# Patient Record
Sex: MALE | Race: WHITE | NOT HISPANIC OR LATINO | Employment: OTHER | URBAN - NONMETROPOLITAN AREA
[De-identification: names, ages, dates, MRNs, and addresses within clinical notes are randomized per-mention and may not be internally consistent; named-entity substitution may affect disease eponyms.]

---

## 2017-01-27 ENCOUNTER — OFFICE VISIT (OUTPATIENT)
Dept: FAMILY MEDICINE | Facility: CLINIC | Age: 77
End: 2017-01-27
Payer: COMMERCIAL

## 2017-01-27 VITALS
TEMPERATURE: 96.6 F | HEIGHT: 67 IN | DIASTOLIC BLOOD PRESSURE: 82 MMHG | WEIGHT: 207 LBS | HEART RATE: 68 BPM | RESPIRATION RATE: 18 BRPM | SYSTOLIC BLOOD PRESSURE: 130 MMHG | BODY MASS INDEX: 32.49 KG/M2 | OXYGEN SATURATION: 95 %

## 2017-01-27 DIAGNOSIS — G62.9 PERIPHERAL POLYNEUROPATHY: ICD-10-CM

## 2017-01-27 DIAGNOSIS — E11.9 TYPE 2 DIABETES MELLITUS WITHOUT COMPLICATION, WITHOUT LONG-TERM CURRENT USE OF INSULIN (H): ICD-10-CM

## 2017-01-27 DIAGNOSIS — M54.41 CHRONIC BILATERAL LOW BACK PAIN WITH BILATERAL SCIATICA: Primary | ICD-10-CM

## 2017-01-27 DIAGNOSIS — G89.29 CHRONIC BILATERAL LOW BACK PAIN WITH BILATERAL SCIATICA: Primary | ICD-10-CM

## 2017-01-27 DIAGNOSIS — Z71.89 ADVANCE CARE PLANNING: ICD-10-CM

## 2017-01-27 DIAGNOSIS — M54.42 CHRONIC BILATERAL LOW BACK PAIN WITH BILATERAL SCIATICA: Primary | ICD-10-CM

## 2017-01-27 PROCEDURE — 99214 OFFICE O/P EST MOD 30 MIN: CPT | Performed by: FAMILY MEDICINE

## 2017-01-27 NOTE — PATIENT INSTRUCTIONS
"  * Sciatica    Sciatica (\"Lumbar Radiculopathy\") causes a pain that spreads from the lower back down into the buttock, hip and leg. Sometimes leg pain can occur without any back pain. Sciatica is due to irritation or pressure on a spinal nerve as it comes out of the spinal canal. This is most often due to a bulge or rupture of a nearby spinal disk (the cartilage cushion between each spinal bone), which presses on a nearby nerve. Other causes include spinal stenosis (narrowing of the spinal canal) and spasm of the piriformis muscle (a muscle in the buttocks that the sciatic nerve passes through).  Sciatica may begin after a sudden twisting/bending force (such as in a car accident), or sometimes after a simple awkward movement. In either case, muscle spasm is commonly present and contributes to the pain.  The diagnosis of sciatica is made from the symptoms and physical exam. Unless you had a physical injury (such as a car accident or fall), X-rays are usually not ordered for the initial evaluation of sciatica because the nerves and disks cannot be seen on an X-ray. Most sciatica (80-90%) gets better with time.  What can I do about my low back pain?  There are three main things you can do to ease low back pain and help it go away.    Use heat or cold packs.    Take medicine as directed.    Use positions, movements and exercises. Stay active! Too much rest can make your symptoms worse.  Using heat or cold packs  Try cold packs or gentle heat to ease your pain. Use whichever gives the most relief. Apply the cold pack or heat for 15 minutes at a time, as often as needed.  Taking medicine  If taking over-the-counter medicine:    Take ibuprofen (Advil, Motrin) 600 mg. three times a day as needed for pain.  OR    Take Aleve (naproxen sodium) 220 to 440 mg. two times a day as needed for pain  If your doctor prescribed a muscle relaxant (cyclobenzapine 10 mg.):    Take one half ( ) to 1 tablet at bedtime    Do not drive when " taking this medicine. This drug may make you sleepy.  Using positions, movements and exercises  Research tells us that moving your joints and muscles can help you recover from back pain. Such activity should be simple and gentle.  Use the positions below as well as walking to help relieve your discomfort. Try taking a short walk every 3 to 4 hours during the day. Walk for a few minutes inside your home or take longer walks outside, on a treadmill or at a mall. Slowly increase the amount of time you walk. Expect discomfort when you begin, but it should lessen as your back starts to recover.  Finding a position that is comfortable  When your back pain is new, you may find that certain positions will ease your pain. Gently try each of the following positions until you find one that eases your pain. Once you find a position of comfort, use it as often as you like while you recover. Return to your daily routine as soon as possible.     Lie on your back with your legs bent. You can do this by placing a pillow under your knees or lie on the floor and rest your lower legs on the seat of a chair.    Lie on your side with your knees bent and place a pillow between your knees.    Lie on your stomach over pillows.  When should I call my doctor?  Your back pain should improve over the first couple of weeks. As it improves, you should be able to return to your normal activities. But call your doctor if:    You have a sudden change in your ability to control? your bladder or bowels.    You begin to feel tingling in your groin or legs.    The pain spreads down your leg and into your foot.    Your toes, feet or leg muscles begin to feel weak.    You feel generally unwell or sick.    Your pain gets worse.    7455-8819 [employer]. [address] [place] [address], [place] [address]. All rights reserved. This information is not intended as a substitute for professional medical care. Always follow your healthcare professional's  instructions.        Treating Peripheral Neuropathy  Peripheral neuropathy is a disease of the nerves. It most often starts in your feet and may also eventually affect the arms. It may cause pain or may make you unable to sense pain. Sometimes, weakness occurs as well. Lack of pain and weakness makes you more likely to injure yourself without knowing it.  Learn ways to protect your feet. Check your feet daily for wounds you may not have felt. Avoid burns by testing bath water with your elbow before stepping in. Also, always wear shoes to prevent injury.    Regular foot care  If you have foot numbness, you may not notice cutting yourself while trimming your nails. To prevent problems, your doctor may ask you to visit for nail and callus trimming. See your doctor for foot care as often as suggested.  Check your feet daily  Catch problems early by checking your feet every day for changes. Look at the top and bottom of your feet, your heels, and between your toes. It may help to use a mirror. If this is hard, ask someone to check for you. Call your doctor if you notice a wound, ulceration, ingrown nail, or any changes in your feet. This includes increased heat, swelling, and redness.  Wear proper footwear  Always wear shoes and socks, even indoors. Ask your doctor how to choose the right shoe. After buying shoes, bring them to your doctor to be checked for fit. Take new shoes off every hour or so to check for red pressure areas on your feet. Each time you put on your shoes, use your fingers first to feel inside for foreign objects.  Common causes of peripheral neuropathy  Some common causes of peripheral neuropathy include:    Diabetes or other endocrine disorders    Toxins (such as alcohol)    Nutritional deficiencies (such as Vitamin B-12)    Kidney disease    Injury    Repetitive stress (such as carpal tunnel syndrome)    Autoimmune disease    Cancer and tumors    Infection  Diagnosis and treatment  Diagnosis of  peripheral neuropathy includes a complete history and physical exam.  Lab tests including blood work and imaging often help determine the cause.  Special nerve tests are often helpful including nerve conduction velocity studies (NCV), and electromyelography (EMG).  Treatment focuses on treating the underlying disorder and treating symptoms through the use of medicines, injections, TENS (transcutaneous electrical nerve stimulation), acupuncture, massage, and other methods.    2180-7010 [employer]. [address] [place] [address], [place] [address]. All rights reserved. This information is not intended as a substitute for professional medical care. Always follow your healthcare professional's instructions.
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
## 2017-01-27 NOTE — MR AVS SNAPSHOT
"              After Visit Summary   1/27/2017    Ana Maria Duvall    MRN: [id number]           Patient Information     Date Of Birth          1940        Visit Information        Provider Department      1/27/2017 8:40 AM See Meng MD [facility]        Today's Diagnoses     Chronic bilateral low back pain with bilateral sciatica    -  1     Peripheral polyneuropathy (H)         Type 2 diabetes mellitus without complication, without long-term current use of insulin (H)         Advance care planning           Care Instructions      * Sciatica    Sciatica (\"Lumbar Radiculopathy\") causes a pain that spreads from the lower back down into the buttock, hip and leg. Sometimes leg pain can occur without any back pain. Sciatica is due to irritation or pressure on a spinal nerve as it comes out of the spinal canal. This is most often due to a bulge or rupture of a nearby spinal disk (the cartilage cushion between each spinal bone), which presses on a nearby nerve. Other causes include spinal stenosis (narrowing of the spinal canal) and spasm of the piriformis muscle (a muscle in the buttocks that the sciatic nerve passes through).  Sciatica may begin after a sudden twisting/bending force (such as in a car accident), or sometimes after a simple awkward movement. In either case, muscle spasm is commonly present and contributes to the pain.  The diagnosis of sciatica is made from the symptoms and physical exam. Unless you had a physical injury (such as a car accident or fall), X-rays are usually not ordered for the initial evaluation of sciatica because the nerves and disks cannot be seen on an X-ray. Most sciatica (80-90%) gets better with time.  What can I do about my low back pain?  There are three main things you can do to ease low back pain and help it go away.    Use heat or cold packs.    Take medicine as directed.    Use positions, movements and exercises. Stay active! Too much rest can make " your symptoms worse.  Using heat or cold packs  Try cold packs or gentle heat to ease your pain. Use whichever gives the most relief. Apply the cold pack or heat for 15 minutes at a time, as often as needed.  Taking medicine  If taking over-the-counter medicine:    Take ibuprofen (Advil, Motrin) 600 mg. three times a day as needed for pain.  OR    Take Aleve (naproxen sodium) 220 to 440 mg. two times a day as needed for pain  If your doctor prescribed a muscle relaxant (cyclobenzapine 10 mg.):    Take one half ( ) to 1 tablet at bedtime    Do not drive when taking this medicine. This drug may make you sleepy.  Using positions, movements and exercises  Research tells us that moving your joints and muscles can help you recover from back pain. Such activity should be simple and gentle.  Use the positions below as well as walking to help relieve your discomfort. Try taking a short walk every 3 to 4 hours during the day. Walk for a few minutes inside your home or take longer walks outside, on a treadmill or at a mall. Slowly increase the amount of time you walk. Expect discomfort when you begin, but it should lessen as your back starts to recover.  Finding a position that is comfortable  When your back pain is new, you may find that certain positions will ease your pain. Gently try each of the following positions until you find one that eases your pain. Once you find a position of comfort, use it as often as you like while you recover. Return to your daily routine as soon as possible.     Lie on your back with your legs bent. You can do this by placing a pillow under your knees or lie on the floor and rest your lower legs on the seat of a chair.    Lie on your side with your knees bent and place a pillow between your knees.    Lie on your stomach over pillows.  When should I call my doctor?  Your back pain should improve over the first couple of weeks. As it improves, you should be able to return to your normal  activities. But call your doctor if:    You have a sudden change in your ability to control? your bladder or bowels.    You begin to feel tingling in your groin or legs.    The pain spreads down your leg and into your foot.    Your toes, feet or leg muscles begin to feel weak.    You feel generally unwell or sick.    Your pain gets worse.    3424-4623 [employer]. [address] [place] [address], [place] [address]. All rights reserved. This information is not intended as a substitute for professional medical care. Always follow your healthcare professional's instructions.        Treating Peripheral Neuropathy  Peripheral neuropathy is a disease of the nerves. It most often starts in your feet and may also eventually affect the arms. It may cause pain or may make you unable to sense pain. Sometimes, weakness occurs as well. Lack of pain and weakness makes you more likely to injure yourself without knowing it.  Learn ways to protect your feet. Check your feet daily for wounds you may not have felt. Avoid burns by testing bath water with your elbow before stepping in. Also, always wear shoes to prevent injury.    Regular foot care  If you have foot numbness, you may not notice cutting yourself while trimming your nails. To prevent problems, your doctor may ask you to visit for nail and callus trimming. See your doctor for foot care as often as suggested.  Check your feet daily  Catch problems early by checking your feet every day for changes. Look at the top and bottom of your feet, your heels, and between your toes. It may help to use a mirror. If this is hard, ask someone to check for you. Call your doctor if you notice a wound, ulceration, ingrown nail, or any changes in your feet. This includes increased heat, swelling, and redness.  Wear proper footwear  Always wear shoes and socks, even indoors. Ask your doctor how to choose the right shoe. After buying shoes, bring them to your doctor to be checked for fit.  Take new shoes off every hour or so to check for red pressure areas on your feet. Each time you put on your shoes, use your fingers first to feel inside for foreign objects.  Common causes of peripheral neuropathy  Some common causes of peripheral neuropathy include:    Diabetes or other endocrine disorders    Toxins (such as alcohol)    Nutritional deficiencies (such as Vitamin B-12)    Kidney disease    Injury    Repetitive stress (such as carpal tunnel syndrome)    Autoimmune disease    Cancer and tumors    Infection  Diagnosis and treatment  Diagnosis of peripheral neuropathy includes a complete history and physical exam.  Lab tests including blood work and imaging often help determine the cause.  Special nerve tests are often helpful including nerve conduction velocity studies (NCV), and electromyelography (EMG).  Treatment focuses on treating the underlying disorder and treating symptoms through the use of medicines, injections, TENS (transcutaneous electrical nerve stimulation), acupuncture, massage, and other methods.    0886-2818 [employer]. [address] [place] [address]. All rights reserved. This information is not intended as a substitute for professional medical care. Always follow your healthcare professional's instructions.              Follow-ups after your visit        Future tests that were ordered for you today     Open Future Orders        Priority Expected Expires Ordered    MR Lumbar Spine w/o Contrast Routine  1/27/2018 1/27/2017            Who to contact     If you have questions or need follow up information about today's clinic visit or your schedule please contact [facility] directly at [phone].  Normal or non-critical lab and imaging results will be communicated to you by MyChart, letter or phone within 4 business days after the clinic has received the results. If you do not hear from us within 7 days, please contact the clinic through  "[employer] or phone. If you have a critical or abnormal lab result, we will notify you by phone as soon as possible.  Submit refill requests through [employer] or call your pharmacy and they will forward the refill request to us. Please allow 3 business days for your refill to be completed.          Additional Information About Your Visit        [employer] Information     [employer] lets you send messages to your doctor, view your test results, renew your prescriptions, schedule appointments and more. To sign up, go to [email or web address] . Click on \"Log in\" on the left side of the screen, which will take you to the Welcome page. Then click on \"Sign up Now\" on the right side of the page.     You will be asked to enter the access code listed below, as well as some personal information. Please follow the directions to create your username and password.     Your access code is: [email or web address]  Expires: 3/30/2017  8:03 PM     Your access code will  in 90 days. If you need help or a new code, please call your [facility] clinic or [phone].        Care EveryWhere ID     This is your Care EveryWhere ID. This could be used by other organizations to access your [facility] medical records  [id number]        Your Vitals Were     Pulse Temperature Respirations Height BMI (Body Mass Index) Pulse Oximetry    68 96.6  F (35.9  C) (Tympanic) 18 5' 7\" (1.702 m) 32.41 kg/m2 95%       Blood Pressure from Last 3 Encounters:   17 130/82   16 156/92   10/11/16 130/78    Weight from Last 3 Encounters:   17 207 lb (93.895 kg)   16 200 lb (90.719 kg)   10/11/16 206 lb 6.4 oz (93.622 kg)               Primary Care Provider Office Phone # Fax #    Ravinder Plunkett [phone]       [address] [address]        Thank you!     Thank you for choosing [facility]  for your care. Our goal is always to provide you with excellent care. " Hearing back from our patients is one way we can continue to improve our services. Please take a few minutes to complete the written survey that you may receive in the mail after your visit with us. Thank you!             Your Updated Medication List - Protect others around you: Learn how to safely use, store and throw away your medicines at www.disposemymeds.org.          This list is accurate as of: 1/27/17  9:06 AM.  Always use your most recent med list.                   Brand Name Dispense Instructions for use    ACE NOT PRESCRIBED (INTENTIONAL)     0 each    ACE Inhibitor not prescribed due to Intolerance       aspirin 81 MG tablet      1 TABLET DAILY       blood glucose monitoring lancets     1 Box    Use to test blood sugars 1 times daily or as directed.       blood glucose monitoring test strip    ONE TOUCH ULTRA    100 each    Use to test blood sugars 1 times daily or as directed.       brimonidine 0.2 % ophthalmic solution    ALPHAGAN    1 Bottle    Place 1 drop into both eyes 2 times daily       CALCIUM + D PO      1 TABLET DAILY       flax seed oil 1000 MG capsule      1 CAPSULE DAILY       ibuprofen 200 MG tablet    ADVIL/MOTRIN     Take 200 mg by mouth every 4 hours as needed       metFORMIN 500 MG tablet    GLUCOPHAGE    180 tablet    Take one tab at bkfst and one at supper       Multi-vitamin Tabs tablet   Generic drug:  multivitamin, therapeutic with minerals      DAILY       order for DME     1 Device    Equipment being ordered: cam walker       Saw Palmetto 1000 MG Caps      2 CAPSULES DAILY       STATIN NOT PRESCRIBED (INTENTIONAL)      Statin not prescribed intentionally due to Intolerance       TYLENOL 500 MG tablet   Generic drug:  acetaminophen      Take 1,000 mg by mouth every 6 hours as needed       VITAMIN D3 PO      Take by mouth daily

## 2017-02-03 ENCOUNTER — HOSPITAL ENCOUNTER (OUTPATIENT)
Dept: MRI IMAGING | Facility: CLINIC | Age: 77
Discharge: HOME | End: 2017-02-03
Attending: FAMILY MEDICINE | Admitting: FAMILY MEDICINE
Payer: COMMERCIAL

## 2017-02-03 ENCOUNTER — DOCUMENTATION ONLY (OUTPATIENT)
Dept: URGENT CARE | Facility: URGENT CARE | Age: 77
End: 2017-02-03

## 2017-02-03 DIAGNOSIS — M54.41 CHRONIC BILATERAL LOW BACK PAIN WITH BILATERAL SCIATICA: ICD-10-CM

## 2017-02-03 DIAGNOSIS — G89.29 CHRONIC BILATERAL LOW BACK PAIN WITH SCIATICA, SCIATICA LATERALITY UNSPECIFIED: Primary | ICD-10-CM

## 2017-02-03 DIAGNOSIS — M54.40 CHRONIC BILATERAL LOW BACK PAIN WITH SCIATICA, SCIATICA LATERALITY UNSPECIFIED: Primary | ICD-10-CM

## 2017-02-03 DIAGNOSIS — M54.42 CHRONIC BILATERAL LOW BACK PAIN WITH BILATERAL SCIATICA: ICD-10-CM

## 2017-02-03 DIAGNOSIS — G89.29 CHRONIC BILATERAL LOW BACK PAIN WITH BILATERAL SCIATICA: ICD-10-CM

## 2017-02-03 LAB
CREAT BLD-MCNC: 0.8 MG/DL (ref 0.66–1.25)
GFR SERPL CREATININE-BSD FRML MDRD: >90 ML/MIN/1.7M2

## 2017-02-03 PROCEDURE — 25500064 ZZH RX 255 OP 636: Performed by: FAMILY MEDICINE

## 2017-02-03 PROCEDURE — 82565 ASSAY OF CREATININE: CPT

## 2017-02-03 PROCEDURE — A9585 GADOBUTROL INJECTION: HCPCS | Performed by: FAMILY MEDICINE

## 2017-02-03 PROCEDURE — 72158 MRI LUMBAR SPINE W/O & W/DYE: CPT

## 2017-02-03 RX ORDER — GADOBUTROL 604.72 MG/ML
7 INJECTION INTRAVENOUS ONCE
Status: COMPLETED | OUTPATIENT
Start: 2017-02-03 | End: 2017-02-03

## 2017-02-03 RX ADMIN — GADOBUTROL 7 ML: 604.72 INJECTION INTRAVENOUS at 10:38

## 2017-02-04 NOTE — PROGRESS NOTES
Patient has been informed of MRI lumbar spine results, consisted with multilevel foraminal stenosis and mild progressive degenerative changes at L2-L3 level. Orthopedic referral placed for further recommendation. All questions answered.      Results for orders placed or performed during the hospital encounter of 02/03/17   MR Lumbar Spine w/o & w Contrast    Narrative    MRI LUMBAR SPINE WITHOUT AND WITH CONTRAST   2/3/2017 11:05 AM     HISTORY: Low back pain. Multiple previous surgeries.    TECHNIQUE: Multiplanar multisequence MRI of the lumbar spine without  and with 7 mL Gadavist.    COMPARISON: MRI from 9/6/2013.    FINDINGS: The report is dictated assuming five lumbar-type vertebral  bodies, and radiographic correlation may be necessary.  Sagittal  images demonstrate normal vertebral body height.  Bone marrow signal  is unremarkable. Tip of the conus medullaris and cauda equina are  normal.    T12-L1:  No disc herniation or stenosis.  Facet joints are  unremarkable.    L1-L2:  Mild disc bulge and facet hypertrophy. Mild central stenosis.  Mild-to-moderate right and mild left foraminal stenosis. No  significant change.    L2-L3:  Broad-based disc bulge. Mild facet hypertrophy. Moderate  central stenosis. Moderate left and mild-to-moderate right foraminal  stenosis. Very mild progression of degenerative changes at this level.    L3-L4:  Broad-based disc bulge and left greater than right facet  hypertrophy. Mild central stenosis. Severe left and moderate-to-severe  right foraminal stenosis. No significant change.    L4-L5:  Previous posterior decompression at this level and probably  posterior fusion. No disc herniation or stenosis.    L5-S1:  Evidence of left laminotomy at this level. Broad-based disc  bulge. No central stenosis. Moderate to severe, if not severe, left  foraminal stenosis. Moderate right foraminal stenosis.    Paraspinous soft tissues:  Unremarkable.      Impression    IMPRESSION:    1. At L1-L2  there is mild central stenosis. Mild-to-moderate right and  mild left foraminal stenosis.  2. At L2-L3 there is moderate central and left foraminal stenosis.  Mild-to-moderate right foraminal stenosis.  3. At L3-L4 there is mild central stenosis. Severe left and  moderate-to-severe right foraminal stenosis.  4. At L5-S1 there is moderate-to-severe, if not severe, left foraminal  stenosis. Moderate right foraminal stenosis.  5. Findings are similar to the prior study with perhaps mild  progression of degenerative changes at the L2-L3 level.    AMILCAR HERNÁNDEZ MD   Creatinine POCT   Result Value Ref Range    Creatinine 0.8 0.66 - 1.25 mg/dL    GFR Estimate >90 >60 mL/min/1.7m2    GFR Estimate If Black >90 >60 mL/min/1.7m2         See Meng MD  [facility]

## 2017-02-10 ENCOUNTER — OFFICE VISIT (OUTPATIENT)
Dept: FAMILY MEDICINE | Facility: CLINIC | Age: 77
End: 2017-02-10
Payer: COMMERCIAL

## 2017-02-10 VITALS
TEMPERATURE: 97.1 F | WEIGHT: 209 LBS | SYSTOLIC BLOOD PRESSURE: 142 MMHG | HEIGHT: 67 IN | BODY MASS INDEX: 32.8 KG/M2 | HEART RATE: 68 BPM | DIASTOLIC BLOOD PRESSURE: 80 MMHG | RESPIRATION RATE: 18 BRPM

## 2017-02-10 DIAGNOSIS — K57.32 DIVERTICULITIS OF LARGE INTESTINE WITHOUT PERFORATION OR ABSCESS WITHOUT BLEEDING: Primary | ICD-10-CM

## 2017-02-10 DIAGNOSIS — R10.32 ABDOMINAL PAIN, LEFT LOWER QUADRANT: ICD-10-CM

## 2017-02-10 PROCEDURE — 99213 OFFICE O/P EST LOW 20 MIN: CPT | Performed by: FAMILY MEDICINE

## 2017-02-10 RX ORDER — METRONIDAZOLE 500 MG/1
500 TABLET ORAL 3 TIMES DAILY
Qty: 21 TABLET | Refills: 0 | Status: SHIPPED | OUTPATIENT
Start: 2017-02-10 | End: 2017-02-17

## 2017-02-10 RX ORDER — CIPROFLOXACIN 500 MG/1
500 TABLET, FILM COATED ORAL 2 TIMES DAILY
Qty: 14 TABLET | Refills: 0 | Status: SHIPPED | OUTPATIENT
Start: 2017-02-10 | End: 2017-04-21

## 2017-02-10 NOTE — PATIENT INSTRUCTIONS
Diverticulitis    Some people get pouches along the wall of the colon as they get older. The pouches, called diverticuli, usually cause no symptoms. If the pouches become blocked, you can get an infection. This infection is called diverticulitis. It causes pain in your lower abdomen and fever. If not treated, it can become a serious condition, causing an abscess to form inside the pouch. The abscess may block the intestinal tract even or rupture, spreading infection throughout the abdomen.  When treatment is started early, oral antibiotics alone may be enough to cure diverticulitis. This method is tried first. But, if you don't improve or if your condition gets worse while you are trying oral antibiotics, you may need to be admitted to the hospital for IV antibiotics. Severe cases may require surgery.  Home care  The following guidelines will help you care for yourself at home:    During the acute illness, rest and follow a low-fiber diet. Include foods like:    Flake cereal, mashed potatoes, pancakes, waffles, pasta, white bread, rice, applesauce, bananas, eggs, meat, fish, poultry, tofu, and cooked vegetables    Take antibiotics exactly as the doctor says. Don't miss any doses or stop taking the medication, even if you feel better.    Monitor your temperature and report any rising temperatures to your doctor.  Preventing future attacks  Once you have had an episode of diverticulitis, you are at risk for having it again. After you have recovered from this episode, you may be able to reduce your risk by eating a high-fiber diet (20-35 gm/day of fiber). This cleans out the colon pouches that already exist and prevents new ones from forming. Foods high in fiber include fresh fruits and edible peelings, raw or lightly cooked vegetables, whole grain cereals and breads, dried beans and peas, and bran.  Other steps that can help prevent future attacks include:    Take your medications, such as antibiotics, as the doctor  says.    Drink 6 to 8 glasses of water every day, unless directed otherwise.    Use a heating pad or hot water bottle to reduce abdominal cramping or pain.    Begin an exercise program. Ask your doctor how to get started. You can benefit from simple activities such as walking or gardening.    Treat diarrhea with a bland diet. Start with liquids only; then slowly add fiber over time.    Watch for changes in your bowel movements (constipation to diarrhea).    Get plenty of rest and sleep.  Follow-up care  Follow up with your doctor as advised or sooner if you are not getting better in the next 2 days.  When to seek medical care  Get prompt medical attention if any of the following occur:    Fever of 100.4 F (38 C) or higher, or as directed by your health care provider    Repeated vomiting or swelling of the abdomen    Weakness, dizziness, light-headedness    Pain in your abdomen that gets worse, severe, or spreads to your back    Pain that moves to the right lower abdomen    Rectal bleeding (stools that are red, black or maroon color)    Unexpected vaginal bleeding    2896-3986 [employer]. [address] [place] [address]. All rights reserved. This information is not intended as a substitute for professional medical care. Always follow your healthcare professional's instructions.        Patient Education    Ciprofloxacin Hydrochloride Ophthalmic drops, solution    Ciprofloxacin Hydrochloride Ophthalmic ointment    Ciprofloxacin Hydrochloride Oral tablet    Ciprofloxacin Hydrochloride Oral tablet, extended-release    Ciprofloxacin Hydrochloride Otic drops, solution    Ciprofloxacin Oral suspension    Ciprofloxacin Solution for injection    Ciprofloxacin, Dextrose Solution for injection  Ciprofloxacin Hydrochloride Oral tablet  What is this medicine?  CIPROFLOXACIN (sip ana laura FLOX a sin) is a quinolone antibiotic. It is used to treat certain kinds of bacterial infections. It will not work for  colds, flu, or other viral infections.  This medicine may be used for other purposes; ask your health care provider or pharmacist if you have questions.  What should I tell my health care provider before I take this medicine?  They need to know if you have any of these conditions:    bone problems    cerebral disease    joint problems    irregular heartbeat    kidney disease    liver disease    myasthenia gravis    seizure disorder    tendon problems    an unusual or allergic reaction to ciprofloxacin, other antibiotics or medicines, foods, dyes, or preservatives    pregnant or trying to get pregnant    breast-feeding  How should I use this medicine?  Take this medicine by mouth with a glass of water. Follow the directions on the prescription label. Take your medicine at regular intervals. Do not take your medicine more often than directed. Take all of your medicine as directed even if you think your are better. Do not skip doses or stop your medicine early.  You can take this medicine with food or on an empty stomach. It can be taken with a meal that contains dairy or calcium, but do not take it alone with a dairy product, like milk or yogurt or calcium-fortified juice.  A special MedGuide will be given to you by the pharmacist with each prescription and refill. Be sure to read this information carefully each time.  Talk to your pediatrician regarding the use of this medicine in children. Special care may be needed.  Overdosage: If you think you have taken too much of this medicine contact a poison control center or emergency room at once.  NOTE: This medicine is only for you. Do not share this medicine with others.  What if I miss a dose?  If you miss a dose, take it as soon as you can. If it is almost time for your next dose, take only that dose. Do not take double or extra doses.  What may interact with this medicine?  Do not take this medicine with any of the following  medications:    cisapride    droperidol    terfenadine    tizanidine  This medicine may also interact with the following medications:    antacids    birth control pills    caffeine    cyclosporin    didanosine (ddI) buffered tablets or powder    medicines for diabetes    medicines for inflammation like ibuprofen, naproxen    methotrexate    multivitamins    omeprazole    phenytoin    probenecid    sucralfate    theophylline    warfarin  This list may not describe all possible interactions. Give your health care provider a list of all the medicines, herbs, non-prescription drugs, or dietary supplements you use. Also tell them if you smoke, drink alcohol, or use illegal drugs. Some items may interact with your medicine.  What should I watch for while using this medicine?  Tell your doctor or health care professional if your symptoms do not improve.  Do not treat diarrhea with over the counter products. Contact your doctor if you have diarrhea that lasts more than 2 days or if it is severe and watery.  You may get drowsy or dizzy. Do not drive, use machinery, or do anything that needs mental alertness until you know how this medicine affects you. Do not stand or sit up quickly, especially if you are an older patient. This reduces the risk of dizzy or fainting spells.  This medicine can make you more sensitive to the sun. Keep out of the sun. If you cannot avoid being in the sun, wear protective clothing and use sunscreen. Do not use sun lamps or tanning beds/booths.  Avoid antacids, aluminum, calcium, iron, magnesium, and zinc products for 6 hours before and 2 hours after taking a dose of this medicine.  What side effects may I notice from receiving this medicine?  Side effects that you should report to your doctor or health care professional as soon as possible:    allergic reactions like skin rash, itching or hives, swelling of the face, lips, or tongue    breathing problems    confusion, nightmares or  hallucinations    feeling faint or lightheaded, falls    irregular heartbeat    joint, muscle or tendon pain or swelling    pain or trouble passing urine    persistent headache with or without blurred vision    redness, blistering, peeling or loosening of the skin, including inside the mouth    seizure    unusual pain, numbness, tingling, or weakness  Side effects that usually do not require medical attention (report to your doctor or health care professional if they continue or are bothersome):    diarrhea    nausea or stomach upset    white patches or sores in the mouth  This list may not describe all possible side effects. Call your doctor for medical advice about side effects. You may report side effects to FDA at 5-605-FDA-8214.  Where should I keep my medicine?  Keep out of the reach of children.  Store at room temperature below 30 degrees C (86 degrees F). Keep container tightly closed. Throw away any unused medicine after the expiration date.  NOTE:This sheet is a summary. It may not cover all possible information. If you have questions about this medicine, talk to your doctor, pharmacist, or health care provider. Copyright  2016 [employer]        Patient Education    Butylparaben, Cetyl Alcohol, Methylparaben, Propylene Glycol, Propylparaben, Sodium Lauryl Sulfate, Stearyl Alcohol, Water Topical emulsion, Metronidazole Topical gel    Metronidazole Oral capsule    Metronidazole Oral tablet    Metronidazole Oral tablet, extended-release    Metronidazole Solution for injection    Metronidazole Topical cream    Metronidazole Topical gel    Metronidazole Topical lotion    Metronidazole Vaginal gel  Metronidazole Oral tablet  What is this medicine?  METRONIDAZOLE (me troe NI da zole) is an antiinfective. It is used to treat certain kinds of bacterial and protozoal infections. It will not work for colds, flu, or other viral infections.  This medicine may be used for other purposes; ask your health care provider  or pharmacist if you have questions.  What should I tell my health care provider before I take this medicine?  They need to know if you have any of these conditions:    anemia or other blood disorders    disease of the nervous system    fungal or yeast infection    if you drink alcohol containing drinks    liver disease    seizures    an unusual or allergic reaction to metronidazole, or other medicines, foods, dyes, or preservatives    pregnant or trying to get pregnant    breast-feeding  How should I use this medicine?  Take this medicine by mouth with a full glass of water. Follow the directions on the prescription label. Take your medicine at regular intervals. Do not take your medicine more often than directed. Take all of your medicine as directed even if you think you are better. Do not skip doses or stop your medicine early.  Talk to your pediatrician regarding the use of this medicine in children. Special care may be needed.  Overdosage: If you think you have taken too much of this medicine contact a poison control center or emergency room at once.  NOTE: This medicine is only for you. Do not share this medicine with others.  What if I miss a dose?  If you miss a dose, take it as soon as you can. If it is almost time for your next dose, take only that dose. Do not take double or extra doses.  What may interact with this medicine?  Do not take this medicine with any of the following medications:    alcohol or any product that contains alcohol    amprenavir oral solution    cisapride    disulfiram    dofetilide    dronedarone    paclitaxel injection    pimozide    ritonavir oral solution    sertraline oral solution    sulfamethoxazole-trimethoprim injection    thioridazine    ziprasidone  This medicine may also interact with the following medications:    birth control pills    cimetidine    lithium    other medicines that prolong the QT interval (cause an abnormal heart  rhythm)    phenobarbital    phenytoin    warfarin  This list may not describe all possible interactions. Give your health care provider a list of all the medicines, herbs, non-prescription drugs, or dietary supplements you use. Also tell them if you smoke, drink alcohol, or use illegal drugs. Some items may interact with your medicine.  What should I watch for while using this medicine?  Tell your doctor or health care professional if your symptoms do not improve or if they get worse.  You may get drowsy or dizzy. Do not drive, use machinery, or do anything that needs mental alertness until you know how this medicine affects you. Do not stand or sit up quickly, especially if you are an older patient. This reduces the risk of dizzy or fainting spells.  Avoid alcoholic drinks while you are taking this medicine and for three days afterward. Alcohol may make you feel dizzy, sick, or flushed.  If you are being treated for a sexually transmitted disease, avoid sexual contact until you have finished your treatment. Your sexual partner may also need treatment.  What side effects may I notice from receiving this medicine?  Side effects that you should report to your doctor or health care professional as soon as possible:    allergic reactions like skin rash or hives, swelling of the face, lips, or tongue    confusion, clumsiness    difficulty speaking    discolored or sore mouth    dizziness    fever, infection    numbness, tingling, pain or weakness in the hands or feet    trouble passing urine or change in the amount of urine    redness, blistering, peeling or loosening of the skin, including inside the mouth    seizures    unusually weak or tired    vaginal irritation, dryness, or discharge  Side effects that usually do not require medical attention (report to your doctor or health care professional if they continue or are bothersome):    diarrhea    headache    irritability    metallic taste    nausea    stomach pain or  cramps    trouble sleeping  This list may not describe all possible side effects. Call your doctor for medical advice about side effects. You may report side effects to FDA at 5-264-HUC-4737.  Where should I keep my medicine?  Keep out of the reach of children.  Store at room temperature below 25 degrees C (77 degrees F). Protect from light. Keep container tightly closed. Throw away any unused medicine after the expiration date.  NOTE:This sheet is a summary. It may not cover all possible information. If you have questions about this medicine, talk to your doctor, pharmacist, or health care provider. Copyright  2016 [employer]
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
## 2017-02-10 NOTE — NURSING NOTE
"Chief Complaint   Patient presents with     Abdominal Pain       Initial /80 mmHg  Pulse 68  Temp(Src) 97.1  F (36.2  C) (Tympanic)  Resp 18  Ht 5' 7\" (1.702 m)  Wt 209 lb (94.802 kg)  BMI 32.73 kg/m2 Estimated body mass index is 32.73 kg/(m^2) as calculated from the following:    Height as of this encounter: 5' 7\" (1.702 m).    Weight as of this encounter: 209 lb (94.802 kg).  Medication Reconciliation: complete  "

## 2017-02-10 NOTE — MR AVS SNAPSHOT
After Visit Summary   2/10/2017    Ana Maria Duvall    MRN: [id number]           Patient Information     Date Of Birth          1940        Visit Information        Provider Department      2/10/2017 10:40 AM See Meng MD [facility]        Today's Diagnoses     Diverticulitis of large intestine without perforation or abscess without bleeding    -  1       Care Instructions      Diverticulitis    Some people get pouches along the wall of the colon as they get older. The pouches, called diverticuli, usually cause no symptoms. If the pouches become blocked, you can get an infection. This infection is called diverticulitis. It causes pain in your lower abdomen and fever. If not treated, it can become a serious condition, causing an abscess to form inside the pouch. The abscess may block the intestinal tract even or rupture, spreading infection throughout the abdomen.  When treatment is started early, oral antibiotics alone may be enough to cure diverticulitis. This method is tried first. But, if you don't improve or if your condition gets worse while you are trying oral antibiotics, you may need to be admitted to the hospital for IV antibiotics. Severe cases may require surgery.  Home care  The following guidelines will help you care for yourself at home:    During the acute illness, rest and follow a low-fiber diet. Include foods like:    Flake cereal, mashed potatoes, pancakes, waffles, pasta, white bread, rice, applesauce, bananas, eggs, meat, fish, poultry, tofu, and cooked vegetables    Take antibiotics exactly as the doctor says. Don't miss any doses or stop taking the medication, even if you feel better.    Monitor your temperature and report any rising temperatures to your doctor.  Preventing future attacks  Once you have had an episode of diverticulitis, you are at risk for having it again. After you have recovered from this episode, you may be able to reduce your  risk by eating a high-fiber diet (20-35 gm/day of fiber). This cleans out the colon pouches that already exist and prevents new ones from forming. Foods high in fiber include fresh fruits and edible peelings, raw or lightly cooked vegetables, whole grain cereals and breads, dried beans and peas, and bran.  Other steps that can help prevent future attacks include:    Take your medications, such as antibiotics, as the doctor says.    Drink 6 to 8 glasses of water every day, unless directed otherwise.    Use a heating pad or hot water bottle to reduce abdominal cramping or pain.    Begin an exercise program. Ask your doctor how to get started. You can benefit from simple activities such as walking or gardening.    Treat diarrhea with a bland diet. Start with liquids only; then slowly add fiber over time.    Watch for changes in your bowel movements (constipation to diarrhea).    Get plenty of rest and sleep.  Follow-up care  Follow up with your doctor as advised or sooner if you are not getting better in the next 2 days.  When to seek medical care  Get prompt medical attention if any of the following occur:    Fever of 100.4 F (38 C) or higher, or as directed by your health care provider    Repeated vomiting or swelling of the abdomen    Weakness, dizziness, light-headedness    Pain in your abdomen that gets worse, severe, or spreads to your back    Pain that moves to the right lower abdomen    Rectal bleeding (stools that are red, black or maroon color)    Unexpected vaginal bleeding    5967-0953 [employer]. [address] [place] [address]. All rights reserved. This information is not intended as a substitute for professional medical care. Always follow your healthcare professional's instructions.        Patient Education    Ciprofloxacin Hydrochloride Ophthalmic drops, solution    Ciprofloxacin Hydrochloride Ophthalmic ointment    Ciprofloxacin Hydrochloride Oral tablet    Ciprofloxacin  Hydrochloride Oral tablet, extended-release    Ciprofloxacin Hydrochloride Otic drops, solution    Ciprofloxacin Oral suspension    Ciprofloxacin Solution for injection    Ciprofloxacin, Dextrose Solution for injection  Ciprofloxacin Hydrochloride Oral tablet  What is this medicine?  CIPROFLOXACIN (sip ana laura FLOX a sin) is a quinolone antibiotic. It is used to treat certain kinds of bacterial infections. It will not work for colds, flu, or other viral infections.  This medicine may be used for other purposes; ask your health care provider or pharmacist if you have questions.  What should I tell my health care provider before I take this medicine?  They need to know if you have any of these conditions:    bone problems    cerebral disease    joint problems    irregular heartbeat    kidney disease    liver disease    myasthenia gravis    seizure disorder    tendon problems    an unusual or allergic reaction to ciprofloxacin, other antibiotics or medicines, foods, dyes, or preservatives    pregnant or trying to get pregnant    breast-feeding  How should I use this medicine?  Take this medicine by mouth with a glass of water. Follow the directions on the prescription label. Take your medicine at regular intervals. Do not take your medicine more often than directed. Take all of your medicine as directed even if you think your are better. Do not skip doses or stop your medicine early.  You can take this medicine with food or on an empty stomach. It can be taken with a meal that contains dairy or calcium, but do not take it alone with a dairy product, like milk or yogurt or calcium-fortified juice.  A special MedGuide will be given to you by the pharmacist with each prescription and refill. Be sure to read this information carefully each time.  Talk to your pediatrician regarding the use of this medicine in children. Special care may be needed.  Overdosage: If you think you have taken too much of this medicine contact a  poison control center or emergency room at once.  NOTE: This medicine is only for you. Do not share this medicine with others.  What if I miss a dose?  If you miss a dose, take it as soon as you can. If it is almost time for your next dose, take only that dose. Do not take double or extra doses.  What may interact with this medicine?  Do not take this medicine with any of the following medications:    cisapride    droperidol    terfenadine    tizanidine  This medicine may also interact with the following medications:    antacids    birth control pills    caffeine    cyclosporin    didanosine (ddI) buffered tablets or powder    medicines for diabetes    medicines for inflammation like ibuprofen, naproxen    methotrexate    multivitamins    omeprazole    phenytoin    probenecid    sucralfate    theophylline    warfarin  This list may not describe all possible interactions. Give your health care provider a list of all the medicines, herbs, non-prescription drugs, or dietary supplements you use. Also tell them if you smoke, drink alcohol, or use illegal drugs. Some items may interact with your medicine.  What should I watch for while using this medicine?  Tell your doctor or health care professional if your symptoms do not improve.  Do not treat diarrhea with over the counter products. Contact your doctor if you have diarrhea that lasts more than 2 days or if it is severe and watery.  You may get drowsy or dizzy. Do not drive, use machinery, or do anything that needs mental alertness until you know how this medicine affects you. Do not stand or sit up quickly, especially if you are an older patient. This reduces the risk of dizzy or fainting spells.  This medicine can make you more sensitive to the sun. Keep out of the sun. If you cannot avoid being in the sun, wear protective clothing and use sunscreen. Do not use sun lamps or tanning beds/booths.  Avoid antacids, aluminum, calcium, iron, magnesium, and zinc products  for 6 hours before and 2 hours after taking a dose of this medicine.  What side effects may I notice from receiving this medicine?  Side effects that you should report to your doctor or health care professional as soon as possible:    allergic reactions like skin rash, itching or hives, swelling of the face, lips, or tongue    breathing problems    confusion, nightmares or hallucinations    feeling faint or lightheaded, falls    irregular heartbeat    joint, muscle or tendon pain or swelling    pain or trouble passing urine    persistent headache with or without blurred vision    redness, blistering, peeling or loosening of the skin, including inside the mouth    seizure    unusual pain, numbness, tingling, or weakness  Side effects that usually do not require medical attention (report to your doctor or health care professional if they continue or are bothersome):    diarrhea    nausea or stomach upset    white patches or sores in the mouth  This list may not describe all possible side effects. Call your doctor for medical advice about side effects. You may report side effects to FDA at 2-363-FDA-9623.  Where should I keep my medicine?  Keep out of the reach of children.  Store at room temperature below 30 degrees C (86 degrees F). Keep container tightly closed. Throw away any unused medicine after the expiration date.  NOTE:This sheet is a summary. It may not cover all possible information. If you have questions about this medicine, talk to your doctor, pharmacist, or health care provider. Copyright  2016 [employer]        Patient Education    Butylparaben, Cetyl Alcohol, Methylparaben, Propylene Glycol, Propylparaben, Sodium Lauryl Sulfate, Stearyl Alcohol, Water Topical emulsion, Metronidazole Topical gel    Metronidazole Oral capsule    Metronidazole Oral tablet    Metronidazole Oral tablet, extended-release    Metronidazole Solution for injection    Metronidazole Topical cream    Metronidazole Topical  gel    Metronidazole Topical lotion    Metronidazole Vaginal gel  Metronidazole Oral tablet  What is this medicine?  METRONIDAZOLE (me troe NI da zole) is an antiinfective. It is used to treat certain kinds of bacterial and protozoal infections. It will not work for colds, flu, or other viral infections.  This medicine may be used for other purposes; ask your health care provider or pharmacist if you have questions.  What should I tell my health care provider before I take this medicine?  They need to know if you have any of these conditions:    anemia or other blood disorders    disease of the nervous system    fungal or yeast infection    if you drink alcohol containing drinks    liver disease    seizures    an unusual or allergic reaction to metronidazole, or other medicines, foods, dyes, or preservatives    pregnant or trying to get pregnant    breast-feeding  How should I use this medicine?  Take this medicine by mouth with a full glass of water. Follow the directions on the prescription label. Take your medicine at regular intervals. Do not take your medicine more often than directed. Take all of your medicine as directed even if you think you are better. Do not skip doses or stop your medicine early.  Talk to your pediatrician regarding the use of this medicine in children. Special care may be needed.  Overdosage: If you think you have taken too much of this medicine contact a poison control center or emergency room at once.  NOTE: This medicine is only for you. Do not share this medicine with others.  What if I miss a dose?  If you miss a dose, take it as soon as you can. If it is almost time for your next dose, take only that dose. Do not take double or extra doses.  What may interact with this medicine?  Do not take this medicine with any of the following medications:    alcohol or any product that contains alcohol    amprenavir oral  solution    cisapride    disulfiram    dofetilide    dronedarone    paclitaxel injection    pimozide    ritonavir oral solution    sertraline oral solution    sulfamethoxazole-trimethoprim injection    thioridazine    ziprasidone  This medicine may also interact with the following medications:    birth control pills    cimetidine    lithium    other medicines that prolong the QT interval (cause an abnormal heart rhythm)    phenobarbital    phenytoin    warfarin  This list may not describe all possible interactions. Give your health care provider a list of all the medicines, herbs, non-prescription drugs, or dietary supplements you use. Also tell them if you smoke, drink alcohol, or use illegal drugs. Some items may interact with your medicine.  What should I watch for while using this medicine?  Tell your doctor or health care professional if your symptoms do not improve or if they get worse.  You may get drowsy or dizzy. Do not drive, use machinery, or do anything that needs mental alertness until you know how this medicine affects you. Do not stand or sit up quickly, especially if you are an older patient. This reduces the risk of dizzy or fainting spells.  Avoid alcoholic drinks while you are taking this medicine and for three days afterward. Alcohol may make you feel dizzy, sick, or flushed.  If you are being treated for a sexually transmitted disease, avoid sexual contact until you have finished your treatment. Your sexual partner may also need treatment.  What side effects may I notice from receiving this medicine?  Side effects that you should report to your doctor or health care professional as soon as possible:    allergic reactions like skin rash or hives, swelling of the face, lips, or tongue    confusion, clumsiness    difficulty speaking    discolored or sore mouth    dizziness    fever, infection    numbness, tingling, pain or weakness in the hands or feet    trouble passing urine or change in the  amount of urine    redness, blistering, peeling or loosening of the skin, including inside the mouth    seizures    unusually weak or tired    vaginal irritation, dryness, or discharge  Side effects that usually do not require medical attention (report to your doctor or health care professional if they continue or are bothersome):    diarrhea    headache    irritability    metallic taste    nausea    stomach pain or cramps    trouble sleeping  This list may not describe all possible side effects. Call your doctor for medical advice about side effects. You may report side effects to FDA at 3-194-YYJ-4013.  Where should I keep my medicine?  Keep out of the reach of children.  Store at room temperature below 25 degrees C (77 degrees F). Protect from light. Keep container tightly closed. Throw away any unused medicine after the expiration date.  NOTE:This sheet is a summary. It may not cover all possible information. If you have questions about this medicine, talk to your doctor, pharmacist, or health care provider. Copyright  2016 [employer]              Follow-ups after your visit        Your next 10 appointments already scheduled     Feb 14, 2017  2:20 PM   New Visit with Jerardo Thompson MD   [facility] ([facility])    [address] [place] [address] [id number] [phone]              Who to contact     If you have questions or need follow up information about today's clinic visit or your schedule please contact [facility] directly at [phone].  Normal or non-critical lab and imaging results will be communicated to you by MyChart, letter or phone within 4 business days after the clinic has received the results. If you do not hear from us within 7 days, please contact the clinic through MyChart or phone. If you have a critical or abnormal lab result, we will notify you by phone as soon as possible.  Submit refill requests through [employer] or call your  "pharmacy and they will forward the refill request to us. Please allow 3 business days for your refill to be completed.          Additional Information About Your Visit        [employer] Information     [employer] gives you secure access to your electronic health record. If you see a primary care provider, you can also send messages to your care team and make appointments. If you have questions, please call your primary care clinic.  If you do not have a primary care provider, please call [phone] and they will assist you.        Care EveryWhere ID     This is your Care EveryWhere ID. This could be used by other organizations to access your [place] medical records  [id number]        Your Vitals Were     Pulse Temperature Respirations Height BMI (Body Mass Index)       68 97.1  F (36.2  C) (Tympanic) 18 5' 7\" (1.702 m) 32.73 kg/m2        Blood Pressure from Last 3 Encounters:   02/10/17 142/80   01/27/17 130/82   12/30/16 156/92    Weight from Last 3 Encounters:   02/10/17 209 lb (94.802 kg)   01/27/17 207 lb (93.895 kg)   12/30/16 200 lb (90.719 kg)              Today, you had the following     No orders found for display         Today's Medication Changes          These changes are accurate as of: 2/10/17 11:03 AM.  If you have any questions, ask your nurse or doctor.               Start taking these medicines.        Dose/Directions    ciprofloxacin 500 MG tablet   Commonly known as:  CIPRO   Used for:  Diverticulitis of large intestine without perforation or abscess without bleeding   Started by:  See Meng MD        Dose:  500 mg   Take 1 tablet (500 mg) by mouth 2 times daily   Quantity:  14 tablet   Refills:  0       metroNIDAZOLE 500 MG tablet   Commonly known as:  FLAGYL   Used for:  Diverticulitis of large intestine without perforation or abscess without bleeding   Started by:  See Meng MD        Dose:  500 mg   Take 1 tablet (500 mg) by mouth 3 times daily for 7 days   Quantity:  21 tablet "   Refills:  0            Where to get your medicines      These medications were sent to [facility]- - [place], [place] - [address]  [address] [facility] [address]     Phone:  [phone]    - ciprofloxacin 500 MG tablet  - metroNIDAZOLE 500 MG tablet             Primary Care Provider Office Phone # Fax #    Ravinder Plunkett [phone]       [facility] [address] [facility] [address]        Thank you!     Thank you for choosing [facility]  for your care. Our goal is always to provide you with excellent care. Hearing back from our patients is one way we can continue to improve our services. Please take a few minutes to complete the written survey that you may receive in the mail after your visit with us. Thank you!             Your Updated Medication List - Protect others around you: Learn how to safely use, store and throw away your medicines at www.disposemymeds.org.          This list is accurate as of: 2/10/17 11:03 AM.  Always use your most recent med list.                   Brand Name Dispense Instructions for use    ACE NOT PRESCRIBED (INTENTIONAL)     0 each    ACE Inhibitor not prescribed due to Intolerance       aspirin 81 MG tablet      1 TABLET DAILY       blood glucose monitoring lancets     1 Box    Use to test blood sugars 1 times daily or as directed.       blood glucose monitoring test strip    ONE TOUCH ULTRA    100 each    Use to test blood sugars 1 times daily or as directed.       brimonidine 0.2 % ophthalmic solution    ALPHAGAN    1 Bottle    Place 1 drop into both eyes 2 times daily       CALCIUM + D PO      1 TABLET DAILY       ciprofloxacin 500 MG tablet    CIPRO    14 tablet    Take 1 tablet (500 mg) by mouth 2 times daily       flax seed oil 1000 MG capsule      1 CAPSULE DAILY       ibuprofen 200 MG tablet    ADVIL/MOTRIN     Take 200 mg by mouth every 4 hours as needed        metFORMIN 500 MG tablet    GLUCOPHAGE    180 tablet    Take one tab at bkfst and one at supper       metroNIDAZOLE 500 MG tablet    FLAGYL    21 tablet    Take 1 tablet (500 mg) by mouth 3 times daily for 7 days       Multi-vitamin Tabs tablet   Generic drug:  multivitamin, therapeutic with minerals      DAILY       order for DME     1 Device    Equipment being ordered: cam walker       Saw Palmetto 1000 MG Caps      2 CAPSULES DAILY       STATIN NOT PRESCRIBED (INTENTIONAL)      Statin not prescribed intentionally due to Intolerance       TYLENOL 500 MG tablet   Generic drug:  acetaminophen      Take 1,000 mg by mouth every 6 hours as needed       VITAMIN D3 PO      Take by mouth daily

## 2017-02-14 ENCOUNTER — RADIANT APPOINTMENT (OUTPATIENT)
Dept: GENERAL RADIOLOGY | Facility: CLINIC | Age: 77
End: 2017-02-14
Attending: NEUROLOGICAL SURGERY
Payer: COMMERCIAL

## 2017-02-14 ENCOUNTER — TELEPHONE (OUTPATIENT)
Dept: PALLIATIVE MEDICINE | Facility: CLINIC | Age: 77
End: 2017-02-14

## 2017-02-14 ENCOUNTER — OFFICE VISIT (OUTPATIENT)
Dept: NEUROSURGERY | Facility: CLINIC | Age: 77
End: 2017-02-14
Payer: COMMERCIAL

## 2017-02-14 VITALS
OXYGEN SATURATION: 95 % | WEIGHT: 210 LBS | HEART RATE: 85 BPM | SYSTOLIC BLOOD PRESSURE: 151 MMHG | BODY MASS INDEX: 32.96 KG/M2 | TEMPERATURE: 98.1 F | DIASTOLIC BLOOD PRESSURE: 88 MMHG | HEIGHT: 67 IN

## 2017-02-14 DIAGNOSIS — M54.42 CHRONIC BILATERAL LOW BACK PAIN WITH LEFT-SIDED SCIATICA: Primary | ICD-10-CM

## 2017-02-14 DIAGNOSIS — G89.29 CHRONIC BILATERAL LOW BACK PAIN WITH LEFT-SIDED SCIATICA: Primary | ICD-10-CM

## 2017-02-14 DIAGNOSIS — M54.16 LUMBAR RADICULOPATHY: ICD-10-CM

## 2017-02-14 DIAGNOSIS — M54.40 LOW BACK PAIN WITH SCIATICA: ICD-10-CM

## 2017-02-14 PROCEDURE — 72110 X-RAY EXAM L-2 SPINE 4/>VWS: CPT

## 2017-02-14 PROCEDURE — 99203 OFFICE O/P NEW LOW 30 MIN: CPT | Performed by: NEUROLOGICAL SURGERY

## 2017-02-14 NOTE — TELEPHONE ENCOUNTER
Called patient to schedule CHAYO CLIFTON 2/14/17    [facility] [place]

## 2017-02-14 NOTE — PATIENT INSTRUCTIONS
I recommend low back steroid injections with pain management in [facility] physical therapy. You will receive a call to schedule these. Please also stop at Xray before you leave. We will call with results.   Follow up if pain persists. Please call the clinic with any questions. [phone]

## 2017-02-14 NOTE — NURSING NOTE
"Ana Maria Duvall is a 76 year old male who presents for:  Chief Complaint   Patient presents with     Neurologic Problem     bilateral low back pain radiating into left buttocks, leg and foot        Initial Vitals:  There were no vitals taken for this visit. Estimated body mass index is 32.73 kg/(m^2) as calculated from the following:    Height as of 2/10/17: 5' 7\" (1.702 m).    Weight as of 2/10/17: 209 lb (94.8 kg).. There is no height or weight on file to calculate BSA. BP completed using cuff size: regular  Data Unavailable    Do you feel safe in your environment?  Yes  Do you need any refills today? No    Nursing Comments: bilateral low back pain radiating into left buttocks, leg and foot.  Patient rates his pain today as 3 bilateral buttucks and legs with numbness and weakness.  Worse when bening      5 min. nursing intake time  Lauren Montanez CMA, AAS      Discharge plan: LORE, PT and Xrays  2 min. nursing discharge time  Lauren Montanez CMA, AAS       "
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
## 2017-02-14 NOTE — MR AVS SNAPSHOT
After Visit Summary   2/14/2017    Ana Maria Duvall    MRN: [id number]           Patient Information     Date Of Birth          1940        Visit Information        Provider Department      2/14/2017 2:20 PM Jerardo Thompson MD [facility] Portal        Today's Diagnoses     Low back pain with sciatica    -  1      Care Instructions    I recommend low back steroid [facility]. You will receive a call to schedule these. Please also stop at Xray before you leave. We will call with results.   Follow up if pain persists. Please call the clinic with any questions. [phone]          Follow-ups after your visit        Additional Services     [facility] PT, HAND, AND CHIROPRACTIC REFERRAL       **This order will print in the [facility] Scheduling Office**    Physical Therapy, Hand Therapy and Chiropractic Care are available through:    *[facility]  *[facility]  *[facility]    Call one number to schedule at any of the above locations: [phone].    Your provider has referred you to: Physical Therapy at [facility] or [facility]    Indication/Reason for Referral: Low Back Pain  Onset of Illness: unknown  Therapy Orders: Evaluate and Treat  Special Programs: None  Special Request: None    Alexsandra Upton      Additional Comments for the Therapist or Chiropractor: none    Please be aware that coverage of these services is subject to the terms and limitations of your health insurance plan.  Call member services at your health plan with any benefit or coverage questions.      Please bring the following to your appointment:    *Your personal calendar for scheduling future appointments  *Comfortable clothing            PAIN MANAGEMENT CENTER ([place]) REFERRAL       Your provider has referred you to the [facility].    Reason for Referral: Procedure or injection only - patient will  be contacted within 24 hrs to schedule: Epidural Steroid: Lumbar    Please complete the following questions:    What is your diagnosis for the patient's pain? Lumbar radiculopathy    Do you have any specific questions for the pain specialist? No    Are there any red flags that may impact the assessment or management of the patient? None    **ANY DIAGNOSTIC TESTS THAT ARE NOT IN EPIC SHOULD BE SENT TO THE PAIN CENTER**    Please note the Pre-Op Pain Consult must be scheduled 2-3 weeks prior to the patient's surgery.  Patient's trying to schedule within 2 weeks of surgery may not be accommodated.     Pre-Op Pain Consults are only good for 30 days.    REGARDING OPIOID MEDICATIONS:  We will always address appropriateness of opioid pain medications, but we generally will not automatically take on a prescribing role. When we do take on prescribing of opioids for chronic pain, it is in collaboration with the referring physician for an intermediate period of time (months), with an expectation that the primary physician or provider will assume the prescribing role if medications are effective at stable doses with demonstrated compliance.  Therefore, please do not assume that your prescribing responsibilities end on the day of pain clinic consultation.  Is this agreeable to you? YES    For any questions, contact the [facility] at [phone].    Please be aware that coverage of these services is subject to the terms and limitations of your health insurance plan.  Call member services at your health plan with any benefit or coverage questions.      Please bring the following with you to your appointment:    (1) Any X-Rays, CTs or MRIs which have been performed.  Contact the facility where they were done to arrange for  prior to your scheduled appointment.    (2) List of current medications   (3) This referral request   (4) Any documents/labs given to you for this referral                  Future  "tests that were ordered for you today     Open Future Orders        Priority Expected Expires Ordered    XR Lumbar Spine G/E 4 Views Routine 2/14/2017 2/14/2018 2/14/2017            Who to contact     If you have questions or need follow up information about today's clinic visit or your schedule please contact [facility] directly at [phone].  Normal or non-critical lab and imaging results will be communicated to you by MyChart, letter or phone within 4 business days after the clinic has received the results. If you do not hear from us within 7 days, please contact the clinic through [phone] or phone. If you have a critical or abnormal lab result, we will notify you by phone as soon as possible.  Submit refill requests through [employer] or call your pharmacy and they will forward the refill request to us. Please allow 3 business days for your refill to be completed.          Additional Information About Your Visit        [employer] [employer] Information     [employer] gives you secure access to your electronic health record. If you see a primary care provider, you can also send messages to your care team and make appointments. If you have questions, please call your primary care clinic.  If you do not have a primary care provider, please call [phone] and they will assist you.        Care EveryWhere ID     This is your Care EveryWhere ID. This could be used by other organizations to access your [facility] medical records  [id number]        Your Vitals Were     Pulse Temperature Height Pulse Oximetry BMI (Body Mass Index)       85 98.1  F (36.7  C) (Oral) 5' 7\" (1.702 m) 95% 32.89 kg/m2        Blood Pressure from Last 3 Encounters:   02/14/17 151/88   02/10/17 142/80   01/27/17 130/82    Weight from Last 3 Encounters:   02/14/17 210 lb (95.3 kg)   02/10/17 209 lb (94.8 kg)   01/27/17 207 lb (93.9 kg)              We Performed the Following     [facility] PT, HAND, AND CHIROPRACTIC REFERRAL     PAIN MANAGEMENT CENTER " ([facility]) REFERRAL        Primary Care Provider Office Phone # Fax #    Ravinder Plunkett [phone]       [address] [address]        Thank you!     Thank you for choosing [facility]  for your care. Our goal is always to provide you with excellent care. Hearing back from our patients is one way we can continue to improve our services. Please take a few minutes to complete the written survey that you may receive in the mail after your visit with us. Thank you!             Your Updated Medication List - Protect others around you: Learn how to safely use, store and throw away your medicines at www.disposemymeds.org.          This list is accurate as of: 2/14/17  2:58 PM.  Always use your most recent med list.                   Brand Name Dispense Instructions for use    ACE NOT PRESCRIBED (INTENTIONAL)     0 each    ACE Inhibitor not prescribed due to Intolerance       aspirin 81 MG tablet      1 TABLET DAILY       blood glucose monitoring lancets     1 Box    Use to test blood sugars 1 times daily or as directed.       blood glucose monitoring test strip    ONE TOUCH ULTRA    100 each    Use to test blood sugars 1 times daily or as directed.       brimonidine 0.2 % ophthalmic solution    ALPHAGAN    1 Bottle    Place 1 drop into both eyes 2 times daily       CALCIUM + D PO      1 TABLET DAILY       ciprofloxacin 500 MG tablet    CIPRO    14 tablet    Take 1 tablet (500 mg) by mouth 2 times daily       flax seed oil 1000 MG capsule      1 CAPSULE DAILY       ibuprofen 200 MG tablet    ADVIL/MOTRIN     Take 200 mg by mouth every 4 hours as needed       metFORMIN 500 MG tablet    GLUCOPHAGE    180 tablet    Take one tab at bkfst and one at supper       metroNIDAZOLE 500 MG tablet    FLAGYL    21 tablet    Take 1 tablet (500 mg) by mouth 3 times daily for 7 days       Multi-vitamin Tabs tablet   Generic drug:  multivitamin,  therapeutic with minerals      DAILY       order for DME     1 Device    Equipment being ordered: cam walker       Saw Palmetto 1000 MG Caps      2 CAPSULES DAILY       STATIN NOT PRESCRIBED (INTENTIONAL)      Statin not prescribed intentionally due to Intolerance       TYLENOL 500 MG tablet   Generic drug:  acetaminophen      Take 1,000 mg by mouth every 6 hours as needed       VITAMIN D3 PO      Take by mouth daily

## 2017-02-27 ENCOUNTER — HOSPITAL ENCOUNTER (OUTPATIENT)
Dept: PHYSICAL THERAPY | Facility: CLINIC | Age: 77
Setting detail: THERAPIES SERIES
End: 2017-02-27
Attending: NEUROLOGICAL SURGERY
Payer: COMMERCIAL

## 2017-02-27 PROCEDURE — 97110 THERAPEUTIC EXERCISES: CPT | Mod: GP | Performed by: PHYSICAL THERAPIST

## 2017-02-27 PROCEDURE — 40000718 ZZHC STATISTIC PT DEPARTMENT ORTHO VISIT: Performed by: PHYSICAL THERAPIST

## 2017-02-27 PROCEDURE — 97140 MANUAL THERAPY 1/> REGIONS: CPT | Mod: GP | Performed by: PHYSICAL THERAPIST

## 2017-02-27 PROCEDURE — 97161 PT EVAL LOW COMPLEX 20 MIN: CPT | Mod: GP | Performed by: PHYSICAL THERAPIST

## 2017-02-27 NOTE — PROGRESS NOTES
02/27/17 0900   General Information   Type of Visit Initial OP Ortho PT Evaluation   Start of Care Date 02/27/17   Referring Physician Dr. 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 Pmhx; back surgery 2014, low back in the 80s/90s, neck surgery 80s, shoulder ssurgery early 80's. 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 Pt states he wants less pain, wants to do the exercises well. Pt states he is active except in the winter time. Pt states he has to go up and down stairs at work and at home with some increase in pain. no pain that goes down the legs, only when he lifts something too heavy. His main goal is to decrease pain. He is frustrated that he did not go to PT after the back surgery in 2014 as the back pain has progressively gotten worse since then and he knows he needs to do something about it.   Impairments A. Pain;F. Decreased strength and endurance;D. Decreased ROM;E. Decreased flexibility   Functional Limitations perform activities of daily living;perform required work activities   Symptom Location Pt states his pain location changes, middle and down the left.   How/Where did it occur Other  (Post op lumbar fusion. )   Onset date of current episode/exacerbation 01/01/14   Chronicity Chronic   Pain rating (0-10 point scale) Best (/10);Worst (/10)   Best (/10) 3   Worst (/10) 10   Pain quality A. Sharp;E. Shooting;F. Stabbing   Frequency of pain/symptoms A. Constant    Pain/symptoms exacerbated by C. Lifting;D. Carrying;G. Certain positions;I. Bending;L. Work tasks   Pain/symptoms eased by C. Rest;E. Changing positions;G. Heat;H. Cold   Progression of symptoms since onset: Worsened   Prior Level of Function   Prior Level of Function-Mobility Pt was able to walk, carry and participate with all work activities without being limited by pain.   Current Level of Function   Patient role/employment history A. 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   Living environment [place]   Fall Risk Screen   Fall screen completed by PT   Per patient - Fall 2 or more times in past year? Yes   Per patient - Fall with injury in past year? No   Is patient a fall risk? No   Fall screen comments Pt fell on ice, tripped on stairs. no concers from PT regarding balance.   Lumbar Spine/SI Objective Findings   Gait/Locomotion Decreased Hip mobility into extension and terminal knee extension during ambulation.    Balance/Proprioception (Single Leg Stance) x5s B   Pelvic Screen - Hip ROM, - SI provocation test of ilium compression   Hip Screen - Hip ROM   Transversus Abdominus Strength (Laith Leg Lowering-deg) + weakness as indicated by 90/90 position heel taps   Hip Flexion (L2) Strength 5/5 B   Hip Abduction Strength 4-/5 B   Knee Flexion Strength 5/5 B   Knee Extension (L3) Strength 5/5 R, 4-/5 L   Ankle Dorsiflexion (L4) Strength 5/5 B   Hamstring Flexibility + tightness in 90/90 position B    Hip Flexor Flexibility + tightness as indicated by prone position, hips flat with stretch   Quadricep Flexibility + tightness as indicated by Ravinder Stretch   Ravinder Test + B   Ely Test + B   Segmental Mobility decreased segmental mobility in thoracic spine T6-T8. Lumbar spine not taken due to post operative status.   Palpation Tightness along lumbar surgical incision. Tightness in B QL with palpable trigger points.   Planned Therapy Interventions   Planned Therapy Interventions IADL  retraining;balance training;gait training;joint mobilization;manual therapy;ROM;strengthening;stretching;neuromuscular re-education;motor coordination training   Clinical Impression   Criteria for Skilled Therapeutic Interventions Met yes, treatment indicated   PT Diagnosis Decreased strength and mobility secondary to chronic LBP   Influenced by the following impairments decreased strength, decreased muscular length   Functional limitations due to impairments decreased sitting tolerance, lifting tolerance and difficulty participating with work activities   Clinical Presentation Stable/Uncomplicated   Clinical Presentation Rationale Pt presents with long past medical history however patient's condition is currently stable. Pt is motivated to participate with PT and demonstrates understanding of interventions   Clinical Decision Making (Complexity) Low complexity   Therapy Frequency 2 times/Week   Predicted Duration of Therapy Intervention (days/wks) 10 weeks   Risk & Benefits of therapy have been explained Yes   Patient, Family & other staff in agreement with plan of care Yes   Education Assessment   Preferred Learning Style Demonstration;Pictures/video   Barriers to Learning No barriers   ORTHO GOALS   PT Ortho Eval Goals 1;2;3   Ortho Goal 1   Goal Identifier Strength   Goal Description Pt will demonstrate atleast 4+/5 hip abduction strength bilaterally in order to climb stairs with <3/10 pain.   Target Date 05/08/17   Ortho Goal 2   Goal Identifier HEP   Goal Description Pt will demonstrate proper form and duration with HEP of hip and core strengthening in order to progress independently.   Target Date 03/27/17   Ortho Goal 3   Goal Identifier Work   Goal Description Pt will be able to lift atleast 50# w/ proper squat form and lifting mechanics in order to prevent recurring low back pain.   Target Date 05/08/17   Total Evaluation Time   Total Evaluation Time 25

## 2017-03-03 ENCOUNTER — HOSPITAL ENCOUNTER (OUTPATIENT)
Dept: PHYSICAL THERAPY | Facility: CLINIC | Age: 77
Setting detail: THERAPIES SERIES
End: 2017-03-03
Attending: NEUROLOGICAL SURGERY
Payer: COMMERCIAL

## 2017-03-03 PROCEDURE — 97140 MANUAL THERAPY 1/> REGIONS: CPT | Mod: GP | Performed by: PHYSICAL THERAPIST

## 2017-03-03 PROCEDURE — 40000718 ZZHC STATISTIC PT DEPARTMENT ORTHO VISIT: Performed by: PHYSICAL THERAPIST

## 2017-03-03 PROCEDURE — 97110 THERAPEUTIC EXERCISES: CPT | Mod: GP | Performed by: PHYSICAL THERAPIST

## 2017-03-06 ENCOUNTER — HOSPITAL ENCOUNTER (OUTPATIENT)
Dept: PHYSICAL THERAPY | Facility: CLINIC | Age: 77
Setting detail: THERAPIES SERIES
End: 2017-03-06
Attending: NEUROLOGICAL SURGERY
Payer: COMMERCIAL

## 2017-03-06 PROCEDURE — 97140 MANUAL THERAPY 1/> REGIONS: CPT | Mod: GP | Performed by: PHYSICAL THERAPIST

## 2017-03-06 PROCEDURE — 97110 THERAPEUTIC EXERCISES: CPT | Mod: GP | Performed by: PHYSICAL THERAPIST

## 2017-03-06 PROCEDURE — 40000718 ZZHC STATISTIC PT DEPARTMENT ORTHO VISIT: Performed by: PHYSICAL THERAPIST

## 2017-03-09 ENCOUNTER — HOSPITAL ENCOUNTER (OUTPATIENT)
Dept: PHYSICAL THERAPY | Facility: CLINIC | Age: 77
Setting detail: THERAPIES SERIES
End: 2017-03-09
Attending: NEUROLOGICAL SURGERY
Payer: COMMERCIAL

## 2017-03-09 PROCEDURE — 97110 THERAPEUTIC EXERCISES: CPT | Mod: GP | Performed by: PHYSICAL THERAPIST

## 2017-03-09 PROCEDURE — 97140 MANUAL THERAPY 1/> REGIONS: CPT | Mod: GP | Performed by: PHYSICAL THERAPIST

## 2017-03-09 PROCEDURE — 40000718 ZZHC STATISTIC PT DEPARTMENT ORTHO VISIT: Performed by: PHYSICAL THERAPIST

## 2017-03-13 ENCOUNTER — HOSPITAL ENCOUNTER (OUTPATIENT)
Dept: PHYSICAL THERAPY | Facility: CLINIC | Age: 77
Setting detail: THERAPIES SERIES
End: 2017-03-13
Attending: NEUROLOGICAL SURGERY
Payer: COMMERCIAL

## 2017-03-13 PROCEDURE — 97140 MANUAL THERAPY 1/> REGIONS: CPT | Mod: GP | Performed by: PHYSICAL THERAPIST

## 2017-03-13 PROCEDURE — 97110 THERAPEUTIC EXERCISES: CPT | Mod: GP | Performed by: PHYSICAL THERAPIST

## 2017-03-13 PROCEDURE — 40000718 ZZHC STATISTIC PT DEPARTMENT ORTHO VISIT: Performed by: PHYSICAL THERAPIST

## 2017-03-16 ENCOUNTER — HOSPITAL ENCOUNTER (OUTPATIENT)
Dept: PHYSICAL THERAPY | Facility: CLINIC | Age: 77
Setting detail: THERAPIES SERIES
End: 2017-03-16
Attending: NEUROLOGICAL SURGERY
Payer: COMMERCIAL

## 2017-03-16 PROCEDURE — 40000718 ZZHC STATISTIC PT DEPARTMENT ORTHO VISIT: Performed by: PHYSICAL THERAPIST

## 2017-03-16 PROCEDURE — 97110 THERAPEUTIC EXERCISES: CPT | Mod: GP | Performed by: PHYSICAL THERAPIST

## 2017-03-16 PROCEDURE — 97140 MANUAL THERAPY 1/> REGIONS: CPT | Mod: GP | Performed by: PHYSICAL THERAPIST

## 2017-03-20 ENCOUNTER — TELEPHONE (OUTPATIENT)
Dept: FAMILY MEDICINE | Facility: CLINIC | Age: 77
End: 2017-03-20

## 2017-03-20 ENCOUNTER — HOSPITAL ENCOUNTER (OUTPATIENT)
Dept: PHYSICAL THERAPY | Facility: CLINIC | Age: 77
Setting detail: THERAPIES SERIES
End: 2017-03-20
Attending: NEUROLOGICAL SURGERY
Payer: COMMERCIAL

## 2017-03-20 PROCEDURE — 40000718 ZZHC STATISTIC PT DEPARTMENT ORTHO VISIT: Performed by: PHYSICAL THERAPIST

## 2017-03-20 PROCEDURE — 97140 MANUAL THERAPY 1/> REGIONS: CPT | Mod: GP | Performed by: PHYSICAL THERAPIST

## 2017-03-20 PROCEDURE — 97110 THERAPEUTIC EXERCISES: CPT | Mod: GP | Performed by: PHYSICAL THERAPIST

## 2017-03-20 NOTE — TELEPHONE ENCOUNTER
Per 02-14-17 neurosurg appt, Dr. Thompson-  Imaging:   MRI lumbar - post op changes from L4-S1 and left L5-S1 foraminal stenosis        Assessment/Plan:   1. PT  2. LORE  3. Will check spine Xrays  4. No surgery recommended at this time, he may follow up with Nurse practitioner as needed        Jerardo Thompson MD, MS, FAANS  Neurosurgeon  [facility]  [address] [place] [address]. [place] [address]  Tel [phone]      Spoke with pt who is currently doing PT, feels would benefit from LORE.  Gave number to above clinic to call for scheduling LORE.  SILVIO Montiel RN

## 2017-03-20 NOTE — TELEPHONE ENCOUNTER
Patient left a message with the  requesting that Dr. Meng call him to discuss back issues.    Paula Cantrell-Station

## 2017-03-21 ENCOUNTER — TELEPHONE (OUTPATIENT)
Dept: NEUROSURGERY | Facility: CLINIC | Age: 77
End: 2017-03-21

## 2017-03-21 NOTE — TELEPHONE ENCOUNTER
Patient wanted to try out PT for a while first which is going well but having continued pain.  Saw Dr Thompson 2/14/17 recommended PT and LORE. He would like to try injection. Order in EPIC for ETIENNE at [facility]. Provided patient with number to call to schedule injection at  Pain. Advised to call back if any further questions or concerns. Patient verbalized understanding.

## 2017-03-21 NOTE — TELEPHONE ENCOUNTER
Pre-screening Questions for Radiology Injections:    Injection to be done at which interventional clinic site? [facility]    Procedure ordered by DR. LIGHT    Procedure ordered? Lumbar Epidural Steroid Injection    What insurance would patient like us to bill for this procedure? [employer]      Worker's comp-Any injection DO NOT SCHEDULE and route to Shannon Reeves.      [employer] insurance - For SI joint injections, DO NOT SCHEDULE and route Shannon Reeves.      [employer]- MBB's must be scheduled at LEAST two weeks apart      [employer] - Any injection besides hip/shoulder/knee joint DO NOT SCHEDULE and route to Shannon Reeves. She will obtain PA and call pt back to schedule procedure or notify pt of denial.     Any chance of pregnancy? Not Applicable   If YES, do NOT schedule and route to RN pool    Is an  needed? No     Patient has a drive home? (mandatory) YES:     Is patient taking any blood thinners (plavix, coumadin, jantoven, warfarin, heparin, pradaxa or dabigatran )? No   If hold needed, do NOT schedule, route to RN pool     Is patient taking any aspirin products? No     If more than 325mg/day do NOT schedule; route to RN pool     For CERVICAL procedures, hold all aspirin products for 6 days.      Does the patient have a bleeding or clotting disorder? No     If yes, okay to schedule AND route to RN nurse pool    **For any patients with platelet count <100, must be forwarded to provider**    Is patient diabetic?  Yes  If YES, have them bring their glucometer.    Does patient have an active infection or treated for one within the past week? No     Is patient currently taking any antibiotics?  No     For patients on chronic, preventative, or prophylactic antibiotics, procedures may be scheduled.     For patients on antibiotics for active or recent infection:    Viridiana Kern [place], [place]-antibiotic course must have been completed for 4 days    Jos Cordero-antibiotic course must have  been completed for 7 days    Is patient currently taking any steroid medications? (i.e. Prednisone, Medrol)  No     For patients on steroid medications:    Viridiana Kern [place], [place]-steroid course must have been completed for 4 days    Jos Cordero-steroid course must have been completed for 7 days    Reviewed with patient:  If you are started on any steroids or antibiotics between now and your appointment, you must contact us because it may affect our ability to perform your procedure.  Yes    Is patient actively being treated for cancer or immunocompromised, including the spleen having been removed? No    If YES, do NOT schedule and route to RN pool     **For Dr. Pike patients without spleens should have the chart sent to her**    Are you able to get on and off an exam table with minimal or no assistance? Yes  If NO, do NOT schedule and route to RN pool    Are you able to roll over and lay on your stomach with minimal or no assistance? Yes  If NO, do NOT schedule and route to RN pool     Any allergies to contrast dye, iodine, shellfish, or numbing and steroid medications? No  If YES, route to RN pool AND add allergy information to appointment notes    Allergies: Review of patient's allergies indicates no known allergies.        Has the patient had a flu shot or any other vaccinations within 7 days before or after the procedure.  No       Does patient have an MRI/CT?  YES:   (SI joint, hip injections, lumbar sympathetic blocks, and stellate ganglion blocks do not require an MRI)    Was the MRI done w/in the last 3 years?  Yes    Was MRI done at [facility]? Yes      If not, where was it done? N/A       If MRI was not done at [facility], [facility] or [facility] do NOT schedule and route to nursing.  If pt has an imaging disc, the injection may be scheduled but pt has to bring disc to appt. If they show up w/out disc the injection cannot be done    Reminders (please tell patient if applicable):        Instructed pt to arrive 30 minutes early for IV start if this is for a cervical procedure, ALL sympathetic (stellate ganglion, hypogastric, or lumbar sympathetic block) and all sedation procedures (RFA, spinal cord stimulation trials).  Not Applicable    -IVs are not routinely placed for [place] and [place] cervical case       If NPO for sedation, informed patient that it is okay to take medications with sips of water (except if they are to hold blood thinners).  Not Applicable   *DO take blood pressure medication if it is prescribed*      If this is for a cervical LORE, informed patient that aspirin needs to be held for 6 days.   Not Applicable      For all patients not having spinal cord stimulator (SCS) trials or radiofrequency ablations (RFAs), informed patient:  IV sedation is not provided for this procedure.  If you feel that an oral anti-anxiety medication is needed, you can discuss this further with your referring provider or primary care provider.  The Pain Clinic provider will discuss specifics of what the procedure includes at your appointment.  Most procedures last 10-20 minutes.  We use numbing medications to help with any discomfort during the procedure.  Not Applicable      Do not schedule procedures requiring IV placement in the first appointment of the day or first appointment after lunch.       For patients 85 or older we recommend having an adult stay w/ them for the remainder of the day.       Does the patient have any questions?  NO  Shannon Reeves  [facility]

## 2017-03-21 NOTE — TELEPHONE ENCOUNTER
Routing to  to submit PA, patient would like a Friday appointment.      Shannon ALMANZAR    [facility]

## 2017-03-22 NOTE — TELEPHONE ENCOUNTER
PA pending additional information - please specify exact level and laterality to be injected. Routing to Dr. Thompson to determine.        Shannon ALMANZAR    [facility]

## 2017-03-23 ENCOUNTER — HOSPITAL ENCOUNTER (OUTPATIENT)
Dept: PHYSICAL THERAPY | Facility: CLINIC | Age: 77
Setting detail: THERAPIES SERIES
End: 2017-03-23
Attending: NEUROLOGICAL SURGERY
Payer: COMMERCIAL

## 2017-03-23 PROCEDURE — 97035 APP MDLTY 1+ULTRASOUND EA 15: CPT | Mod: GP | Performed by: PHYSICAL THERAPIST

## 2017-03-23 PROCEDURE — 97140 MANUAL THERAPY 1/> REGIONS: CPT | Mod: GP | Performed by: PHYSICAL THERAPIST

## 2017-03-23 PROCEDURE — 40000718 ZZHC STATISTIC PT DEPARTMENT ORTHO VISIT: Performed by: PHYSICAL THERAPIST

## 2017-03-27 ENCOUNTER — HOSPITAL ENCOUNTER (OUTPATIENT)
Dept: PHYSICAL THERAPY | Facility: CLINIC | Age: 77
Setting detail: THERAPIES SERIES
End: 2017-03-27
Attending: NEUROLOGICAL SURGERY
Payer: COMMERCIAL

## 2017-03-27 PROCEDURE — 97140 MANUAL THERAPY 1/> REGIONS: CPT | Mod: GP | Performed by: PHYSICAL THERAPIST

## 2017-03-27 PROCEDURE — 40000718 ZZHC STATISTIC PT DEPARTMENT ORTHO VISIT: Performed by: PHYSICAL THERAPIST

## 2017-03-29 DIAGNOSIS — E11.9 TYPE 2 DIABETES MELLITUS WITHOUT COMPLICATION, WITHOUT LONG-TERM CURRENT USE OF INSULIN (H): Primary | ICD-10-CM

## 2017-03-29 NOTE — TELEPHONE ENCOUNTER
Patient left a vm stating he talked to [employer] and they were going to send something to request this medication. We haven't received anything yet but patient wants to get this medication through [employer].    Paula Cantrell-[facility] [place]

## 2017-03-30 ENCOUNTER — HOSPITAL ENCOUNTER (OUTPATIENT)
Dept: PHYSICAL THERAPY | Facility: CLINIC | Age: 77
Setting detail: THERAPIES SERIES
End: 2017-03-30
Attending: NEUROLOGICAL SURGERY
Payer: COMMERCIAL

## 2017-03-30 PROCEDURE — 97140 MANUAL THERAPY 1/> REGIONS: CPT | Mod: GP | Performed by: PHYSICAL THERAPIST

## 2017-03-30 PROCEDURE — 97110 THERAPEUTIC EXERCISES: CPT | Mod: GP | Performed by: PHYSICAL THERAPIST

## 2017-03-30 PROCEDURE — 40000718 ZZHC STATISTIC PT DEPARTMENT ORTHO VISIT: Performed by: PHYSICAL THERAPIST

## 2017-03-30 NOTE — TELEPHONE ENCOUNTER
PA pending additional information - please specify exact level and laterality to be injected. Routing to nursing to determine.        Shannon ALMANZAR    [place]

## 2017-03-30 NOTE — TELEPHONE ENCOUNTER
Lets plan on left L5-S1 transforaminal epidural steroid injection  Given surgery, back-up plan would be L4 transforaminal epidural steroid injection, and then caudal.  Cannot do interlaminar at this level.    Analia Bal MD  [facility]

## 2017-03-30 NOTE — TELEPHONE ENCOUNTER
Faxed completed PA form and supporting documentation for ETIENNE Carrero. Right fax confirmation 3/30 at 3:00 pm.      Shannon ALMANZAR    [facility]

## 2017-04-03 ENCOUNTER — HOSPITAL ENCOUNTER (OUTPATIENT)
Dept: PHYSICAL THERAPY | Facility: CLINIC | Age: 77
Setting detail: THERAPIES SERIES
End: 2017-04-03
Attending: NEUROLOGICAL SURGERY
Payer: COMMERCIAL

## 2017-04-03 DIAGNOSIS — E11.9 TYPE 2 DIABETES MELLITUS WITHOUT COMPLICATION, WITHOUT LONG-TERM CURRENT USE OF INSULIN (H): Primary | ICD-10-CM

## 2017-04-03 DIAGNOSIS — E11.9 TYPE 2 DIABETES MELLITUS WITHOUT COMPLICATION, WITHOUT LONG-TERM CURRENT USE OF INSULIN (H): ICD-10-CM

## 2017-04-03 DIAGNOSIS — E11.9 TYPE 2 DIABETES MELLITUS WITHOUT COMPLICATION, UNSPECIFIED LONG TERM INSULIN USE STATUS: ICD-10-CM

## 2017-04-03 PROCEDURE — 40000718 ZZHC STATISTIC PT DEPARTMENT ORTHO VISIT: Performed by: PHYSICAL THERAPIST

## 2017-04-03 PROCEDURE — 97140 MANUAL THERAPY 1/> REGIONS: CPT | Mod: GP | Performed by: PHYSICAL THERAPIST

## 2017-04-03 PROCEDURE — 97110 THERAPEUTIC EXERCISES: CPT | Mod: GP | Performed by: PHYSICAL THERAPIST

## 2017-04-03 RX ORDER — BILBERRY FRUIT 1000 MG
2 CAPSULE ORAL DAILY
Qty: 60 CAPSULE | Refills: 0 | Status: SHIPPED | OUTPATIENT
Start: 2017-04-03

## 2017-04-03 NOTE — TELEPHONE ENCOUNTER
order for DME tashia plus meter         Last Written Prescription Date: 9/22/16  Last Fill Quantity: 1, # refills: 0  Last Office Visit with [facility], [facility] or [facility] prescribing provider:  2/10/17        BP Readings from Last 3 Encounters:   02/14/17 151/88   02/10/17 142/80   01/27/17 130/82     Lab Results   Component Value Date    MICROL 10 08/01/2016     Lab Results   Component Value Date    UMALCR 12.45 08/01/2016     Creatinine   Date Value Ref Range Status   08/01/2016 0.76 0.66 - 1.25 mg/dL Final   ]  GFR Estimate   Date Value Ref Range Status   02/03/2017 >90 >60 mL/min/1.7m2 Final   08/01/2016 >90  Non  GFR Calc   >60 mL/min/1.7m2 Final   01/15/2015 88 >60 mL/min/1.7m2 Final     Comment:     Non  GFR Calc     GFR Estimate If Black   Date Value Ref Range Status   02/03/2017 >90 >60 mL/min/1.7m2 Final   08/01/2016 >90   GFR Calc   >60 mL/min/1.7m2 Final   01/15/2015 >90   GFR Calc   >60 mL/min/1.7m2 Final     Lab Results   Component Value Date    CHOL 188 11/09/2015     Lab Results   Component Value Date    HDL 47 11/09/2015     Lab Results   Component Value Date     11/09/2015     Lab Results   Component Value Date    TRIG 88 11/09/2015     Lab Results   Component Value Date    CHOLHDLRATIO 4.0 11/09/2015     Lab Results   Component Value Date    AST 22 08/01/2016     Lab Results   Component Value Date    ALT 32 08/01/2016     Lab Results   Component Value Date    A1C 5.8 08/01/2016    A1C 6.0 11/09/2015    A1C 5.9 04/09/2015    A1C 6.5 11/21/2014    A1C 6.3 06/21/2013     Potassium   Date Value Ref Range Status   08/01/2016 5.0 3.4 - 5.3 mmol/L Final     blood glucose monitoring test strip  Change to Tashia test strips       Last Written Prescription Date: 9/16/16  Last Fill Quantity: 100, # refills: 1  Last Office Visit with [facility], [facility] or  [facility] prescribing provider:  2/10/16        BP Readings from Last 3 Encounters:   02/14/17 151/88    02/10/17 142/80   01/27/17 130/82     Lab Results   Component Value Date    MICROL 10 08/01/2016     Lab Results   Component Value Date    UMALCR 12.45 08/01/2016     Creatinine   Date Value Ref Range Status   08/01/2016 0.76 0.66 - 1.25 mg/dL Final   ]  GFR Estimate   Date Value Ref Range Status   02/03/2017 >90 >60 mL/min/1.7m2 Final   08/01/2016 >90  Non  GFR Calc   >60 mL/min/1.7m2 Final   01/15/2015 88 >60 mL/min/1.7m2 Final     Comment:     Non  GFR Calc     GFR Estimate If Black   Date Value Ref Range Status   02/03/2017 >90 >60 mL/min/1.7m2 Final   08/01/2016 >90   GFR Calc   >60 mL/min/1.7m2 Final   01/15/2015 >90   GFR Calc   >60 mL/min/1.7m2 Final     Lab Results   Component Value Date    CHOL 188 11/09/2015     Lab Results   Component Value Date    HDL 47 11/09/2015     Lab Results   Component Value Date     11/09/2015     Lab Results   Component Value Date    TRIG 88 11/09/2015     Lab Results   Component Value Date    CHOLHDLRATIO 4.0 11/09/2015     Lab Results   Component Value Date    AST 22 08/01/2016     Lab Results   Component Value Date    ALT 32 08/01/2016     Lab Results   Component Value Date    A1C 5.8 08/01/2016    A1C 6.0 11/09/2015    A1C 5.9 04/09/2015    A1C 6.5 11/21/2014    A1C 6.3 06/21/2013     Potassium   Date Value Ref Range Status   08/01/2016 5.0 3.4 - 5.3 mmol/L Final         Look like this is a dupilcation from 3/29/17 for metFORMIN (GLUCOPHAGE) 500 MG tablet

## 2017-04-03 NOTE — TELEPHONE ENCOUNTER
blood glucose monitoring (ONE TOUCH DELICA) lancets         Last Written Prescription Date: 12/4/14  Last Fill Quantity: 1, # refills: 6  Last Office Visit with [facility] or [facility] prescribing provider:  2/10/16        BP Readings from Last 3 Encounters:   02/14/17 151/88   02/10/17 142/80   01/27/17 130/82     Lab Results   Component Value Date    MICROL 10 08/01/2016     Lab Results   Component Value Date    UMALCR 12.45 08/01/2016     Creatinine   Date Value Ref Range Status   08/01/2016 0.76 0.66 - 1.25 mg/dL Final   ]  GFR Estimate   Date Value Ref Range Status   02/03/2017 >90 >60 mL/min/1.7m2 Final   08/01/2016 >90  Non  GFR Calc   >60 mL/min/1.7m2 Final   01/15/2015 88 >60 mL/min/1.7m2 Final     Comment:     Non  GFR Calc     GFR Estimate If Black   Date Value Ref Range Status   02/03/2017 >90 >60 mL/min/1.7m2 Final   08/01/2016 >90   GFR Calc   >60 mL/min/1.7m2 Final   01/15/2015 >90   GFR Calc   >60 mL/min/1.7m2 Final     Lab Results   Component Value Date    CHOL 188 11/09/2015     Lab Results   Component Value Date    HDL 47 11/09/2015     Lab Results   Component Value Date     11/09/2015     Lab Results   Component Value Date    TRIG 88 11/09/2015     Lab Results   Component Value Date    CHOLHDLRATIO 4.0 11/09/2015     Lab Results   Component Value Date    AST 22 08/01/2016     Lab Results   Component Value Date    ALT 32 08/01/2016     Lab Results   Component Value Date    A1C 5.8 08/01/2016    A1C 6.0 11/09/2015    A1C 5.9 04/09/2015    A1C 6.5 11/21/2014    A1C 6.3 06/21/2013     Potassium   Date Value Ref Range Status   08/01/2016 5.0 3.4 - 5.3 mmol/L Final

## 2017-04-06 ENCOUNTER — HOSPITAL ENCOUNTER (OUTPATIENT)
Dept: PHYSICAL THERAPY | Facility: CLINIC | Age: 77
Setting detail: THERAPIES SERIES
End: 2017-04-06
Attending: NEUROLOGICAL SURGERY
Payer: COMMERCIAL

## 2017-04-06 PROCEDURE — 40000718 ZZHC STATISTIC PT DEPARTMENT ORTHO VISIT: Performed by: PHYSICAL THERAPIST

## 2017-04-06 PROCEDURE — 97140 MANUAL THERAPY 1/> REGIONS: CPT | Mod: GP | Performed by: PHYSICAL THERAPIST

## 2017-04-07 NOTE — TELEPHONE ENCOUNTER
The injection has been scheduled.    Nitza Velasco RN-BSN  [facility]

## 2017-04-07 NOTE — TELEPHONE ENCOUNTER
PA approved.  Effective date: 4/5/17  PA reference #: [id number]  Pt. notified:   Yes   Pt. informed directly.      Shannon ALMANZAR    [facility]

## 2017-04-10 ENCOUNTER — HOSPITAL ENCOUNTER (OUTPATIENT)
Dept: PHYSICAL THERAPY | Facility: CLINIC | Age: 77
Setting detail: THERAPIES SERIES
End: 2017-04-10
Attending: NEUROLOGICAL SURGERY
Payer: COMMERCIAL

## 2017-04-10 PROCEDURE — 40000718 ZZHC STATISTIC PT DEPARTMENT ORTHO VISIT: Performed by: PHYSICAL THERAPIST

## 2017-04-10 PROCEDURE — 97140 MANUAL THERAPY 1/> REGIONS: CPT | Mod: GP | Performed by: PHYSICAL THERAPIST

## 2017-04-13 ENCOUNTER — HOSPITAL ENCOUNTER (OUTPATIENT)
Dept: PHYSICAL THERAPY | Facility: CLINIC | Age: 77
Setting detail: THERAPIES SERIES
End: 2017-04-13
Attending: NEUROLOGICAL SURGERY
Payer: COMMERCIAL

## 2017-04-13 PROCEDURE — 40000718 ZZHC STATISTIC PT DEPARTMENT ORTHO VISIT: Performed by: PHYSICAL THERAPIST

## 2017-04-13 PROCEDURE — 97110 THERAPEUTIC EXERCISES: CPT | Mod: GP | Performed by: PHYSICAL THERAPIST

## 2017-04-13 PROCEDURE — 97140 MANUAL THERAPY 1/> REGIONS: CPT | Mod: GP | Performed by: PHYSICAL THERAPIST

## 2017-04-20 ENCOUNTER — HOSPITAL ENCOUNTER (OUTPATIENT)
Dept: PHYSICAL THERAPY | Facility: CLINIC | Age: 77
Setting detail: THERAPIES SERIES
End: 2017-04-20
Attending: NEUROLOGICAL SURGERY
Payer: COMMERCIAL

## 2017-04-20 PROCEDURE — 40000718 ZZHC STATISTIC PT DEPARTMENT ORTHO VISIT: Performed by: PHYSICAL THERAPIST

## 2017-04-20 PROCEDURE — 97140 MANUAL THERAPY 1/> REGIONS: CPT | Mod: GP | Performed by: PHYSICAL THERAPIST

## 2017-04-20 PROCEDURE — 97110 THERAPEUTIC EXERCISES: CPT | Mod: GP | Performed by: PHYSICAL THERAPIST

## 2017-04-21 ENCOUNTER — TELEPHONE (OUTPATIENT)
Dept: PALLIATIVE MEDICINE | Facility: CLINIC | Age: 77
End: 2017-04-21

## 2017-04-21 ENCOUNTER — RADIOLOGY INJECTION OFFICE VISIT (OUTPATIENT)
Dept: PALLIATIVE MEDICINE | Facility: CLINIC | Age: 77
End: 2017-04-21
Payer: COMMERCIAL

## 2017-04-21 ENCOUNTER — RADIANT APPOINTMENT (OUTPATIENT)
Dept: RADIOLOGY | Facility: CLINIC | Age: 77
End: 2017-04-21
Attending: ANESTHESIOLOGY
Payer: COMMERCIAL

## 2017-04-21 VITALS — DIASTOLIC BLOOD PRESSURE: 91 MMHG | OXYGEN SATURATION: 99 % | SYSTOLIC BLOOD PRESSURE: 163 MMHG | HEART RATE: 65 BPM

## 2017-04-21 DIAGNOSIS — M54.32 BILATERAL SCIATICA: Primary | ICD-10-CM

## 2017-04-21 DIAGNOSIS — M47.816 LUMBAR FACET ARTHROPATHY: ICD-10-CM

## 2017-04-21 DIAGNOSIS — M54.16 LUMBAR RADICULOPATHY: ICD-10-CM

## 2017-04-21 DIAGNOSIS — M47.817 LUMBOSACRAL FACET JOINT SYNDROME: ICD-10-CM

## 2017-04-21 DIAGNOSIS — M96.1 POST LAMINECTOMY SYNDROME: ICD-10-CM

## 2017-04-21 DIAGNOSIS — M54.31 BILATERAL SCIATICA: Primary | ICD-10-CM

## 2017-04-21 PROCEDURE — 64494 INJ PARAVERT F JNT L/S 2 LEV: CPT | Mod: 50 | Performed by: ANESTHESIOLOGY

## 2017-04-21 PROCEDURE — 64493 INJ PARAVERT F JNT L/S 1 LEV: CPT | Mod: 50 | Performed by: ANESTHESIOLOGY

## 2017-04-21 ASSESSMENT — PAIN SCALES - GENERAL: PAINLEVEL: MODERATE PAIN (4)

## 2017-04-21 NOTE — PATIENT INSTRUCTIONS
[facility]   Procedure Discharge Instructions    Today you saw:    Dr. Ronald El    You had an:  Lumbar facet joint injections    Medications used:  Lidocaine   Bupivacaine   Dexamethasone  Omnipaque            Be cautious when walking. Numbness and/or weakness in the lower extremities may occur up to 6-8 hours after the procedure due to effect of the local anesthetic    Do not drive for 6 hours. The effect of the local anesthetic could slow your reflexes.     You may resume your regular activities after 24 hours    Avoid strenuous activity for the first 24 hours    You may shower, however avoid swimming, tub baths or hot tubs for 24 hours following your procedure    You may have a mild to moderate increase in pain for several days following the injection.    It may take up to 14 days for the steroid medication to start working although you may feel the effect as early as a few days after the procedure.       You may use ice packs for 10-15 minutes, 3 to 4 times a day at the injection site for comfort    Do not use heat to painful areas for 6 to 8 hours. This will give the local anesthetic time to wear off and prevent you from accidentally burning your skin.     You may use anti-inflammatory medications (such as Ibuprofen or Aleve or Advil) or Tylenol for pain control if necessary    If you were fasting, you may resume your normal diet and medications after the procedure    If you have diabetes, check your blood sugar more frequently than usual as your blood sugar may be higher than normal for 10-14 days following a steroid injection. Contact your doctor who manages your diabetes if your blood sugar is higher than usual    If you experience any of the following, call the pain center nursing line during work hours at [phone] or the after hours provider line at [phone]:  -Fever over 100 degree F  -Swelling, bleeding, redness, drainage, warmth at the injection site  -Progressive  weakness or numbness in your legs or arms  -Loss of bowel or bladder function  -Unusual headache that is not relieved by Tylenol  -Unusual new onset of pain that is not improving      Phone #s:  Appointment line: [phone];  Nurse line: [phone]

## 2017-04-21 NOTE — NURSING NOTE
"Chief Complaint   Patient presents with     Pain       Initial BP (!) 176/96  Pulse 74 Estimated body mass index is 32.89 kg/(m^2) as calculated from the following:    Height as of 2/14/17: 1.702 m (5' 7\").    Weight as of 2/14/17: 95.3 kg (210 lb).  Medication Reconciliation: complete     Injection intake:    If this procedure is requiring IV sedation has patient been NPO for 6  Hours? NA    Is patient on coumadin, plavix or other prescribed blood thinner?   No    If patient is on coumadin was it held for 5 days?   NA    If patient is on plavix was it held for 7 days?    NA     Does patient take aspirin?  Yes -   ASA - 81mg    If this is for a cervical procedure and patient is on aspirin has it been held for 6 days?   NA    Any allergies to contrast dye, iodine, steroid and/or numbing medications?  NO    Is patient currently taking antibiotics or have an active infection?  NO    Does patient have a ? Yes       Is patient pregnant or breastfeeding?  Not Applicable    Are the vital signs normal?  No: Elevated BP      "

## 2017-04-21 NOTE — NURSING NOTE
Discharge Information    IV Discontiued Time:  NA    Amount of Fluid Infused:  NA    Discharge Criteria = When patient returns to baseline or as per MD order    Consciousness:  Pt is fully awake    Circulation:  BP +/- 20% of pre-procedure level    Respiration:  Patient is able to breathe deeply    O2 Sat:  Patient is able to maintain O2 Sat >92% on room air    Activity:  Moves 4 extremities on command    Ambulation:  Patient is able to stand and walk or stand and pivot into wheelchair    Dressing:  Clean/dry or No Dressing    Notes:   Discharge instructions and AVS given to patient    Patient meets criteria for discharge?  YES    Admitted to PCU?  No    Responsible adult present to accompany patient home?  Yes    Signature/Title:    luis langston RN Care Coordinator  [facility] [place]

## 2017-04-21 NOTE — MR AVS SNAPSHOT
After Visit Summary   4/21/2017    Ana Maria Duvall    MRN: [id number]           Patient Information     Date Of Birth          1940        Visit Information        Provider Department      4/21/2017 7:45 AM Ronald Yates MD [facility]        Care Instructions    [facility]   Procedure Discharge Instructions    Today you saw:    Dr. Ronald El    You had an:  Lumbar facet joint injections    Medications used:  Lidocaine   Bupivacaine   Dexamethasone  Omnipaque            Be cautious when walking. Numbness and/or weakness in the lower extremities may occur up to 6-8 hours after the procedure due to effect of the local anesthetic    Do not drive for 6 hours. The effect of the local anesthetic could slow your reflexes.     You may resume your regular activities after 24 hours    Avoid strenuous activity for the first 24 hours    You may shower, however avoid swimming, tub baths or hot tubs for 24 hours following your procedure    You may have a mild to moderate increase in pain for several days following the injection.    It may take up to 14 days for the steroid medication to start working although you may feel the effect as early as a few days after the procedure.       You may use ice packs for 10-15 minutes, 3 to 4 times a day at the injection site for comfort    Do not use heat to painful areas for 6 to 8 hours. This will give the local anesthetic time to wear off and prevent you from accidentally burning your skin.     You may use anti-inflammatory medications (such as Ibuprofen or Aleve or Advil) or Tylenol for pain control if necessary    If you were fasting, you may resume your normal diet and medications after the procedure    If you have diabetes, check your blood sugar more frequently than usual as your blood sugar may be higher than normal for 10-14 days following a steroid injection. Contact your doctor who manages your diabetes if  your blood sugar is higher than usual    If you experience any of the following, call the pain center nursing line during work hours at [phone] or the after hours provider line at [phone]:  -Fever over 100 degree F  -Swelling, bleeding, redness, drainage, warmth at the injection site  -Progressive weakness or numbness in your legs or arms  -Loss of bowel or bladder function  -Unusual headache that is not relieved by Tylenol  -Unusual new onset of pain that is not improving      Phone #s:  Appointment line: [phone];  Nurse line: [phone]            Follow-ups after your visit        Your next 10 appointments already scheduled     Apr 24, 2017  8:45 AM CDT   [facility]    [address] [place] [address] [id number] [phone]            Apr 27, 2017  7:15 AM CDT   [facility] L [facility]ulaw[facility] PT   [facility]    [address] [place] [id number] [phone]            May 01, 2017  8:15 AM CDT   [facility] L Alt[facility]    [address] [place] [id number] [phone]            May 04, 2017  7:15 AM CDT   Treatment with [facility]ela L Ethelnow, PT   [facility]    [address] [place] [address] [id number] [phone]              Who to contact     If you have questions or need follow up information about today's clinic visit or your schedule please contact [facility] directly at [phone].  Normal or non-critical lab and imaging results will be communicated to you by MyChart, letter or phone within 4 business days after the clinic has received the results. If you do not hear from us within 7 days, please contact the clinic through MyChart or phone. If you have a critical or abnormal lab result, we will  notify you by phone as soon as possible.  Submit refill requests through [employer] or call your pharmacy and they will forward the refill request to us. Please allow 3 business days for your refill to be completed.          Additional Information About Your Visit        [employer] Information     [employer] gives you secure access to your electronic health record. If you see a primary care provider, you can also send messages to your care team and make appointments. If you have questions, please call your primary care clinic.  If you do not have a primary care provider, please call [phone] and they will assist you.        Care EveryWhere ID     This is your Care EveryWhere ID. This could be used by other organizations to access your [facility] medical records  [id number]        Your Vitals Were     Pulse Pulse Oximetry                65 99%           Blood Pressure from Last 3 Encounters:   04/21/17 171/89   02/14/17 151/88   02/10/17 142/80    Weight from Last 3 Encounters:   02/14/17 95.3 kg (210 lb)   02/10/17 94.8 kg (209 lb)   01/27/17 93.9 kg (207 lb)              Today, you had the following     No orders found for display       Primary Care Provider Office Phone # Fax #    Ravinder Plunektt [phone]       [address] [address]        Thank you!     Thank you for choosing [facility]  for your care. Our goal is always to provide you with excellent care. Hearing back from our patients is one way we can continue to improve our services. Please take a few minutes to complete the written survey that you may receive in the mail after your visit with us. Thank you!             Your Updated Medication List - Protect others around you: Learn how to safely use, store and throw away your medicines at www.disposemymeds.org.          This list is accurate as of: 4/21/17  8:37 AM.  Always use your most recent med list.                    Brand Name Dispense Instructions for use    ACE NOT PRESCRIBED (INTENTIONAL)     0 each    ACE Inhibitor not prescribed due to Intolerance       aspirin 81 MG tablet      1 TABLET DAILY       blood glucose monitoring lancets     1 Box    Use to test blood sugars 1 times daily or as directed.       blood glucose monitoring test strip    ONE TOUCH ULTRA    100 each    Use to test blood sugars 1 times daily or as directed.       brimonidine 0.2 % ophthalmic solution    ALPHAGAN    1 Bottle    Place 1 drop into both eyes 2 times daily       CALCIUM + D PO      1 TABLET DAILY       flax seed oil 1000 MG capsule      1 CAPSULE DAILY       ibuprofen 200 MG tablet    ADVIL/MOTRIN     Take 200 mg by mouth every 4 hours as needed Reported on 4/21/2017       metFORMIN 500 MG tablet    GLUCOPHAGE    180 tablet    Take one tab at bkfst and one at supper       Multi-vitamin Tabs tablet   Generic drug:  multivitamin, therapeutic with minerals      DAILY       order for DME     1 Device    Equipment being ordered: cam walker       Saw Palmetto 1000 MG Caps     60 capsule    Take 2 capsules by mouth daily       STATIN NOT PRESCRIBED (INTENTIONAL)      Statin not prescribed intentionally due to Intolerance       TYLENOL 500 MG tablet   Generic drug:  acetaminophen      Take 1,000 mg by mouth every 6 hours as needed       VITAMIN D3 PO      Take by mouth daily

## 2017-04-21 NOTE — PROGRESS NOTES
Pre procedure Diagnosis: lumbosacral facet arthropathy, lumbosacral spondylosis   Post procedure Diagnosis: Same  Procedure performed: lumbosacral facet joint injections at L4-5 and L5-S1 bilaterally, fluoroscopically guided, contrast controlled  Indication:  Therapeutic for pain  Anesthesia: none  Complication:  none  Operators: Ronald El MD    Indications:   Ana Maria Duvall is a 76 year old male was sent by Dr. Jerardo Thompson for lumbar epidural steroid injection.  The patient has a history of chronic lower back pain without significant lower extremity pain.  Exam shows tenderness across the lower back and reproduction of pain with extension and lateral rotation of the lumbar spine bilaterally consistent with lumbar facet arthropathy.  They have tried conservative treatment including physical therapy, activity modifications, medications.    Options/alternatives, benefits and risks were discussed with the patient including bleeding, infection, flared pain, tissue trauma, exposure to radiation, reaction to medications including seizure, spinal cord injury, paralysis, weakness, numbness and headache.     Questions were answered to his satisfaction and he wishes to proceed. Voluntary informed consent was obtained and signed.     Vitals were reviewed: Yes  BP (!) 163/91  Pulse 65  SpO2 99%  Allergies were reviewed:  Yes   Medications were reviewed:  Yes   Pre-procedure pain score: 4/10    Procedure:  After obtaining signed informed consent, the patient was brought into the procedure suite and was placed in a prone position on the procedure table.   A Pause for the Cause was performed.  The patient was prepped and draped in the usual sterile fashion.     Under AP fluoroscopic guidance the L4-5 and L5-S1 facet joints on the right side were identified, and the C-arm was rotated obliquely to the affected side to open the joint space. A total of 1 ml of 1% lidocaine was injected at the needle entry points  and needle tracts. Then 27 gauge 3.5 inch spinal needles were inserted and advanced under fluoroscopic guidance targeting the superior articular pillar of each joint. Once the needles made contact with the SAP, they were rotated and advanced into the joints with positive pain reproduction.  Then, the left L4-5 and L5-S1 facet joints were identified with fluoroscopy.  The skin and subcutaneous tissues were anesthetized with Lidocaine 1%.  27 gauge 3.5 inch spinal needles were advanced into the facet joints utilizing intermittent fluoroscopy with positive pain reproduction.  Aspiration was negative at all the levels.    AP fluoroscopic views were obtained to confirm the needle placement. Then,  Omnipaque 300 contrast dye was injected after negative aspiration for heme and CSF in each joint, confirming appropriate placement.  A total of 1 ml of Omnipaque was used.  Omnipaque wasted:  9 ml.    The, each joint was injected with 1.5 ml of a combination of Depomedrol 80 mg and Bupivicaine 0.5% 4 ml and the needles were flushed with Lidocaine 1 % as they were withdrawn.  (total injectate volume 6 ml).     Hemostasis was achieved, the area was cleaned, and bandaids were placed when appropriate.  The patient tolerated the procedure well, and was taken to the recovery room.    Images were saved to PACS.    Post-procedure pain score: 3/10 with improved range of motion  Follow-up includes:   -f/u phone call in one week  -f/u with referring provider    Ronald El MD  [facility]

## 2017-04-24 ENCOUNTER — HOSPITAL ENCOUNTER (OUTPATIENT)
Dept: PHYSICAL THERAPY | Facility: CLINIC | Age: 77
Setting detail: THERAPIES SERIES
End: 2017-04-24
Attending: NEUROLOGICAL SURGERY
Payer: COMMERCIAL

## 2017-04-24 PROCEDURE — 97140 MANUAL THERAPY 1/> REGIONS: CPT | Mod: GP | Performed by: PHYSICAL THERAPIST

## 2017-04-24 PROCEDURE — 40000718 ZZHC STATISTIC PT DEPARTMENT ORTHO VISIT: Performed by: PHYSICAL THERAPIST

## 2017-04-24 NOTE — TELEPHONE ENCOUNTER
[facility], she states that since services have been rendered a new PA needs to be submitted with correct codes.       Faxed completed PA form and supporting documentation for Lumbar facet joint injection to [facility]. Right fax confirmation 4/24 at  10:27 am.        Shannon ALMANZAR    [facility]

## 2017-04-24 NOTE — TELEPHONE ENCOUNTER
Vida dela cruz from [employer], she is requesting the procedure note to be faxed. Procedure note faxed to [phone], right fax confirmation 4/24 at 3:24 pm.    Shannon ALMANZAR    [facility]

## 2017-04-25 NOTE — TELEPHONE ENCOUNTER
PA approved. LUMBAR FACET JOINT INJECTION- ALREADY COMPLETED 4/21/17  Effective date: 4/24/17  PA reference #: [id number]  Pt. notified:   Yes   Pt. informed directly. Called patient and informed him that his injection was approved.      Shannon ALMANZAR    [facility]

## 2017-04-27 ENCOUNTER — TELEPHONE (OUTPATIENT)
Dept: PALLIATIVE MEDICINE | Facility: CLINIC | Age: 77
End: 2017-04-27

## 2017-04-27 NOTE — TELEPHONE ENCOUNTER
Pt LM at 1049 on 4/27. Pt had injection last Friday and states that he is having lots of pain. Pain is in a new spot and is not improving. Would like call back. Phone # [phone].    Christy Presley    [facility]

## 2017-04-27 NOTE — TELEPHONE ENCOUNTER
Agree with nursing assessment - sounds like a pain flare that will ease with time.  Would allow time for steroid to work and continue with instructions by nurse.    Ronald El MD  [facility]

## 2017-04-27 NOTE — TELEPHONE ENCOUNTER
Pt had bilateral lumbar facet joint injections on 4/21/17.    Called pt. He felt good for 2 days after the injection.  He did overdo it after that.  He did miss work due to pain.  He had a PT appointment that he had to cancel.    Any new numbness/tingling?: No  Any weakness?  No  Any bowel or bladder issues?: No  Any New pain areas?: No-intense pain in one area of the low back.    Signs of infection? No  Fever/chills:  No  Pain relief from the injection? No    Informed pt that there do not appear to be any complications from the injection. Pt is aware that it can take up to 14 days for the steroid medication to take full effect. He/She will f/u with Dr. Thompson after 14 days and give an update. Will have Dr. Chloe El  review and only call pt back if he/she has other recommendations.     Informed him that the steroid medication can make his pain worse for a few days before taking full effect.    He is using ice and heat off and on. He is taking tylenol as needed but often enough.  He isn't sure if it is helpful. Talked to pt about doses of tylenol and ibuprofen.  He was advised to stagger use of these 2 meds and take as scheduled for the next 2 days.  He also has some topical meds that he will add to the regimen.      He has the after hours line if needed.    Pt is okay with this plan.  Nitza Velasco RN-BSN  [facility]

## 2017-05-01 ENCOUNTER — HOSPITAL ENCOUNTER (OUTPATIENT)
Dept: PHYSICAL THERAPY | Facility: CLINIC | Age: 77
Setting detail: THERAPIES SERIES
End: 2017-05-01
Attending: NEUROLOGICAL SURGERY
Payer: COMMERCIAL

## 2017-05-01 PROCEDURE — 97140 MANUAL THERAPY 1/> REGIONS: CPT | Mod: GP | Performed by: PHYSICAL THERAPIST

## 2017-05-01 PROCEDURE — 40000718 ZZHC STATISTIC PT DEPARTMENT ORTHO VISIT: Performed by: PHYSICAL THERAPIST

## 2017-05-01 PROCEDURE — 97535 SELF CARE MNGMENT TRAINING: CPT | Mod: GP | Performed by: PHYSICAL THERAPIST

## 2017-05-04 ENCOUNTER — HOSPITAL ENCOUNTER (OUTPATIENT)
Dept: PHYSICAL THERAPY | Facility: CLINIC | Age: 77
Setting detail: THERAPIES SERIES
End: 2017-05-04
Attending: NEUROLOGICAL SURGERY
Payer: COMMERCIAL

## 2017-05-04 PROCEDURE — 64550 ZZHC PT APPLICATION OF SURFACE NUEROSTIMULATOR: CPT | Mod: GP | Performed by: PHYSICAL THERAPIST

## 2017-05-04 PROCEDURE — 97140 MANUAL THERAPY 1/> REGIONS: CPT | Mod: GP | Performed by: PHYSICAL THERAPIST

## 2017-05-04 PROCEDURE — 40000718 ZZHC STATISTIC PT DEPARTMENT ORTHO VISIT: Performed by: PHYSICAL THERAPIST

## 2017-05-08 ENCOUNTER — HOSPITAL ENCOUNTER (OUTPATIENT)
Dept: PHYSICAL THERAPY | Facility: CLINIC | Age: 77
Setting detail: THERAPIES SERIES
End: 2017-05-08
Attending: NEUROLOGICAL SURGERY
Payer: COMMERCIAL

## 2017-05-08 PROCEDURE — 64550 ZZHC PT APPLICATION OF SURFACE NUEROSTIMULATOR: CPT | Mod: GP | Performed by: PHYSICAL THERAPIST

## 2017-05-08 PROCEDURE — 40000718 ZZHC STATISTIC PT DEPARTMENT ORTHO VISIT: Performed by: PHYSICAL THERAPIST

## 2017-05-08 PROCEDURE — 97140 MANUAL THERAPY 1/> REGIONS: CPT | Mod: GP | Performed by: PHYSICAL THERAPIST

## 2017-05-12 ENCOUNTER — HOSPITAL ENCOUNTER (OUTPATIENT)
Dept: PHYSICAL THERAPY | Facility: CLINIC | Age: 77
Setting detail: THERAPIES SERIES
End: 2017-05-12
Attending: NEUROLOGICAL SURGERY
Payer: COMMERCIAL

## 2017-05-12 PROCEDURE — 64550 ZZHC PT APPLICATION OF SURFACE NUEROSTIMULATOR: CPT | Mod: GP | Performed by: PHYSICAL THERAPIST

## 2017-05-12 PROCEDURE — 40000718 ZZHC STATISTIC PT DEPARTMENT ORTHO VISIT: Performed by: PHYSICAL THERAPIST

## 2017-05-22 ENCOUNTER — TELEPHONE (OUTPATIENT)
Dept: FAMILY MEDICINE | Facility: CLINIC | Age: 77
End: 2017-05-22

## 2017-05-22 ENCOUNTER — MEDICAL CORRESPONDENCE (OUTPATIENT)
Dept: HEALTH INFORMATION MANAGEMENT | Facility: CLINIC | Age: 77
End: 2017-05-22

## 2017-05-22 NOTE — TELEPHONE ENCOUNTER
Reason for Call:  Form, our goal is to have forms completed with 72 hours, however, some forms may require a visit or additional information.    Type of letter, form or note:  medical    Who is the form from?: [employer]    Where did the form come from: form was faxed in    What clinic location was the form placed at?: [place]    Where the form was placed: [facility]           Call taken on 5/22/2017 at 11:49 AM by Elizabeth Yeung

## 2017-06-01 DIAGNOSIS — I10 ESSENTIAL HYPERTENSION: Primary | ICD-10-CM

## 2017-06-01 DIAGNOSIS — Z13.6 CARDIOVASCULAR SCREENING; LDL GOAL LESS THAN 130: ICD-10-CM

## 2017-06-01 DIAGNOSIS — E11.9 TYPE 2 DIABETES MELLITUS WITHOUT COMPLICATION, WITHOUT LONG-TERM CURRENT USE OF INSULIN (H): ICD-10-CM

## 2017-06-01 NOTE — LETTER
[facility]  [address] [facility] [address]  Phone: [phone]  Fax: [phone]        June 2, 2017      Ana Maria Duvall                                                                                                             [id number] [facility] [address]            Dear Mr. Duvall,    We are concerned about your health care.  We recently provided you with a medication refill.  Many medications require routine follow-up with your Doctor.      At this time we ask that: You schedule a routine office visit with your physician to follow your Diabetes and High Blood pressure. Also needing fasting labs completed.     Your prescription: Has been refilled for 1 month so you may have time for the above noted follow-up.      Thank you,      Ravinder Plunkett MD/ ss

## 2017-06-01 NOTE — TELEPHONE ENCOUNTER
metFORMIN (GLUCOPHAGE) 500 MG tablet         Last Written Prescription Date: 3/29/2017  Last Fill Quantity: 180, # refills: 0  Last Office Visit with G, [facility] or [facility] prescribing provider:  2/10/2017        BP Readings from Last 3 Encounters:   04/21/17 (!) 163/91   02/14/17 151/88   02/10/17 142/80     Lab Results   Component Value Date    MICROL 10 08/01/2016     Lab Results   Component Value Date    UMALCR 12.45 08/01/2016     Creatinine   Date Value Ref Range Status   08/01/2016 0.76 0.66 - 1.25 mg/dL Final   ]  GFR Estimate   Date Value Ref Range Status   02/03/2017 >90 >60 mL/min/1.7m2 Final   08/01/2016 >90  Non  GFR Calc   >60 mL/min/1.7m2 Final   01/15/2015 88 >60 mL/min/1.7m2 Final     Comment:     Non  GFR Calc     GFR Estimate If Black   Date Value Ref Range Status   02/03/2017 >90 >60 mL/min/1.7m2 Final   08/01/2016 >90   GFR Calc   >60 mL/min/1.7m2 Final   01/15/2015 >90   GFR Calc   >60 mL/min/1.7m2 Final     Lab Results   Component Value Date    CHOL 188 11/09/2015     Lab Results   Component Value Date    HDL 47 11/09/2015     Lab Results   Component Value Date     11/09/2015     Lab Results   Component Value Date    TRIG 88 11/09/2015     Lab Results   Component Value Date    CHOLHDLRATIO 4.0 11/09/2015     Lab Results   Component Value Date    AST 22 08/01/2016     Lab Results   Component Value Date    ALT 32 08/01/2016     Lab Results   Component Value Date    A1C 5.8 08/01/2016    A1C 6.0 11/09/2015    A1C 5.9 04/09/2015    A1C 6.5 11/21/2014    A1C 6.3 06/21/2013     Potassium   Date Value Ref Range Status   08/01/2016 5.0 3.4 - 5.3 mmol/L Final

## 2017-06-02 ENCOUNTER — HOSPITAL ENCOUNTER (OUTPATIENT)
Dept: PHYSICAL THERAPY | Facility: CLINIC | Age: 77
Setting detail: THERAPIES SERIES
End: 2017-06-02
Attending: NEUROLOGICAL SURGERY
Payer: COMMERCIAL

## 2017-06-02 PROCEDURE — 40000718 ZZHC STATISTIC PT DEPARTMENT ORTHO VISIT: Performed by: PHYSICAL THERAPIST

## 2017-06-02 PROCEDURE — 97110 THERAPEUTIC EXERCISES: CPT | Mod: GP | Performed by: PHYSICAL THERAPIST

## 2017-06-02 NOTE — PROGRESS NOTES
Outpatient Physical Therapy Discharge Note     Patient: Ana Maria Duvall  : 1940    Beginning/End Dates of Reporting Period:  2017 to 2017    Referring Provider: Jay Swanson Diagnosis: Decreased mobility and strength secondary to chronic LBP     Client Self Report: Pt states he has been using the TENS unit for pain. pt states he has his days where he is sore, feels that the shot is really working. Pt states he has days where it really hurts at work, but uses the ice pack.     Objective Measurements:                                        Goals:  Goal Identifier Strength   Goal Description Pt will demonstrate atleast 4+/5 hip abduction strength bilaterally in order to climb stairs with <3/10 pain.   Target Date 17   Date Met  17   Progress:     Goal Identifier HEP   Goal Description Pt will demonstrate proper form and duration with HEP of hip and core strengthening in order to progress independently.   Target Date 17   Date Met  17   Progress:     Goal Identifier Work   Goal Description Pt will be able to lift atleast 50# w/ proper squat form and lifting mechanics in order to prevent recurring low back pain.   Target Date 17   Date Met  17   Progress:     Goal Identifier     Goal Description     Target Date     Date Met      Progress:     Goal Identifier     Goal Description     Target Date     Date Met      Progress:     Goal Identifier     Goal Description     Target Date     Date Met      Progress:     Goal Identifier     Goal Description     Target Date     Date Met      Progress:     Goal Identifier     Goal Description     Target Date     Date Met      Progress:     Progress Toward Goals:   Progress this reporting period: Pt has achieved all goals of lifting and participation at work without back pain limiting mobility. Pt is made aware of his pain and when he should stop to pay attention to what his body is telling him, then continue with his day.            Plan:  Discharge from therapy.    Discharge:    Reason for Discharge: Patient has met all goals.    Equipment Issued: grey therapy band    Discharge Plan: Patient to continue home program.

## 2017-06-08 ENCOUNTER — OFFICE VISIT (OUTPATIENT)
Dept: FAMILY MEDICINE | Facility: CLINIC | Age: 77
End: 2017-06-08
Payer: COMMERCIAL

## 2017-06-08 VITALS
WEIGHT: 205 LBS | DIASTOLIC BLOOD PRESSURE: 74 MMHG | SYSTOLIC BLOOD PRESSURE: 120 MMHG | BODY MASS INDEX: 32.18 KG/M2 | HEIGHT: 67 IN | RESPIRATION RATE: 18 BRPM | HEART RATE: 68 BPM | TEMPERATURE: 98.4 F

## 2017-06-08 DIAGNOSIS — I10 ESSENTIAL HYPERTENSION: ICD-10-CM

## 2017-06-08 DIAGNOSIS — Z78.9 STATIN INTOLERANCE: ICD-10-CM

## 2017-06-08 DIAGNOSIS — E11.9 TYPE 2 DIABETES MELLITUS WITHOUT COMPLICATION, WITHOUT LONG-TERM CURRENT USE OF INSULIN (H): ICD-10-CM

## 2017-06-08 DIAGNOSIS — K57.32 DIVERTICULITIS OF COLON: Primary | ICD-10-CM

## 2017-06-08 PROCEDURE — 99214 OFFICE O/P EST MOD 30 MIN: CPT | Performed by: FAMILY MEDICINE

## 2017-06-08 RX ORDER — CIPROFLOXACIN 500 MG/1
500 TABLET, FILM COATED ORAL 2 TIMES DAILY
Qty: 20 TABLET | Refills: 0 | Status: SHIPPED | OUTPATIENT
Start: 2017-06-08 | End: 2017-06-18

## 2017-06-08 RX ORDER — METRONIDAZOLE 500 MG/1
500 TABLET ORAL 3 TIMES DAILY
Qty: 20 TABLET | Refills: 0 | Status: SHIPPED | OUTPATIENT
Start: 2017-06-08 | End: 2017-10-20

## 2017-06-08 NOTE — PATIENT INSTRUCTIONS
Diverticulitis    Some people get pouches along the wall of the colon as they get older. The pouches, called diverticuli, usually cause no symptoms. If the pouches become blocked, you can get an infection. This infection is called diverticulitis. It causes pain in your lower abdomen and fever. If not treated, it can become a serious condition, causing an abscess to form inside the pouch. The abscess may block the intestinal tract even or rupture, spreading infection throughout the abdomen.  When treatment is started early, oral antibiotics alone may be enough to cure diverticulitis. This method is tried first. But, if you don't improve or if your condition gets worse while you are trying oral antibiotics, you may need to be admitted to the hospital for IV antibiotics. Severe cases may require surgery.  Home care  The following guidelines will help you care for yourself at home:    During the acute illness, rest and follow a low-fiber diet. Include foods like:    Flake cereal, mashed potatoes, pancakes, waffles, pasta, white bread, rice, applesauce, bananas, eggs, meat, fish, poultry, tofu, and cooked vegetables    Take antibiotics exactly as the doctor says. Don't miss any doses or stop taking the medication, even if you feel better.    Monitor your temperature and report any rising temperatures to your doctor.  Preventing future attacks  Once you have had an episode of diverticulitis, you are at risk for having it again. After you have recovered from this episode, you may be able to reduce your risk by eating a high-fiber diet (20-35 gm/day of fiber). This cleans out the colon pouches that already exist and prevents new ones from forming. Foods high in fiber include fresh fruits and edible peelings, raw or lightly cooked vegetables, whole grain cereals and breads, dried beans and peas, and bran.  Other steps that can help prevent future attacks include:    Take your medications, such as antibiotics, as the doctor  says.    Drink 6 to 8 glasses of water every day, unless directed otherwise.    Use a heating pad or hot water bottle to reduce abdominal cramping or pain.    Begin an exercise program. Ask your doctor how to get started. You can benefit from simple activities such as walking or gardening.    Treat diarrhea with a bland diet. Start with liquids only; then slowly add fiber over time.    Watch for changes in your bowel movements (constipation to diarrhea).    Get plenty of rest and sleep.  Follow-up care  Follow up with your doctor as advised or sooner if you are not getting better in the next 2 days.  When to seek medical care  Get prompt medical attention if any of the following occur:    Fever of 100.4 F (38 C) or higher, or as directed by your health care provider    Repeated vomiting or swelling of the abdomen    Weakness, dizziness, light-headedness    Pain in your abdomen that gets worse, severe, or spreads to your back    Pain that moves to the right lower abdomen    Rectal bleeding (stools that are red, black or maroon color)    Unexpected vaginal bleeding    5173-9561 [employer]. [address] [place] [address]. All rights reserved. This information is not intended as a substitute for professional medical care. Always follow your healthcare professional's instructions.        Patient Education    Ciprofloxacin Hydrochloride Ophthalmic drops, solution    Ciprofloxacin Hydrochloride Ophthalmic ointment    Ciprofloxacin Hydrochloride Oral tablet    Ciprofloxacin Hydrochloride Oral tablet, extended-release    Ciprofloxacin Hydrochloride Otic drops, solution    Ciprofloxacin Oral suspension    Ciprofloxacin Solution for injection    Ciprofloxacin, Dextrose Solution for injection  Ciprofloxacin Hydrochloride Oral tablet  What is this medicine?  CIPROFLOXACIN (sip ana laura FLOX a sin) is a quinolone antibiotic. It is used to treat certain kinds of bacterial infections. It will not work for  colds, flu, or other viral infections.  This medicine may be used for other purposes; ask your health care provider or pharmacist if you have questions.  What should I tell my health care provider before I take this medicine?  They need to know if you have any of these conditions:    bone problems    cerebral disease    joint problems    irregular heartbeat    kidney disease    liver disease    myasthenia gravis    seizure disorder    tendon problems    an unusual or allergic reaction to ciprofloxacin, other antibiotics or medicines, foods, dyes, or preservatives    pregnant or trying to get pregnant    breast-feeding  How should I use this medicine?  Take this medicine by mouth with a glass of water. Follow the directions on the prescription label. Take your medicine at regular intervals. Do not take your medicine more often than directed. Take all of your medicine as directed even if you think your are better. Do not skip doses or stop your medicine early.  You can take this medicine with food or on an empty stomach. It can be taken with a meal that contains dairy or calcium, but do not take it alone with a dairy product, like milk or yogurt or calcium-fortified juice.  A special MedGuide will be given to you by the pharmacist with each prescription and refill. Be sure to read this information carefully each time.  Talk to your pediatrician regarding the use of this medicine in children. Special care may be needed.  Overdosage: If you think you have taken too much of this medicine contact a poison control center or emergency room at once.  NOTE: This medicine is only for you. Do not share this medicine with others.  What if I miss a dose?  If you miss a dose, take it as soon as you can. If it is almost time for your next dose, take only that dose. Do not take double or extra doses.  What may interact with this medicine?  Do not take this medicine with any of the following  medications:    cisapride    droperidol    terfenadine    tizanidine  This medicine may also interact with the following medications:    antacids    birth control pills    caffeine    cyclosporin    didanosine (ddI) buffered tablets or powder    medicines for diabetes    medicines for inflammation like ibuprofen, naproxen    methotrexate    multivitamins    omeprazole    phenytoin    probenecid    sucralfate    theophylline    warfarin  This list may not describe all possible interactions. Give your health care provider a list of all the medicines, herbs, non-prescription drugs, or dietary supplements you use. Also tell them if you smoke, drink alcohol, or use illegal drugs. Some items may interact with your medicine.  What should I watch for while using this medicine?  Tell your doctor or health care professional if your symptoms do not improve.  Do not treat diarrhea with over the counter products. Contact your doctor if you have diarrhea that lasts more than 2 days or if it is severe and watery.  You may get drowsy or dizzy. Do not drive, use machinery, or do anything that needs mental alertness until you know how this medicine affects you. Do not stand or sit up quickly, especially if you are an older patient. This reduces the risk of dizzy or fainting spells.  This medicine can make you more sensitive to the sun. Keep out of the sun. If you cannot avoid being in the sun, wear protective clothing and use sunscreen. Do not use sun lamps or tanning beds/booths.  Avoid antacids, aluminum, calcium, iron, magnesium, and zinc products for 6 hours before and 2 hours after taking a dose of this medicine.  What side effects may I notice from receiving this medicine?  Side effects that you should report to your doctor or health care professional as soon as possible:    allergic reactions like skin rash, itching or hives, swelling of the face, lips, or tongue    breathing problems    confusion, nightmares or  hallucinations    feeling faint or lightheaded, falls    irregular heartbeat    joint, muscle or tendon pain or swelling    pain or trouble passing urine    persistent headache with or without blurred vision    redness, blistering, peeling or loosening of the skin, including inside the mouth    seizure    unusual pain, numbness, tingling, or weakness  Side effects that usually do not require medical attention (report to your doctor or health care professional if they continue or are bothersome):    diarrhea    nausea or stomach upset    white patches or sores in the mouth  This list may not describe all possible side effects. Call your doctor for medical advice about side effects. You may report side effects to FDA at 5-455-FDA-8930.  Where should I keep my medicine?  Keep out of the reach of children.  Store at room temperature below 30 degrees C (86 degrees F). Keep container tightly closed. Throw away any unused medicine after the expiration date.  NOTE:This sheet is a summary. It may not cover all possible information. If you have questions about this medicine, talk to your doctor, pharmacist, or health care provider. Copyright  2016 [employer]        Patient Education    Butylparaben, Cetyl Alcohol, Methylparaben, Propylene Glycol, Propylparaben, Sodium Lauryl Sulfate, Stearyl Alcohol, Water Topical emulsion, Metronidazole Topical gel    Metronidazole Oral capsule    Metronidazole Oral tablet    Metronidazole Oral tablet, extended-release    Metronidazole Solution for injection    Metronidazole Topical cream    Metronidazole Topical gel    Metronidazole Topical lotion    Metronidazole Vaginal gel  Metronidazole Oral tablet  What is this medicine?  METRONIDAZOLE (me troe NI da zole) is an antiinfective. It is used to treat certain kinds of bacterial and protozoal infections. It will not work for colds, flu, or other viral infections.  This medicine may be used for other purposes; ask your health care provider  or pharmacist if you have questions.  What should I tell my health care provider before I take this medicine?  They need to know if you have any of these conditions:    anemia or other blood disorders    disease of the nervous system    fungal or yeast infection    if you drink alcohol containing drinks    liver disease    seizures    an unusual or allergic reaction to metronidazole, or other medicines, foods, dyes, or preservatives    pregnant or trying to get pregnant    breast-feeding  How should I use this medicine?  Take this medicine by mouth with a full glass of water. Follow the directions on the prescription label. Take your medicine at regular intervals. Do not take your medicine more often than directed. Take all of your medicine as directed even if you think you are better. Do not skip doses or stop your medicine early.  Talk to your pediatrician regarding the use of this medicine in children. Special care may be needed.  Overdosage: If you think you have taken too much of this medicine contact a poison control center or emergency room at once.  NOTE: This medicine is only for you. Do not share this medicine with others.  What if I miss a dose?  If you miss a dose, take it as soon as you can. If it is almost time for your next dose, take only that dose. Do not take double or extra doses.  What may interact with this medicine?  Do not take this medicine with any of the following medications:    alcohol or any product that contains alcohol    amprenavir oral solution    cisapride    disulfiram    dofetilide    dronedarone    paclitaxel injection    pimozide    ritonavir oral solution    sertraline oral solution    sulfamethoxazole-trimethoprim injection    thioridazine    ziprasidone  This medicine may also interact with the following medications:    birth control pills    cimetidine    lithium    other medicines that prolong the QT interval (cause an abnormal heart  rhythm)    phenobarbital    phenytoin    warfarin  This list may not describe all possible interactions. Give your health care provider a list of all the medicines, herbs, non-prescription drugs, or dietary supplements you use. Also tell them if you smoke, drink alcohol, or use illegal drugs. Some items may interact with your medicine.  What should I watch for while using this medicine?  Tell your doctor or health care professional if your symptoms do not improve or if they get worse.  You may get drowsy or dizzy. Do not drive, use machinery, or do anything that needs mental alertness until you know how this medicine affects you. Do not stand or sit up quickly, especially if you are an older patient. This reduces the risk of dizzy or fainting spells.  Avoid alcoholic drinks while you are taking this medicine and for three days afterward. Alcohol may make you feel dizzy, sick, or flushed.  If you are being treated for a sexually transmitted disease, avoid sexual contact until you have finished your treatment. Your sexual partner may also need treatment.  What side effects may I notice from receiving this medicine?  Side effects that you should report to your doctor or health care professional as soon as possible:    allergic reactions like skin rash or hives, swelling of the face, lips, or tongue    confusion, clumsiness    difficulty speaking    discolored or sore mouth    dizziness    fever, infection    numbness, tingling, pain or weakness in the hands or feet    trouble passing urine or change in the amount of urine    redness, blistering, peeling or loosening of the skin, including inside the mouth    seizures    unusually weak or tired    vaginal irritation, dryness, or discharge  Side effects that usually do not require medical attention (report to your doctor or health care professional if they continue or are bothersome):    diarrhea    headache    irritability    metallic taste    nausea    stomach pain or  cramps    trouble sleeping  This list may not describe all possible side effects. Call your doctor for medical advice about side effects. You may report side effects to FDA at 7-962-OSQ-3329.  Where should I keep my medicine?  Keep out of the reach of children.  Store at room temperature below 25 degrees C (77 degrees F). Protect from light. Keep container tightly closed. Throw away any unused medicine after the expiration date.  NOTE:This sheet is a summary. It may not cover all possible information. If you have questions about this medicine, talk to your doctor, pharmacist, or health care provider. Copyright  2016 [employer]        4 Steps for Eating Healthier  Changing the way you eat can improve your health. It can lower your cholesterol and blood pressure, and help you stay at a healthy weight. Your diet doesn t have to be bland and boring to be healthy. Just watch your calories and follow these steps:    1. Eat fewer unhealthy fats    Choose more fish and lean meats instead of fatty cuts of meat.    Skip butter and lard, and use less margarine.    Pass on foods that have palm, coconut, or hydrogenated oils.    Eat fewer high-fat dairy foods like cheese, ice cream, and whole milk.    Get a heart-healthy cookbook and try some low-fat recipes.  2. Go light on salt    Keep the saltshaker off the table.    Limit high-salt ingredients, such as soy sauce, bouillon, and garlic salt.    Instead of adding salt when cooking, season your food with herbs and flavorings. Try lemon, garlic, and onion.    Limit convenience foods, such as boxed or canned foods and restaurant food.    Read food labels and choose lower-sodium options.  3. Limit sugar    Pause before you add sugars to pancakes, cereal, coffee, or tea. This includes white and brown table sugar, syrup, honey, and molasses. Cut your usual amount by half.    Use non-sugar sweeteners. Stevia, aspartame, and sucralose can satisfy a sweet tooth without adding  calories.    Swap out sugar-filled soda and other drinks. Buy sugar-free or low-calorie beverages. Remember water is always the best choice.    Read labels and choose foods with less added sugar. Keep in mind that dairy foods and foods with fruit will have some natural sugar.    Cut the sugar in recipes by 1/3 to 1/2. Boost the flavor with extracts like almond, vanilla, or orange. Or add spices such as cinnamon or nutmeg.  4. Eat more fiber    Eat fresh fruits and vegetables every day.    Boost your diet with whole grains. Go for oats, whole-grain rice, and bran.    Add beans and lentils to your meals.    Drink more water to match your fiber increase. This is to help prevent constipation.    1779-3624 [employer]. [address] [place] [address]. All rights reserved. This information is not intended as a substitute for professional medical care. Always follow your healthcare professional's instructions.        Low-Salt Choices  Eating salt (sodium) can make your body retain too much water. Excess water makes your heart work harder. Canned, packaged, and frozen foods are easy to prepare, but they are often high in sodium. Here are some ideas for low-salt foods you can easily prepare yourself.    For breakfast    Fruit or 100% fruit juice    Whole-wheat bread or an English muffin. Compare sodium content on labels.    Low-fat milk or yogurt    Unsalted eggs    Shredded wheat    Corn tortillas    Unsalted steamed rice    Regular (not instant) hot cereal, made without salt  Stay away from:    Sausage, majano, and ham    Flour tortillas    Packaged muffins, pancakes, and biscuits    Instant hot cereals    Cottage cheese  For lunch and dinner    Fresh fish, chicken, turkey, or meat--baked, broiled, or roasted without salt    Dry beans, cooked without salt    Tofu, stir-fried without salt    Unsalted fresh fruit and vegetables, or frozen or canned fruit and vegetables with no added salt  Stay away  from:    Lunch or deli meat that is cured or smoked    Cheese    Tomato juice and catsup    Canned vegetables, soups, and fish not labeled as no-salt-added or reduced sodium    Packaged gravies and sauces    Olives, pickles, and relish    Bottled salad dressings  For snacks and desserts    Yogurt    Unsalted, air popped popcorn    Unsalted nuts or seeds  Stay away from:    Pies and cakes    Packaged dessert mixes    Pizza    Canned and packaged puddings    Pretzels, chips, crackers, and nuts--unless the label says unsalted    5667-0068 [employer]. [address] [place] [address], [place] [address]. All rights reserved. This information is not intended as a substitute for professional medical care. Always follow your healthcare professional's instructions.

## 2017-06-08 NOTE — NURSING NOTE
"Chief Complaint   Patient presents with     Diabetes     Hypertension     Abdominal Cramping       Initial /74 (Cuff Size: Adult Regular)  Pulse 68  Temp 98.4  F (36.9  C) (Tympanic)  Resp 18  Ht 5' 7\" (1.702 m)  Wt 205 lb (93 kg)  BMI 32.11 kg/m2 Estimated body mass index is 32.11 kg/(m^2) as calculated from the following:    Height as of this encounter: 5' 7\" (1.702 m).    Weight as of this encounter: 205 lb (93 kg).  Medication Reconciliation: complete    Health Maintenance that is potentially due pending provider review:  NONE    n/a    "

## 2017-06-08 NOTE — PROGRESS NOTES
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    Use non-sugar sweeteners. Stevia, aspartame, and sucralose can satisfy a sweet tooth without adding calories.    Swap out sugar-filled soda and other drinks. Buy sugar-free or low-calorie beverages. Remember water is always the best choice.    Read labels and choose foods with less added sugar. Keep in mind that dairy foods and foods with fruit will have some natural sugar.    Cut the sugar in recipes by 1/3 to 1/2. Boost the flavor with extracts like almond, vanilla, or orange. Or add spices such as cinnamon or nutmeg.  4. Eat more fiber    Eat fresh fruits and vegetables every day.    Boost your diet with whole grains. Go for oats, whole-grain rice, and bran.    Add beans and lentils to your meals.    Drink more water to match your fiber increase. This is to help prevent constipation.    0403-4190 [employer]. [address] [place] [address], [place] [address]. All rights reserved. This information is not intended as a substitute for professional medical care. Always follow your healthcare professional's instructions.        Low-Salt Choices  Eating salt (sodium) can make your body retain too much water. Excess water makes your heart work harder. Canned, packaged, and frozen foods are easy to prepare, but they are often high in sodium. Here are some ideas for low-salt foods  you can easily prepare yourself.    For breakfast    Fruit or 100% fruit juice    Whole-wheat bread or an English muffin. Compare sodium content on labels.    Low-fat milk or yogurt    Unsalted eggs    Shredded wheat    Corn tortillas    Unsalted steamed rice    Regular (not instant) hot cereal, made without salt  Stay away from:    Sausage, majano, and ham    Flour tortillas    Packaged muffins, pancakes, and biscuits    Instant hot cereals    Cottage cheese  For lunch and dinner    Fresh fish, chicken, turkey, or meat--baked, broiled, or roasted without salt    Dry beans, cooked without salt    Tofu, stir-fried without salt    Unsalted fresh fruit and vegetables, or frozen or canned fruit and vegetables with no added salt  Stay away from:    Lunch or deli meat that is cured or smoked    Cheese    Tomato juice and catsup    Canned vegetables, soups, and fish not labeled as no-salt-added or reduced sodium    Packaged gravies and sauces    Olives, pickles, and relish    Bottled salad dressings  For snacks and desserts    Yogurt    Unsalted, air popped popcorn    Unsalted nuts or seeds  Stay away from:    Pies and cakes    Packaged dessert mixes    Pizza    Canned and packaged puddings    Pretzels, chips, crackers, and nuts--unless the label says unsalted    5224-6119 [employer]. [address] [place] [address], [place] [address]. All rights reserved. This information is not intended as a substitute for professional medical care. Always follow your healthcare professional's instructions.            See Meng MD  [facility]

## 2017-06-08 NOTE — MR AVS SNAPSHOT
After Visit Summary   6/8/2017    Ana Maria Duvall    MRN: [id number]           Patient Information     Date Of Birth          1940        Visit Information        Provider Department      6/8/2017 8:00 AM See Meng MD [facility]        Today's Diagnoses     Diverticulitis of colon    -  1    Type 2 diabetes mellitus without complication, without long-term current use of insulin (H)        Essential hypertension        Statin intolerance          Care Instructions      Diverticulitis    Some people get pouches along the wall of the colon as they get older. The pouches, called diverticuli, usually cause no symptoms. If the pouches become blocked, you can get an infection. This infection is called diverticulitis. It causes pain in your lower abdomen and fever. If not treated, it can become a serious condition, causing an abscess to form inside the pouch. The abscess may block the intestinal tract even or rupture, spreading infection throughout the abdomen.  When treatment is started early, oral antibiotics alone may be enough to cure diverticulitis. This method is tried first. But, if you don't improve or if your condition gets worse while you are trying oral antibiotics, you may need to be admitted to the hospital for IV antibiotics. Severe cases may require surgery.  Home care  The following guidelines will help you care for yourself at home:    During the acute illness, rest and follow a low-fiber diet. Include foods like:    Flake cereal, mashed potatoes, pancakes, waffles, pasta, white bread, rice, applesauce, bananas, eggs, meat, fish, poultry, tofu, and cooked vegetables    Take antibiotics exactly as the doctor says. Don't miss any doses or stop taking the medication, even if you feel better.    Monitor your temperature and report any rising temperatures to your doctor.  Preventing future attacks  Once you have had an episode of diverticulitis, you are at risk for  having it again. After you have recovered from this episode, you may be able to reduce your risk by eating a high-fiber diet (20-35 gm/day of fiber). This cleans out the colon pouches that already exist and prevents new ones from forming. Foods high in fiber include fresh fruits and edible peelings, raw or lightly cooked vegetables, whole grain cereals and breads, dried beans and peas, and bran.  Other steps that can help prevent future attacks include:    Take your medications, such as antibiotics, as the doctor says.    Drink 6 to 8 glasses of water every day, unless directed otherwise.    Use a heating pad or hot water bottle to reduce abdominal cramping or pain.    Begin an exercise program. Ask your doctor how to get started. You can benefit from simple activities such as walking or gardening.    Treat diarrhea with a bland diet. Start with liquids only; then slowly add fiber over time.    Watch for changes in your bowel movements (constipation to diarrhea).    Get plenty of rest and sleep.  Follow-up care  Follow up with your doctor as advised or sooner if you are not getting better in the next 2 days.  When to seek medical care  Get prompt medical attention if any of the following occur:    Fever of 100.4 F (38 C) or higher, or as directed by your health care provider    Repeated vomiting or swelling of the abdomen    Weakness, dizziness, light-headedness    Pain in your abdomen that gets worse, severe, or spreads to your back    Pain that moves to the right lower abdomen    Rectal bleeding (stools that are red, black or maroon color)    Unexpected vaginal bleeding    3535-1339 [employer]. [address] [place] [address], [place] [address]. All rights reserved. This information is not intended as a substitute for professional medical care. Always follow your healthcare professional's instructions.        Patient Education    Ciprofloxacin Hydrochloride Ophthalmic drops, solution    Ciprofloxacin  Hydrochloride Ophthalmic ointment    Ciprofloxacin Hydrochloride Oral tablet    Ciprofloxacin Hydrochloride Oral tablet, extended-release    Ciprofloxacin Hydrochloride Otic drops, solution    Ciprofloxacin Oral suspension    Ciprofloxacin Solution for injection    Ciprofloxacin, Dextrose Solution for injection  Ciprofloxacin Hydrochloride Oral tablet  What is this medicine?  CIPROFLOXACIN (sip ana laura FLOX a sin) is a quinolone antibiotic. It is used to treat certain kinds of bacterial infections. It will not work for colds, flu, or other viral infections.  This medicine may be used for other purposes; ask your health care provider or pharmacist if you have questions.  What should I tell my health care provider before I take this medicine?  They need to know if you have any of these conditions:    bone problems    cerebral disease    joint problems    irregular heartbeat    kidney disease    liver disease    myasthenia gravis    seizure disorder    tendon problems    an unusual or allergic reaction to ciprofloxacin, other antibiotics or medicines, foods, dyes, or preservatives    pregnant or trying to get pregnant    breast-feeding  How should I use this medicine?  Take this medicine by mouth with a glass of water. Follow the directions on the prescription label. Take your medicine at regular intervals. Do not take your medicine more often than directed. Take all of your medicine as directed even if you think your are better. Do not skip doses or stop your medicine early.  You can take this medicine with food or on an empty stomach. It can be taken with a meal that contains dairy or calcium, but do not take it alone with a dairy product, like milk or yogurt or calcium-fortified juice.  A special MedGuide will be given to you by the pharmacist with each prescription and refill. Be sure to read this information carefully each time.  Talk to your pediatrician regarding the use of this medicine in children. Special care  may be needed.  Overdosage: If you think you have taken too much of this medicine contact a poison control center or emergency room at once.  NOTE: This medicine is only for you. Do not share this medicine with others.  What if I miss a dose?  If you miss a dose, take it as soon as you can. If it is almost time for your next dose, take only that dose. Do not take double or extra doses.  What may interact with this medicine?  Do not take this medicine with any of the following medications:    cisapride    droperidol    terfenadine    tizanidine  This medicine may also interact with the following medications:    antacids    birth control pills    caffeine    cyclosporin    didanosine (ddI) buffered tablets or powder    medicines for diabetes    medicines for inflammation like ibuprofen, naproxen    methotrexate    multivitamins    omeprazole    phenytoin    probenecid    sucralfate    theophylline    warfarin  This list may not describe all possible interactions. Give your health care provider a list of all the medicines, herbs, non-prescription drugs, or dietary supplements you use. Also tell them if you smoke, drink alcohol, or use illegal drugs. Some items may interact with your medicine.  What should I watch for while using this medicine?  Tell your doctor or health care professional if your symptoms do not improve.  Do not treat diarrhea with over the counter products. Contact your doctor if you have diarrhea that lasts more than 2 days or if it is severe and watery.  You may get drowsy or dizzy. Do not drive, use machinery, or do anything that needs mental alertness until you know how this medicine affects you. Do not stand or sit up quickly, especially if you are an older patient. This reduces the risk of dizzy or fainting spells.  This medicine can make you more sensitive to the sun. Keep out of the sun. If you cannot avoid being in the sun, wear protective clothing and use sunscreen. Do not use sun lamps or  tanning beds/booths.  Avoid antacids, aluminum, calcium, iron, magnesium, and zinc products for 6 hours before and 2 hours after taking a dose of this medicine.  What side effects may I notice from receiving this medicine?  Side effects that you should report to your doctor or health care professional as soon as possible:    allergic reactions like skin rash, itching or hives, swelling of the face, lips, or tongue    breathing problems    confusion, nightmares or hallucinations    feeling faint or lightheaded, falls    irregular heartbeat    joint, muscle or tendon pain or swelling    pain or trouble passing urine    persistent headache with or without blurred vision    redness, blistering, peeling or loosening of the skin, including inside the mouth    seizure    unusual pain, numbness, tingling, or weakness  Side effects that usually do not require medical attention (report to your doctor or health care professional if they continue or are bothersome):    diarrhea    nausea or stomach upset    white patches or sores in the mouth  This list may not describe all possible side effects. Call your doctor for medical advice about side effects. You may report side effects to FDA at 8-272-FDA-1648.  Where should I keep my medicine?  Keep out of the reach of children.  Store at room temperature below 30 degrees C (86 degrees F). Keep container tightly closed. Throw away any unused medicine after the expiration date.  NOTE:This sheet is a summary. It may not cover all possible information. If you have questions about this medicine, talk to your doctor, pharmacist, or health care provider. Copyright  2016 [employer]        Patient Education    Butylparaben, Cetyl Alcohol, Methylparaben, Propylene Glycol, Propylparaben, Sodium Lauryl Sulfate, Stearyl Alcohol, Water Topical emulsion, Metronidazole Topical gel    Metronidazole Oral capsule    Metronidazole Oral tablet    Metronidazole Oral tablet,  extended-release    Metronidazole Solution for injection    Metronidazole Topical cream    Metronidazole Topical gel    Metronidazole Topical lotion    Metronidazole Vaginal gel  Metronidazole Oral tablet  What is this medicine?  METRONIDAZOLE (me marcos NI shree zoltaylor) is an antiinfective. It is used to treat certain kinds of bacterial and protozoal infections. It will not work for colds, flu, or other viral infections.  This medicine may be used for other purposes; ask your health care provider or pharmacist if you have questions.  What should I tell my health care provider before I take this medicine?  They need to know if you have any of these conditions:    anemia or other blood disorders    disease of the nervous system    fungal or yeast infection    if you drink alcohol containing drinks    liver disease    seizures    an unusual or allergic reaction to metronidazole, or other medicines, foods, dyes, or preservatives    pregnant or trying to get pregnant    breast-feeding  How should I use this medicine?  Take this medicine by mouth with a full glass of water. Follow the directions on the prescription label. Take your medicine at regular intervals. Do not take your medicine more often than directed. Take all of your medicine as directed even if you think you are better. Do not skip doses or stop your medicine early.  Talk to your pediatrician regarding the use of this medicine in children. Special care may be needed.  Overdosage: If you think you have taken too much of this medicine contact a poison control center or emergency room at once.  NOTE: This medicine is only for you. Do not share this medicine with others.  What if I miss a dose?  If you miss a dose, take it as soon as you can. If it is almost time for your next dose, take only that dose. Do not take double or extra doses.  What may interact with this medicine?  Do not take this medicine with any of the following medications:    alcohol or any product  that contains alcohol    amprenavir oral solution    cisapride    disulfiram    dofetilide    dronedarone    paclitaxel injection    pimozide    ritonavir oral solution    sertraline oral solution    sulfamethoxazole-trimethoprim injection    thioridazine    ziprasidone  This medicine may also interact with the following medications:    birth control pills    cimetidine    lithium    other medicines that prolong the QT interval (cause an abnormal heart rhythm)    phenobarbital    phenytoin    warfarin  This list may not describe all possible interactions. Give your health care provider a list of all the medicines, herbs, non-prescription drugs, or dietary supplements you use. Also tell them if you smoke, drink alcohol, or use illegal drugs. Some items may interact with your medicine.  What should I watch for while using this medicine?  Tell your doctor or health care professional if your symptoms do not improve or if they get worse.  You may get drowsy or dizzy. Do not drive, use machinery, or do anything that needs mental alertness until you know how this medicine affects you. Do not stand or sit up quickly, especially if you are an older patient. This reduces the risk of dizzy or fainting spells.  Avoid alcoholic drinks while you are taking this medicine and for three days afterward. Alcohol may make you feel dizzy, sick, or flushed.  If you are being treated for a sexually transmitted disease, avoid sexual contact until you have finished your treatment. Your sexual partner may also need treatment.  What side effects may I notice from receiving this medicine?  Side effects that you should report to your doctor or health care professional as soon as possible:    allergic reactions like skin rash or hives, swelling of the face, lips, or tongue    confusion, clumsiness    difficulty speaking    discolored or sore mouth    dizziness    fever, infection    numbness, tingling, pain or weakness in the hands or  feet    trouble passing urine or change in the amount of urine    redness, blistering, peeling or loosening of the skin, including inside the mouth    seizures    unusually weak or tired    vaginal irritation, dryness, or discharge  Side effects that usually do not require medical attention (report to your doctor or health care professional if they continue or are bothersome):    diarrhea    headache    irritability    metallic taste    nausea    stomach pain or cramps    trouble sleeping  This list may not describe all possible side effects. Call your doctor for medical advice about side effects. You may report side effects to FDA at 9-187-NWV-3717.  Where should I keep my medicine?  Keep out of the reach of children.  Store at room temperature below 25 degrees C (77 degrees F). Protect from light. Keep container tightly closed. Throw away any unused medicine after the expiration date.  NOTE:This sheet is a summary. It may not cover all possible information. If you have questions about this medicine, talk to your doctor, pharmacist, or health care provider. Copyright  2016 [employer]        4 Steps for Eating Healthier  Changing the way you eat can improve your health. It can lower your cholesterol and blood pressure, and help you stay at a healthy weight. Your diet doesn t have to be bland and boring to be healthy. Just watch your calories and follow these steps:    1. Eat fewer unhealthy fats    Choose more fish and lean meats instead of fatty cuts of meat.    Skip butter and lard, and use less margarine.    Pass on foods that have palm, coconut, or hydrogenated oils.    Eat fewer high-fat dairy foods like cheese, ice cream, and whole milk.    Get a heart-healthy cookbook and try some low-fat recipes.  2. Go light on salt    Keep the saltshaker off the table.    Limit high-salt ingredients, such as soy sauce, bouillon, and garlic salt.    Instead of adding salt when cooking, season your food with herbs and  flavorings. Try lemon, garlic, and onion.    Limit convenience foods, such as boxed or canned foods and restaurant food.    Read food labels and choose lower-sodium options.  3. Limit sugar    Pause before you add sugars to pancakes, cereal, coffee, or tea. This includes white and brown table sugar, syrup, honey, and molasses. Cut your usual amount by half.    Use non-sugar sweeteners. Stevia, aspartame, and sucralose can satisfy a sweet tooth without adding calories.    Swap out sugar-filled soda and other drinks. Buy sugar-free or low-calorie beverages. Remember water is always the best choice.    Read labels and choose foods with less added sugar. Keep in mind that dairy foods and foods with fruit will have some natural sugar.    Cut the sugar in recipes by 1/3 to 1/2. Boost the flavor with extracts like almond, vanilla, or orange. Or add spices such as cinnamon or nutmeg.  4. Eat more fiber    Eat fresh fruits and vegetables every day.    Boost your diet with whole grains. Go for oats, whole-grain rice, and bran.    Add beans and lentils to your meals.    Drink more water to match your fiber increase. This is to help prevent constipation.    2677-7894 [employer]. [address] [place] [address], [place] [address]. All rights reserved. This information is not intended as a substitute for professional medical care. Always follow your healthcare professional's instructions.        Low-Salt Choices  Eating salt (sodium) can make your body retain too much water. Excess water makes your heart work harder. Canned, packaged, and frozen foods are easy to prepare, but they are often high in sodium. Here are some ideas for low-salt foods you can easily prepare yourself.    For breakfast    Fruit or 100% fruit juice    Whole-wheat bread or an English muffin. Compare sodium content on labels.    Low-fat milk or yogurt    Unsalted eggs    Shredded wheat    Corn tortillas    Unsalted steamed rice    Regular (not instant)  hot cereal, made without salt  Stay away from:    Sausage, majano, and ham    Flour tortillas    Packaged muffins, pancakes, and biscuits    Instant hot cereals    Cottage cheese  For lunch and dinner    Fresh fish, chicken, turkey, or meat--baked, broiled, or roasted without salt    Dry beans, cooked without salt    Tofu, stir-fried without salt    Unsalted fresh fruit and vegetables, or frozen or canned fruit and vegetables with no added salt  Stay away from:    Lunch or deli meat that is cured or smoked    Cheese    Tomato juice and catsup    Canned vegetables, soups, and fish not labeled as no-salt-added or reduced sodium    Packaged gravies and sauces    Olives, pickles, and relish    Bottled salad dressings  For snacks and desserts    Yogurt    Unsalted, air popped popcorn    Unsalted nuts or seeds  Stay away from:    Pies and cakes    Packaged dessert mixes    Pizza    Canned and packaged puddings    Pretzels, chips, crackers, and nuts--unless the label says unsalted    1188-4647 [employer]. [address] [place] [address]. All rights reserved. This information is not intended as a substitute for professional medical care. Always follow your healthcare professional's instructions.                Follow-ups after your visit        Your next 10 appointments already scheduled     Jun 09, 2017  8:15 AM CDT   LAB with PI LAB   [facility] ([facility])    [address] [place] [id number]   [phone]           Patient must bring picture ID.  Patient should be prepared to give a urine specimen  Please do not eat 10-12 hours before your appointment if you are coming in fasting for labs on lipids, cholesterol, or glucose (sugar).  Pregnant women should follow their Care Team instructions. Water with medications is okay. Do not drink coffee or other fluids.   If you have concerns about taking  your medications, please ask at office or if scheduling  via [employer], send a message by clicking on Secure Messaging, Message Your Care Team.              Future tests that were ordered for you today     Open Future Orders        Priority Expected Expires Ordered    Lipid Profile with reflex to direct LDL Routine 6/9/2017 6/8/2018 6/8/2017    Hemoglobin A1c Routine 6/9/2017 6/8/2018 6/8/2017    TSH with free T4 reflex Routine 6/9/2017 6/8/2018 6/8/2017    Basic metabolic panel  (Ca, Cl, CO2, Creat, Gluc, K, Na, BUN) Routine 6/9/2017 6/8/2018 6/8/2017    CBC with platelets Routine 6/9/2017 6/8/2018 6/8/2017            Who to contact     If you have questions or need follow up information about today's clinic visit or your schedule please contact [facility] directly at [phone].  Normal or non-critical lab and imaging results will be communicated to you by [employer], letter or phone within 4 business days after the clinic has received the results. If you do not hear from us within 7 days, please contact the clinic through "[employer]t or phone. If you have a critical or abnormal lab result, we will notify you by phone as soon as possible.  Submit refill requests through [employer] or call your pharmacy and they will forward the refill request to us. Please allow 3 business days for your refill to be completed.          Additional Information About Your Visit        [employer] Information     [employer] gives you secure access to your electronic health record. If you see a primary care provider, you can also send messages to your care team and make appointments. If you have questions, please call your primary care clinic.  If you do not have a primary care provider, please call [phone] and they will assist you.        Care EveryWhere ID     This is your Care EveryWhere ID. This could be used by other organizations to access your [facility] medical records  [id number]        Your Vitals Were     Pulse Temperature Respirations Height BMI (Body Mass Index)       68  "98.4  F (36.9  C) (Tympanic) 18 5' 7\" (1.702 m) 32.11 kg/m2        Blood Pressure from Last 3 Encounters:   06/08/17 120/74   04/21/17 (!) 163/91   02/14/17 151/88    Weight from Last 3 Encounters:   06/08/17 205 lb (93 kg)   02/14/17 210 lb (95.3 kg)   02/10/17 209 lb (94.8 kg)                 Today's Medication Changes          These changes are accurate as of: 6/8/17  8:33 AM.  If you have any questions, ask your nurse or doctor.               Start taking these medicines.        Dose/Directions    ciprofloxacin 500 MG tablet   Commonly known as:  CIPRO   Used for:  Diverticulitis of colon   Started by:  See Meng MD        Dose:  500 mg   Take 1 tablet (500 mg) by mouth 2 times daily for 10 days   Quantity:  20 tablet   Refills:  0       metroNIDAZOLE 500 MG tablet   Commonly known as:  FLAGYL   Used for:  Diverticulitis of colon   Started by:  See Meng MD        Dose:  500 mg   Take 1 tablet (500 mg) by mouth 3 times daily   Quantity:  20 tablet   Refills:  0            Where to get your medicines      These medications were sent to [facility] [address]     Phone:  [phone]     ciprofloxacin 500 MG tablet    metroNIDAZOLE 500 MG tablet                Primary Care Provider Office Phone # Fax #    Ravinder Plunkett [phone]       [address] [address]        Thank you!     Thank you for choosing [facility]  for your care. Our goal is always to provide you with excellent care. Hearing back from our patients is one way we can continue to improve our services. Please take a few minutes to complete the written survey that you may receive in the mail after your visit with us. Thank you!             Your Updated Medication List - Protect others around you: Learn how to safely use, store and throw away your medicines " at www.disposemymeds.org.          This list is accurate as of: 6/8/17  8:33 AM.  Always use your most recent med list.                   Brand Name Dispense Instructions for use    ACE NOT PRESCRIBED (INTENTIONAL)     0 each    ACE Inhibitor not prescribed due to Intolerance       aspirin 81 MG tablet      1 TABLET DAILY       blood glucose monitoring lancets     1 Box    Use to test blood sugars 1 times daily or as directed.       blood glucose monitoring test strip    ONE TOUCH ULTRA    100 each    Use to test blood sugars 1 times daily or as directed.       brimonidine 0.2 % ophthalmic solution    ALPHAGAN    1 Bottle    Place 1 drop into both eyes 2 times daily       CALCIUM + D PO      1 TABLET DAILY       ciprofloxacin 500 MG tablet    CIPRO    20 tablet    Take 1 tablet (500 mg) by mouth 2 times daily for 10 days       flax seed oil 1000 MG capsule      1 CAPSULE DAILY       ibuprofen 200 MG tablet    ADVIL/MOTRIN     Take 200 mg by mouth every 4 hours as needed Reported on 4/21/2017       metFORMIN 500 MG tablet    GLUCOPHAGE    180 tablet    Take 1 tablet (500 mg) by mouth 2 times daily (with meals) DUE FOR APPOINTMENT AND FASTING LAB June 2017. NO FURTHER REFILLS       metroNIDAZOLE 500 MG tablet    FLAGYL    20 tablet    Take 1 tablet (500 mg) by mouth 3 times daily       Multi-vitamin Tabs tablet   Generic drug:  multivitamin, therapeutic with minerals      DAILY       order for DME     1 Device    Equipment being ordered: cam walker       Saw Palmetto 1000 MG Caps     60 capsule    Take 2 capsules by mouth daily       STATIN NOT PRESCRIBED (INTENTIONAL)      Statin not prescribed intentionally due to Intolerance       TYLENOL 500 MG tablet   Generic drug:  acetaminophen      Take 1,000 mg by mouth every 6 hours as needed       VITAMIN D3 PO      Take by mouth daily

## 2017-06-09 DIAGNOSIS — E11.9 TYPE 2 DIABETES MELLITUS WITHOUT COMPLICATION, WITHOUT LONG-TERM CURRENT USE OF INSULIN (H): ICD-10-CM

## 2017-06-09 LAB
ANION GAP SERPL CALCULATED.3IONS-SCNC: 7 MMOL/L (ref 3–14)
BUN SERPL-MCNC: 17 MG/DL (ref 7–30)
CALCIUM SERPL-MCNC: 9.2 MG/DL (ref 8.5–10.1)
CHLORIDE SERPL-SCNC: 101 MMOL/L (ref 94–109)
CHOLEST SERPL-MCNC: 179 MG/DL
CO2 SERPL-SCNC: 24 MMOL/L (ref 20–32)
CREAT SERPL-MCNC: 0.83 MG/DL (ref 0.66–1.25)
ERYTHROCYTE [DISTWIDTH] IN BLOOD BY AUTOMATED COUNT: 13.8 % (ref 10–15)
GFR SERPL CREATININE-BSD FRML MDRD: ABNORMAL ML/MIN/1.7M2
GLUCOSE SERPL-MCNC: 123 MG/DL (ref 70–99)
HBA1C MFR BLD: 6.1 % (ref 4.3–6)
HCT VFR BLD AUTO: 43.2 % (ref 40–53)
HDLC SERPL-MCNC: 49 MG/DL
HGB BLD-MCNC: 15 G/DL (ref 13.3–17.7)
LDLC SERPL CALC-MCNC: 115 MG/DL
MCH RBC QN AUTO: 30.2 PG (ref 26.5–33)
MCHC RBC AUTO-ENTMCNC: 34.7 G/DL (ref 31.5–36.5)
MCV RBC AUTO: 87 FL (ref 78–100)
NONHDLC SERPL-MCNC: 130 MG/DL
PLATELET # BLD AUTO: 269 10E9/L (ref 150–450)
POTASSIUM SERPL-SCNC: 4.3 MMOL/L (ref 3.4–5.3)
RBC # BLD AUTO: 4.96 10E12/L (ref 4.4–5.9)
SODIUM SERPL-SCNC: 132 MMOL/L (ref 133–144)
TRIGL SERPL-MCNC: 75 MG/DL
TSH SERPL DL<=0.005 MIU/L-ACNC: 1.52 MU/L (ref 0.4–4)
WBC # BLD AUTO: 6.1 10E9/L (ref 4–11)

## 2017-06-09 PROCEDURE — 85027 COMPLETE CBC AUTOMATED: CPT | Performed by: FAMILY MEDICINE

## 2017-06-09 PROCEDURE — 84443 ASSAY THYROID STIM HORMONE: CPT | Performed by: FAMILY MEDICINE

## 2017-06-09 PROCEDURE — 80061 LIPID PANEL: CPT | Performed by: FAMILY MEDICINE

## 2017-06-09 PROCEDURE — 36415 COLL VENOUS BLD VENIPUNCTURE: CPT | Performed by: FAMILY MEDICINE

## 2017-06-09 PROCEDURE — 80048 BASIC METABOLIC PNL TOTAL CA: CPT | Performed by: FAMILY MEDICINE

## 2017-06-09 PROCEDURE — 83036 HEMOGLOBIN GLYCOSYLATED A1C: CPT | Performed by: FAMILY MEDICINE

## 2017-06-12 ENCOUNTER — TELEPHONE (OUTPATIENT)
Dept: FAMILY MEDICINE | Facility: CLINIC | Age: 77
End: 2017-06-12

## 2017-06-12 NOTE — TELEPHONE ENCOUNTER
Per 06-08-17 OV, Dr. Meng-    1. Diverticulitis of colon  - Symptoms likely secondary to acute diverticulitis  - Ciprofloxacin and metronidazole prescribed, common side effects discussed  - Suggested to continue soft diet and well hydration  - We'll consider imaging if symptoms persist or worsen  - ciprofloxacin (CIPRO) 500 MG tablet; Take 1 tablet (500 mg) by mouth 2 times daily for 10 days  Dispense: 20 tablet; Refill: 0  - metroNIDAZOLE (FLAGYL) 500 MG tablet; Take 1 tablet (500 mg) by mouth 3 times daily  Dispense: 20 tablet; Refill: 0       Jennifer, [facility] calling today as thinks MD did not prescribe adequate qty of flagyl for directions indicated.   RN discussed with Dr. Plunkett in Dr. Meng's absence Flagyl who advises Flagyl quantity sufficient for 10 day course.  PC to pharm, gave verbal for additional #10 tabs for total of #30 tabs- 10 day supply.  Pharm will inform pt.  SILVIO Montiel RN

## 2017-08-08 DIAGNOSIS — E11.9 TYPE 2 DIABETES MELLITUS WITHOUT COMPLICATION, WITHOUT LONG-TERM CURRENT USE OF INSULIN (H): ICD-10-CM

## 2017-08-08 NOTE — TELEPHONE ENCOUNTER
metFORMIN (GLUCOPHAGE) 500 MG tablet         Last Written Prescription Date: 6/2/17  Last Fill Quantity: 180, # refills: 0  Last Office Visit with [facility], [facility] or [facility] prescribing provider:  6/8/17        BP Readings from Last 3 Encounters:   06/08/17 120/74   04/21/17 (!) 163/91   02/14/17 151/88     Lab Results   Component Value Date    MICROL 10 08/01/2016     Lab Results   Component Value Date    UMALCR 12.45 08/01/2016     Creatinine   Date Value Ref Range Status   06/09/2017 0.83 0.66 - 1.25 mg/dL Final   ]  GFR Estimate   Date Value Ref Range Status   06/09/2017 >90  Non  GFR Calc   >60 mL/min/1.7m2 Final   02/03/2017 >90 >60 mL/min/1.7m2 Final   08/01/2016 >90  Non  GFR Calc   >60 mL/min/1.7m2 Final     GFR Estimate If Black   Date Value Ref Range Status   06/09/2017 >90   GFR Calc   >60 mL/min/1.7m2 Final   02/03/2017 >90 >60 mL/min/1.7m2 Final   08/01/2016 >90   GFR Calc   >60 mL/min/1.7m2 Final     Lab Results   Component Value Date    CHOL 179 06/09/2017     Lab Results   Component Value Date    HDL 49 06/09/2017     Lab Results   Component Value Date     06/09/2017     Lab Results   Component Value Date    TRIG 75 06/09/2017     Lab Results   Component Value Date    CHOLHDLRATIO 4.0 11/09/2015     Lab Results   Component Value Date    AST 22 08/01/2016     Lab Results   Component Value Date    ALT 32 08/01/2016     Lab Results   Component Value Date    A1C 6.1 06/09/2017    A1C 5.8 08/01/2016    A1C 6.0 11/09/2015    A1C 5.9 04/09/2015    A1C 6.5 11/21/2014     Potassium   Date Value Ref Range Status   06/09/2017 4.3 3.4 - 5.3 mmol/L Final

## 2017-10-11 DIAGNOSIS — E11.9 TYPE 2 DIABETES MELLITUS WITHOUT COMPLICATION, WITHOUT LONG-TERM CURRENT USE OF INSULIN (H): ICD-10-CM

## 2017-10-13 ENCOUNTER — ALLIED HEALTH/NURSE VISIT (OUTPATIENT)
Dept: FAMILY MEDICINE | Facility: CLINIC | Age: 77
End: 2017-10-13
Payer: COMMERCIAL

## 2017-10-13 DIAGNOSIS — Z23 NEED FOR PROPHYLACTIC VACCINATION AND INOCULATION AGAINST INFLUENZA: Primary | ICD-10-CM

## 2017-10-13 PROCEDURE — 90471 IMMUNIZATION ADMIN: CPT

## 2017-10-13 PROCEDURE — 90662 IIV NO PRSV INCREASED AG IM: CPT

## 2017-10-13 PROCEDURE — 99207 ZZC NO CHARGE NURSE ONLY: CPT

## 2017-10-13 NOTE — MR AVS SNAPSHOT
After Visit Summary   10/13/2017    Ana Maria Duvall    MRN: [id number]           Patient Information     Date Of Birth          1940        Visit Information        Provider Department      10/13/2017 1:15 PM FL PI AVIVA/LPN [facility]        Today's Diagnoses     Need for prophylactic vaccination and inoculation against influenza    -  1       Follow-ups after your visit        Your next 10 appointments already scheduled     Oct 20, 2017  8:00 AM CDT   SHORT with See Meng MD   [facility] ([facility])    [address] [facility] [id number] [phone]              Who to contact     If you have questions or need follow up information about today's clinic visit or your schedule please contact [facility] directly at [phone].  Normal or non-critical lab and imaging results will be communicated to you by MyChart, letter or phone within 4 business days after the clinic has received the results. If you do not hear from us within 7 days, please contact the clinic through MyChart or phone. If you have a critical or abnormal lab result, we will notify you by phone as soon as possible.  Submit refill requests through [employer] or call your pharmacy and they will forward the refill request to us. Please allow 3 business days for your refill to be completed.          Additional Information About Your Visit        MyChart Information     [employer] gives you secure access to your electronic health record. If you see a primary care provider, you can also send messages to your care team and make appointments. If you have questions, please call your primary care clinic.  If you do not have a primary care provider, please call [phone] and they will assist you.        Care EveryWhere ID     This is your Care EveryWhere ID. This could be used by other organizations to access your [facility] medical records  [id number]          Blood Pressure from Last 3 Encounters:   06/08/17 120/74   04/21/17 (!) 163/91   02/14/17 151/88    Weight from Last 3 Encounters:   06/08/17 205 lb (93 kg)   02/14/17 210 lb (95.3 kg)   02/10/17 209 lb (94.8 kg)              We Performed the Following     FLU VACCINE, INCREASED ANTIGEN, PRESV FREE, AGE 65+ [14154]     Vaccine Administration, Initial [79125]        Primary Care Provider Office Phone # Fax #    Ravinder Plunkett [phone]       [address] [facility] [address]        Equal Access to Services     ROM ASCENCIO : Hadii veena Chavarria, waaline black, akbar kaalmada you, salvador cardenas. So [facility] [phone].    ATENCIÓN: Si habla español, tiene a huber disposición servicios gratuitos de asistencia lingüística. Llame al [phone].    We comply with applicable federal civil rights laws and [place] laws. We do not discriminate on the basis of race, color, national origin, age, disability, sex, sexual orientation, or gender identity.            Thank you!     Thank you for choosing [facility]  for your care. Our goal is always to provide you with excellent care. Hearing back from our patients is one way we can continue to improve our services. Please take a few minutes to complete the written survey that you may receive in the mail after your visit with us. Thank you!             Your Updated Medication List - Protect others around you: Learn how to safely use, store and throw away your medicines at www.disposemymeds.org.          This list is accurate as of: 10/13/17  1:24 PM.  Always use your most recent med list.                   Brand Name Dispense Instructions for use Diagnosis    ACE NOT PRESCRIBED (INTENTIONAL)     0 each    ACE Inhibitor not prescribed due to Intolerance    Cough       aspirin 81 MG tablet      1 TABLET DAILY        blood glucose monitoring lancets     1 Box    Use to test blood  sugars 1 times daily or as directed.    Type 2 diabetes mellitus without complication, without long-term current use of insulin (H)       blood glucose monitoring test strip    ONE TOUCH ULTRA    100 each    Use to test blood sugars 1 times daily or as directed.    Type 2 diabetes mellitus without complication, unspecified long term insulin use status (H), Type 2 diabetes mellitus without complication, without long-term current use of insulin (H)       brimonidine 0.2 % ophthalmic solution    ALPHAGAN    1 Bottle    Place 1 drop into both eyes 2 times daily    Unspecified glaucoma(365.9)       CALCIUM + D PO      1 TABLET DAILY        flax seed oil 1000 MG capsule      1 CAPSULE DAILY        ibuprofen 200 MG tablet    ADVIL/MOTRIN     Take 200 mg by mouth every 4 hours as needed Reported on 4/21/2017        metFORMIN 500 MG tablet    GLUCOPHAGE    180 tablet    Take 2 tablets (1,000 mg) by mouth 2 times daily (with meals)    Type 2 diabetes mellitus without complication, without long-term current use of insulin (H)       metroNIDAZOLE 500 MG tablet    FLAGYL    20 tablet    Take 1 tablet (500 mg) by mouth 3 times daily    Diverticulitis of colon       Multi-vitamin Tabs tablet   Generic drug:  multivitamin, therapeutic with minerals      DAILY        order for DME     1 Device    Equipment being ordered: cam walker    Foot sprain, left, initial encounter       Saw Palmetto 1000 MG Caps     60 capsule    Take 2 capsules by mouth daily    Type 2 diabetes mellitus without complication, unspecified long term insulin use status (H), Type 2 diabetes mellitus without complication, without long-term current use of insulin (H)       STATIN NOT PRESCRIBED (INTENTIONAL)      Statin not prescribed intentionally due to Intolerance        TYLENOL 500 MG tablet   Generic drug:  acetaminophen      Take 1,000 mg by mouth every 6 hours as needed        VITAMIN D3 PO      Take by mouth daily

## 2017-10-18 ENCOUNTER — APPOINTMENT (OUTPATIENT)
Dept: FAMILY MEDICINE | Facility: CLINIC | Age: 77
End: 2017-10-18
Payer: OTHER MISCELLANEOUS

## 2017-10-18 ENCOUNTER — OFFICE VISIT (OUTPATIENT)
Dept: FAMILY MEDICINE | Facility: CLINIC | Age: 77
End: 2017-10-18
Payer: OTHER MISCELLANEOUS

## 2017-10-18 DIAGNOSIS — S61.216A LACERATION OF RIGHT LITTLE FINGER WITHOUT FOREIGN BODY WITHOUT DAMAGE TO NAIL, INITIAL ENCOUNTER: Primary | ICD-10-CM

## 2017-10-18 PROCEDURE — 12001 RPR S/N/AX/GEN/TRNK 2.5CM/<: CPT | Performed by: FAMILY MEDICINE

## 2017-10-18 NOTE — PATIENT INSTRUCTIONS
* LACERATION (All Closures)  A laceration is a cut through the skin. This will usually require stitches (sutures) or staples if it is deep. Minor cuts may be treated with a tape closure ( Steri-Strips ) or Dermabond skin glue.       HOME CARE:  PAIN MEDICINE: You may use acetaminophen (Tylenol) 650-1000 mg every 6 hours or ibuprofen (Motrin, Advil) 600 mg every 6-8 hours with food to control pain, if you are able to take these medicines. [NOTE: If you have chronic liver or kidney disease or ever had a stomach ulcer or GI bleeding, talk with your doctor before using these medicines.]  EXTREMITY, FACE or TRUNK WOUNDS:    Keep the wound clean and dry. If a bandage was applied and it becomes wet or dirty, replace it. Otherwise, leave it in place for the first 24 hours.    If stitches or staples were used, clean the wound daily. Protect the wound from sunlight and tanning lamps.    After removing the bandage, wash the area with soap and water. Use a wet cotton swab (Q tip) to loosen and remove any blood or crust that forms.    After cleaning, apply a thin layer of Polysporin or Bacitracin ointment. This will keep the wound clean and make it easier to remove the stitches or staples. Reapply a fresh bandage.    You may remove the bandage to shower as usual after the first 24 hours, but do not soak the area in water (no swimming) until the stitches or staples are removed.    If Steri-Strips were used, keep the area clean and dry. If it becomes wet, blot it dry with a towel. It is okay to take a brief shower, but avoid scrubbing the area.    If Dermabond skin adhesive was used, do not scratch, rub or pick at the adhesive film. Do not place tape directly over the film. Do not apply liquid, ointment or creams to the wound while the film is in place. Do not clean the wound with peroxide and do not apply ointments. Avoid activities that cause heavy sweating until the film has fallen off. Protect the wound from prolonged  exposure to sunlight or tanning lamps. You may shower as usual but do not soak the wound in water (no baths or swimming). The film will fall off by itself in 5-10 days.  SCALP WOUNDS: During the first two days, you may carefully rinse your hair in the shower to remove blood, glass or dirt particles. After two days, you may shower and shampoo your hair normally. Do not soak your scalp in the tub or go swimming until the stitches or staples have been removed.  MOUTH WOUNDS: Eat soft foods to reduce pain. If the cut is inside of your mouth, clean by rinsing after each meal and at bedtime with a mixture of equal parts water and Hydrogen Peroxide (do not swallow!). Or, you can use a cotton swab to directly apply Hydrogen Peroxide onto the cut.  After the wound is done healing, use sunscreen over the area whenever exposed for the next 6 minths to avoid a darker scar.     FOLLOW UP: Most skin wounds heal within ten days. Mouth and facial wounds heal within five days. However, even with proper treatment, a wound infection may sometimes occur. Therefore, you should check the wound daily for signs of infection listed below.  Stitches should be removed from the face within five days; stitches and staples should be removed from other parts of the body within 7-10 days. Unless you are told to come back to the emergency room, you may have your doctor or urgent care remove the stitches. If dissolving stitches were used in the mouth, these will fall out or dissolve without the need for removal. If tape closures ( Steri-Strips ) were used, remove them yourself if they have not fallen off after 7 days. If Dermabond skin glue was used, the film will fall off by itself in 5-10 days.      GET PROMPT MEDICAL ATTENTION  if any of the following occur:    Increasing pain in the wound    Redness, swelling or pus coming from the wound    Fever over 101 F (38.3 C) oral    If stitches or staples come apart or fall out or if Steri-Strips fall  off before seven days    If the wound edges re-open    Bleeding not controlled by direct pressure    7675-9687 [employer], [address] [place] [address], [place] [address]. All rights reserved. This information is not intended as a substitute for professional medical care. Always follow your healthcare professional's instructions.

## 2017-10-18 NOTE — MR AVS SNAPSHOT
After Visit Summary   10/18/2017    Ana Maria Duvall    MRN: [id number]           Patient Information     Date Of Birth          1940        Visit Information        Provider Department      10/18/2017 3:20 PM See Meng MD [facility]        Today's Diagnoses     Laceration of right little finger without foreign body without damage to nail, initial encounter    -  1      Care Instructions       * LACERATION (All Closures)  A laceration is a cut through the skin. This will usually require stitches (sutures) or staples if it is deep. Minor cuts may be treated with a tape closure ( Steri-Strips ) or Dermabond skin glue.       HOME CARE:  PAIN MEDICINE: You may use acetaminophen (Tylenol) 650-1000 mg every 6 hours or ibuprofen (Motrin, Advil) 600 mg every 6-8 hours with food to control pain, if you are able to take these medicines. [NOTE: If you have chronic liver or kidney disease or ever had a stomach ulcer or GI bleeding, talk with your doctor before using these medicines.]  EXTREMITY, FACE or TRUNK WOUNDS:    Keep the wound clean and dry. If a bandage was applied and it becomes wet or dirty, replace it. Otherwise, leave it in place for the first 24 hours.    If stitches or staples were used, clean the wound daily. Protect the wound from sunlight and tanning lamps.    After removing the bandage, wash the area with soap and water. Use a wet cotton swab (Q tip) to loosen and remove any blood or crust that forms.    After cleaning, apply a thin layer of Polysporin or Bacitracin ointment. This will keep the wound clean and make it easier to remove the stitches or staples. Reapply a fresh bandage.    You may remove the bandage to shower as usual after the first 24 hours, but do not soak the area in water (no swimming) until the stitches or staples are removed.    If Steri-Strips were used, keep the area clean and dry. If it becomes wet, blot it dry with a towel. It is okay to  take a brief shower, but avoid scrubbing the area.    If Dermabond skin adhesive was used, do not scratch, rub or pick at the adhesive film. Do not place tape directly over the film. Do not apply liquid, ointment or creams to the wound while the film is in place. Do not clean the wound with peroxide and do not apply ointments. Avoid activities that cause heavy sweating until the film has fallen off. Protect the wound from prolonged exposure to sunlight or tanning lamps. You may shower as usual but do not soak the wound in water (no baths or swimming). The film will fall off by itself in 5-10 days.  SCALP WOUNDS: During the first two days, you may carefully rinse your hair in the shower to remove blood, glass or dirt particles. After two days, you may shower and shampoo your hair normally. Do not soak your scalp in the tub or go swimming until the stitches or staples have been removed.  MOUTH WOUNDS: Eat soft foods to reduce pain. If the cut is inside of your mouth, clean by rinsing after each meal and at bedtime with a mixture of equal parts water and Hydrogen Peroxide (do not swallow!). Or, you can use a cotton swab to directly apply Hydrogen Peroxide onto the cut.  After the wound is done healing, use sunscreen over the area whenever exposed for the next 6 minths to avoid a darker scar.     FOLLOW UP: Most skin wounds heal within ten days. Mouth and facial wounds heal within five days. However, even with proper treatment, a wound infection may sometimes occur. Therefore, you should check the wound daily for signs of infection listed below.  Stitches should be removed from the face within five days; stitches and staples should be removed from other parts of the body within 7-10 days. Unless you are told to come back to the emergency room, you may have your doctor or urgent care remove the stitches. If dissolving stitches were used in the mouth, these will fall out or dissolve without the need for removal. If tape  closures ( Steri-Strips ) were used, remove them yourself if they have not fallen off after 7 days. If Dermabond skin glue was used, the film will fall off by itself in 5-10 days.      GET PROMPT MEDICAL ATTENTION  if any of the following occur:    Increasing pain in the wound    Redness, swelling or pus coming from the wound    Fever over 101 F (38.3 C) oral    If stitches or staples come apart or fall out or if Steri-Strips fall off before seven days    If the wound edges re-open    Bleeding not controlled by direct pressure    3452-2736 [place] [address]. All rights reserved. This information is not intended as a substitute for professional medical care. Always follow your healthcare professional's instructions.            Follow-ups after your visit        Your next 10 appointments already scheduled     Oct 20, 2017  8:00 AM CDT   SHORT with See Meng MD   [facility]    [address] [place] [id number]   [phone]              Who to contact     If you have questions or need follow up information about today's clinic visit or your schedule please contact [facility] directly at [phone].  Normal or non-critical lab and imaging results will be communicated to you by MyChart, letter or phone within 4 business days after the clinic has received the results. If you do not hear from us within 7 days, please contact the clinic through MyChart or phone. If you have a critical or abnormal lab result, we will notify you by phone as soon as possible.  Submit refill requests through ilab or call your pharmacy and they will forward the refill request to us. Please allow 3 business days for your refill to be completed.          Additional Information About Your Visit        [employer] Information     [employer] gives you secure access to your electronic health record. If you see a primary care  provider, you can also send messages to your care team and make appointments. If you have questions, please call your primary care clinic.  If you do not have a primary care provider, please call [phone] and they will assist you.        Care EveryWhere ID     This is your Care EveryWhere ID. This could be used by other organizations to access your [facility] medical records  [id number]         Blood Pressure from Last 3 Encounters:   06/08/17 120/74   04/21/17 (!) 163/91   02/14/17 151/88    Weight from Last 3 Encounters:   06/08/17 205 lb (93 kg)   02/14/17 210 lb (95.3 kg)   02/10/17 209 lb (94.8 kg)              Today, you had the following     No orders found for display       Primary Care Provider Office Phone # Fax #    Ravinder Plunkett [phone]       [address] [facility] [address]        Equal Access to Services     ROM ASCENCIO : Hadii aad ku hadasho Soomaali, waaxda luqadaha, qaybta kaalmada adeegyada, waxay chengin hayninin amy doyle . So [facility] [phone].    ATENCIÓN: Si habla español, tiene a huber disposición servicios gratuitos de asistencia lingüística. Llame al [phone].    We comply with applicable federal civil rights laws and [place] laws. We do not discriminate on the basis of race, color, national origin, age, disability, sex, sexual orientation, or gender identity.            Thank you!     Thank you for choosing [facility]  for your care. Our goal is always to provide you with excellent care. Hearing back from our patients is one way we can continue to improve our services. Please take a few minutes to complete the written survey that you may receive in the mail after your visit with us. Thank you!             Your Updated Medication List - Protect others around you: Learn how to safely use, store and throw away your medicines at www.disposemymeds.org.          This list is accurate as of: 10/18/17  3:51 PM.  Always  use your most recent med list.                   Brand Name Dispense Instructions for use Diagnosis    ACE NOT PRESCRIBED (INTENTIONAL)     0 each    ACE Inhibitor not prescribed due to Intolerance    Cough       aspirin 81 MG tablet      1 TABLET DAILY        blood glucose monitoring lancets     1 Box    Use to test blood sugars 1 times daily or as directed.    Type 2 diabetes mellitus without complication, without long-term current use of insulin (H)       blood glucose monitoring test strip    ONE TOUCH ULTRA    100 each    Use to test blood sugars 1 times daily or as directed.    Type 2 diabetes mellitus without complication, unspecified long term insulin use status (H), Type 2 diabetes mellitus without complication, without long-term current use of insulin (H)       brimonidine 0.2 % ophthalmic solution    ALPHAGAN    1 Bottle    Place 1 drop into both eyes 2 times daily    Unspecified glaucoma(365.9)       CALCIUM + D PO      1 TABLET DAILY        flax seed oil 1000 MG capsule      1 CAPSULE DAILY        ibuprofen 200 MG tablet    ADVIL/MOTRIN     Take 200 mg by mouth every 4 hours as needed Reported on 4/21/2017        metFORMIN 500 MG tablet    GLUCOPHAGE    180 tablet    Take 2 tablets (1,000 mg) by mouth 2 times daily (with meals)    Type 2 diabetes mellitus without complication, without long-term current use of insulin (H)       metroNIDAZOLE 500 MG tablet    FLAGYL    20 tablet    Take 1 tablet (500 mg) by mouth 3 times daily    Diverticulitis of colon       Multi-vitamin Tabs tablet   Generic drug:  multivitamin, therapeutic with minerals      DAILY        order for DME     1 Device    Equipment being ordered: cam walker    Foot sprain, left, initial encounter       Saw Palmetto 1000 MG Caps     60 capsule    Take 2 capsules by mouth daily    Type 2 diabetes mellitus without complication, unspecified long term insulin use status (H), Type 2 diabetes mellitus without complication, without long-term  current use of insulin (H)       STATIN NOT PRESCRIBED (INTENTIONAL)      Statin not prescribed intentionally due to Intolerance        TYLENOL 500 MG tablet   Generic drug:  acetaminophen      Take 1,000 mg by mouth every 6 hours as needed        VITAMIN D3 PO      Take by mouth daily

## 2017-10-18 NOTE — PROGRESS NOTES
SUBJECTIVE:   Ana Maria Duvall is a 76 year old male who presents to clinic today for the following health issues:      SUBJECTIVE:   Was moving a dryer out of the back of the truck, slipped and pinky finger on right hand was cut open, on the tip.  Happened about an hour ago. Tetanus vaccination status reviewed: tetanus re-vaccination not indicated.       OBJECTIVE:   Patient appears well, vitals are normal. Laceration 1 cm noted involving right 5th digit.  Description: clean wound edges, no foreign bodies. Neurovascular and tendon structures are intact.      ASSESSMENT:   Laceration as described.      PLAN:   Risk and benefits explained. Anesthesia with Lidocaine 2% plain. Wound cleansed and sutured ethilon 5.0, 4 stitches palced. Dressing applied. Wound care instructions provided.  Observe for any signs of infection or other problems.  Return for suture removal in 7-10 days.      See Meng MD  [facility]

## 2017-10-20 ENCOUNTER — OFFICE VISIT (OUTPATIENT)
Dept: FAMILY MEDICINE | Facility: CLINIC | Age: 77
End: 2017-10-20
Payer: COMMERCIAL

## 2017-10-20 VITALS
RESPIRATION RATE: 18 BRPM | WEIGHT: 208 LBS | HEART RATE: 64 BPM | SYSTOLIC BLOOD PRESSURE: 138 MMHG | HEIGHT: 67 IN | TEMPERATURE: 97.1 F | DIASTOLIC BLOOD PRESSURE: 82 MMHG | BODY MASS INDEX: 32.65 KG/M2

## 2017-10-20 DIAGNOSIS — Z00.00 ROUTINE GENERAL MEDICAL EXAMINATION AT A HEALTH CARE FACILITY: Primary | ICD-10-CM

## 2017-10-20 DIAGNOSIS — Z80.42 FAMILY HISTORY OF PROSTATE CANCER: ICD-10-CM

## 2017-10-20 DIAGNOSIS — E11.9 TYPE 2 DIABETES MELLITUS WITHOUT COMPLICATION, WITHOUT LONG-TERM CURRENT USE OF INSULIN (H): ICD-10-CM

## 2017-10-20 DIAGNOSIS — I10 ESSENTIAL HYPERTENSION: ICD-10-CM

## 2017-10-20 DIAGNOSIS — Z78.9 STATIN INTOLERANCE: ICD-10-CM

## 2017-10-20 LAB
CREAT UR-MCNC: <3 MG/DL
MICROALBUMIN UR-MCNC: <5 MG/L
MICROALBUMIN/CREAT UR: NORMAL MG/G CR (ref 0–17)
PSA SERPL-ACNC: 1.81 UG/L (ref 0–4)

## 2017-10-20 PROCEDURE — G0103 PSA SCREENING: HCPCS | Performed by: FAMILY MEDICINE

## 2017-10-20 PROCEDURE — 99397 PER PM REEVAL EST PAT 65+ YR: CPT | Mod: 25 | Performed by: FAMILY MEDICINE

## 2017-10-20 PROCEDURE — 82043 UR ALBUMIN QUANTITATIVE: CPT | Performed by: FAMILY MEDICINE

## 2017-10-20 PROCEDURE — 17000 DESTRUCT PREMALG LESION: CPT | Performed by: FAMILY MEDICINE

## 2017-10-20 PROCEDURE — 99207 C FOOT EXAM  NO CHARGE: CPT | Mod: 25 | Performed by: FAMILY MEDICINE

## 2017-10-20 NOTE — MR AVS SNAPSHOT
After Visit Summary   10/20/2017    Ana Maria Duvall    MRN: [id number]           Patient Information     Date Of Birth          1940        Visit Information        Provider Department      10/20/2017 8:00 AM See Meng MD [facility]        Today's Diagnoses     Routine general medical examination at a health care facility    -  1    Family history of prostate cancer        Type 2 diabetes mellitus without complication, without long-term current use of insulin (H)        Essential hypertension        Statin intolerance          Care Instructions      Preventive Health Recommendations:   Male Ages 65 and over    Yearly exam:             See your health care provider every year in order to  o   Review health changes.   o   Discuss preventive care.    o   Review your medicines if your doctor has prescribed any.    Talk with your health care provider about whether you should have a test to screen for prostate cancer (PSA).    Every 3 years, have a diabetes test (fasting glucose). If you are at risk for diabetes, you should have this test more often.    Every 5 years, have a cholesterol test. Have this test more often if you are at risk for high cholesterol or heart disease.     Every 10 years, have a colonoscopy. Or, have a yearly FIT test (stool test). These exams will check for colon cancer.    Talk to with your health care provider about screening for Abdominal Aortic Aneurysm if you have a family history of AAA or have a history of smoking.    Shots:     Get a flu shot each year.     Get a tetanus shot every 10 years.     Talk to your doctor about your pneumonia vaccines. There are now two you should receive - Pneumovax (PPSV 23) and Prevnar (PCV 13).     Talk to your doctor about a shingles vaccine.     Talk to your doctor about the hepatitis B vaccine.  Nutrition:     Eat at least 5 servings of fruits and vegetables each day.     Eat whole-grain bread, whole-wheat pasta  and brown rice instead of white grains and rice.     Talk to your provider about Calcium and Vitamin D.   Lifestyle    Exercise for at least 150 minutes a week (30 minutes a day, 5 days a week). This will help you control your weight and prevent disease.     Limit alcohol to one drink per day.     No smoking.     Wear sunscreen to prevent skin cancer.     See your dentist every six months for an exam and cleaning.   See your eye doctor every 1 to 2 years to screen for conditions such as glaucoma, macular degeneration, cataracts, etc Living Healthy with Diabetes  Healthful eating  Healthful eating means eating well-balanced meals and snacks at regular times.  Eat a variety of healthy foods to help control blood glucose (blood sugar).  Space your meals during the day. Try to eat a meal every 4 to 5 hours. Include a variety of foods from all food groups.  If you wish, you may have one or two small snacks between meals. Snacks should be nutritious and lower in calories than a meal. (For example: fat-free yogurt, 1/2 cup fruit, 3 cups popcorn, 1/4 cup nuts). Ask your dietitian about healthful snacks.  Do not skip meals.  There are no foods that you cannot eat. But it is important to know which foods most affect your blood glucose.  Three main nutrients give the body energy (calories):  Carbohydrate  Protein  Fat  Foods with carbohydrate will raise blood glucose. These foods include breads, pasta, cereals, rice, fruits, milk and yogurt.  Do not avoid carbohydrate. Eating the right amount of carbohydrate at meals and snacks will help to control blood glucose.  Check blood glucose as directed by your health care provider to see how food choices affect it.  Healthy Eating at a Glance  Pay attention to portion sizes.  Eat a variety of healthy foods every day.  Choose foods high in nutrition:  Fruits and vegetables  Beans and legumes  Whole grains  Heart healthy fats  Lean meats and proteins  Foods without added sodium, sugars  and fat  Manage your weight  Managing weight is not only about how much you eat, but also about the quality of foods you eat.  Eat smaller portions.  Eat less sugar.  Eat less of the high-fat foods.  Eat smaller portions of healthy fats such as nuts, avocado and olives.  Limit fried foods, fatty meats (majano, sausage, hot dogs, cold cuts), butter, salad dressings, cream, gravy, chips, bakery items, whole milk, ice cream, pizza, fast food and hard cheeses.  Choose high-fiber foods such as vegetables, fruits and whole-grain breads and cereals.  These foods help you to feel more satisfied with your meal or snack. They are full of nutrients.  Eat mindfully  Listen to your body's signals for hunger. Eat when you feel hungry and stop when you start to feel full.  Avoid eating when bored, sad or upset.  Physical activity  Activity can help control your glucose levels. The American Diabetes Association recommends 150 minutes of moderate physical activity (walking, biking, swimming) a week or 75 minutes of vigorous activity (jogging, aerobics) per week. Add to that strength training (lifting weights, resistance bands) two or three days a week.  Talk to your doctor before starting an activity program. This is very important if:  You are over age 35.  You have had type 1 diabetes for more than 15 years.  You have had type 2 diabetes for more than 10 years.  You have any risk factors for heart or artery disease (such as high blood pressure, high cholesterol or being overweight).  You have a history of heart or artery disease.  You have any kind of nerve damage (neuropathy).  You have eye disease (retinopathy).  For informational purposes only. Not to replace the advice of your health care provider.  Copyright   2007 [employer]. All rights reserved. [employer] 532943 - REV 03/16.              Follow-ups after your visit        Who to contact     If you have questions or need follow up information about today's  "clinic visit or your schedule please contact [facility] directly at [phone].  Normal or non-critical lab and imaging results will be communicated to you by MyChart, letter or phone within 4 business days after the clinic has received the results. If you do not hear from us within 7 days, please contact the clinic through [employer] or phone. If you have a critical or abnormal lab result, we will notify you by phone as soon as possible.  Submit refill requests through [employer] or call your pharmacy and they will forward the refill request to us. Please allow 3 business days for your refill to be completed.          Additional Information About Your Visit        [employer] Information     [employer] gives you secure access to your electronic health record. If you see a primary care provider, you can also send messages to your care team and make appointments. If you have questions, please call your primary care clinic.  If you do not have a primary care provider, please call [phone] and they will assist you.        Care EveryWhere ID     This is your Care EveryWhere ID. This could be used by other organizations to access your [place] medical records  [id number]        Your Vitals Were     Pulse Temperature Respirations Height BMI (Body Mass Index)       64 97.1  F (36.2  C) (Tympanic) 18 5' 7\" (1.702 m) 32.58 kg/m2        Blood Pressure from Last 3 Encounters:   10/20/17 138/82   06/08/17 120/74   04/21/17 (!) 163/91    Weight from Last 3 Encounters:   10/20/17 208 lb (94.3 kg)   06/08/17 205 lb (93 kg)   02/14/17 210 lb (95.3 kg)              We Performed the Following     Albumin Random Urine Quantitative with Creat Ratio     FOOT EXAM     PSA, screen        Primary Care Provider Office Phone # Fax #    Ravinder Plunkett [phone]       [address] [facility] [address]        Equal Access to Services     ROM ASCENCIO AH: kari Vega " sofia keylapio pedrozasalvador arriola. So [facility] [phone].    ATENCIÓN: Si pamela hsieh, tiene a huber disposición servicios gratuitos de asistencia lingüística. Kurtis al [phone].    We comply with applicable federal civil rights laws and [place] laws. We do not discriminate on the basis of race, color, national origin, age, disability, sex, sexual orientation, or gender identity.            Thank you!     Thank you for choosing [facility]  for your care. Our goal is always to provide you with excellent care. Hearing back from our patients is one way we can continue to improve our services. Please take a few minutes to complete the written survey that you may receive in the mail after your visit with us. Thank you!             Your Updated Medication List - Protect others around you: Learn how to safely use, store and throw away your medicines at www.disposemymeds.org.          This list is accurate as of: 10/20/17  8:33 AM.  Always use your most recent med list.                   Brand Name Dispense Instructions for use Diagnosis    ACE NOT PRESCRIBED (INTENTIONAL)     0 each    ACE Inhibitor not prescribed due to Intolerance    Cough       aspirin 81 MG tablet      1 TABLET DAILY        blood glucose monitoring lancets     1 Box    Use to test blood sugars 1 times daily or as directed.    Type 2 diabetes mellitus without complication, without long-term current use of insulin (H)       blood glucose monitoring test strip    ONE TOUCH ULTRA    100 each    Use to test blood sugars 1 times daily or as directed.    Type 2 diabetes mellitus without complication, unspecified long term insulin use status (H), Type 2 diabetes mellitus without complication, without long-term current use of insulin (H)       brimonidine 0.2 % ophthalmic solution    ALPHAGAN    1 Bottle    Place 1 drop into both eyes 2 times daily    Unspecified glaucoma(365.9)       CALCIUM + D  PO      1 TABLET DAILY        flax seed oil 1000 MG capsule      1 CAPSULE DAILY        ibuprofen 200 MG tablet    ADVIL/MOTRIN     Take 200 mg by mouth every 4 hours as needed Reported on 4/21/2017        metFORMIN 500 MG tablet    GLUCOPHAGE    180 tablet    Take 2 tablets (1,000 mg) by mouth 2 times daily (with meals)    Type 2 diabetes mellitus without complication, without long-term current use of insulin (H)       Multi-vitamin Tabs tablet   Generic drug:  multivitamin, therapeutic with minerals      DAILY        Saw [place] 1000 MG Caps     60 capsule    Take 2 capsules by mouth daily    Type 2 diabetes mellitus without complication, unspecified long term insulin use status (H), Type 2 diabetes mellitus without complication, without long-term current use of insulin (H)       STATIN NOT PRESCRIBED (INTENTIONAL)      Statin not prescribed intentionally due to Intolerance        TYLENOL 500 MG tablet   Generic drug:  acetaminophen      Take 1,000 mg by mouth every 6 hours as needed        VITAMIN D3 PO      Take by mouth daily

## 2017-10-20 NOTE — NURSING NOTE
"Chief Complaint   Patient presents with     Physical       Initial /82 (Cuff Size: Adult Regular)  Pulse 64  Temp 97.1  F (36.2  C) (Tympanic)  Resp 18  Ht 5' 7\" (1.702 m)  Wt 208 lb (94.3 kg)  BMI 32.58 kg/m2 Estimated body mass index is 32.58 kg/(m^2) as calculated from the following:    Height as of this encounter: 5' 7\" (1.702 m).    Weight as of this encounter: 208 lb (94.3 kg).  Medication Reconciliation: complete    Health Maintenance that is potentially due pending provider review:  NONE    n/a    Is there anyone who you would like to be able to receive your results? Not Applicable  If yes have patient fill out ANA    "
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
## 2017-10-20 NOTE — PATIENT INSTRUCTIONS
Preventive Health Recommendations:   Male Ages 65 and over    Yearly exam:             See your health care provider every year in order to  o   Review health changes.   o   Discuss preventive care.    o   Review your medicines if your doctor has prescribed any.    Talk with your health care provider about whether you should have a test to screen for prostate cancer (PSA).    Every 3 years, have a diabetes test (fasting glucose). If you are at risk for diabetes, you should have this test more often.    Every 5 years, have a cholesterol test. Have this test more often if you are at risk for high cholesterol or heart disease.     Every 10 years, have a colonoscopy. Or, have a yearly FIT test (stool test). These exams will check for colon cancer.    Talk to with your health care provider about screening for Abdominal Aortic Aneurysm if you have a family history of AAA or have a history of smoking.    Shots:     Get a flu shot each year.     Get a tetanus shot every 10 years.     Talk to your doctor about your pneumonia vaccines. There are now two you should receive - Pneumovax (PPSV 23) and Prevnar (PCV 13).     Talk to your doctor about a shingles vaccine.     Talk to your doctor about the hepatitis B vaccine.  Nutrition:     Eat at least 5 servings of fruits and vegetables each day.     Eat whole-grain bread, whole-wheat pasta and brown rice instead of white grains and rice.     Talk to your provider about Calcium and Vitamin D.   Lifestyle    Exercise for at least 150 minutes a week (30 minutes a day, 5 days a week). This will help you control your weight and prevent disease.     Limit alcohol to one drink per day.     No smoking.     Wear sunscreen to prevent skin cancer.     See your dentist every six months for an exam and cleaning.   See your eye doctor every 1 to 2 years to screen for conditions such as glaucoma, macular degeneration, cataracts, etc Living Healthy with Diabetes  Healthful eating  Healthful  eating means eating well-balanced meals and snacks at regular times.  Eat a variety of healthy foods to help control blood glucose (blood sugar).  Space your meals during the day. Try to eat a meal every 4 to 5 hours. Include a variety of foods from all food groups.  If you wish, you may have one or two small snacks between meals. Snacks should be nutritious and lower in calories than a meal. (For example: fat-free yogurt, 1/2 cup fruit, 3 cups popcorn, 1/4 cup nuts). Ask your dietitian about healthful snacks.  Do not skip meals.  There are no foods that you cannot eat. But it is important to know which foods most affect your blood glucose.  Three main nutrients give the body energy (calories):  Carbohydrate  Protein  Fat  Foods with carbohydrate will raise blood glucose. These foods include breads, pasta, cereals, rice, fruits, milk and yogurt.  Do not avoid carbohydrate. Eating the right amount of carbohydrate at meals and snacks will help to control blood glucose.  Check blood glucose as directed by your health care provider to see how food choices affect it.  Healthy Eating at a Glance  Pay attention to portion sizes.  Eat a variety of healthy foods every day.  Choose foods high in nutrition:  Fruits and vegetables  Beans and legumes  Whole grains  Heart healthy fats  Lean meats and proteins  Foods without added sodium, sugars and fat  Manage your weight  Managing weight is not only about how much you eat, but also about the quality of foods you eat.  Eat smaller portions.  Eat less sugar.  Eat less of the high-fat foods.  Eat smaller portions of healthy fats such as nuts, avocado and olives.  Limit fried foods, fatty meats (majano, sausage, hot dogs, cold cuts), butter, salad dressings, cream, gravy, chips, bakery items, whole milk, ice cream, pizza, fast food and hard cheeses.  Choose high-fiber foods such as vegetables, fruits and whole-grain breads and cereals.  These foods help you to feel more satisfied  with your meal or snack. They are full of nutrients.  Eat mindfully  Listen to your body's signals for hunger. Eat when you feel hungry and stop when you start to feel full.  Avoid eating when bored, sad or upset.  Physical activity  Activity can help control your glucose levels. The American Diabetes Association recommends 150 minutes of moderate physical activity (walking, biking, swimming) a week or 75 minutes of vigorous activity (jogging, aerobics) per week. Add to that strength training (lifting weights, resistance bands) two or three days a week.  Talk to your doctor before starting an activity program. This is very important if:  You are over age 35.  You have had type 1 diabetes for more than 15 years.  You have had type 2 diabetes for more than 10 years.  You have any risk factors for heart or artery disease (such as high blood pressure, high cholesterol or being overweight).  You have a history of heart or artery disease.  You have any kind of nerve damage (neuropathy).  You have eye disease (retinopathy).  For informational purposes only. Not to replace the advice of your health care provider.  Copyright   2007 [facility]. All rights reserved. [employer] Karma [id number]   REV 03/16.

## 2017-10-20 NOTE — LETTER
October 23, 2017      Ana Maria Duvall  [address] [facility] [address]        Dear ,    The results of your recent lab tests were within normal limits. Enclosed is a copy of these results.  If you have any further questions or problems, please contact our office.     Resulted Orders   Albumin Random Urine Quantitative with Creat Ratio   Result Value Ref Range    Creatinine Urine <3 mg/dL    Albumin Urine mg/L <5 mg/L   PSA, screen   Result Value Ref Range    PSA 1.81 0 - 4 ug/L       If you have any questions or concerns, please call the clinic at the number listed above.       Sincerely,        See Meng MD

## 2017-11-08 ENCOUNTER — TELEPHONE (OUTPATIENT)
Dept: FAMILY MEDICINE | Facility: CLINIC | Age: 77
End: 2017-11-08

## 2017-11-08 NOTE — TELEPHONE ENCOUNTER
Reason for Call:  Form, our goal is to have forms completed with 72 hours, however, some forms may require a visit or additional information.    Type of letter, form or note:  employer    Who is the form from?: Patient    Where did the form come from: Patient or family brought in       What clinic location was the form placed at?: [place]    Where the form was placed: 's Box    What number is listed as a contact on the form?: Fax back to [phone]       Additional comments: Patient brought this form in for Dr. Meng to sign. Given to Dr. Meng to sign.    Call taken on 11/8/2017 at 3:23 PM by Paula Cantrell

## 2017-12-18 DIAGNOSIS — E11.9 TYPE 2 DIABETES MELLITUS WITHOUT COMPLICATION, WITHOUT LONG-TERM CURRENT USE OF INSULIN (H): ICD-10-CM

## 2018-01-19 DIAGNOSIS — E11.9 TYPE 2 DIABETES MELLITUS WITHOUT COMPLICATION, WITHOUT LONG-TERM CURRENT USE OF INSULIN (H): ICD-10-CM

## 2018-01-19 LAB — HBA1C MFR BLD: 6.6 % (ref 4.3–6)

## 2018-01-19 PROCEDURE — 83036 HEMOGLOBIN GLYCOSYLATED A1C: CPT | Performed by: FAMILY MEDICINE

## 2018-01-19 PROCEDURE — 36415 COLL VENOUS BLD VENIPUNCTURE: CPT | Performed by: FAMILY MEDICINE

## 2018-03-28 ENCOUNTER — OFFICE VISIT (OUTPATIENT)
Dept: FAMILY MEDICINE | Facility: CLINIC | Age: 78
End: 2018-03-28
Payer: COMMERCIAL

## 2018-03-28 VITALS
HEIGHT: 67 IN | BODY MASS INDEX: 33.12 KG/M2 | TEMPERATURE: 98.8 F | DIASTOLIC BLOOD PRESSURE: 72 MMHG | OXYGEN SATURATION: 97 % | HEART RATE: 86 BPM | SYSTOLIC BLOOD PRESSURE: 136 MMHG | WEIGHT: 211 LBS

## 2018-03-28 DIAGNOSIS — Z12.11 SCREENING FOR COLON CANCER: ICD-10-CM

## 2018-03-28 DIAGNOSIS — G89.29 CHRONIC BILATERAL LOW BACK PAIN, WITH SCIATICA PRESENCE UNSPECIFIED: Primary | ICD-10-CM

## 2018-03-28 DIAGNOSIS — M54.5 CHRONIC BILATERAL LOW BACK PAIN, WITH SCIATICA PRESENCE UNSPECIFIED: Primary | ICD-10-CM

## 2018-03-28 PROCEDURE — 99213 OFFICE O/P EST LOW 20 MIN: CPT | Performed by: FAMILY MEDICINE
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
## 2018-03-28 NOTE — MR AVS SNAPSHOT
After Visit Summary   3/28/2018    Ana Maria Duvall    MRN: [id number]           Patient Information     Date Of Birth          1940        Visit Information        Provider Department      3/28/2018 2:40 PM See Meng MD [facility]        Today's Diagnoses     Chronic bilateral low back pain, with sciatica presence unspecified    -  1    Screening for colon cancer          Care Instructions      Back Care Tips    Caring for your back  These are things you can do to prevent a recurrence of acute back pain and to reduce symptoms from chronic back pain:    Maintain a healthy weight. If you are overweight, losing weight will help most types of back pain.    Exercise is an important part of recovery from most types of back pain. The muscles behind and in front of the spine support the back. This means strengthening both the back muscles and the abdominal muscles will provide better support for your spine.     Swimming and brisk walking are good overall exercises to improve your fitness level.    Practice safe lifting methods (below).    Practice good posture when sitting, standing and walking. Avoid prolonged sitting. This puts more stress on the lower back than standing or walking.    Wear quality shoes with sufficient arch support. Foot and ankle alignment can affect back symptoms. Women should avoid wearing high heels.    Therapeutic massage can help relax the back muscles without stretching them.    During the first 24 to 72 hours after an acute injury or flare-up of chronic back pain, apply an ice pack to the painful area for 20 minutes and then remove it for 20 minutes, over a period of 60 to 90 minutes, or several times a day. As a safety precaution, do not use a heating pad at bedtime. Sleeping on a heating pad can lead to skin burns or tissue damage.    You can alternate ice and heat therapies.  Medications  Talk to your healthcare provider before using medicines,  especially if you have other medical problems or are taking other medicines.    You may use acetaminophen or ibuprofen to control pain, unless your healthcare provider prescribed other pain medicine. If you have chronic conditions like diabetes, liver or kidney disease, stomach ulcers, or gastrointestinal bleeding, or are taking blood thinners, talk with your healthcare provider before taking any medicines.    Be careful if you are given prescription pain medicines, narcotics, or medicine for muscle spasm. They can cause drowsiness, affect your coordination, reflexes, and judgment. Do not drive or operate heavy machinery while taking these types of medicines. Take prescription pain medicine only as prescribed by your healthcare provider.  Lumbar stretch  Here is a simple stretching exercise that will help relax muscle spasm and keep your back more limber. If exercise makes your back pain worse, don t do it.    Lie on your back with your knees bent and both feet on the ground.    Slowly raise your left knee to your chest as you flatten your lower back against the floor. Hold for 5 seconds.    Relax and repeat the exercise with your right knee.    Do 10 of these exercises for each leg.  Safe lifting method    Don t bend over at the waist to lift an object off the floor.  Instead, bend your knees and hips in a squat.     Keep your back and head upright    Hold the object close to your body, directly in front of you.    Straighten your legs to lift the object.     Lower the object to the floor in the reverse fashion.    If you must slide something across the floor, push it.  Posture tips  Sitting  Sit in chairs with straight backs or low-back support. Keep your knees lower than your hips, with your feet flat on the floor.  When driving, sit up straight. Adjust the seat forward so you are not leaning toward the steering wheel.  A small pillow or rolled towel behind your lower back may help if you are driving long  distances.   Standing  When standing for long periods, shift most of your weight to one leg at a time. Alternate legs every few minutes.   Sleeping  The best way to sleep is on your side with your knees bent. Put a low pillow under your head to support your neck in a neutral spine position. Avoid thick pillows that bend your neck to one side. Put a pillow between your legs to further relax your lower back. If you sleep on your back, put pillows under your knees to support your legs in a slightly flexed position. Use a firm mattress. If your mattress sags, replace it, or use a 1/2-inch plywood board under the mattress to add support.  Follow-up care  Follow up with your healthcare provider, or as advised.  If X-rays, a CT scan or an MRI scan were taken, they will be reviewed by a radiologist. You will be notified of any new findings that may affect your care.  Call 911  Seek emergency medical care if any of the following occur:    Trouble breathing    Confusion    Very drowsy    Fainting or loss of consciousness    Rapid or very slow heart rate    Loss of  bowel or bladder control  When to seek medical care  Call your healthcare provider if any of the following occur:    Pain becomes worse or spreads to your arms or legs    Weakness or numbness in one or both arms or legs    Numbness in the groin area  Date Last Reviewed: 6/1/2016 2000-2017 [employer]. [address] [place], [place] [address]. All rights reserved. This information is not intended as a substitute for professional medical care. Always follow your healthcare professional's instructions.                Follow-ups after your visit        Additional Services     GASTROENTEROLOGY ADULT REF PROCEDURE ONLY       Last Lab Result: Creatinine (mg/dL)       Date                     Value                 06/09/2017               0.83             ----------  Body mass index is 33.05 kg/(m^2).     Needed:  No  Language:   English    Patient will be contacted to schedule procedure.     Please be aware that coverage of these services is subject to the terms and limitations of your health insurance plan.  Call member services at your health plan with any benefit or coverage questions.  Any procedures must be performed at a [facility] facility OR coordinated by your clinic's referral office.    Please bring the following with you to your appointment:    (1) Any X-Rays, CTs or MRIs which have been performed.  Contact the facility where they were done to arrange for  prior to your scheduled appointment.    (2) List of current medications   (3) This referral request   (4) Any documents/labs given to you for this referral                  Future tests that were ordered for you today     Open Future Orders        Priority Expected Expires Ordered    XR Lumb/Sac Facet Anes/Ster Inj Routine 3/28/2018 3/28/2019 3/28/2018            Who to contact     If you have questions or need follow up information about today's clinic visit or your schedule please contact [facility] directly at [phone].  Normal or non-critical lab and imaging results will be communicated to you by [facility], letter or phone within 4 business days after the clinic has received the results. If you do not hear from us within 7 days, please contact the clinic through [employer] or phone. If you have a critical or abnormal lab result, we will notify you by phone as soon as possible.  Submit refill requests through [employer] or call your pharmacy and they will forward the refill request to us. Please allow 3 business days for your refill to be completed.          Additional Information About Your Visit        [employer] Information     [employer] gives you secure access to your electronic health record. If you see a primary care provider, you can also send messages to your care team and make appointments. If you have questions, please call your primary care clinic.   "If you do not have a primary care provider, please call [phone] and they will assist you.        Care EveryWhere ID     This is your Care EveryWhere ID. This could be used by other organizations to access your [facility] medical records  [id number]        Your Vitals Were     Pulse Temperature Height Pulse Oximetry BMI (Body Mass Index)       86 98.8  F (37.1  C) (Tympanic) 5' 7\" (1.702 m) 97% 33.05 kg/m2        Blood Pressure from Last 3 Encounters:   03/28/18 136/72   10/20/17 138/82   06/08/17 120/74    Weight from Last 3 Encounters:   03/28/18 211 lb (95.7 kg)   10/20/17 208 lb (94.3 kg)   06/08/17 205 lb (93 kg)              We Performed the Following     GASTROENTEROLOGY ADULT REF PROCEDURE ONLY        Primary Care Provider Office Phone # Fax #    Ravinder Plunkett [phone]       [address] [place] [address]        Equal Access to Services     [facility] Center[facility] : Hadii aad ku hadasho Soomaali, waaxda luqadaha, qaybta kaalmada adeegyada, waxdhara islas haygaviota doyle . So [facility] [phone].    ATENCIÓN: Si habla español, tiene a huber disposición servicios gratuitos de asistencia lingüística. Llame al [phone].    We comply with applicable federal civil rights laws and [place] laws. We do not discriminate on the basis of race, color, national origin, age, disability, sex, sexual orientation, or gender identity.            Thank you!     Thank you for choosing [facility]  for your care. Our goal is always to provide you with excellent care. Hearing back from our patients is one way we can continue to improve our services. Please take a few minutes to complete the written survey that you may receive in the mail after your visit with us. Thank you!             Your Updated Medication List - Protect others around you: Learn how to safely use, store and throw away your medicines at www.disposemymeds.org.          This list is accurate as of " 3/28/18  3:19 PM.  Always use your most recent med list.                   Brand Name Dispense Instructions for use Diagnosis    ACE NOT PRESCRIBED (INTENTIONAL)     0 each    ACE Inhibitor not prescribed due to Intolerance    Cough       aspirin 81 MG tablet      1 TABLET DAILY        blood glucose monitoring lancets     1 Box    Use to test blood sugars 1 times daily or as directed.    Type 2 diabetes mellitus without complication, without long-term current use of insulin (H)       blood glucose monitoring test strip    ONETOUCH ULTRA    100 each    Use to test blood sugars 1 times daily or as directed.    Type 2 diabetes mellitus without complication, unspecified long term insulin use status (H), Type 2 diabetes mellitus without complication, without long-term current use of insulin (H)       brimonidine 0.2 % ophthalmic solution    ALPHAGAN    1 Bottle    Place 1 drop into both eyes 2 times daily    Unspecified glaucoma(365.9)       CALCIUM + D PO      1 TABLET DAILY        flax seed oil 1000 MG capsule      1 CAPSULE DAILY        ibuprofen 200 MG tablet    ADVIL/MOTRIN     Take 200 mg by mouth every 4 hours as needed Reported on 4/21/2017        metFORMIN 500 MG tablet    GLUCOPHAGE    360 tablet    TAKE 2 TABLETS (1,000 MG) 2 TIMES DAILY (WITH MEALS)    Type 2 diabetes mellitus without complication, without long-term current use of insulin (H)       Multi-vitamin Tabs tablet   Generic drug:  multivitamin, therapeutic with minerals      DAILY        Saw Palmetto 1000 MG Caps     60 capsule    Take 2 capsules by mouth daily    Type 2 diabetes mellitus without complication, unspecified long term insulin use status (H), Type 2 diabetes mellitus without complication, without long-term current use of insulin (H)       STATIN NOT PRESCRIBED (INTENTIONAL)      Statin not prescribed intentionally due to Intolerance        TYLENOL 500 MG tablet   Generic drug:  acetaminophen      Take 1,000 mg by mouth every 6 hours as  needed        VITAMIN D3 PO      Take by mouth daily

## 2018-03-28 NOTE — PATIENT INSTRUCTIONS
Back Care Tips    Caring for your back  These are things you can do to prevent a recurrence of acute back pain and to reduce symptoms from chronic back pain:    Maintain a healthy weight. If you are overweight, losing weight will help most types of back pain.    Exercise is an important part of recovery from most types of back pain. The muscles behind and in front of the spine support the back. This means strengthening both the back muscles and the abdominal muscles will provide better support for your spine.     Swimming and brisk walking are good overall exercises to improve your fitness level.    Practice safe lifting methods (below).    Practice good posture when sitting, standing and walking. Avoid prolonged sitting. This puts more stress on the lower back than standing or walking.    Wear quality shoes with sufficient arch support. Foot and ankle alignment can affect back symptoms. Women should avoid wearing high heels.    Therapeutic massage can help relax the back muscles without stretching them.    During the first 24 to 72 hours after an acute injury or flare-up of chronic back pain, apply an ice pack to the painful area for 20 minutes and then remove it for 20 minutes, over a period of 60 to 90 minutes, or several times a day. As a safety precaution, do not use a heating pad at bedtime. Sleeping on a heating pad can lead to skin burns or tissue damage.    You can alternate ice and heat therapies.  Medications  Talk to your healthcare provider before using medicines, especially if you have other medical problems or are taking other medicines.    You may use acetaminophen or ibuprofen to control pain, unless your healthcare provider prescribed other pain medicine. If you have chronic conditions like diabetes, liver or kidney disease, stomach ulcers, or gastrointestinal bleeding, or are taking blood thinners, talk with your healthcare provider before taking any medicines.    Be careful if you are given  prescription pain medicines, narcotics, or medicine for muscle spasm. They can cause drowsiness, affect your coordination, reflexes, and judgment. Do not drive or operate heavy machinery while taking these types of medicines. Take prescription pain medicine only as prescribed by your healthcare provider.  Lumbar stretch  Here is a simple stretching exercise that will help relax muscle spasm and keep your back more limber. If exercise makes your back pain worse, don t do it.    Lie on your back with your knees bent and both feet on the ground.    Slowly raise your left knee to your chest as you flatten your lower back against the floor. Hold for 5 seconds.    Relax and repeat the exercise with your right knee.    Do 10 of these exercises for each leg.  Safe lifting method    Don t bend over at the waist to lift an object off the floor.  Instead, bend your knees and hips in a squat.     Keep your back and head upright    Hold the object close to your body, directly in front of you.    Straighten your legs to lift the object.     Lower the object to the floor in the reverse fashion.    If you must slide something across the floor, push it.  Posture tips  Sitting  Sit in chairs with straight backs or low-back support. Keep your knees lower than your hips, with your feet flat on the floor.  When driving, sit up straight. Adjust the seat forward so you are not leaning toward the steering wheel.  A small pillow or rolled towel behind your lower back may help if you are driving long distances.   Standing  When standing for long periods, shift most of your weight to one leg at a time. Alternate legs every few minutes.   Sleeping  The best way to sleep is on your side with your knees bent. Put a low pillow under your head to support your neck in a neutral spine position. Avoid thick pillows that bend your neck to one side. Put a pillow between your legs to further relax your lower back. If you sleep on your back, put pillows  under your knees to support your legs in a slightly flexed position. Use a firm mattress. If your mattress sags, replace it, or use a 1/2-inch plywood board under the mattress to add support.  Follow-up care  Follow up with your healthcare provider, or as advised.  If X-rays, a CT scan or an MRI scan were taken, they will be reviewed by a radiologist. You will be notified of any new findings that may affect your care.  Call 911  Seek emergency medical care if any of the following occur:    Trouble breathing    Confusion    Very drowsy    Fainting or loss of consciousness    Rapid or very slow heart rate    Loss of  bowel or bladder control  When to seek medical care  Call your healthcare provider if any of the following occur:    Pain becomes worse or spreads to your arms or legs    Weakness or numbness in one or both arms or legs    Numbness in the groin area  Date Last Reviewed: 6/1/2016 2000-2017 The [employer]. [address] [place] [address]. All rights reserved. This information is not intended as a substitute for professional medical care. Always follow your healthcare professional's instructions.

## 2018-04-03 ENCOUNTER — TELEPHONE (OUTPATIENT)
Dept: PALLIATIVE MEDICINE | Facility: CLINIC | Age: 78
End: 2018-04-03

## 2018-04-03 NOTE — TELEPHONE ENCOUNTER
Pre-screening Questions for Radiology Injections:    Injection to be done at which interventional clinic site? [facility]    Procedure ordered by Lumbar Facet Injection    Procedure ordered? Lumbar Facet Joint Injection    What insurance would patient like us to bill for this procedure? [employer]      Worker's comp or MVA (motor vehicle accident) -Any injection DO NOT SCHEDULE and route to Patricia Aguilar.      [facility] - For SI joint injections, DO NOT SCHEDULE and route Shannon Reeves. Jeeran FREEDOM NO PA REQUIRED EFFECTIVE 11/1/2017      [facility]- MBB's must be scheduled at LEAST two weeks apart      [employer] - Any injection besides hip/shoulder/knee joint DO NOT SCHEDULE and route to Shannon Reeves. She will obtain PA and call pt back to schedule procedure or notify pt of denial.        [employer]-PA REQUIRED FOR NON-LORE OR Joint injections    Any chance of pregnancy? Not Applicable   If YES, do NOT schedule and route to RN pool    Is an  needed? No     Patient has a drive home? (mandatory) YES:     Is patient taking any blood thinners (plavix, coumadin, jantoven, warfarin, heparin, pradaxa or dabigatran )? No   If hold needed, do NOT schedule, route to RN pool     Is patient taking any aspirin products? No     If more than 325mg/day do NOT schedule; route to RN pool     For CERVICAL procedures, hold all aspirin products for 6 days.      Does the patient have a bleeding or clotting disorder? No     If YES, okay to schedule AND route to RN nurse pool    **For any patients with platelet count <100, must be forwarded to provider**    Is patient diabetic?  Yes  If YES, have them bring their glucometer.    Does patient have an active infection or treated for one within the past week? No     Is patient currently taking any antibiotics?  No     For patients on chronic, preventative, or prophylactic antibiotics, procedures may be scheduled.     For patients on antibiotics for active  or recent infection:    Valeriano Kern Burton, Snitzer-antibiotic course must have been completed for 4 days    Dr. Olivares-antibiotic course must have been completed for 7 days    Is patient currently taking any steroid medications? (i.e. Prednisone, Medrol)  No     For patients on steroid medications:    Valeriano Kern Burton, Snitzer-steroid course must have been completed for 4 days    -steroid course must have been completed for 7 days    Reviewed with patient:  If you are started on any steroids or antibiotics between now and your appointment, you must contact us because it may affect our ability to perform your procedure.  Yes    Is patient actively being treated for cancer or immunocompromised? No  If YES, do NOT schedule and route to RN pool     Are you able to get on and off an exam table with minimal or no assistance? Yes  If NO, do NOT schedule and route to RN pool    Are you able to roll over and lay on your stomach with minimal or no assistance? Yes  If NO, do NOT schedule and route to RN pool     Any allergies to contrast dye, iodine, shellfish, or numbing and steroid medications? No  If YES, route to RN pool AND add allergy information to appointment notes    Allergies: Review of patient's allergies indicates no known allergies.      Has the patient had a flu shot or any other vaccinations within 7 days before or after the procedure.  No     Does patient have an MRI/CT?  YES:   (SI joint, hip injections, lumbar sympathetic blocks, and stellate ganglion blocks do not require an MRI)    Was the MRI done w/in the last 3 years?  Yes    Was MRI done at [facility]? Yes      If not, where was it done? N/A       If MRI was not done at [facility], [facility] or [facility] do NOT schedule and route to nursing.  If pt has an imaging disc, the injection may be scheduled but pt has to bring disc to appt. If they show up w/out disc the injection cannot be done    Reminders (please tell patient  if applicable):       Instructed pt to arrive 30 minutes early for IV start if this is for a cervical procedure, ALL sympathetic (stellate ganglion, hypogastric, or lumbar sympathetic block) and all sedation procedures (RFA, spinal cord stimulation trials).  Not Applicable   -IVs are not routinely placed for Dr. Muir cervical cases   -Dr. Villatoro: IVs for cervical ESIs and cervical TBDs (not CMBBs/facet inj)      If NPO for sedation, informed patient that it is okay to take medications with sips of water (except if they are to hold blood thinners).  Not Applicable   *DO take blood pressure medication if it is prescribed*      If this is for a cervical LORE, informed patient that aspirin needs to be held for 6 days.   Not Applicable      For all patients not having spinal cord stimulator (SCS) trials or radiofrequency ablations (RFAs), informed patient:    IV sedation is not provided for this procedure.  If you feel that an oral anti-anxiety medication is needed, you can discuss this further with your referring provider or primary care provider.  The Pain Clinic provider will discuss specifics of what the procedure includes at your appointment.  Most procedures last 10-20 minutes.  We use numbing medications to help with any discomfort during the procedure.  NO      Do not schedule procedures requiring IV placement in the first appointment of the day or first appointment after lunch.       For patients 85 or older we recommend having an adult stay w/ them for the remainder of the day.       Does the patient have any questions?  NO  Jen Frank  [facility]

## 2018-04-04 NOTE — TELEPHONE ENCOUNTER
Per Dr. Meng's 3/28/18 visit notes:  77-year-old male presents with chronic low back pain, history of multiple spinal surgeries in the past, had a steroid injection back in April 2017 which worked pretty well, would like to have repeat one, referral placed  --------------  This is for a repeat facet joint injection.  Last lumbar facet joint injection was done 4/2017.  Called pt   He had 7 months of relief @ 80%.    Routed to .    Nitza Velasco, RN-BSN  [facility]

## 2018-04-04 NOTE — TELEPHONE ENCOUNTER
PA pending additional information -% of relief and how long relief lasted from previous LESI. If this is NOT the same levels, please review and advise exact levels and laterality.        Shannon ALMANZAR    [facility]

## 2018-04-04 NOTE — TELEPHONE ENCOUNTER
Patient scheduled in [place], they handle the CON ALMANZAR    [facility]

## 2018-04-05 ENCOUNTER — TELEPHONE (OUTPATIENT)
Dept: FAMILY MEDICINE | Facility: CLINIC | Age: 78
End: 2018-04-05

## 2018-04-05 NOTE — TELEPHONE ENCOUNTER
Ana Maria is calling asking if he could have a FIT test instead of a colonoscopy. Please advise.    Paula Cantrell-[facility]

## 2018-04-13 ENCOUNTER — RADIOLOGY INJECTION OFFICE VISIT (OUTPATIENT)
Dept: PALLIATIVE MEDICINE | Facility: CLINIC | Age: 78
End: 2018-04-13
Payer: COMMERCIAL

## 2018-04-13 ENCOUNTER — HOSPITAL ENCOUNTER (OUTPATIENT)
Dept: RADIOLOGY | Facility: CLINIC | Age: 78
Discharge: HOME | End: 2018-04-13
Attending: FAMILY MEDICINE | Admitting: FAMILY MEDICINE
Payer: COMMERCIAL

## 2018-04-13 VITALS — RESPIRATION RATE: 16 BRPM | HEART RATE: 73 BPM | SYSTOLIC BLOOD PRESSURE: 158 MMHG | DIASTOLIC BLOOD PRESSURE: 86 MMHG

## 2018-04-13 DIAGNOSIS — G89.29 CHRONIC BILATERAL LOW BACK PAIN, WITH SCIATICA PRESENCE UNSPECIFIED: ICD-10-CM

## 2018-04-13 DIAGNOSIS — M47.817 LUMBOSACRAL FACET JOINT SYNDROME: ICD-10-CM

## 2018-04-13 DIAGNOSIS — M47.817 LUMBOSACRAL SPONDYLOSIS WITHOUT MYELOPATHY: Primary | ICD-10-CM

## 2018-04-13 DIAGNOSIS — M54.5 CHRONIC BILATERAL LOW BACK PAIN, WITH SCIATICA PRESENCE UNSPECIFIED: ICD-10-CM

## 2018-04-13 PROCEDURE — 64493 INJ PARAVERT F JNT L/S 1 LEV: CPT | Mod: 50

## 2018-04-13 PROCEDURE — 25000125 ZZHC RX 250: Performed by: ANESTHESIOLOGY

## 2018-04-13 PROCEDURE — 64493 INJ PARAVERT F JNT L/S 1 LEV: CPT | Mod: LT | Performed by: ANESTHESIOLOGY

## 2018-04-13 PROCEDURE — 25000128 H RX IP 250 OP 636: Performed by: ANESTHESIOLOGY

## 2018-04-13 RX ORDER — IOPAMIDOL 612 MG/ML
3 INJECTION, SOLUTION INTRATHECAL ONCE
Status: COMPLETED | OUTPATIENT
Start: 2018-04-13 | End: 2018-04-13

## 2018-04-13 RX ORDER — METHYLPREDNISOLONE ACETATE 40 MG/ML
40 INJECTION, SUSPENSION INTRA-ARTICULAR; INTRALESIONAL; INTRAMUSCULAR; SOFT TISSUE ONCE
Status: COMPLETED | OUTPATIENT
Start: 2018-04-13 | End: 2018-04-13

## 2018-04-13 RX ORDER — BUPIVACAINE HYDROCHLORIDE 5 MG/ML
5 INJECTION, SOLUTION PERINEURAL ONCE
Status: COMPLETED | OUTPATIENT
Start: 2018-04-13 | End: 2018-04-13

## 2018-04-13 RX ADMIN — IOPAMIDOL 1 ML: 612 INJECTION, SOLUTION INTRATHECAL at 11:19

## 2018-04-13 RX ADMIN — BUPIVACAINE HYDROCHLORIDE 2 MG: 5 INJECTION, SOLUTION EPIDURAL; INTRACAUDAL; PERINEURAL at 11:19

## 2018-04-13 RX ADMIN — METHYLPREDNISOLONE ACETATE 40 MG: 40 INJECTION, SUSPENSION INTRA-ARTICULAR; INTRALESIONAL; INTRAMUSCULAR; SOFT TISSUE at 11:19

## 2018-04-13 NOTE — PROGRESS NOTES
Pre-procedure Intake    Have you been fasting? No     If yes, for how long?     Are you taking a prescribed blood thinner such as coumadin, Plavix, Xarelto?    No    If yes, when did you take your last dose?     Do you take aspirin?  No    If cervical procedure, have you held aspirin for 6 days?   NA    Do you have any allergies to contrast dye, iodine, steroid and/or numbing medications?  NO    Are you currently taking antibiotics or have an active infection?  NO    Have you had a fever/elevated temperature within the past week? NO    Are you currently taking oral steroids? NO    Do you have a ? Yes       Are you pregnant or breastfeeding?  Not Applicable    Are the vital signs normal?  Yes

## 2018-04-13 NOTE — IP AVS SNAPSHOT
[facility]    [address]    [facility] [place] [facility] [place] [address]    Phone:  [phone]    Fax:  [phone]                                       After Visit Summary   4/13/2018    Ana Maria Duvall    MRN: [id number]           After Visit Summary Signature Page     I have received my discharge instructions, and my questions have been answered. I have discussed any challenges I see with this plan with the nurse or doctor.    ..........................................................................................................................................  Patient/Patient Representative Signature      ..........................................................................................................................................  Patient Representative Print Name and Relationship to Patient    ..................................................               ................................................  Date                                            Time    ..........................................................................................................................................  Reviewed by Signature/Title    ...................................................              ..............................................  Date                                                            Time

## 2018-04-13 NOTE — IP AVS SNAPSHOT
MRN:[id number]                      After Visit Summary   4/13/2018    Ana Maria Duvall    MRN: [id number]           Visit Information        Provider Department      4/13/2018 10:45 AM [facility]           Review of your medicines      UNREVIEWED medicines. Ask your doctor about these medicines        Dose / Directions    ACE NOT PRESCRIBED (INTENTIONAL)   Used for:  Cough        ACE Inhibitor not prescribed due to Intolerance   Quantity:  0 each   Refills:  0       aspirin 81 MG tablet        1 TABLET DAILY   Refills:  0       brimonidine 0.2 % ophthalmic solution   Commonly known as:  ALPHAGAN   Used for:  Unspecified glaucoma(365.9)        Dose:  1 drop   Place 1 drop into both eyes 2 times daily   Quantity:  1 Bottle   Refills:  2       CALCIUM + D PO        1 TABLET DAILY   Refills:  0       flax seed oil 1000 MG capsule        1 CAPSULE DAILY   Refills:  0       ibuprofen 200 MG tablet   Commonly known as:  ADVIL/MOTRIN        Dose:  200 mg   Take 200 mg by mouth every 4 hours as needed Reported on 4/21/2017   Refills:  0       metFORMIN 500 MG tablet   Commonly known as:  GLUCOPHAGE   Used for:  Type 2 diabetes mellitus without complication, without long-term current use of insulin (H)        TAKE 2 TABLETS (1,000 MG) 2 TIMES DAILY (WITH MEALS)   Quantity:  360 tablet   Refills:  0       Multi-vitamin Tabs tablet   Generic drug:  multivitamin, therapeutic with minerals        DAILY   Refills:  0       Saw [place] 1000 MG Caps   Used for:  Type 2 diabetes mellitus without complication, unspecified long term insulin use status (H), Type 2 diabetes mellitus without complication, without long-term current use of insulin (H)        Dose:  2 capsule   Take 2 capsules by mouth daily   Quantity:  60 capsule   Refills:  0       STATIN NOT PRESCRIBED (INTENTIONAL)        Statin not prescribed intentionally due to Intolerance   Refills:  0       TYLENOL 500 MG tablet    Generic drug:  acetaminophen        Dose:  1000 mg   Take 1,000 mg by mouth every 6 hours as needed   Refills:  0       VITAMIN D3 PO        Take by mouth daily   Refills:  0         CONTINUE these medicines which have NOT CHANGED        Dose / Directions    blood glucose monitoring lancets   Used for:  Type 2 diabetes mellitus without complication, without long-term current use of insulin (H)        Use to test blood sugars 1 times daily or as directed.   Quantity:  1 Box   Refills:  6       blood glucose monitoring test strip   Commonly known as:  ONETOUCH ULTRA   Used for:  Type 2 diabetes mellitus without complication, unspecified long term insulin use status (H), Type 2 diabetes mellitus without complication, without long-term current use of insulin (H)        Use to test blood sugars 1 times daily or as directed.   Quantity:  100 each   Refills:  1                Protect others around you: Learn how to safely use, store and throw away your medicines at www.disposemymeds.org.         Follow-ups after your visit        Your next 10 appointments already scheduled     Apr 20, 2018   Procedure with Kyle West MD   [facility] ([facility])    [address] [address] [phone]           The medical center is located at [address] (between [address] in [place], [place] miles north of [place]).               Care Instructions        Further instructions from your care team       [facility]   Procedure Discharge Instructions    Today you saw:    Dr. Ronald El      You had an: Lumbar facet joint injection      Medications used:  Lidocaine   Bupivacaine   Depo-medrol  IsoVue            Be cautious when walking. Numbness and/or weakness in the lower extremities may occur for up to 6-8 hours after the procedure due to effect of the local anesthetic.    Do not drive for 6 hours. The effect of the local anesthetic  could slow your reflexes.     You may resume your regular activities after 24 hours    Avoid strenuous activity for the first 24 hours    You may shower, however avoid swimming, tub baths or hot tubs for 24 hours following your procedure    You may have a mild to moderate increase in pain for several days following the injection.    It may take up to 14 days for the steroid medication to start working although you may feel the effect as early as a few days after the procedure.       You may use ice packs for 10-15 minutes, 3 to 4 times a day at the injection site for comfort    Do not use heat to painful areas for 6 to 8 hours. This will give the local anesthetic time to wear off and prevent you from accidentally burning your skin.     You may use anti-inflammatory medications (such as Ibuprofen or Aleve or Advil) or Tylenol for pain control if necessary    If you have diabetes, check your blood sugar more frequently than usual as your blood sugar may be higher than normal for 10-14 days following a steroid injection. Contact your doctor who manages your diabetes if your blood sugar is higher than usual    If you experience any of the following, call the Pain Clinic during work hours at [phone] or the Provider Line after hours at [phone]:  -Fever over 100 degree F  -Swelling, bleeding, redness, drainage, warmth at the injection site  -Progressive weakness or numbness in your legs   -Loss of bowel or bladder function  -Unusual headache that is not relieved by Tylenol or other pain reliever  -Unusual new onset of pain that is not improving         Additional Information About Your Visit        [facility] Information     [facility] gives you secure access to your electronic health record. If you see a primary care provider, you can also send messages to your care team and make appointments. If you have questions, please call your primary care clinic.  If you do not have a primary care provider, please call  [phone] and they will assist you.        Care EveryWhere ID     This is your Care EveryWhere ID. This could be used by other organizations to access your [facility] medical records  [id number]        Your Vitals Were     Blood Pressure Pulse Respirations             161/89 75 15          Primary Care Provider Office Phone # Fax #    Ravinder Sukhjinder Plunkett [phone]      Equal Access to Services     ROM ASCENCIO : Hadii aad ku hadasho Soomaali, waaxda luqadaha, qaybta kaalmada adeegyada, waxay chengin hayninin adeeg phani laBenjamíngaviota . So [facility] [phone].    ATENCIÓN: Si habla español, tiene a huber disposición servicios gratuitos de asistencia lingüística. Llame al [phone].    We comply with applicable federal civil rights laws and [place] laws. We do not discriminate on the basis of race, color, national origin, age, disability, sex, sexual orientation, or gender identity.            Thank you!     Thank you for choosing [facility] for your care. Our goal is always to provide you with excellent care. Hearing back from our patients is one way we can continue to improve our services. Please take a few minutes to complete the written survey that you may receive in the mail after you visit with us. Thank you!             Medication List: This is a list of all your medications and when to take them. Check marks below indicate your daily home schedule. Keep this list as a reference.      Medications           Morning Afternoon Evening Bedtime As Needed    ACE NOT PRESCRIBED (INTENTIONAL)   ACE Inhibitor not prescribed due to Intolerance                                aspirin 81 MG tablet   1 TABLET DAILY                                blood glucose monitoring lancets   Use to test blood sugars 1 times daily or as directed.                                blood glucose monitoring test strip   Commonly known as:  ONETOUCH ULTRA   Use to test blood sugars 1 times daily or as directed.                                 brimonidine 0.2 % ophthalmic solution   Commonly known as:  ALPHAGAN   Place 1 drop into both eyes 2 times daily                                CALCIUM + D PO   1 TABLET DAILY                                flax seed oil 1000 MG capsule   1 CAPSULE DAILY                                ibuprofen 200 MG tablet   Commonly known as:  ADVIL/MOTRIN   Take 200 mg by mouth every 4 hours as needed Reported on 4/21/2017                                metFORMIN 500 MG tablet   Commonly known as:  GLUCOPHAGE   TAKE 2 TABLETS (1,000 MG) 2 TIMES DAILY (WITH MEALS)                                Multi-vitamin Tabs tablet   DAILY   Generic drug:  multivitamin, therapeutic with minerals                                Saw [place] 1000 MG Caps   Take 2 capsules by mouth daily                                STATIN NOT PRESCRIBED (INTENTIONAL)   Statin not prescribed intentionally due to Intolerance                                TYLENOL 500 MG tablet   Take 1,000 mg by mouth every 6 hours as needed   Generic drug:  acetaminophen                                VITAMIN D3 PO   Take by mouth daily

## 2018-04-13 NOTE — PROGRESS NOTES
Pre procedure Diagnosis: lumbar facet arthropathy, lumbosacral spondylosis   Post procedure Diagnosis: Same  Procedure performed: lumbosacral facet joint injections at L5-S1 bilaterally, fluoroscopically guided, contrast controlled  Indication:  Therapeutic for pain  Anesthesia: none  Complication:  none  Operators: Ronald El MD    Indications:   Ana Maria Duvall is a 77 year old male was sent by Dr. Meng for repeat lumbar injections.  The patient has a history of chronic lower back pain without radiation.  Exam shows well healed lumbar surgical scars, tenderness to palpation of the lumbar paraspinal muscles over the L5-S1 facet joints and reproduction of pain with extension and lateral rotation of the lumbar spine.  They have tried conservative treatment including physical therapy, medications, and interventional procedures.    MRI LUMBAR SPINE WITHOUT AND WITH CONTRAST   2/3/2017 11:05 AM   FINDINGS: The report is dictated assuming five lumbar-type vertebral  bodies, and radiographic correlation may be necessary.  Sagittal  images demonstrate normal vertebral body height.  Bone marrow signal  is unremarkable. Tip of the conus medullaris and cauda equina are  normal.     T12-L1:  No disc herniation or stenosis.  Facet joints are  unremarkable.     L1-L2:  Mild disc bulge and facet hypertrophy. Mild central stenosis.  Mild-to-moderate right and mild left foraminal stenosis. No  significant change.     L2-L3:  Broad-based disc bulge. Mild facet hypertrophy. Moderate  central stenosis. Moderate left and mild-to-moderate right foraminal  stenosis. Very mild progression of degenerative changes at this level.     L3-L4:  Broad-based disc bulge and left greater than right facet  hypertrophy. Mild central stenosis. Severe left and moderate-to-severe  right foraminal stenosis. No significant change.     L4-L5:  Previous posterior decompression at this level and probably  posterior fusion. No disc herniation or  stenosis.     L5-S1:  Evidence of left laminotomy at this level. Broad-based disc  bulge. No central stenosis. Moderate to severe, if not severe, left  foraminal stenosis. Moderate right foraminal stenosis.     Paraspinous soft tissues:  Unremarkable.       IMPRESSION:    1. At L1-L2 there is mild central stenosis. Mild-to-moderate right and  mild left foraminal stenosis.  2. At L2-L3 there is moderate central and left foraminal stenosis.  Mild-to-moderate right foraminal stenosis.  3. At L3-L4 there is mild central stenosis. Severe left and  moderate-to-severe right foraminal stenosis.  4. At L5-S1 there is moderate-to-severe, if not severe, left foraminal  stenosis. Moderate right foraminal stenosis.  5. Findings are similar to the prior study with perhaps mild  progression of degenerative changes at the L2-L3 level.    Options/alternatives, benefits and risks were discussed with the patient including bleeding, infection, flared pain, tissue trauma, exposure to radiation, reaction to medications including seizure, spinal cord injury, paralysis, weakness, numbness and headache.     Questions were answered to his satisfaction and he wishes to proceed. Voluntary informed consent was obtained and signed.     Vitals were reviewed: Yes  161/89  75 15  Allergies were reviewed:  Yes   Medications were reviewed:  Yes   Pre-procedure pain score: 5/10    Procedure:  After obtaining signed informed consent, the patient was brought into the procedure suite and was placed in a prone position on the procedure table.   A Pause for the Cause was performed.  The patient was prepped and draped in the usual sterile fashion.     Under AP fluoroscopic guidance the L5-S1 facet joints were identified bilaterally, and the C-arm was rotated obliquely to the affected side to open the joint space, first on the right, then on the left. A total of 2 ml of 1% lidocaine was injected at the needle entry points and needle tracts. Then 25 gauge 5  inch spinal needles were inserted and advanced under fluoroscopic guidance targeting the superior articular process of each joint. Once the needles made contact with the SAP, they were rotated and then advanced into the joints.    AP and lateral fluoroscopic views were obtained to confirm the needle placement. Then,  Isovue 300 contrast dye was injected after negative aspiration for heme and CSF in each joint, confirming appropriate needle placement.  A total of 1 ml of Isovue was used.  Isovue wasted:  2 ml.    Then, each joint was injected with 1.5 ml of a combination of Depomedrol 40 mg and 0.5% bupivacaine 1 ml (total injectate volume 3 ml) and the needles were flushed with local anesthetic as they were withdrawn.  He did complain of some pain down the right lower extremity with injection of medication, so the needle location was confirmed and repositioned slightly and the medication was then injected without discomfort.     Hemostasis was achieved, the area was cleaned, and bandaids were placed when appropriate.  The patient tolerated the procedure well, and was taken to the recovery room.    Images were saved to PACS.    Post-procedure pain score: 4/10  Follow-up includes:   -f/u with referring provider    Ronald El MD  [facility]

## 2018-04-13 NOTE — DISCHARGE INSTRUCTIONS
[facility]   Procedure Discharge Instructions    Today you saw:    Dr. Ronald El      You had an: Lumbar facet joint injection      Medications used:  Lidocaine   Bupivacaine   Depo-medrol  IsoVue            Be cautious when walking. Numbness and/or weakness in the lower extremities may occur for up to 6-8 hours after the procedure due to effect of the local anesthetic.    Do not drive for 6 hours. The effect of the local anesthetic could slow your reflexes.     You may resume your regular activities after 24 hours    Avoid strenuous activity for the first 24 hours    You may shower, however avoid swimming, tub baths or hot tubs for 24 hours following your procedure    You may have a mild to moderate increase in pain for several days following the injection.    It may take up to 14 days for the steroid medication to start working although you may feel the effect as early as a few days after the procedure.       You may use ice packs for 10-15 minutes, 3 to 4 times a day at the injection site for comfort    Do not use heat to painful areas for 6 to 8 hours. This will give the local anesthetic time to wear off and prevent you from accidentally burning your skin.     You may use anti-inflammatory medications (such as Ibuprofen or Aleve or Advil) or Tylenol for pain control if necessary    If you have diabetes, check your blood sugar more frequently than usual as your blood sugar may be higher than normal for 10-14 days following a steroid injection. Contact your doctor who manages your diabetes if your blood sugar is higher than usual    If you experience any of the following, call the Pain Clinic during work hours at [phone] or the Provider Line after hours at [phone]:  -Fever over 100 degree F  -Swelling, bleeding, redness, drainage, warmth at the injection site  -Progressive weakness or numbness in your legs   -Loss of bowel or bladder function  -Unusual headache that is not  relieved by Tylenol or other pain reliever  -Unusual new onset of pain that is not improving

## 2018-04-18 ENCOUNTER — TELEPHONE (OUTPATIENT)
Dept: FAMILY MEDICINE | Facility: CLINIC | Age: 78
End: 2018-04-18

## 2018-04-18 ENCOUNTER — MEDICAL CORRESPONDENCE (OUTPATIENT)
Dept: HEALTH INFORMATION MANAGEMENT | Facility: CLINIC | Age: 78
End: 2018-04-18

## 2018-04-18 NOTE — TELEPHONE ENCOUNTER
Received a certificate of medical necessity from [facility]. Given to Dr. Meng to sign.    [facility] [place]

## 2018-04-19 ENCOUNTER — ANESTHESIA EVENT (OUTPATIENT)
Dept: GASTROENTEROLOGY | Facility: CLINIC | Age: 78
End: 2018-04-19
Payer: COMMERCIAL

## 2018-04-19 ASSESSMENT — LIFESTYLE VARIABLES: TOBACCO_USE: 1

## 2018-04-19 NOTE — ANESTHESIA PREPROCEDURE EVALUATION
Anesthesia Evaluation     . Pt has had prior anesthetic. Type: General and MAC           ROS/MED HX    ENT/Pulmonary:     (+)tobacco use, Past use , . .    Neurologic:  - neg neurologic ROS   (+)other neuro    Spinal cord injury: sciatic.   Cardiovascular:     (+) hypertension----. : . . . :. .       METS/Exercise Tolerance:     Hematologic:  - neg hematologic  ROS       Musculoskeletal:  - neg musculoskeletal ROS       GI/Hepatic:  - neg GI/hepatic ROS       Renal/Genitourinary:     (+) Other Renal/ Genitourinary,       Endo:     (+) type II DM .      Psychiatric:     (+) psychiatric history       Infectious Disease:  - neg infectious disease ROS       Malignancy:   (+) Malignancy History of Skin          Other:                     Physical Exam  Normal systems: cardiovascular, pulmonary and dental    Airway   Mallampati: II  TM distance: >3 FB  Neck ROM: full    Dental     Cardiovascular       Pulmonary                     Anesthesia Plan      History & Physical Review  History and physical reviewed and following examination; no interval change.    ASA Status:  2 .    NPO Status:  > 8 hours    Plan for MAC with Intravenous and Propofol induction. Reason for MAC:  Other - see comments  PONV prophylaxis:  Ondansetron (or other 5HT-3) and Dexamethasone or Solumedrol       Postoperative Care  Postoperative pain management:  IV analgesics and Oral pain medications.      Consents  Anesthetic plan, risks, benefits and alternatives discussed with:  Patient..                          .  Anesthesia Evaluation     . Pt has had prior anesthetic. Type: General    No history of anesthetic complications     ROS/MED HX    ENT/Pulmonary:  - neg pulmonary ROS     Neurologic:  - neg neurologic ROS     Cardiovascular:     (+) hypertension range: 130 sys range now off meds,. : . . . :. .      (-) CAD, orthopnea/PND and syncope   METS/Exercise Tolerance:  >4 METS   Hematologic:  - neg hematologic  ROS       Musculoskeletal: Comment:  Low back pain - limits activities        GI/Hepatic:  - neg GI/hepatic ROS       Renal/Genitourinary:  - ROS Renal section negative       Endo:  - neg endo ROS         Psychiatric:         Infectious Disease:  - neg infectious disease ROS       Malignancy:         Other:               Physical Exam  Normal systems: dental    Airway   Mallampati: III  TM distance: <3 FB  Neck ROM: full    Dental     Cardiovascular   Rhythm and rate: regular and normal      Pulmonary    breath sounds clear to auscultation    Other findings: LABS:    BMP:  Lab Test   11/20/13 11/01/13              0813      0900       NA         --        136        POTASSIUM  4.5       --         CHLORIDE   --        102        CO2        --        24         BUN        --        15         CR         --        0.70       GLC        --        119*       RENNY        --        9.7          CBC:   Lab Test   11/01/13              0900       WBC        6.2        RBC        5.21       HGB        15.4       HCT        43.6       MCV        84       ECG No Change, NSR by hx      MCH        29.6       MCHC       35.3       RDW        13.0       PLT        260                          Anesthesia Plan    ASA Scores 2 .    Plan for General and ETT - with Intravenous and Propofol induction.Maintenance will be Balanced.   Routine analgesia and antiemetics to be used for post-operative care. Anesthetic plan, risks, benefits and alternatives discussed with: patient or representative. Possibility of blood products discussed.        Equipment:Videolaryngoscope (D blade available)    Felicita Fritz MD  11/20/2013      History & Physical Review  History and physical reviewed; no interval change.                        .  Anesthesia Evaluation     . Pt has had prior anesthetic. Type: General    No history of anesthetic complications     ROS/MED HX    ENT/Pulmonary:  - neg pulmonary ROS     Neurologic:  - neg neurologic ROS     Cardiovascular:     (+) hypertension range:  130 sys range now off meds,. : . . . :. .      (-) CAD, orthopnea/PND and syncope   METS/Exercise Tolerance:  >4 METS   Hematologic:  - neg hematologic  ROS       Musculoskeletal: Comment: Low back pain - limits activities        GI/Hepatic:  - neg GI/hepatic ROS       Renal/Genitourinary:  - ROS Renal section negative       Endo:  - neg endo ROS         Psychiatric:         Infectious Disease:  - neg infectious disease ROS       Malignancy:         Other:               Physical Exam  Normal systems: dental    Airway   Mallampati: III  TM distance: <3 FB  Neck ROM: full    Dental     Cardiovascular   Rhythm and rate: regular and normal      Pulmonary    breath sounds clear to auscultation    Other findings: LABS:    BMP:  Lab Test   11/20/13 11/01/13              0813      0900       NA         --        136        POTASSIUM  4.5       --         CHLORIDE   --        102        CO2        --        24         BUN        --        15         CR         --        0.70       GLC        --        119*       RENNY        --        9.7          CBC:   Lab Test   11/01/13              0900       WBC        6.2        RBC        5.21       HGB        15.4       HCT        43.6       MCV        84       ECG No Change, NSR by hx      MCH        29.6       MCHC       35.3       RDW        13.0       PLT        260                          Anesthesia Plan    ASA Scores 2 .    Plan for General and ETT - with Intravenous and Propofol induction.Maintenance will be Balanced.   Routine analgesia and antiemetics to be used for post-operative care. Anesthetic plan, risks, benefits and alternatives discussed with: patient or representative. Possibility of blood products discussed.        Equipment:Videolaryngoscope (D blade available)    Felicita Fritz MD  11/20/2013      History & Physical Review  History and physical reviewed; no interval change.                        .  Anesthesia Evaluation     . Pt has had prior anesthetic. Type:  General    No history of anesthetic complications     ROS/MED HX    ENT/Pulmonary:  - neg pulmonary ROS     Neurologic:  - neg neurologic ROS     Cardiovascular:     (+) hypertension range: 130 sys range now off meds,. : . . . :. .      (-) CAD, orthopnea/PND and syncope   METS/Exercise Tolerance:  >4 METS   Hematologic:  - neg hematologic  ROS       Musculoskeletal: Comment: Low back pain - limits activities        GI/Hepatic:  - neg GI/hepatic ROS       Renal/Genitourinary:  - ROS Renal section negative       Endo:  - neg endo ROS         Psychiatric:         Infectious Disease:  - neg infectious disease ROS       Malignancy:         Other:               Physical Exam  Normal systems: dental    Airway   Mallampati: III  TM distance: <3 FB  Neck ROM: full    Dental     Cardiovascular   Rhythm and rate: regular and normal      Pulmonary    breath sounds clear to auscultation    Other findings: LABS:    BMP:  Lab Test   11/20/13 11/01/13              0813      0900       NA         --        136        POTASSIUM  4.5       --         CHLORIDE   --        102        CO2        --        24         BUN        --        15         CR         --        0.70       GLC        --        119*       RENNY        --        9.7          CBC:   Lab Test   11/01/13              0900       WBC        6.2        RBC        5.21       HGB        15.4       HCT        43.6       MCV        84       ECG No Change, NSR by hx      MCH        29.6       MCHC       35.3       RDW        13.0       PLT        260                          Anesthesia Plan    ASA Scores 2 .    Plan for General and ETT - with Intravenous and Propofol induction.Maintenance will be Balanced.   Routine analgesia and antiemetics to be used for post-operative care. Anesthetic plan, risks, benefits and alternatives discussed with: patient or representative. Possibility of blood products discussed.        Equipment:Videolaryngoscope (D blade available)    Felicita  MD Lam  11/20/2013      History & Physical Review  History and physical reviewed; no interval change.                        .

## 2018-04-20 ENCOUNTER — ANESTHESIA (OUTPATIENT)
Dept: GASTROENTEROLOGY | Facility: CLINIC | Age: 78
End: 2018-04-20
Payer: COMMERCIAL

## 2018-04-20 ENCOUNTER — SURGERY (OUTPATIENT)
Age: 78
End: 2018-04-20

## 2018-04-20 ENCOUNTER — HOSPITAL ENCOUNTER (OUTPATIENT)
Facility: CLINIC | Age: 78
Discharge: HOME | End: 2018-04-20
Attending: SURGERY | Admitting: SURGERY
Payer: COMMERCIAL

## 2018-04-20 VITALS
WEIGHT: 211 LBS | OXYGEN SATURATION: 99 % | HEIGHT: 67 IN | SYSTOLIC BLOOD PRESSURE: 136 MMHG | TEMPERATURE: 98.4 F | RESPIRATION RATE: 16 BRPM | BODY MASS INDEX: 33.12 KG/M2 | DIASTOLIC BLOOD PRESSURE: 80 MMHG

## 2018-04-20 LAB
COLONOSCOPY: NORMAL
GLUCOSE BLDC GLUCOMTR-MCNC: 118 MG/DL (ref 70–99)

## 2018-04-20 PROCEDURE — 45381 COLONOSCOPY SUBMUCOUS NJX: CPT | Mod: PT | Performed by: SURGERY

## 2018-04-20 PROCEDURE — 82962 GLUCOSE BLOOD TEST: CPT

## 2018-04-20 PROCEDURE — 88305 TISSUE EXAM BY PATHOLOGIST: CPT | Mod: 26 | Performed by: SURGERY

## 2018-04-20 PROCEDURE — 25000125 ZZHC RX 250: Performed by: SURGERY

## 2018-04-20 PROCEDURE — 88305 TISSUE EXAM BY PATHOLOGIST: CPT | Performed by: SURGERY

## 2018-04-20 PROCEDURE — 45385 COLONOSCOPY W/LESION REMOVAL: CPT | Performed by: SURGERY

## 2018-04-20 PROCEDURE — 37000008 ZZH ANESTHESIA TECHNICAL FEE, 1ST 30 MIN: Performed by: SURGERY

## 2018-04-20 PROCEDURE — 45385 COLONOSCOPY W/LESION REMOVAL: CPT | Mod: PT | Performed by: SURGERY

## 2018-04-20 PROCEDURE — 25000128 H RX IP 250 OP 636: Performed by: NURSE ANESTHETIST, CERTIFIED REGISTERED

## 2018-04-20 PROCEDURE — 25000128 H RX IP 250 OP 636: Performed by: SURGERY

## 2018-04-20 RX ORDER — PROPOFOL 10 MG/ML
INJECTION, EMULSION INTRAVENOUS PRN
Status: DISCONTINUED | OUTPATIENT
Start: 2018-04-20 | End: 2018-04-20

## 2018-04-20 RX ORDER — SODIUM CHLORIDE, SODIUM LACTATE, POTASSIUM CHLORIDE, CALCIUM CHLORIDE 600; 310; 30; 20 MG/100ML; MG/100ML; MG/100ML; MG/100ML
INJECTION, SOLUTION INTRAVENOUS CONTINUOUS
Status: DISCONTINUED | OUTPATIENT
Start: 2018-04-20 | End: 2018-04-20 | Stop reason: HOSPADM

## 2018-04-20 RX ORDER — PROPOFOL 10 MG/ML
INJECTION, EMULSION INTRAVENOUS CONTINUOUS PRN
Status: DISCONTINUED | OUTPATIENT
Start: 2018-04-20 | End: 2018-04-20

## 2018-04-20 RX ORDER — LIDOCAINE 40 MG/G
CREAM TOPICAL
Status: DISCONTINUED | OUTPATIENT
Start: 2018-04-20 | End: 2018-04-20 | Stop reason: HOSPADM

## 2018-04-20 RX ORDER — ONDANSETRON 2 MG/ML
4 INJECTION INTRAMUSCULAR; INTRAVENOUS
Status: DISCONTINUED | OUTPATIENT
Start: 2018-04-20 | End: 2018-04-20 | Stop reason: HOSPADM

## 2018-04-20 RX ADMIN — PROPOFOL 200 MCG/KG/MIN: 10 INJECTION, EMULSION INTRAVENOUS at 08:38

## 2018-04-20 RX ADMIN — SODIUM CHLORIDE, POTASSIUM CHLORIDE, SODIUM LACTATE AND CALCIUM CHLORIDE: 600; 310; 30; 20 INJECTION, SOLUTION INTRAVENOUS at 08:12

## 2018-04-20 RX ADMIN — LIDOCAINE HYDROCHLORIDE 1 ML: 10 INJECTION, SOLUTION EPIDURAL; INFILTRATION; INTRACAUDAL; PERINEURAL at 08:11

## 2018-04-20 RX ADMIN — PROPOFOL 30 MG: 10 INJECTION, EMULSION INTRAVENOUS at 08:45

## 2018-04-20 NOTE — ANESTHESIA POSTPROCEDURE EVALUATION
Patient: Ana Maria Duvall    Procedure(s):  colonoscopy - Wound Class: II-Clean Contaminated    Diagnosis:screening  Diagnosis Additional Information: No value filed.    Anesthesia Type:  MAC    Note:  Anesthesia Post Evaluation    Patient location during evaluation: Bedside  Patient participation: Able to fully participate in evaluation  Level of consciousness: awake  Pain management: adequate  Airway patency: patent  Cardiovascular status: acceptable  Respiratory status: acceptable  Hydration status: stable  PONV: none             Last vitals:  Vitals:    04/20/18 0758   BP: 132/84   Resp: 16   Temp: 36.9  C (98.4  F)   SpO2: 97%         Electronically Signed By: NAN Miller CRNA  April 20, 2018  8:58 AM

## 2018-04-20 NOTE — ANESTHESIA CARE TRANSFER NOTE
Patient: Ana Maria Duvall    Procedure(s):  colonoscopy - Wound Class: II-Clean Contaminated    Diagnosis: screening  Diagnosis Additional Information: No value filed.    Anesthesia Type:   MAC     Note:  Airway :Room Air  Patient transferred to:Phase II  Handoff Report: Identifed the Patient, Identified the Reponsible Provider, Reviewed the pertinent medical history, Discussed the surgical course, Reviewed Intra-OP anesthesia mangement and issues during anesthesia, Set expectations for post-procedure period and Allowed opportunity for questions and acknowledgement of understanding      Vitals: (Last set prior to Anesthesia Care Transfer)    CRNA VITALS  4/20/2018 0828 - 4/20/2018 0858      4/20/2018             Pulse: 67    SpO2: 94 %                Electronically Signed By: NAN Miller CRNA  April 20, 2018  8:58 AM

## 2018-04-23 LAB — COPATH REPORT: NORMAL

## 2018-05-25 ENCOUNTER — TELEPHONE (OUTPATIENT)
Dept: PALLIATIVE MEDICINE | Facility: CLINIC | Age: 78
End: 2018-05-25

## 2018-05-25 ENCOUNTER — TELEPHONE (OUTPATIENT)
Dept: FAMILY MEDICINE | Facility: CLINIC | Age: 78
End: 2018-05-25

## 2018-05-25 DIAGNOSIS — M54.42 CHRONIC BILATERAL LOW BACK PAIN WITH LEFT-SIDED SCIATICA: Primary | ICD-10-CM

## 2018-05-25 DIAGNOSIS — G89.29 CHRONIC BILATERAL LOW BACK PAIN WITH LEFT-SIDED SCIATICA: Primary | ICD-10-CM

## 2018-05-25 NOTE — TELEPHONE ENCOUNTER
Reason for Call: Request for an order or referral:    Order or referral being requested:  Requesting a order for a back injection. Pt had one two months ago and did not work. Told to call for another if the first one did not work.    Date needed: as soon as possible    Has the patient been seen by the PCP for this problem? YES    Phone number Patient can be reached at:  Home number on file [phone] (home)    Best Time:  Any Time      Can we leave a detailed message on this number?  YES    Call taken on 5/25/2018 at 8:47 AM by Corinna Nj

## 2018-05-25 NOTE — TELEPHONE ENCOUNTER
Pt informed, has # to schedule appt.  Will review ASA recommendations pre- procedure.  SILVIO Montiel RN

## 2018-05-25 NOTE — TELEPHONE ENCOUNTER
Last lumbar LORE- 04-13-18. Pt reports very minimal relief from this injection.   States Dr. Chloe El told him if LORE ineffective could repeat.   Pain mngmnt referral for LORE pended.  Forwarded to Angie for review/ recommendations in Dr. Meng's absence.  SILVIO Montiel RN

## 2018-05-25 NOTE — TELEPHONE ENCOUNTER
Pre-screening Questions for Radiology Injections:     Injection to be done at which interventional clinic site? [facility]     Procedure ordered by Dr. Meng     Procedure ordered? Lumbar LORE     What insurance would patient like us to bill for this procedure? [employer]- W[employer]ARGRET HANDLES PA's       Worker's comp or MVA (motor vehicle accident) -Any injection DO NOT SCHEDULE and route to Patricia Aguilar.       [employer] insurance - For SI joint injections, DO NOT SCHEDULE and route Shannon Reeves. [employer] F[employer]EDOM NO PA REQUIRED EFFECTIVE 11/1/2017       [employer]- MBB's must be scheduled at LEAST two weeks apart       [employer] - Any injection besides hip/shoulder/knee joint DO NOT SCHEDULE and route to Shannon Reeves. She will obtain PA and call pt back to schedule procedure or notify pt of denial.         [employer]-PA REQUIRED FOR NON-LORE OR Joint injections     Any chance of pregnancy? Not Applicable   If YES, do NOT schedule and route to RN pool     Is an  needed? No     Patient has a drive home? (mandatory) YES:      Is patient taking any blood thinners (plavix, coumadin, jantoven, warfarin, heparin, pradaxa or dabigatran )? No   If hold needed, do NOT schedule, route to RN pool     Is patient taking any aspirin products? No     If more than 325mg/day do NOT schedule; route to RN pool     For CERVICAL procedures, hold all aspirin products for 6 days.      Does the patient have a bleeding or clotting disorder? No     If YES, okay to schedule AND route to RN nurse pool    **For any patients with platelet count <100, must be forwarded to provider**     Is patient diabetic?  Yes  If YES, have them bring their glucometer.     Does patient have an active infection or treated for one within the past week? No     Is patient currently taking any antibiotics?  No     For patients on chronic, preventative, or prophylactic antibiotics, procedures may be scheduled.     For patients on antibiotics for  active or recent infection:    Valeriano Kern Burton, Snitzer-antibiotic course must have been completed for 4 days    Dr. Olivares-antibiotic course must have been completed for 7 days     Is patient currently taking any steroid medications? (i.e. Prednisone, Medrol)  No     For patients on steroid medications:    Valeriano Kern Burton, Snitzer-steroid course must have been completed for 4 days    -steroid course must have been completed for 7 days     Reviewed with patient:  If you are started on any steroids or antibiotics between now and your appointment, you must contact us because it may affect our ability to perform your procedure.  Yes     Is patient actively being treated for cancer or immunocompromised? No  If YES, do NOT schedule and route to RN pool      Are you able to get on and off an exam table with minimal or no assistance? Yes  If NO, do NOT schedule and route to RN pool     Are you able to roll over and lay on your stomach with minimal or no assistance? Yes  If NO, do NOT schedule and route to RN pool     Any allergies to contrast dye, iodine, shellfish, or numbing and steroid medications? No  If YES, route to RN pool AND add allergy information to appointment notes     Allergies: Review of patient's allergies indicates no known allergies.      Has the patient had a flu shot or any other vaccinations within 7 days before or after the procedure.  No     Does patient have an MRI/CT?  YES:   (SI joint, hip injections, lumbar sympathetic blocks, and stellate ganglion blocks do not require an MRI)    Was the MRI done w/in the last 3 years?  Yes    Was MRI done at [facility]? Yes      If not, where was it done? N/A        If MRI was not done at [facility], [facility] or [facility] do NOT schedule and route to nursing.  If pt has an imaging disc, the injection may be scheduled but pt has to bring disc to appt. If they show up w/out disc the injection cannot be done     Reminders (please  tell patient if applicable):       Instructed pt to arrive 30 minutes early for IV start if this is for a cervical procedure, ALL sympathetic (stellate ganglion, hypogastric, or lumbar sympathetic block) and all sedation procedures (RFA, spinal cord stimulation trials).  Not Applicable   -IVs are not routinely placed for Dr. Muir cervical cases   -Dr. Villatoro: IVs for cervical ESIs and cervical TBDs (not CMBBs/facet inj)       If NPO for sedation, informed patient that it is okay to take medications with sips of water (except if they are to hold blood thinners).  Not Applicable                        *DO take blood pressure medication if it is prescribed*       If this is for a cervical LORE, informed patient that aspirin needs to be held for 6 days.   Not Applicable       For all patients not having spinal cord stimulator (SCS) trials or radiofrequency ablations (RFAs), informed patient:     IV sedation is not provided for this procedure.  If you feel that an oral anti-anxiety medication is needed, you can discuss this further with your referring provider or primary care provider.  The Pain Clinic provider will discuss specifics of what the procedure includes at your appointment.  Most procedures last 10-20 minutes.  We use numbing medications to help with any discomfort during the procedure.  NO       Do not schedule procedures requiring IV placement in the first appointment of the day or first appointment after lunch.        For patients 85 or older we recommend having an adult stay w/ them for the remainder of the day.        Does the patient have any questions?  NO  Jen Frank  [facility]

## 2018-06-14 ENCOUNTER — RADIOLOGY INJECTION OFFICE VISIT (OUTPATIENT)
Dept: PALLIATIVE MEDICINE | Facility: CLINIC | Age: 78
End: 2018-06-14
Attending: NURSE PRACTITIONER
Payer: COMMERCIAL

## 2018-06-14 ENCOUNTER — HOSPITAL ENCOUNTER (OUTPATIENT)
Dept: RADIOLOGY | Facility: CLINIC | Age: 78
Discharge: HOME | End: 2018-06-14
Attending: FAMILY MEDICINE | Admitting: FAMILY MEDICINE
Payer: COMMERCIAL

## 2018-06-14 VITALS — SYSTOLIC BLOOD PRESSURE: 181 MMHG | HEART RATE: 74 BPM | DIASTOLIC BLOOD PRESSURE: 97 MMHG | RESPIRATION RATE: 15 BRPM

## 2018-06-14 DIAGNOSIS — M54.16 LUMBAR RADICULOPATHY: Primary | ICD-10-CM

## 2018-06-14 DIAGNOSIS — M51.369 DDD (DEGENERATIVE DISC DISEASE), LUMBAR: ICD-10-CM

## 2018-06-14 DIAGNOSIS — G89.29 CHRONIC BILATERAL LOW BACK PAIN WITH LEFT-SIDED SCIATICA: ICD-10-CM

## 2018-06-14 DIAGNOSIS — M54.42 CHRONIC BILATERAL LOW BACK PAIN WITH LEFT-SIDED SCIATICA: ICD-10-CM

## 2018-06-14 DIAGNOSIS — M99.43 CONNECTIVE TISSUE STENOSIS OF NEURAL CANAL OF LUMBAR REGION: ICD-10-CM

## 2018-06-14 PROCEDURE — 25000125 ZZHC RX 250: Performed by: ANESTHESIOLOGY

## 2018-06-14 PROCEDURE — 25000128 H RX IP 250 OP 636: Performed by: ANESTHESIOLOGY

## 2018-06-14 PROCEDURE — 62323 NJX INTERLAMINAR LMBR/SAC: CPT | Performed by: ANESTHESIOLOGY

## 2018-06-14 PROCEDURE — 27210202 XR TRANSLAMINAR EPIDURAL LUMBAR INJ INCL IMAGING: Mod: TC

## 2018-06-14 RX ORDER — LIDOCAINE HYDROCHLORIDE 10 MG/ML
5 INJECTION, SOLUTION EPIDURAL; INFILTRATION; INTRACAUDAL; PERINEURAL ONCE
Status: COMPLETED | OUTPATIENT
Start: 2018-06-14 | End: 2018-06-14

## 2018-06-14 RX ORDER — DEXAMETHASONE SODIUM PHOSPHATE 10 MG/ML
20 INJECTION, SOLUTION INTRAMUSCULAR; INTRAVENOUS ONCE
Status: COMPLETED | OUTPATIENT
Start: 2018-06-14 | End: 2018-06-14

## 2018-06-14 RX ORDER — LIDOCAINE HYDROCHLORIDE AND EPINEPHRINE 10; 10 MG/ML; UG/ML
1 INJECTION, SOLUTION INFILTRATION; PERINEURAL ONCE
Status: COMPLETED | OUTPATIENT
Start: 2018-06-14 | End: 2018-06-14

## 2018-06-14 RX ORDER — BUPIVACAINE HYDROCHLORIDE 5 MG/ML
10 INJECTION, SOLUTION PERINEURAL ONCE
Status: COMPLETED | OUTPATIENT
Start: 2018-06-14 | End: 2018-06-14

## 2018-06-14 RX ORDER — IOPAMIDOL 612 MG/ML
3 INJECTION, SOLUTION INTRATHECAL ONCE
Status: COMPLETED | OUTPATIENT
Start: 2018-06-14 | End: 2018-06-14

## 2018-06-14 RX ORDER — METHYLPREDNISOLONE ACETATE 40 MG/ML
40 INJECTION, SUSPENSION INTRA-ARTICULAR; INTRALESIONAL; INTRAMUSCULAR; SOFT TISSUE ONCE
Status: COMPLETED | OUTPATIENT
Start: 2018-06-14 | End: 2018-06-14

## 2018-06-14 RX ADMIN — LIDOCAINE HYDROCHLORIDE,EPINEPHRINE BITARTRATE 1.5 ML: 10; .01 INJECTION, SOLUTION INFILTRATION; PERINEURAL at 09:42

## 2018-06-14 RX ADMIN — METHYLPREDNISOLONE ACETATE 40 MG: 40 INJECTION, SUSPENSION INTRA-ARTICULAR; INTRALESIONAL; INTRAMUSCULAR; SOFT TISSUE at 09:42

## 2018-06-14 RX ADMIN — IOPAMIDOL 1 ML: 612 INJECTION, SOLUTION INTRATHECAL at 09:42

## 2018-06-14 RX ADMIN — BUPIVACAINE HYDROCHLORIDE 1 ML: 5 INJECTION, SOLUTION EPIDURAL; INTRACAUDAL at 09:41

## 2018-06-14 RX ADMIN — DEXAMETHASONE SODIUM PHOSPHATE 5 MG: 10 INJECTION, SOLUTION INTRAMUSCULAR; INTRAVENOUS at 09:41

## 2018-06-14 RX ADMIN — LIDOCAINE HYDROCHLORIDE 3 ML: 10 INJECTION, SOLUTION EPIDURAL; INFILTRATION; INTRACAUDAL; PERINEURAL at 09:42

## 2018-06-14 NOTE — IP AVS SNAPSHOT
[facility]    [address]    [place] [address]    Phone:  [phone]    Fax:  [phone]                                       After Visit Summary   6/14/2018    Ana Maria Duvall    MRN: [id number]           After Visit Summary Signature Page     I have received my discharge instructions, and my questions have been answered. I have discussed any challenges I see with this plan with the nurse or doctor.    ..........................................................................................................................................  Patient/Patient Representative Signature      ..........................................................................................................................................  Patient Representative Print Name and Relationship to Patient    ..................................................               ................................................  Date                                            Time    ..........................................................................................................................................  Reviewed by Signature/Title    ...................................................              ..............................................  Date                                                            Time

## 2018-06-14 NOTE — PROGRESS NOTES
Pre-procedure Intake    Have you been fasting? No     If yes, for how long?     Are you taking a prescribed blood thinner such as coumadin, Plavix, Xarelto?    No    If yes, when did you take your last dose?     Do you take aspirin?  Yes -   ASA    If cervical procedure, have you held aspirin for 6 days?   NA    Do you have any allergies to contrast dye, iodine, steroid and/or numbing medications?  NO    Are you currently taking antibiotics or have an active infection?  NO    Have you had a fever/elevated temperature within the past week? NO    Are you currently taking oral steroids? NO    Do you have a ? Yes       Are you pregnant or breastfeeding?  Not Applicable    Are the vital signs normal?  Yes

## 2018-06-14 NOTE — PROGRESS NOTES
Pre procedure Diagnosis: Lumbar radiculopathy, lumbar degenerative disc disease, history of lumbar laminectomy, lumbar stenosis  Post procedure Diagnosis: Same  Procedure performed: caudal epidural steroid injection utilizing an epidural catheter, fluoroscopically guided, contrast controlled  Anesthesia: local  Complications: none  Operators: Ronald El MD     Indications:   Ana Maria Duvall is a 77 year old male was sent by Dr. See Meng for lumbar epidural steroid injection.  They have a history of chronic lower back pain with intermittent lower extremity pain that has not responded to lumbar facet joint injections.  Exam shows tenderness of the lower lumbar paraspinal muscles, mildly forward flexed gait, well healed lumbar surgical scars, and negative SLR and they have tried conservative treatment including activity modifications, medications, and interventional procedures.    MRI was done on 2/3/17 which showed   FINDINGS: The report is dictated assuming five lumbar-type vertebral  bodies, and radiographic correlation may be necessary.  Sagittal  images demonstrate normal vertebral body height.  Bone marrow signal  is unremarkable. Tip of the conus medullaris and cauda equina are  normal.     T12-L1:  No disc herniation or stenosis.  Facet joints are  unremarkable.     L1-L2:  Mild disc bulge and facet hypertrophy. Mild central stenosis.  Mild-to-moderate right and mild left foraminal stenosis. No  significant change.     L2-L3:  Broad-based disc bulge. Mild facet hypertrophy. Moderate  central stenosis. Moderate left and mild-to-moderate right foraminal  stenosis. Very mild progression of degenerative changes at this level.     L3-L4:  Broad-based disc bulge and left greater than right facet  hypertrophy. Mild central stenosis. Severe left and moderate-to-severe  right foraminal stenosis. No significant change.     L4-L5:  Previous posterior decompression at this level and probably  posterior  fusion. No disc herniation or stenosis.     L5-S1:  Evidence of left laminotomy at this level. Broad-based disc  bulge. No central stenosis. Moderate to severe, if not severe, left  foraminal stenosis. Moderate right foraminal stenosis.     Paraspinous soft tissues:  Unremarkable.       IMPRESSION:    1. At L1-L2 there is mild central stenosis. Mild-to-moderate right and  mild left foraminal stenosis.  2. At L2-L3 there is moderate central and left foraminal stenosis.  Mild-to-moderate right foraminal stenosis.  3. At L3-L4 there is mild central stenosis. Severe left and  moderate-to-severe right foraminal stenosis.  4. At L5-S1 there is moderate-to-severe, if not severe, left foraminal  stenosis. Moderate right foraminal stenosis.  5. Findings are similar to the prior study with perhaps mild  progression of degenerative changes at the L2-L3 level.    Options/alternatives, benefits and risks were discussed with the patient including bleeding, infection, tissue trauma, numbness, weakness, paralysis, spinal cord injury, radiation exposure, headache, reaction to medications including seizure, and effects on the bladder from local anesthetic.  Questions were answered to his satisfaction and he agrees to proceed. Voluntary informed consent was obtained and signed.     Vitals were reviewed: Yes  156/103 75 14  Allergies were reviewed:  Yes   Medications were reviewed:  Yes   Pre-procedure pain score: 6/10    Procedure:  After obtaining signed informed consent, patient was brought into the procedure suite and was placed in a prone position on the procedure table.   A Pause for the Cause was performed.  Patient was prepped and draped in the usual sterile fashion.     The sacral hiatus and cornua were palpated.  Under lateral fluoroscopic guidance sacral hiatus was identified.  3 ml of lidocaine 1% was then injected at the needle entry point to anesthetize the skin. Then a 17 gauge 3.5 inch Tuohy needle was inserted into  sacral hiatus utilizing fluoroscopic guidance.  Aspiration was negative for blood or CSF.  Needle placement was verified by injecting Isovue 300 0.5 ml utilizing real time fluoroscopy that showed good needle placement and adequate spread in the lower sacral epidural space without intravascular or intrathecal uptake.  Then, an epidural catheter was advanced through the Tuohy needle into the epidural space without resistance.  Aspiration was initially positive for heme but after repositioning of the catheter aspiration was negative for heme or CSF.  Catheter placement was verified by injecting Isovue 300 2 ml under real time fluoroscopy that showed catheter placement at L5 with epidural spread without intravascular or intrathecal uptake  Isovue used:  2.5 ml.  Isovue wasted:  12.5 ml.    Then, after repeated negative aspiration, a test dose was performed by injecting 1.5 ml of Lidocaine 1 % with epinephrine into the lumbar epidural space which was negative for heart rate or blood pressure changes, tinnitus, metallic taste, lower extremity paresthesias, or other complaints.    Then, after repeated negative aspiration, a combination of Depomedrol 40 mg, Decadron 5 mg, Bupivicaine 5 % 1 ml, diluted with 3.5 ml of normal saline for a total injectate volume of 6 ml was injected in a slow and incremental fashion and the needle and catheter were withdrawn.  The injection site was cleaned and sterile dressing applied.     The patient tolerated the procedure well without complications and was taken to the recovery area for continued observation.  They were subsequently discharged to home in good condition after meeting discharge criteria.      Images were saved to PACS.    Post-procedure pain score: 2/10  Follow-up includes:   -f/u phone call in one week  -f/u with referring provider    Ronald El MD  [facility]

## 2018-06-14 NOTE — DISCHARGE INSTRUCTIONS
[facility]   Procedure Discharge Instructions    Today you saw:    Dr. Ronald El      You had an:  Epidural steroid injection      Medications used:  Lidocaine   Bupivacaine   Dexamethasone Depo-medrol  IsoVue          Be cautious when walking. Numbness and/or weakness in the lower extremities may occur for up to 6-8 hours after the procedure due to effect of the local anesthetic    Do not drive for 6 hours. The effect of the local anesthetic could slow your reflexes.     You may resume your regular activities after 24 hours    Avoid strenuous activity for the first 24 hours    You may shower, however avoid swimming, tub baths or hot tubs for 24 hours following your procedure    You may have a mild to moderate increase in pain for several days following the injection.    It may take up to 14 days for the steroid medication to start working although you may feel the effect as early as a few days after the procedure.       You may use ice packs for 10-15 minutes, 3 to 4 times a day at the injection site for comfort    Do not use heat to painful areas for 6 to 8 hours. This will give the local anesthetic time to wear off and prevent you from accidentally burning your skin.     You may use anti-inflammatory medications (such as Ibuprofen or Aleve or Advil) or Tylenol for pain control if necessary    If you have diabetes, check your blood sugar more frequently than usual as your blood sugar may be higher than normal for 10-14 days following a steroid injection. Contact your doctor who manages your diabetes if your blood sugar is higher than usual    If you experience any of the following, call the Pain Clinic during work hours at [phone] or the Provider Line after hours at [phone]:  -Fever over 100 degree F  -Swelling, bleeding, redness, drainage, warmth at the injection site  -Progressive weakness or numbness in your legs   -Loss of bowel or bladder function  -Unusual headache  that is not relieved by Tylenol or other pain reliever  -Unusual new onset of pain that is not improving

## 2018-06-14 NOTE — IP AVS SNAPSHOT
MRN:[id number]                      After Visit Summary   6/14/2018    Ana Maria Duvall    MRN: [id number]           Visit Information        Provider Department      6/14/2018  9:45 AM [facility]           Review of your medicines      UNREVIEWED medicines. Ask your doctor about these medicines        Dose / Directions    ACE NOT PRESCRIBED (INTENTIONAL)   Used for:  Cough        ACE Inhibitor not prescribed due to Intolerance   Quantity:  0 each   Refills:  0       aspirin 81 MG tablet        1 TABLET DAILY   Refills:  0       brimonidine 0.2 % ophthalmic solution   Commonly known as:  ALPHAGAN   Used for:  Unspecified glaucoma(365.9)        Dose:  1 drop   Place 1 drop into both eyes 2 times daily   Quantity:  1 Bottle   Refills:  2       CALCIUM + D PO        1 TABLET DAILY   Refills:  0       flax seed oil 1000 MG capsule        1 CAPSULE DAILY   Refills:  0       ibuprofen 200 MG tablet   Commonly known as:  ADVIL/MOTRIN        Dose:  200 mg   Take 200 mg by mouth every 4 hours as needed Reported on 4/21/2017   Refills:  0       metFORMIN 500 MG tablet   Commonly known as:  GLUCOPHAGE   Used for:  Type 2 diabetes mellitus without complication, without long-term current use of insulin (H)        TAKE 2 TABLETS (1,000 MG) 2 TIMES DAILY (WITH MEALS)   Quantity:  360 tablet   Refills:  0       Multi-vitamin Tabs tablet   Generic drug:  multivitamin, therapeutic with minerals        DAILY   Refills:  0       Saw [place] 1000 MG Caps   Used for:  Type 2 diabetes mellitus without complication, unspecified long term insulin use status (H), Type 2 diabetes mellitus without complication, without long-term current use of insulin (H)        Dose:  2 capsule   Take 2 capsules by mouth daily   Quantity:  60 capsule   Refills:  0       STATIN NOT PRESCRIBED (INTENTIONAL)        Statin not prescribed intentionally due to Intolerance   Refills:  0       TYLENOL 500 MG tablet    Generic drug:  acetaminophen        Dose:  1000 mg   Take 1,000 mg by mouth every 6 hours as needed   Refills:  0       VITAMIN D3 PO        Take by mouth daily   Refills:  0         CONTINUE these medicines which have NOT CHANGED        Dose / Directions    blood glucose monitoring lancets   Used for:  Type 2 diabetes mellitus without complication, without long-term current use of insulin (H)        Use to test blood sugars 1 times daily or as directed.   Quantity:  1 Box   Refills:  6       blood glucose monitoring test strip   Commonly known as:  ONETOUCH ULTRA   Used for:  Type 2 diabetes mellitus without complication, unspecified long term insulin use status (H), Type 2 diabetes mellitus without complication, without long-term current use of insulin (H)        Use to test blood sugars 1 times daily or as directed.   Quantity:  100 each   Refills:  1                Protect others around you: Learn how to safely use, store and throw away your medicines at www.disposemymeds.org.         Follow-ups after your visit        Your next 10 appointments already scheduled     Jun 14, 2018  9:45 AM CDT   Radiology Injections with Ronald El MD   [facility] ([facility])    [address]  [address]  [place] [address]   [phone]            Jun 14, 2018  9:45 AM CDT   XR TRANSLAMINAR EPIDURAL LUMBAR INJ with WYPAYOVANA   [facility] ([facility])    [address] [address]   [phone]           For nerve root injection, please send or bring copies of any MRIs or other scans you have had.  Bring a list of your current medicines to your exam. (Include vitamins, minerals and over-the-counter medicines.) Leave your valuables at home.  Plan to have someone drive you home afterward.  Stop taking the following medicines (but talk to your doctor first):   If you take blood thinners, you may need  to stop taking them a few days before treatment. Talk to your doctor before stopping these medicines.Stop taking Coumadin (warfarin) 3 days before treatment. Restart the day after treatment.   If you take Plavix, Ticlid, Pletal or Persantine, please ask your doctor if you should stop these medicines. You may need extra tests on the morning of your scan.   If you take Xarelto (Rivaroxaban), you may need to stop taking it 24 hours before treatment. Talk to your doctor before stopping this medicine. Restart the day after treatment.  You may take your other medicines as normal.  Stop all food and drink (including water) 3 hours before your test or treatment.  Please tell the doctor:   If you are allergic to X-ray dye (contrast fluid).   If you may be pregnant.  Injections take about 30 to 45 minutes. Most people spend up to 2 hours in the clinic or hospital.  Please call the Imaging Department at your exam site with any questions.               Care Instructions        Further instructions from your care team       [facility]   Procedure Discharge Instructions    Today you saw:    Dr. Ronald El      You had an:  Epidural steroid injection      Medications used:  Lidocaine   Bupivacaine   Dexamethasone Depo-medrol  IsoVue          Be cautious when walking. Numbness and/or weakness in the lower extremities may occur for up to 6-8 hours after the procedure due to effect of the local anesthetic    Do not drive for 6 hours. The effect of the local anesthetic could slow your reflexes.     You may resume your regular activities after 24 hours    Avoid strenuous activity for the first 24 hours    You may shower, however avoid swimming, tub baths or hot tubs for 24 hours following your procedure    You may have a mild to moderate increase in pain for several days following the injection.    It may take up to 14 days for the steroid medication to start working although you may feel the effect as  early as a few days after the procedure.       You may use ice packs for 10-15 minutes, 3 to 4 times a day at the injection site for comfort    Do not use heat to painful areas for 6 to 8 hours. This will give the local anesthetic time to wear off and prevent you from accidentally burning your skin.     You may use anti-inflammatory medications (such as Ibuprofen or Aleve or Advil) or Tylenol for pain control if necessary    If you have diabetes, check your blood sugar more frequently than usual as your blood sugar may be higher than normal for 10-14 days following a steroid injection. Contact your doctor who manages your diabetes if your blood sugar is higher than usual    If you experience any of the following, call the Pain Clinic during work hours at [phone] or the Provider Line after hours at [phone]:  -Fever over 100 degree F  -Swelling, bleeding, redness, drainage, warmth at the injection site  -Progressive weakness or numbness in your legs   -Loss of bowel or bladder function  -Unusual headache that is not relieved by Tylenol or other pain reliever  -Unusual new onset of pain that is not improving         Additional Information About Your Visit        [facility] Information     [facility] gives you secure access to your electronic health record. If you see a primary care provider, you can also send messages to your care team and make appointments. If you have questions, please call your primary care clinic.  If you do not have a primary care provider, please call [phone] and they will assist you.        Care EveryWhere ID     This is your Care EveryWhere ID. This could be used by other organizations to access your [facility] medical records  [id number]        Your Vitals Were     Blood Pressure Pulse Respirations             156/103 74 14          Primary Care Provider Office Phone # Fax #    Ravinder Plunkett [phone]      Equal Access to Services     ROM DELEON:  Hadii aad libertad hornpetrosedi Cobyali, wakipda luqadaha, qaybta kaalheather orta, salvador chengsudarshan cardenas. So [facility] [phone].    ATENCIÓN: Si pamela hsieh, tiene a huber disposición servicios gratuitos de asistencia lingüística. Llame al [phone].    We comply with applicable federal civil rights laws and [place] laws. We do not discriminate on the basis of race, color, national origin, age, disability, sex, sexual orientation, or gender identity.            Thank you!     Thank you for choosing [facility] for your care. Our goal is always to provide you with excellent care. Hearing back from our patients is one way we can continue to improve our services. Please take a few minutes to complete the written survey that you may receive in the mail after you visit with us. Thank you!             Medication List: This is a list of all your medications and when to take them. Check marks below indicate your daily home schedule. Keep this list as a reference.      Medications           Morning Afternoon Evening Bedtime As Needed    ACE NOT PRESCRIBED (INTENTIONAL)   ACE Inhibitor not prescribed due to Intolerance                                aspirin 81 MG tablet   1 TABLET DAILY                                blood glucose monitoring lancets   Use to test blood sugars 1 times daily or as directed.                                blood glucose monitoring test strip   Commonly known as:  ONETOUCH ULTRA   Use to test blood sugars 1 times daily or as directed.                                brimonidine 0.2 % ophthalmic solution   Commonly known as:  ALPHAGAN   Place 1 drop into both eyes 2 times daily                                CALCIUM + D PO   1 TABLET DAILY                                flax seed oil 1000 MG capsule   1 CAPSULE DAILY                                ibuprofen 200 MG tablet   Commonly known as:  ADVIL/MOTRIN   Take 200 mg by mouth every 4 hours as needed Reported on 4/21/2017                                 metFORMIN 500 MG tablet   Commonly known as:  GLUCOPHAGE   TAKE 2 TABLETS (1,000 MG) 2 TIMES DAILY (WITH MEALS)                                Multi-vitamin Tabs tablet   DAILY   Generic drug:  multivitamin, therapeutic with minerals                                Saw [place] 1000 MG Caps   Take 2 capsules by mouth daily                                STATIN NOT PRESCRIBED (INTENTIONAL)   Statin not prescribed intentionally due to Intolerance                                TYLENOL 500 MG tablet   Take 1,000 mg by mouth every 6 hours as needed   Generic drug:  acetaminophen                                VITAMIN D3 PO   Take by mouth daily

## 2018-06-22 ENCOUNTER — TELEPHONE (OUTPATIENT)
Dept: RADIOLOGY | Facility: CLINIC | Age: 78
End: 2018-06-22

## 2018-06-22 NOTE — TELEPHONE ENCOUNTER
Patient had a caudal epidural steroid injection on 6/14/18.  Called patient for an update.      Left message that we were calling for an update about how he was doing after the injection.  LM that if he has any problems or questions to call the clinic at [phone].

## 2018-08-31 ENCOUNTER — ALLIED HEALTH/NURSE VISIT (OUTPATIENT)
Dept: FAMILY MEDICINE | Facility: CLINIC | Age: 78
End: 2018-08-31
Payer: COMMERCIAL

## 2018-08-31 ENCOUNTER — TELEPHONE (OUTPATIENT)
Dept: FAMILY MEDICINE | Facility: CLINIC | Age: 78
End: 2018-08-31

## 2018-08-31 DIAGNOSIS — E11.9 TYPE 2 DIABETES MELLITUS WITHOUT COMPLICATION, WITHOUT LONG-TERM CURRENT USE OF INSULIN (H): ICD-10-CM

## 2018-08-31 DIAGNOSIS — I10 ESSENTIAL HYPERTENSION: ICD-10-CM

## 2018-08-31 DIAGNOSIS — Z23 NEED FOR PROPHYLACTIC VACCINATION AND INOCULATION AGAINST INFLUENZA: Primary | ICD-10-CM

## 2018-08-31 DIAGNOSIS — Z13.5 SCREENING FOR DIABETIC RETINOPATHY: Primary | ICD-10-CM

## 2018-08-31 PROCEDURE — 90662 IIV NO PRSV INCREASED AG IM: CPT

## 2018-08-31 PROCEDURE — 99207 ZZC NO CHARGE NURSE ONLY: CPT

## 2018-08-31 PROCEDURE — G0008 ADMIN INFLUENZA VIRUS VAC: HCPCS

## 2018-08-31 NOTE — MR AVS SNAPSHOT
After Visit Summary   8/31/2018    Ana Maria Duvall    MRN: [id number]           Patient Information     Date Of Birth          1940        Visit Information        Provider Department      8/31/2018 1:30 PM [place] LOS CHICAS/LPN [facility]        Today's Diagnoses     Need for prophylactic vaccination and inoculation against influenza    -  1      Care Instructions    Pertussis booster is recommended one time for those over 65 who may be around small children.  (Pertussis is a bacterial respiratory infection that can be serious in young children, and cause a prolonged severe cough in adults)  Pertussis boosters can now be given as a part of a tetanus update.  It is called the TDaP shot.  If it's been more than 2 years since your last tetanus update, you should get the TDaP shot.  You only need to get it one time after age 65.    Medicare  And some insurances do not pay for the TDaP in clinic, so we recommend getting it at your public health office or pharmacy.            Follow-ups after your visit        Your next 10 appointments already scheduled     Sep 13, 2018  8:00 AM CDT   LAB with [facility]   [facility] ([facility]    [address] [place] [id number]   [phone]           Please do not eat 10-12 hours before your appointment if you are coming in fasting for labs on lipids, cholesterol, or glucose (sugar). This does not apply to pregnant women. Water, hot tea and black coffee (with nothing added) are okay. Do not drink other fluids, diet soda or chew gum.            Sep 13, 2018  8:20 AM CDT   SHORT with See Meng MD   [facility] ([facility])    [address] [facility] [address]   [phone]              Future tests that were ordered for you today     Open Future Orders        Priority Expected Expires Ordered    Hemoglobin A1c - FUTURE S+90 Routine  11/29/2018 8/31/2018    Lipid  panel reflex to direct LDL Fasting Routine  11/29/2018 8/31/2018    Basic metabolic panel Routine  8/31/2019 8/31/2018            Who to contact     If you have questions or need follow up information about today's clinic visit or your schedule please contact [facility] directly at [phone].  Normal or non-critical lab and imaging results will be communicated to you by MyChart, letter or phone within 4 business days after the clinic has received the results. If you do not hear from us within 7 days, please contact the clinic through [employer] or phone. If you have a critical or abnormal lab result, we will notify you by phone as soon as possible.  Submit refill requests through [employer] or call your pharmacy and they will forward the refill request to us. Please allow 3 business days for your refill to be completed.          Additional Information About Your Visit        MyChart Information     [employer] gives you secure access to your electronic health record. If you see a primary care provider, you can also send messages to your care team and make appointments. If you have questions, please call your primary care clinic.  If you do not have a primary care provider, please call [phone] and they will assist you.        Care EveryWhere ID     This is your Care EveryWhere ID. This could be used by other organizations to access your [facility] medical records  [id number]         Blood Pressure from Last 3 Encounters:   06/14/18 (!) 181/97   04/20/18 136/80   04/13/18 158/86    Weight from Last 3 Encounters:   04/20/18 211 lb (95.7 kg)   03/28/18 211 lb (95.7 kg)   10/20/17 208 lb (94.3 kg)              We Performed the Following     ADMIN INFLUENZA (For MEDICARE Patients ONLY) []     FLU VACCINE, INCREASED ANTIGEN, PRESV FREE, AGE 65+ [[address]]        Primary Care Provider Office Phone # Fax #    Ravinder Plunkett [phone]       [address] [facility] [place]  [address]        Equal Access to Services     [facility] : Hadii aad ku hadpetrosedi Cobyali, wakipda shanejosefha, qalawrenceclarita pedrozalashondashree orta, salvador cardenas. So [place] -[phone].    ATENCIÓN: Si habla español, tiene a huber disposición servicios gratuitos de asistencia lingüística. Llame al [phone].    We comply with applicable federal civil rights laws and [place] laws. We do not discriminate on the basis of race, color, national origin, age, disability, sex, sexual orientation, or gender identity.            Thank you!     Thank you for choosing [facility]  for your care. Our goal is always to provide you with excellent care. Hearing back from our patients is one way we can continue to improve our services. Please take a few minutes to complete the written survey that you may receive in the mail after your visit with us. Thank you!             Your Updated Medication List - Protect others around you: Learn how to safely use, store and throw away your medicines at www.disposemymeds.org.          This list is accurate as of 8/31/18  1:38 PM.  Always use your most recent med list.                   Brand Name Dispense Instructions for use Diagnosis    ACE NOT PRESCRIBED (INTENTIONAL)     0 each    ACE Inhibitor not prescribed due to Intolerance    Cough       aspirin 81 MG tablet      1 TABLET DAILY        blood glucose monitoring lancets     1 Box    Use to test blood sugars 1 times daily or as directed.    Type 2 diabetes mellitus without complication, without long-term current use of insulin (H)       blood glucose monitoring test strip    ONETOUCH ULTRA    100 each    Use to test blood sugars 1 times daily or as directed.    Type 2 diabetes mellitus without complication, unspecified long term insulin use status (H), Type 2 diabetes mellitus without complication, without long-term current use of insulin (H)       brimonidine 0.2 % ophthalmic solution    ALPHAGAN    1 Bottle     Place 1 drop into both eyes 2 times daily    Unspecified glaucoma(365.9)       CALCIUM + D PO      1 TABLET DAILY        flax seed oil 1000 MG capsule      1 CAPSULE DAILY        ibuprofen 200 MG tablet    ADVIL/MOTRIN     Take 200 mg by mouth every 4 hours as needed Reported on 4/21/2017        metFORMIN 500 MG tablet    GLUCOPHAGE    360 tablet    TAKE 2 TABLETS (1,000 MG) 2 TIMES DAILY (WITH MEALS)    Type 2 diabetes mellitus without complication, without long-term current use of insulin (H)       Multi-vitamin Tabs tablet   Generic drug:  multivitamin, therapeutic with minerals      DAILY        Saw Palmetto 1000 MG Caps     60 capsule    Take 2 capsules by mouth daily    Type 2 diabetes mellitus without complication, unspecified long term insulin use status (H), Type 2 diabetes mellitus without complication, without long-term current use of insulin (H)       STATIN NOT PRESCRIBED (INTENTIONAL)      Statin not prescribed intentionally due to Intolerance        TYLENOL 500 MG tablet   Generic drug:  acetaminophen      Take 1,000 mg by mouth every 6 hours as needed        VITAMIN D3 PO      Take by mouth daily

## 2018-08-31 NOTE — TELEPHONE ENCOUNTER
Yeyo calling asking for fasting Lipid  lab orders and A1C,BMP and TSH to be placed has lab only appointment for 09.13.2018 any questions he is coming in today 08.31.2018 for flu shot    Elizabeth Yeung CSS

## 2018-08-31 NOTE — PATIENT INSTRUCTIONS
Pertussis booster is recommended one time for those over 65 who may be around small children.  (Pertussis is a bacterial respiratory infection that can be serious in young children, and cause a prolonged severe cough in adults)  Pertussis boosters can now be given as a part of a tetanus update.  It is called the TDaP shot.  If it's been more than 2 years since your last tetanus update, you should get the TDaP shot.  You only need to get it one time after age 65.    Medicare  And some insurances do not pay for the TDaP in clinic, so we recommend getting it at your public health office or pharmacy.

## 2018-08-31 NOTE — PROGRESS NOTES

## 2018-08-31 NOTE — TELEPHONE ENCOUNTER
Future lab Orders placed per Health Maintenance/ per protocol. Not due for TSH.  Spoke with patient, scheduled him for a DM check and labs to be done prior.

## 2018-09-20 DIAGNOSIS — Z13.5 SCREENING FOR DIABETIC RETINOPATHY: ICD-10-CM

## 2018-09-20 DIAGNOSIS — I10 ESSENTIAL HYPERTENSION: ICD-10-CM

## 2018-09-20 DIAGNOSIS — E11.9 TYPE 2 DIABETES MELLITUS WITHOUT COMPLICATION, WITHOUT LONG-TERM CURRENT USE OF INSULIN (H): ICD-10-CM

## 2018-09-20 LAB
ANION GAP SERPL CALCULATED.3IONS-SCNC: 5 MMOL/L (ref 3–14)
BUN SERPL-MCNC: 14 MG/DL (ref 7–30)
CALCIUM SERPL-MCNC: 8.9 MG/DL (ref 8.5–10.1)
CHLORIDE SERPL-SCNC: 100 MMOL/L (ref 94–109)
CHOLEST SERPL-MCNC: 176 MG/DL
CO2 SERPL-SCNC: 29 MMOL/L (ref 20–32)
CREAT SERPL-MCNC: 0.78 MG/DL (ref 0.66–1.25)
GFR SERPL CREATININE-BSD FRML MDRD: >90 ML/MIN/1.7M2
GLUCOSE SERPL-MCNC: 110 MG/DL (ref 70–99)
HBA1C MFR BLD: 6.3 % (ref 0–5.6)
HDLC SERPL-MCNC: 42 MG/DL
LDLC SERPL CALC-MCNC: 110 MG/DL
NONHDLC SERPL-MCNC: 134 MG/DL
POTASSIUM SERPL-SCNC: 5 MMOL/L (ref 3.4–5.3)
SODIUM SERPL-SCNC: 134 MMOL/L (ref 133–144)
TRIGL SERPL-MCNC: 120 MG/DL

## 2018-09-20 PROCEDURE — 36415 COLL VENOUS BLD VENIPUNCTURE: CPT | Performed by: FAMILY MEDICINE

## 2018-09-20 PROCEDURE — 83036 HEMOGLOBIN GLYCOSYLATED A1C: CPT | Performed by: FAMILY MEDICINE

## 2018-09-20 PROCEDURE — 80061 LIPID PANEL: CPT | Performed by: FAMILY MEDICINE

## 2018-09-20 PROCEDURE — 80048 BASIC METABOLIC PNL TOTAL CA: CPT | Performed by: FAMILY MEDICINE

## 2018-09-27 ENCOUNTER — OFFICE VISIT (OUTPATIENT)
Dept: FAMILY MEDICINE | Facility: CLINIC | Age: 78
End: 2018-09-27
Payer: COMMERCIAL

## 2018-09-27 VITALS
HEIGHT: 67 IN | DIASTOLIC BLOOD PRESSURE: 74 MMHG | TEMPERATURE: 97.8 F | BODY MASS INDEX: 32.49 KG/M2 | RESPIRATION RATE: 18 BRPM | SYSTOLIC BLOOD PRESSURE: 132 MMHG | HEART RATE: 68 BPM | WEIGHT: 207 LBS

## 2018-09-27 DIAGNOSIS — Z78.9 STATIN INTOLERANCE: ICD-10-CM

## 2018-09-27 DIAGNOSIS — I10 ESSENTIAL HYPERTENSION: ICD-10-CM

## 2018-09-27 DIAGNOSIS — E11.9 TYPE 2 DIABETES MELLITUS WITHOUT COMPLICATION, WITHOUT LONG-TERM CURRENT USE OF INSULIN (H): Primary | ICD-10-CM

## 2018-09-27 PROCEDURE — 99214 OFFICE O/P EST MOD 30 MIN: CPT | Performed by: FAMILY MEDICINE

## 2018-09-27 NOTE — PATIENT INSTRUCTIONS
Eating Heart-Healthy Food: Using the DASH Plan    Eating for your heart doesn t have to be hard or boring. You just need to know how to make healthier choices. The DASH eating plan has been developed to help you do just that. DASH stands for Dietary Approaches to Stop Hypertension. It is a plan that has been proven to be healthier for your heart and to lower your risk for high blood pressure. It can also help lower your risk for cancer, heart disease, osteoporosis, and diabetes.  Choosing from each food group  Choose foods from each of the food groups below each day. Try to get the recommended number of servings for each food group. The serving numbers are based on a diet of 2,000 calories a day. Talk to your doctor if you re unsure about your calorie needs. Along with getting the correct servings, the DASH plan also recommends a sodium intake less than 2,300 mg per day.        Grains  Servings: 6 to 8 a day  A serving is:    1 slice bread    1 ounce dry cereal    Half a cup cooked rice, pasta or cereal  Best choices: Whole grains and any grains high in fiber. Vegetables  Servings: 4 to 5 a day  A serving is:    1 cup raw leafy vegetable    Half a cup cut-up raw or cooked vegetable    Half a cup vegetable juice  Best choices: Fresh or frozen vegetables prepared without added salt or fat.   Fruits  Servings: 4 to 5 a day  A serving is:    1 medium fruit    One-quarter cup dried fruit    Half a cup fresh, frozen, or canned fruit    Half a cup of 100% fruit juices  Best choices: A variety of fresh fruits of different colors. Whole fruits are a better choice than fruit juices. Low-fat or fat-free dairy  Servings: 2 to 3 a day  A serving is:    1 cup milk    1 cup yogurt    One and a half ounces cheese  Best choices: Skim or 1% milk, low-fat or fat-free yogurt or buttermilk, and low-fat cheeses.         Lean meats, poultry, fish  Servings: 6 or fewer a day  A serving is:    1 ounce cooked meats, poultry, or fish    1  egg  Best choices: Lean poultry and fish. Trim away visible fat. Broil, grill, roast, or boil instead of frying. Remove skin from poultry before eating. Limit how much red meat you eat.  Nuts, seeds, beans  Servings: 4 to 5 a week  A serving is:    One-third cup nuts (one and a half ounces)    2 tablespoons nut butter or seeds    Half a cup cooked dry beans or legumes  Best choices: Dry roasted nuts with no salt added, lentils, kidney beans, garbanzo beans, and whole pacheco beans.   Fats and oils  Servings: 2 to 3 a day  A serving is:    1 teaspoon vegetable oil    1 teaspoon soft margarine    1 tablespoon mayonnaise    2 tablespoons salad dressing  Best choices: Nut and vegetable oils (nontropical vegetable oils), such as olive and canola oil. Sweets  Servings: 5 a week or fewer  A serving is:    1 tablespoon sugar, maple syrup, or honey    1 tablespoon jam or jelly    1 half-ounce jelly beans (about 15)    1 cup lemonade  Best choices: Dried fruit can be a satisfying sweet. Choose low-fat sweets. And watch your serving sizes!      For more on the DASH eating plan, visit:  www.nhlbi.nih.gov/health/health-topics/topics/dash   Date Last Reviewed: 6/1/2016 2000-2017 [employer]. [address] [place] [address], [place] [address]. All rights reserved. This information is not intended as a substitute for professional medical care. Always follow your healthcare professional's instructions.

## 2018-09-27 NOTE — MR AVS SNAPSHOT
After Visit Summary   9/27/2018    Ana Maria Duvall    MRN: [id number]           Patient Information     Date Of Birth          1940        Visit Information        Provider Department      9/27/2018 8:00 AM See Meng MD [facility]        Today's Diagnoses     Type 2 diabetes mellitus without complication, without long-term current use of insulin (H)    -  1    Statin intolerance        Essential hypertension          Care Instructions        Eating Heart-Healthy Food: Using the DASH Plan    Eating for your heart doesn t have to be hard or boring. You just need to know how to make healthier choices. The DASH eating plan has been developed to help you do just that. DASH stands for Dietary Approaches to Stop Hypertension. It is a plan that has been proven to be healthier for your heart and to lower your risk for high blood pressure. It can also help lower your risk for cancer, heart disease, osteoporosis, and diabetes.  Choosing from each food group  Choose foods from each of the food groups below each day. Try to get the recommended number of servings for each food group. The serving numbers are based on a diet of 2,000 calories a day. Talk to your doctor if you re unsure about your calorie needs. Along with getting the correct servings, the DASH plan also recommends a sodium intake less than 2,300 mg per day.        Grains  Servings: 6 to 8 a day  A serving is:    1 slice bread    1 ounce dry cereal    Half a cup cooked rice, pasta or cereal  Best choices: Whole grains and any grains high in fiber. Vegetables  Servings: 4 to 5 a day  A serving is:    1 cup raw leafy vegetable    Half a cup cut-up raw or cooked vegetable    Half a cup vegetable juice  Best choices: Fresh or frozen vegetables prepared without added salt or fat.   Fruits  Servings: 4 to 5 a day  A serving is:    1 medium fruit    One-quarter cup dried fruit    Half a cup fresh, frozen, or canned fruit    Half  a cup of 100% fruit juices  Best choices: A variety of fresh fruits of different colors. Whole fruits are a better choice than fruit juices. Low-fat or fat-free dairy  Servings: 2 to 3 a day  A serving is:    1 cup milk    1 cup yogurt    One and a half ounces cheese  Best choices: Skim or 1% milk, low-fat or fat-free yogurt or buttermilk, and low-fat cheeses.         Lean meats, poultry, fish  Servings: 6 or fewer a day  A serving is:    1 ounce cooked meats, poultry, or fish    1 egg  Best choices: Lean poultry and fish. Trim away visible fat. Broil, grill, roast, or boil instead of frying. Remove skin from poultry before eating. Limit how much red meat you eat.  Nuts, seeds, beans  Servings: 4 to 5 a week  A serving is:    One-third cup nuts (one and a half ounces)    2 tablespoons nut butter or seeds    Half a cup cooked dry beans or legumes  Best choices: Dry roasted nuts with no salt added, lentils, kidney beans, garbanzo beans, and whole pacheco beans.   Fats and oils  Servings: 2 to 3 a day  A serving is:    1 teaspoon vegetable oil    1 teaspoon soft margarine    1 tablespoon mayonnaise    2 tablespoons salad dressing  Best choices: Nut and vegetable oils (nontropical vegetable oils), such as olive and canola oil. Sweets  Servings: 5 a week or fewer  A serving is:    1 tablespoon sugar, maple syrup, or honey    1 tablespoon jam or jelly    1 half-ounce jelly beans (about 15)    1 cup lemonade  Best choices: Dried fruit can be a satisfying sweet. Choose low-fat sweets. And watch your serving sizes!      For more on the DASH eating plan, visit:  www.nhlbi.nih.gov/health/health-topics/topics/dash   Date Last Reviewed: 6/1/2016 2000-2017 [employer]. [address] [place] [address], [place] [address]. All rights reserved. This information is not intended as a substitute for professional medical care. Always follow your healthcare professional's instructions.                Follow-ups after your visit       "  Follow-up notes from your care team     Return in about 6 months (around 3/27/2019).      Who to contact     If you have questions or need follow up information about today's clinic visit or your schedule please contact [facility] directly at [phone].  Normal or non-critical lab and imaging results will be communicated to you by [employer], letter or phone within 4 business days after the clinic has received the results. If you do not hear from us within 7 days, please contact the clinic through [employer] or phone. If you have a critical or abnormal lab result, we will notify you by phone as soon as possible.  Submit refill requests through [employer] or call your pharmacy and they will forward the refill request to us. Please allow 3 business days for your refill to be completed.          Additional Information About Your Visit        [employer] Information     [employer] gives you secure access to your electronic health record. If you see a primary care provider, you can also send messages to your care team and make appointments. If you have questions, please call your primary care clinic.  If you do not have a primary care provider, please call [phone] and they will assist you.        Care EveryWhere ID     This is your Care EveryWhere ID. This could be used by other organizations to access your [facility] medical records  [id number]        Your Vitals Were     Pulse Temperature Respirations Height BMI (Body Mass Index)       68 97.8  F (36.6  C) (Tympanic) 18 5' 7\" (1.702 m) 32.42 kg/m2        Blood Pressure from Last 3 Encounters:   09/27/18 132/74   06/14/18 (!) 181/97   04/20/18 136/80    Weight from Last 3 Encounters:   09/27/18 207 lb (93.9 kg)   04/20/18 211 lb (95.7 kg)   03/28/18 211 lb (95.7 kg)              Today, you had the following     No orders found for display         Today's Medication Changes          These changes are accurate as of 9/27/18  8:37 AM.  If you have any questions, " ask your nurse or doctor.               These medicines have changed or have updated prescriptions.        Dose/Directions    metFORMIN 500 MG tablet   Commonly known as:  GLUCOPHAGE   This may have changed:  See the new instructions.   Used for:  Type 2 diabetes mellitus without complication, without long-term current use of insulin (H)        TAKE 2 TABLETS (1,000 MG) 2 TIMES DAILY (WITH MEALS)   Quantity:  360 tablet   Refills:  0                Primary Care Provider Office Phone # Fax #    See Pantoja MD Yusra [phone]       [address] [facility] [address]        Equal Access to Services     [facility]: Hadii veena maurer hadasho Sojoyce, waaxda luqadaha, qaybta kaalmada you, salvador doyle . So [facility] [phone].    ATENCIÓN: Si habla español, tiene a huber disposición servicios gratuitos de asistencia lingüística. Llame al [phone].    We comply with applicable federal civil rights laws and [place] laws. We do not discriminate on the basis of race, color, national origin, age, disability, sex, sexual orientation, or gender identity.            Thank you!     Thank you for choosing [facility]  for your care. Our goal is always to provide you with excellent care. Hearing back from our patients is one way we can continue to improve our services. Please take a few minutes to complete the written survey that you may receive in the mail after your visit with us. Thank you!             Your Updated Medication List - Protect others around you: Learn how to safely use, store and throw away your medicines at www.disposemymeds.org.          This list is accurate as of 9/27/18  8:37 AM.  Always use your most recent med list.                   Brand Name Dispense Instructions for use Diagnosis    ACE NOT PRESCRIBED (INTENTIONAL)     0 each    ACE Inhibitor not prescribed due to Intolerance    Cough       aspirin 81 MG tablet      1 TABLET DAILY         blood glucose monitoring lancets     1 Box    Use to test blood sugars 1 times daily or as directed.    Type 2 diabetes mellitus without complication, without long-term current use of insulin (H)       blood glucose monitoring test strip    ONETOUCH ULTRA    100 each    Use to test blood sugars 1 times daily or as directed.    Type 2 diabetes mellitus without complication, unspecified long term insulin use status (H), Type 2 diabetes mellitus without complication, without long-term current use of insulin (H)       brimonidine 0.2 % ophthalmic solution    ALPHAGAN    1 Bottle    Place 1 drop into both eyes 2 times daily    Unspecified glaucoma(365.9)       CALCIUM + D PO      1 TABLET DAILY        flax seed oil 1000 MG capsule      1 CAPSULE DAILY        ibuprofen 200 MG tablet    ADVIL/MOTRIN     Take 200 mg by mouth every 4 hours as needed Reported on 4/21/2017        metFORMIN 500 MG tablet    GLUCOPHAGE    360 tablet    TAKE 2 TABLETS (1,000 MG) 2 TIMES DAILY (WITH MEALS)    Type 2 diabetes mellitus without complication, without long-term current use of insulin (H)       Multi-vitamin Tabs tablet   Generic drug:  multivitamin, therapeutic with minerals      DAILY        Saw Palmetto 1000 MG Caps     60 capsule    Take 2 capsules by mouth daily    Type 2 diabetes mellitus without complication, unspecified long term insulin use status (H), Type 2 diabetes mellitus without complication, without long-term current use of insulin (H)       STATIN NOT PRESCRIBED (INTENTIONAL)      Statin not prescribed intentionally due to Intolerance        TYLENOL 500 MG tablet   Generic drug:  acetaminophen      Take 1,000 mg by mouth every 6 hours as needed        UNABLE TO FIND      MEDICATION NAME: CBD oil        VITAMIN D3 PO      Take by mouth daily

## 2018-09-27 NOTE — NURSING NOTE
"Chief Complaint   Patient presents with     Diabetes     Lipids       Initial /74 (Cuff Size: Adult Regular)  Pulse 68  Temp 97.8  F (36.6  C) (Tympanic)  Resp 18  Ht 5' 7\" (1.702 m)  Wt 207 lb (93.9 kg)  BMI 32.42 kg/m2 Estimated body mass index is 32.42 kg/(m^2) as calculated from the following:    Height as of this encounter: 5' 7\" (1.702 m).    Weight as of this encounter: 207 lb (93.9 kg).    Patient presents to the clinic using No DME    Health Maintenance that is potentially due pending provider review:  NONE    n/a    Is there anyone who you would like to be able to receive your results? Not Applicable  If yes have patient fill out ANA    "

## 2018-11-02 ENCOUNTER — OFFICE VISIT (OUTPATIENT)
Dept: FAMILY MEDICINE | Facility: CLINIC | Age: 78
End: 2018-11-02
Payer: COMMERCIAL

## 2018-11-02 VITALS
SYSTOLIC BLOOD PRESSURE: 134 MMHG | RESPIRATION RATE: 18 BRPM | HEIGHT: 67 IN | TEMPERATURE: 97 F | BODY MASS INDEX: 32.33 KG/M2 | HEART RATE: 68 BPM | OXYGEN SATURATION: 95 % | WEIGHT: 206 LBS | DIASTOLIC BLOOD PRESSURE: 82 MMHG

## 2018-11-02 DIAGNOSIS — Z12.5 SCREENING FOR PROSTATE CANCER: ICD-10-CM

## 2018-11-02 DIAGNOSIS — N40.0 BENIGN PROSTATIC HYPERPLASIA WITHOUT LOWER URINARY TRACT SYMPTOMS: ICD-10-CM

## 2018-11-02 DIAGNOSIS — Z00.00 ROUTINE GENERAL MEDICAL EXAMINATION AT A HEALTH CARE FACILITY: Primary | ICD-10-CM

## 2018-11-02 DIAGNOSIS — R06.09 EXERTIONAL DYSPNEA: ICD-10-CM

## 2018-11-02 DIAGNOSIS — E11.9 TYPE 2 DIABETES MELLITUS WITHOUT COMPLICATION, WITHOUT LONG-TERM CURRENT USE OF INSULIN (H): ICD-10-CM

## 2018-11-02 LAB
CREAT UR-MCNC: 52 MG/DL
MICROALBUMIN UR-MCNC: 13 MG/L
MICROALBUMIN/CREAT UR: 25.19 MG/G CR (ref 0–17)
PSA SERPL-ACNC: 1.72 UG/L (ref 0–4)

## 2018-11-02 PROCEDURE — 82043 UR ALBUMIN QUANTITATIVE: CPT | Performed by: FAMILY MEDICINE

## 2018-11-02 PROCEDURE — 99397 PER PM REEVAL EST PAT 65+ YR: CPT | Performed by: FAMILY MEDICINE

## 2018-11-02 PROCEDURE — G0103 PSA SCREENING: HCPCS | Performed by: FAMILY MEDICINE

## 2018-11-02 PROCEDURE — 99207 C FOOT EXAM  NO CHARGE: CPT | Mod: 25 | Performed by: FAMILY MEDICINE

## 2018-11-02 ASSESSMENT — ENCOUNTER SYMPTOMS
SHORTNESS OF BREATH: 1
CONSTIPATION: 0
WEAKNESS: 1
HEMATOCHEZIA: 0
HEMATURIA: 0
HEARTBURN: 1
COUGH: 0
ABDOMINAL PAIN: 0
JOINT SWELLING: 1
DIARRHEA: 0
DIZZINESS: 0
HEADACHES: 0
MYALGIAS: 1
NAUSEA: 0
SORE THROAT: 0
DIZZINESS: 1
PARESTHESIAS: 0
ARTHRALGIAS: 1
DYSURIA: 0
PALPITATIONS: 0
EYE PAIN: 0
FREQUENCY: 0
NERVOUS/ANXIOUS: 0
FEVER: 0
CHILLS: 0

## 2018-11-02 ASSESSMENT — ACTIVITIES OF DAILY LIVING (ADL): CURRENT_FUNCTION: NO ASSISTANCE NEEDED

## 2018-11-02 NOTE — PATIENT INSTRUCTIONS
Preventive Health Recommendations:     See your health care provider every year to    Review health changes.     Discuss preventive care.      Review your medicines if your doctor has prescribed any.      Talk with your health care provider about whether you should have a test to screen for prostate cancer (PSA).    Every 3 years, have a diabetes test (fasting glucose). If you are at risk for diabetes, you should have this test more often.    Every 5 years, have a cholesterol test. Have this test more often if you are at risk for high cholesterol or heart disease.     Every 10 years, have a colonoscopy. Or, have a yearly FIT test (stool test). These exams will check for colon cancer.    Talk to with your health care provider about screening for Abdominal Aortic Aneurysm if you have a family history of AAA or have a history of smoking.    Shots:     Get a flu shot each year.     Get a tetanus shot every 10 years.     Talk to your doctor about your pneumonia vaccines. There are now two you should receive - Pneumovax (PPSV 23) and Prevnar (PCV 13).     Talk to your pharmacist about a shingles vaccine.     Talk to your doctor about the hepatitis B vaccine.  Nutrition:     Eat at least 5 servings of fruits and vegetables each day.     Eat whole-grain bread, whole-wheat pasta and brown rice instead of white grains and rice.     Get adequate Calcium and Vitamin D.   Lifestyle    Exercise for at least 150 minutes a week (30 minutes a day, 5 days a week). This will help you control your weight and prevent disease.     Limit alcohol to one drink per day.     No smoking.     Wear sunscreen to prevent skin cancer.    See your dentist every six months for an exam and cleaning.    See your eye doctor every 1 to 2 years to screen for conditions such as glaucoma, macular degeneration, cataracts, etc.    Personalized Prevention Plan  You are due for the preventive services outlined below.  Your care team is available to assist you  in scheduling these services.  If you have already completed any of these items, please share that information with your care team to update in your medical record.  Health Maintenance Due   Topic Date Due     Tetanus Vaccine - every 10 years  08/20/2018     Diabetic Foot Exam - yearly  10/20/2018     Microalbumin Lab - yearly  10/20/2018

## 2018-11-02 NOTE — LETTER
November 5, 2018      Ana Maria Duvall  [address] [facility] [id number]        Dear AlishaJadiel,    PSA test came back normal, some albumin noted in urine. Continue current medications. Let us know if there are any questions.     Resulted Orders   Albumin Random Urine Quantitative with Creat Ratio   Result Value Ref Range    Creatinine Urine 52 mg/dL    Albumin Urine mg/L 13 mg/L    Albumin Urine mg/g Cr 25.19 (H) 0 - 17 mg/g Cr   PSA, screen   Result Value Ref Range    PSA 1.72 0 - 4 ug/L         Sincerely,      See Meng MD
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
## 2018-11-02 NOTE — NURSING NOTE
"Chief Complaint   Patient presents with     Physical       Initial /82 (Cuff Size: Adult Regular)  Pulse 68  Temp 97  F (36.1  C) (Tympanic)  Resp 18  Ht 5' 7\" (1.702 m)  Wt 206 lb (93.4 kg)  SpO2 95%  BMI 32.26 kg/m2 Estimated body mass index is 32.26 kg/(m^2) as calculated from the following:    Height as of this encounter: 5' 7\" (1.702 m).    Weight as of this encounter: 206 lb (93.4 kg).    Patient presents to the clinic using No DME    Health Maintenance that is potentially due pending provider review:  NONE    n/a    Is there anyone who you would like to be able to receive your results? Not Applicable  If yes have patient fill out ANA    "

## 2018-11-02 NOTE — MR AVS SNAPSHOT
After Visit Summary   11/2/2018    Ana Maria Duvall    MRN: [id number]           Patient Information     Date Of Birth          1940        Visit Information        Provider Department      11/2/2018 8:00 AM See Meng MD [facility]        Today's Diagnoses     Routine general medical examination at a health care facility    -  1    Type 2 diabetes mellitus without complication, without long-term current use of insulin (H)        Exertional dyspnea        Benign prostatic hyperplasia without lower urinary tract symptoms        Screening for prostate cancer          Care Instructions      Preventive Health Recommendations:     See your health care provider every year to    Review health changes.     Discuss preventive care.      Review your medicines if your doctor has prescribed any.      Talk with your health care provider about whether you should have a test to screen for prostate cancer (PSA).    Every 3 years, have a diabetes test (fasting glucose). If you are at risk for diabetes, you should have this test more often.    Every 5 years, have a cholesterol test. Have this test more often if you are at risk for high cholesterol or heart disease.     Every 10 years, have a colonoscopy. Or, have a yearly FIT test (stool test). These exams will check for colon cancer.    Talk to with your health care provider about screening for Abdominal Aortic Aneurysm if you have a family history of AAA or have a history of smoking.    Shots:     Get a flu shot each year.     Get a tetanus shot every 10 years.     Talk to your doctor about your pneumonia vaccines. There are now two you should receive - Pneumovax (PPSV 23) and Prevnar (PCV 13).     Talk to your pharmacist about a shingles vaccine.     Talk to your doctor about the hepatitis B vaccine.  Nutrition:     Eat at least 5 servings of fruits and vegetables each day.     Eat whole-grain bread, whole-wheat pasta and brown rice  instead of white grains and rice.     Get adequate Calcium and Vitamin D.   Lifestyle    Exercise for at least 150 minutes a week (30 minutes a day, 5 days a week). This will help you control your weight and prevent disease.     Limit alcohol to one drink per day.     No smoking.     Wear sunscreen to prevent skin cancer.    See your dentist every six months for an exam and cleaning.    See your eye doctor every 1 to 2 years to screen for conditions such as glaucoma, macular degeneration, cataracts, etc.    Personalized Prevention Plan  You are due for the preventive services outlined below.  Your care team is available to assist you in scheduling these services.  If you have already completed any of these items, please share that information with your care team to update in your medical record.  Health Maintenance Due   Topic Date Due     Tetanus Vaccine - every 10 years  08/20/2018     Diabetic Foot Exam - yearly  10/20/2018     Microalbumin Lab - yearly  10/20/2018             Follow-ups after your visit        Who to contact     If you have questions or need follow up information about today's clinic visit or your schedule please contact [facility] directly at [phone].  Normal or non-critical lab and imaging results will be communicated to you by [employer], letter or phone within 4 business days after the clinic has received the results. If you do not hear from us within 7 days, please contact the clinic through [employer] or phone. If you have a critical or abnormal lab result, we will notify you by phone as soon as possible.  Submit refill requests through [employer] or call your pharmacy and they will forward the refill request to us. Please allow 3 business days for your refill to be completed.          Additional Information About Your Visit        [employer] Information     [employer] gives you secure access to your electronic health record. If you see a primary care provider, you can also send  "messages to your care team and make appointments. If you have questions, please call your primary care clinic.  If you do not have a primary care provider, please call [phone] and they will assist you.        Care EveryWhere ID     This is your Care EveryWhere ID. This could be used by other organizations to access your [facility] medical records  [id number]        Your Vitals Were     Pulse Temperature Respirations Height Pulse Oximetry BMI (Body Mass Index)    68 97  F (36.1  C) (Tympanic) 18 5' 7\" (1.702 m) 95% 32.26 kg/m2       Blood Pressure from Last 3 Encounters:   11/02/18 134/82   09/27/18 132/74   06/14/18 (!) 181/97    Weight from Last 3 Encounters:   11/02/18 206 lb (93.4 kg)   09/27/18 207 lb (93.9 kg)   04/20/18 211 lb (95.7 kg)              We Performed the Following     Albumin Random Urine Quantitative with Creat Ratio     FOOT EXAM     PSA, screen          Today's Medication Changes          These changes are accurate as of 11/2/18  8:28 AM.  If you have any questions, ask your nurse or doctor.               These medicines have changed or have updated prescriptions.        Dose/Directions    metFORMIN 500 MG tablet   Commonly known as:  GLUCOPHAGE   This may have changed:  See the new instructions.   Used for:  Type 2 diabetes mellitus without complication, without long-term current use of insulin (H)        TAKE 2 TABLETS (1,000 MG) 2 TIMES DAILY (WITH MEALS)   Quantity:  360 tablet   Refills:  0                Primary Care Provider Office Phone # Fax #    See Ur MD Yusra [phone]       [address] [place] [id number]        Equal Access to Services     [facility] Center[facility]ITH : Hadjoie Chavarria, waaxda luqadaha, qaybta kaalsalvador harrell. So [facility] [phone].    ATENCIÓN: Si habla español, tiene a huber disposición servicios gratuitos de asistencia lingüística. Llame al [phone].    We comply with applicable federal " civil rights laws and [place] laws. We do not discriminate on the basis of race, color, national origin, age, disability, sex, sexual orientation, or gender identity.            Thank you!     Thank you for choosing [facility]  for your care. Our goal is always to provide you with excellent care. Hearing back from our patients is one way we can continue to improve our services. Please take a few minutes to complete the written survey that you may receive in the mail after your visit with us. Thank you!             Your Updated Medication List - Protect others around you: Learn how to safely use, store and throw away your medicines at www.disposemymeds.org.          This list is accurate as of 11/2/18  8:28 AM.  Always use your most recent med list.                   Brand Name Dispense Instructions for use Diagnosis    ACE NOT PRESCRIBED (INTENTIONAL)     0 each    ACE Inhibitor not prescribed due to Intolerance    Cough       aspirin 81 MG tablet      1 TABLET DAILY        blood glucose monitoring lancets     1 Box    Use to test blood sugars 1 times daily or as directed.    Type 2 diabetes mellitus without complication, without long-term current use of insulin (H)       blood glucose monitoring test strip    ONETOUCH ULTRA    100 each    Use to test blood sugars 1 times daily or as directed.    Type 2 diabetes mellitus without complication, unspecified long term insulin use status, Type 2 diabetes mellitus without complication, without long-term current use of insulin (H)       brimonidine 0.2 % ophthalmic solution    ALPHAGAN    1 Bottle    Place 1 drop into both eyes 2 times daily    Unspecified glaucoma(365.9)       CALCIUM + D PO      1 TABLET DAILY        flax seed oil 1000 MG capsule      1 CAPSULE DAILY        ibuprofen 200 MG tablet    ADVIL/MOTRIN     Take 200 mg by mouth every 4 hours as needed Reported on 4/21/2017        metFORMIN 500 MG tablet    GLUCOPHAGE    360 tablet    TAKE 2  TABLETS (1,000 MG) 2 TIMES DAILY (WITH MEALS)    Type 2 diabetes mellitus without complication, without long-term current use of insulin (H)       Multi-vitamin Tabs tablet   Generic drug:  multivitamin, therapeutic with minerals      DAILY        Saw Palmetto 1000 MG Caps     60 capsule    Take 2 capsules by mouth daily    Type 2 diabetes mellitus without complication, unspecified long term insulin use status, Type 2 diabetes mellitus without complication, without long-term current use of insulin (H)       STATIN NOT PRESCRIBED (INTENTIONAL)      Statin not prescribed intentionally due to Intolerance        TYLENOL 500 MG tablet   Generic drug:  acetaminophen      Take 1,000 mg by mouth every 6 hours as needed        UNABLE TO FIND      MEDICATION NAME: CBD oil        VITAMIN D3 PO      Take by mouth daily

## 2019-01-28 DIAGNOSIS — E11.9 TYPE 2 DIABETES MELLITUS WITHOUT COMPLICATION (H): ICD-10-CM

## 2019-01-28 DIAGNOSIS — E11.9 TYPE 2 DIABETES MELLITUS WITHOUT COMPLICATION, WITHOUT LONG-TERM CURRENT USE OF INSULIN (H): ICD-10-CM

## 2019-01-28 NOTE — TELEPHONE ENCOUNTER
"Requested Prescriptions   Pending Prescriptions Disp Refills     blood glucose monitoring (ONE TOUCH DELICA) lancets       Sig: Use to test blood sugars 1 times daily or as directed.    Diabetic Supplies Protocol Passed - 1/28/2019 10:58 AM       Passed - Medication is active on med list       Passed - Patient is 18 years of age or older       Passed - Recent (6 mo) or future (30 days) visit within the authorizing provider's specialty    Patient had office visit in the last 6 months or has a visit in the next 30 days with authorizing provider.  See \"Patient Info\" tab in inbasket, or \"Choose Columns\" in Meds & Orders section of the refill encounter.      Last Written Prescription Date:  4/3/17  Last Fill Quantity: 1,  # refills: 6   Last office visit: 11/2/2018 with prescribing provider:     Future Office Visit:              blood glucose (ONETOUCH ULTRA) test strip 100 each 1     Sig: Use to test blood sugars 1 times daily or as directed.    Diabetic Supplies Protocol Passed - 1/28/2019 10:58 AM       Passed - Medication is active on med list       Passed - Patient is 18 years of age or older       Passed - Recent (6 mo) or future (30 days) visit within the authorizing provider's specialty    Patient had office visit in the last 6 months or has a visit in the next 30 days with authorizing provider.  See \"Patient Info\" tab in inbasket, or \"Choose Columns\" in Meds & Orders section of the refill encounter.      Last Written Prescription Date:  4/13/18  Last Fill Quantity: 100,  # refills: 1   Last office visit: 11/2/2018 with prescribing provider:  1   Future Office Visit:              Need a New Prescription, per medicaid can not have an or.    "

## 2019-01-28 NOTE — TELEPHONE ENCOUNTER
Rec'd [employer] [facility] fax requesting new order for [employer] True Metrix Air meter, test strips, lancets.  Spoke with pt who verifies this request.   ZAIDA.  SILVIO Montiel RN

## 2019-05-03 ENCOUNTER — TELEPHONE (OUTPATIENT)
Dept: FAMILY MEDICINE | Facility: CLINIC | Age: 79
End: 2019-05-03

## 2019-05-03 DIAGNOSIS — E11.9 TYPE 2 DIABETES MELLITUS WITHOUT COMPLICATION, WITHOUT LONG-TERM CURRENT USE OF INSULIN (H): ICD-10-CM

## 2019-05-03 NOTE — TELEPHONE ENCOUNTER
Calling asking for 1 fill to [facility]. Will make appointment in next month with Dr Meng.    Elizabeth Yeung CSS

## 2019-05-03 NOTE — TELEPHONE ENCOUNTER
Lab Results   Component Value Date    A1C 6.3 09/20/2018    A1C 6.6 01/19/2018    A1C 6.1 06/09/2017    A1C 5.8 08/01/2016    A1C 6.0 11/09/2015     Pt states is taking metformin 500 mg tab 1 BID, not tab 2 (1000 mg) BID as noted per LR 12-19-17.  Pt requesting 1 month supply until sees Dr. Meng later this month for DM F/U.  Refill pended for 500mg tab 1 BID, forwarded to Dr. Meng.  SILVIO Montiel, RN

## 2019-05-17 ENCOUNTER — OFFICE VISIT (OUTPATIENT)
Dept: FAMILY MEDICINE | Facility: CLINIC | Age: 79
End: 2019-05-17
Payer: COMMERCIAL

## 2019-05-17 VITALS
RESPIRATION RATE: 20 BRPM | WEIGHT: 208 LBS | SYSTOLIC BLOOD PRESSURE: 148 MMHG | BODY MASS INDEX: 32.65 KG/M2 | HEIGHT: 67 IN | HEART RATE: 69 BPM | DIASTOLIC BLOOD PRESSURE: 84 MMHG | TEMPERATURE: 97.9 F | OXYGEN SATURATION: 96 %

## 2019-05-17 DIAGNOSIS — Z23 NEED FOR SHINGLES VACCINE: ICD-10-CM

## 2019-05-17 DIAGNOSIS — I10 ESSENTIAL HYPERTENSION: Primary | ICD-10-CM

## 2019-05-17 DIAGNOSIS — E11.9 TYPE 2 DIABETES MELLITUS WITHOUT COMPLICATION, WITHOUT LONG-TERM CURRENT USE OF INSULIN (H): ICD-10-CM

## 2019-05-17 LAB — HBA1C MFR BLD: 6.2 % (ref 0–5.6)

## 2019-05-17 PROCEDURE — 36415 COLL VENOUS BLD VENIPUNCTURE: CPT | Performed by: FAMILY MEDICINE

## 2019-05-17 PROCEDURE — 99214 OFFICE O/P EST MOD 30 MIN: CPT | Performed by: FAMILY MEDICINE

## 2019-05-17 PROCEDURE — 83036 HEMOGLOBIN GLYCOSYLATED A1C: CPT | Performed by: FAMILY MEDICINE

## 2019-05-17 RX ORDER — LISINOPRIL 10 MG/1
10 TABLET ORAL DAILY
Qty: 90 TABLET | Refills: 1 | Status: SHIPPED | OUTPATIENT
Start: 2019-05-17 | End: 2019-05-31

## 2019-05-17 ASSESSMENT — MIFFLIN-ST. JEOR: SCORE: 1627.11

## 2019-05-17 NOTE — PROGRESS NOTES
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    Half a cup cooked dry beans or legumes  Best choices: Dry roasted nuts with no salt added, lentils, kidney beans, garbanzo beans, and whole pacheco beans.   Fats and oils  Servings: 2 to 3 a day  A serving is:    1 teaspoon vegetable oil    1 teaspoon soft margarine    1 tablespoon mayonnaise    2 tablespoons salad dressing  Best choices: Nut and vegetable oils (nontropical vegetable oils), such as olive and canola oil. Sweets  Servings: 5 a week or fewer  A serving is:    1 tablespoon sugar, maple syrup, or honey    1 tablespoon jam or jelly    1 half-ounce jelly beans (about 15)    1 cup lemonade  Best choices: Dried fruit can be a satisfying sweet. Choose low-fat sweets. And watch your serving sizes!      For more on the DASH eating plan, visit:  www.nhlbi.nih.gov/health/health-topics/topics/dash   Date Last Reviewed: 6/1/2016 2000-2018 [employer]. [address] [place] [address], [place] [address]. All rights reserved. This information is not intended as a substitute for professional medical care. Always follow your healthcare professional's instructions.               See Meng MD  [facility]

## 2019-05-17 NOTE — NURSING NOTE
"Chief Complaint   Patient presents with     Diabetes       Initial /84 (BP Location: Right arm, Patient Position: Sitting, Cuff Size: Adult Large)   Pulse 69   Temp 97.9  F (36.6  C) (Tympanic)   Resp 20   Ht 1.702 m (5' 7\")   Wt 94.3 kg (208 lb)   SpO2 96%   BMI 32.58 kg/m   Estimated body mass index is 32.58 kg/m  as calculated from the following:    Height as of this encounter: 1.702 m (5' 7\").    Weight as of this encounter: 94.3 kg (208 lb).    Patient presents to the clinic using No DME    Health Maintenance that is potentially due pending provider review:  NONE    n/a    Is there anyone who you would like to be able to receive your results? No  If yes have patient fill out ANA    "

## 2019-05-31 ENCOUNTER — ALLIED HEALTH/NURSE VISIT (OUTPATIENT)
Dept: FAMILY MEDICINE | Facility: CLINIC | Age: 79
End: 2019-05-31
Payer: COMMERCIAL

## 2019-05-31 VITALS — DIASTOLIC BLOOD PRESSURE: 80 MMHG | HEART RATE: 72 BPM | SYSTOLIC BLOOD PRESSURE: 134 MMHG

## 2019-05-31 DIAGNOSIS — I10 ESSENTIAL HYPERTENSION: Primary | ICD-10-CM

## 2019-05-31 PROCEDURE — 99207 ZZC NO CHARGE NURSE ONLY: CPT

## 2019-05-31 NOTE — PROGRESS NOTES
S-(situation): RN visit for BP check in F/U to 05-17-19 OV, lisinopril 10 mg daily prescribed.     B-(background): States did not start lisinopril as has been checking home BP's- - 130's since reducing added salt intake.   Component      Latest Ref Rng & Units 9/20/2018   Sodium      133 - 144 mmol/L 134   Potassium      3.4 - 5.3 mmol/L 5.0   Chloride      94 - 109 mmol/L 100   Carbon Dioxide      20 - 32 mmol/L 29   Anion Gap      3 - 14 mmol/L 5   Glucose      70 - 99 mg/dL 110 (H)   Urea Nitrogen      7 - 30 mg/dL 14   Creatinine      0.66 - 1.25 mg/dL 0.78   GFR Estimate      >60 mL/min/1.7m2 >90   GFR Estimate If Black      >60 mL/min/1.7m2 >90   Calcium      8.5 - 10.1 mg/dL 8.9     Component      Latest Ref Rng & Units 11/2/2018   Creatinine Urine      mg/dL 52   Albumin Urine mg/L      mg/L 13   Albumin Urine mg/g Cr      0 - 17 mg/g Cr 25.19 (H)         A-(assessment):   BP Readings from Last 6 Encounters:   05/31/19 134/80   05/17/19 148/84   11/02/18 134/82   09/27/18 132/74   06/14/18 (!) 181/97   04/20/18 136/80     HR 72.  RN discussed with Dr. Yusra MD discussed with pt. Agreed will hold off on taking lisinopril for now (D/C'd), continue to monitor home BP's for goal <140/90.   F/U with RN BP visit in 1 month. Cancel lab only BMP today. VORB.      R-(recommendations): Pt advised as above per MD. Pt voices understanding/ agreement. SILVIO Montiel, RO[place]

## 2019-06-24 ENCOUNTER — OFFICE VISIT (OUTPATIENT)
Dept: FAMILY MEDICINE | Facility: CLINIC | Age: 79
End: 2019-06-24
Payer: COMMERCIAL

## 2019-06-24 VITALS
HEIGHT: 67 IN | BODY MASS INDEX: 32.65 KG/M2 | TEMPERATURE: 97.9 F | OXYGEN SATURATION: 96 % | SYSTOLIC BLOOD PRESSURE: 138 MMHG | WEIGHT: 208 LBS | DIASTOLIC BLOOD PRESSURE: 80 MMHG | HEART RATE: 79 BPM | RESPIRATION RATE: 19 BRPM

## 2019-06-24 DIAGNOSIS — G89.29 CHRONIC RIGHT-SIDED LOW BACK PAIN WITHOUT SCIATICA: Primary | ICD-10-CM

## 2019-06-24 DIAGNOSIS — M54.50 CHRONIC RIGHT-SIDED LOW BACK PAIN WITHOUT SCIATICA: Primary | ICD-10-CM

## 2019-06-24 PROCEDURE — 99214 OFFICE O/P EST MOD 30 MIN: CPT | Performed by: NURSE PRACTITIONER

## 2019-06-24 ASSESSMENT — MIFFLIN-ST. JEOR: SCORE: 1627.11

## 2019-06-24 ASSESSMENT — PAIN SCALES - GENERAL: PAINLEVEL: MODERATE PAIN (4)

## 2019-06-24 NOTE — PATIENT INSTRUCTIONS
Referral placed to repeat the injection   They will call you to set this up     Tylenol 1000 mg three times a day as needed for pain     Stretches as we discussed
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
## 2019-06-25 ENCOUNTER — TELEPHONE (OUTPATIENT)
Dept: PALLIATIVE MEDICINE | Facility: CLINIC | Age: 79
End: 2019-06-25

## 2019-06-25 ENCOUNTER — TELEPHONE (OUTPATIENT)
Dept: FAMILY MEDICINE | Facility: CLINIC | Age: 79
End: 2019-06-25
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
## 2019-06-25 NOTE — TELEPHONE ENCOUNTER
Spoke with pt who wants to update Angie- since appt yesterday has noticed radiating pain down rt leg. Denies numbness, tingling.  Has spoke with pain mgnmnt this AM, awaiting call back with insurance approval for injection.  States currently taking tylenol for pain. Does not want to take any prescription pain medications.  Forwarded to Angie as ROHIT Montiel RN

## 2019-06-25 NOTE — TELEPHONE ENCOUNTER
Reason for call:  Patient reporting a symptom    Symptom or request: Yeyo saw Angie yesterday for back pain. He said to tell Angie that he is now getting radiating pain down the right leg. He didn't have that yesterday. She referred him to the pain clinic for an injection and he had not heard from them yet so I did give him the phone number so he could call them.         Have you been treated for this before? Yes         Phone Number patient can be reached at:  Home number on file [phone] (home)    Best Time:  anytime    Can we leave a detailed message on this number:  YES    Call taken on 6/25/2019 at 10:08 AM by Ludivina Rojas

## 2019-06-25 NOTE — TELEPHONE ENCOUNTER
I called and spoke with patient.   He will have his LORE on 7/3  Ok with tylenol for pain control   Not interested in physical therapy   Discussed red flag signs with back pain   Angie SHEFFIELD

## 2019-06-28 ENCOUNTER — ALLIED HEALTH/NURSE VISIT (OUTPATIENT)
Dept: FAMILY MEDICINE | Facility: CLINIC | Age: 79
End: 2019-06-28
Payer: COMMERCIAL

## 2019-06-28 VITALS — HEART RATE: 64 BPM | SYSTOLIC BLOOD PRESSURE: 124 MMHG | DIASTOLIC BLOOD PRESSURE: 80 MMHG

## 2019-06-28 DIAGNOSIS — I10 ESSENTIAL HYPERTENSION: Primary | ICD-10-CM

## 2019-06-28 PROCEDURE — 99207 ZZC NO CHARGE NURSE ONLY: CPT

## 2019-06-28 NOTE — PROGRESS NOTES
S-(situation): RN visit for BP check in F/U to 05-31-19 RN BP check- pt did not take lisinopril- discontinued by PCP.    B-(background):No current antihypertensives. Reports home BP readings have been variable, pt attributes to back pain. Home readings this AM- 162/116. Recheck 142/ 84.     A-(assessment):   BP Readings from Last 6 Encounters:   06/28/19 124/80   06/24/19 138/80   05/31/19 134/80   05/17/19 148/84   11/02/18 134/82   09/27/18 132/74   HR 64.  Continues to reduce sodium intake.   Managing LBP with tylenol, icing, positioning, rest when able. Scheduled for LORE 07-03-19.    R-(recommendations): Advised to continue home BP monitoring and periodic nurse only BP checks with BP goal < 140/90. Pt voices understanding/ agreement.  SILVIO Montiel RN

## 2019-07-03 ENCOUNTER — HOSPITAL ENCOUNTER (OUTPATIENT)
Dept: RADIOLOGY | Facility: CLINIC | Age: 79
Discharge: HOME | End: 2019-07-03
Attending: ANESTHESIOLOGY | Admitting: ANESTHESIOLOGY
Payer: COMMERCIAL

## 2019-07-03 ENCOUNTER — RADIOLOGY INJECTION OFFICE VISIT (OUTPATIENT)
Dept: PALLIATIVE MEDICINE | Facility: CLINIC | Age: 79
End: 2019-07-03
Payer: COMMERCIAL

## 2019-07-03 VITALS — SYSTOLIC BLOOD PRESSURE: 158 MMHG | HEART RATE: 69 BPM | RESPIRATION RATE: 16 BRPM | DIASTOLIC BLOOD PRESSURE: 96 MMHG

## 2019-07-03 DIAGNOSIS — M47.817 LUMBOSACRAL SPONDYLOSIS WITHOUT MYELOPATHY: Primary | ICD-10-CM

## 2019-07-03 DIAGNOSIS — M47.816 SPONDYLOSIS OF LUMBAR REGION WITHOUT MYELOPATHY OR RADICULOPATHY: ICD-10-CM

## 2019-07-03 DIAGNOSIS — M54.16 LUMBAR RADICULOPATHY: ICD-10-CM

## 2019-07-03 DIAGNOSIS — M47.817 LUMBOSACRAL FACET JOINT SYNDROME: ICD-10-CM

## 2019-07-03 PROCEDURE — 64493 INJ PARAVERT F JNT L/S 1 LEV: CPT | Mod: 50 | Performed by: ANESTHESIOLOGY

## 2019-07-03 PROCEDURE — 25500064 ZZH RX 255 OP 636: Performed by: ANESTHESIOLOGY

## 2019-07-03 PROCEDURE — 64494 INJ PARAVERT F JNT L/S 2 LEV: CPT | Mod: 50 | Performed by: ANESTHESIOLOGY

## 2019-07-03 PROCEDURE — 25000128 H RX IP 250 OP 636: Performed by: ANESTHESIOLOGY

## 2019-07-03 PROCEDURE — 27210202 XR LUMBAR SACRAL FACET INJ BILATERAL: Mod: TC

## 2019-07-03 RX ORDER — DEXAMETHASONE SODIUM PHOSPHATE 10 MG/ML
20 INJECTION, SOLUTION INTRAMUSCULAR; INTRAVENOUS ONCE
Status: DISCONTINUED | OUTPATIENT
Start: 2019-07-03 | End: 2019-07-03 | Stop reason: CLARIF

## 2019-07-03 RX ORDER — METHYLPREDNISOLONE ACETATE 40 MG/ML
40 INJECTION, SUSPENSION INTRA-ARTICULAR; INTRALESIONAL; INTRAMUSCULAR; SOFT TISSUE ONCE
Status: COMPLETED | OUTPATIENT
Start: 2019-07-03 | End: 2019-07-03

## 2019-07-03 RX ORDER — LIDOCAINE HYDROCHLORIDE 10 MG/ML
5 INJECTION, SOLUTION EPIDURAL; INFILTRATION; INTRACAUDAL; PERINEURAL ONCE
Status: COMPLETED | OUTPATIENT
Start: 2019-07-03 | End: 2019-07-03

## 2019-07-03 RX ORDER — BUPIVACAINE HYDROCHLORIDE 5 MG/ML
10 INJECTION, SOLUTION PERINEURAL ONCE
Status: COMPLETED | OUTPATIENT
Start: 2019-07-03 | End: 2019-07-03

## 2019-07-03 RX ORDER — LIDOCAINE HYDROCHLORIDE AND EPINEPHRINE 10; 10 MG/ML; UG/ML
1 INJECTION, SOLUTION INFILTRATION; PERINEURAL ONCE
Status: DISCONTINUED | OUTPATIENT
Start: 2019-07-03 | End: 2019-07-03 | Stop reason: CLARIF

## 2019-07-03 RX ADMIN — METHYLPREDNISOLONE ACETATE 80 MG: 40 INJECTION, SUSPENSION INTRA-ARTICULAR; INTRALESIONAL; INTRAMUSCULAR; SOFT TISSUE at 09:36

## 2019-07-03 RX ADMIN — LIDOCAINE HYDROCHLORIDE 4 ML: 10 INJECTION, SOLUTION EPIDURAL; INFILTRATION; INTRACAUDAL; PERINEURAL at 09:36

## 2019-07-03 RX ADMIN — IOHEXOL 1.5 ML: 300 INJECTION, SOLUTION INTRAVENOUS at 09:36

## 2019-07-03 RX ADMIN — BUPIVACAINE HYDROCHLORIDE 4 ML: 5 INJECTION, SOLUTION EPIDURAL; INTRACAUDAL; PERINEURAL at 09:37

## 2019-07-03 ASSESSMENT — PAIN SCALES - GENERAL
PAINLEVEL: MODERATE PAIN (4)
PAINLEVEL: MODERATE PAIN (5)

## 2019-07-03 NOTE — PROGRESS NOTES
Pre procedure Diagnosis: lumbosacral facet arthropathy, lumbosacral spondylosis   Post procedure Diagnosis: Same  Procedure performed: lumbosacral facet joint injections at L4-5 and L5-S1 bilaterally, fluoroscopically guided, contrast controlled  Indication:  Therapeutic for pain  Anesthesia: none  Complication:  none  Operators: Ronald El MD     Indications:   Ana Maria Duvall is a 78 year old male was sent by Leora Duvall NP for lumbar procedures.  The patient has a history of chronic lower back pain without significant lower extremity pain.  Exam shows tenderness across the lower back and reproduction of pain with extension and lateral rotation of the lumbar spine bilaterally consistent with lumbar facet arthropathy.  They have tried conservative treatment including physical therapy, activity modifications, medications, and interventional procedures.    MRI LUMBAR SPINE WITHOUT AND WITH CONTRAST   2/3/2017 11:05 AM   FINDINGS: The report is dictated assuming five lumbar-type vertebral  bodies, and radiographic correlation may be necessary.  Sagittal  images demonstrate normal vertebral body height.  Bone marrow signal  is unremarkable. Tip of the conus medullaris and cauda equina are  normal.     T12-L1:  No disc herniation or stenosis.  Facet joints are  unremarkable.     L1-L2:  Mild disc bulge and facet hypertrophy. Mild central stenosis.  Mild-to-moderate right and mild left foraminal stenosis. No  significant change.     L2-L3:  Broad-based disc bulge. Mild facet hypertrophy. Moderate  central stenosis. Moderate left and mild-to-moderate right foraminal  stenosis. Very mild progression of degenerative changes at this level.     L3-L4:  Broad-based disc bulge and left greater than right facet  hypertrophy. Mild central stenosis. Severe left and moderate-to-severe  right foraminal stenosis. No significant change.     L4-L5:  Previous posterior decompression at this level and probably  posterior  fusion. No disc herniation or stenosis.     L5-S1:  Evidence of left laminotomy at this level. Broad-based disc  bulge. No central stenosis. Moderate to severe, if not severe, left  foraminal stenosis. Moderate right foraminal stenosis.     Paraspinous soft tissues:  Unremarkable.                                                                    IMPRESSION:    1. At L1-L2 there is mild central stenosis. Mild-to-moderate right and  mild left foraminal stenosis.  2. At L2-L3 there is moderate central and left foraminal stenosis.  Mild-to-moderate right foraminal stenosis.  3. At L3-L4 there is mild central stenosis. Severe left and  moderate-to-severe right foraminal stenosis.  4. At L5-S1 there is moderate-to-severe, if not severe, left foraminal  stenosis. Moderate right foraminal stenosis.  5. Findings are similar to the prior study with perhaps mild  progression of degenerative changes at the L2-L3 level.     AMILCAR HERNÁNDEZ MD     Options/alternatives, benefits and risks were discussed with the patient including bleeding, infection, flared pain, tissue trauma, exposure to radiation, reaction to medications including seizure, spinal cord injury, paralysis, weakness, numbness and headache.      Questions were answered to his satisfaction and he wishes to proceed. Voluntary informed consent was obtained and signed.      Vitals were reviewed: Yes  BP (!) 158/96   Pulse 69   Resp 16   Allergies were reviewed:  Yes   Medications were reviewed:  Yes   Pre-procedure pain score: 5/10     Procedure:  After obtaining signed informed consent, the patient was brought into the procedure suite and was placed in a prone position on the procedure table.   A Pause for the Cause was performed.  The patient was prepped and draped in the usual sterile fashion.      Under AP fluoroscopic guidance the L4-5 and L5-S1 facet joints on the right side were identified, and the C-arm was rotated obliquely to the affected side to open the  joint space. A total of 1 ml of 1% lidocaine was injected at the needle entry points and needle tracts. Then 25 gauge 5 inch spinal needles were inserted and advanced under fluoroscopic guidance targeting the superior articular process of each joint. Once the needles made contact with the SAP, they were rotated and advanced into the joints with positive pain reproduction.  Then, the left L4-5 and L5-S1 facet joints were identified with fluoroscopy.  The skin and subcutaneous tissues were anesthetized with Lidocaine 1%.  25 gauge 5 inch spinal needles were advanced into the facet joints utilizing intermittent fluoroscopy with positive pain reproduction.  Aspiration was negative at all the levels.     AP fluoroscopic views were obtained to confirm the needle placement. Then,  Omnipaque 300 contrast dye was injected after negative aspiration for heme and CSF in each joint, confirming appropriate placement.  A total of 1.5 ml of Omnipaque was used.  Omnipaque wasted:  8.5 ml.     The, each joint was injected with 1.5 ml of a combination of Depomedrol 80 mg and Bupivicaine 0.5% 4 ml and the needles were flushed with Lidocaine 1 % as they were withdrawn.  (total injectate volume 6 ml).     Hemostasis was achieved, the area was cleaned, and bandaids were placed when appropriate.  The patient tolerated the procedure well, and was taken to the recovery room.    Images were saved to PACS.     Post-procedure pain score: 4/10 with improved range of motion  Follow-up includes:   -f/u phone call in one week  -f/u with referring provider     Ronald El MD  [facility] [place]

## 2019-07-03 NOTE — IP AVS SNAPSHOT
[facility]  [address]  [facility] [address]  Phone:  [phone]  Fax:  [phone]                                    After Visit Summary   7/3/2019    Ana Maria Duvall    MRN: [id number]           After Visit Summary Signature Page    I have received my discharge instructions, and my questions have been answered. I have discussed any challenges I see with this plan with the nurse or doctor.    ..........................................................................................................................................  Patient/Patient Representative Signature      ..........................................................................................................................................  Patient Representative Print Name and Relationship to Patient    ..................................................               ................................................  Date                                   Time    ..........................................................................................................................................  Reviewed by Signature/Title    ...................................................              ..............................................  Date                                               Time          22EPIC Rev 08/18

## 2019-07-03 NOTE — DISCHARGE INSTRUCTIONS
[facility]   Procedure Discharge Instructions    Today you saw:    Dr. Ronald El     You had an:     facet joint injection      Medications used:  Lidocaine   Bupivacaine   Depo-medrol  Omnipaque         Be cautious when walking. Numbness and/or weakness in the lower extremities may occur for up to 6-8 hours after the procedure due to effect of the local anesthetic    Do not drive for 6 hours. The effect of the local anesthetic could slow your reflexes.     You may resume your regular activities after 24 hours    Avoid strenuous activity for the first 24 hours    You may shower, however avoid swimming, tub baths or hot tubs for 24 hours following your procedure    You may have a mild to moderate increase in pain for several days following the injection.    It may take up to 14 days for the steroid medication to start working although you may feel the effect as early as a few days after the procedure.       You may use ice packs for 10-15 minutes, 3 to 4 times a day at the injection site for comfort    Do not use heat to painful areas for 6 to 8 hours. This will give the local anesthetic time to wear off and prevent you from accidentally burning your skin.     Unless you have been directed to avoid the use of anti-inflammatory medications (NSAIDS), you may use medications such as ibuprofen, Aleve or Tylenol for pain control if needed.     If you have diabetes, check your blood sugar more frequently than usual as your blood sugar may be higher than normal for 10-14 days following a steroid injection. Contact your doctor who manages your diabetes if your blood sugar is higher than usual    Possible side effects of steroids that you may experience include flushing, elevated blood pressure, increased appetite, mild headaches and restlessness.  All of these symptoms will get better with time.    If you experience any of the following, call the Pain Clinic during work hours at [phone] or  the Provider Line after hours at [phone]:  -Fever over 100 degree F  -Swelling, bleeding, redness, drainage, warmth at the injection site  -Progressive weakness or numbness in your legs or arms  -Loss of bowel or bladder function  -Unusual headache that is not relieved by Tylenol or other pain reliever  -Unusual new onset of pain that is not improving

## 2019-07-03 NOTE — IP AVS SNAPSHOT
MRN:[id number]                      After Visit Summary   7/3/2019    Ana Maria Duvall    MRN: [id number]           Visit Information        Provider Department      7/3/2019  9:15 AM [facility]           Review of your medicines      UNREVIEWED medicines. Ask your doctor about these medicines       Dose / Directions   ACE NOT PRESCRIBED (INTENTIONAL)  Used for:  Cough      ACE Inhibitor not prescribed due to Intolerance  Quantity:  0 each  Refills:  0     aspirin 81 MG tablet  Commonly known as:  ASA      1 TABLET DAILY  Refills:  0     brimonidine 0.2 % ophthalmic solution  Commonly known as:  ALPHAGAN  Used for:  GLAUCOMA NOS      Dose:  1 drop  Place 1 drop into both eyes 2 times daily  Quantity:  1 Bottle  Refills:  2     CALCIUM + D PO      1 TABLET DAILY  Refills:  0     flax seed oil 1000 MG capsule      1 CAPSULE DAILY  Refills:  0     ibuprofen 200 MG tablet  Commonly known as:  ADVIL/MOTRIN      Dose:  200 mg  Take 200 mg by mouth every 4 hours as needed Reported on 4/21/2017  Refills:  0     metFORMIN 500 MG tablet  Commonly known as:  GLUCOPHAGE  Used for:  Type 2 diabetes mellitus without complication, without long-term current use of insulin (H)      TAKE 1 TABLET 2 TIMES DAILY (WITH MEALS)  Quantity:  180 tablet  Refills:  3     Multi-vitamin tablet  Generic drug:  multivitamin w/minerals      DAILY  Refills:  0     Saw Palmetto 1000 MG Caps  Used for:  Type 2 diabetes mellitus without complication, unspecified long term insulin use status, Type 2 diabetes mellitus without complication, without long-term current use of insulin (H)      Dose:  2 capsule  Take 2 capsules by mouth daily  Quantity:  60 capsule  Refills:  0     STATIN NOT PRESCRIBED  Commonly known as:  INTENTIONAL      Statin not prescribed intentionally due to Intolerance  Refills:  0     TYLENOL 500 MG tablet  Generic drug:  acetaminophen      Dose:  1000 mg  Take 1,000 mg by mouth every 6  hours as needed  Refills:  0     UNABLE TO FIND      MEDICATION NAME: CBD oil  Refills:  0     VITAMIN D3 PO      Take by mouth daily  Refills:  0        CONTINUE these medicines which have NOT CHANGED       Dose / Directions   blood glucose lancets standard  Commonly known as:  NO BRAND SPECIFIED  Used for:  Type 2 diabetes mellitus without complication, without long-term current use of insulin (H)      Use to test blood sugar 1 time daily  Quantity:  100 each  Refills:  11     blood glucose monitoring meter device kit  Commonly known as:  NO BRAND SPECIFIED  Used for:  Type 2 diabetes mellitus without complication, without long-term current use of insulin (H)      Use to test blood sugar 1 time daily  Quantity:  1 kit  Refills:  0     blood glucose test strip  Commonly known as:  NO BRAND SPECIFIED  Used for:  Type 2 diabetes mellitus without complication, without long-term current use of insulin (H)      Use to test blood sugar 1 time daily  Quantity:  100 each  Refills:  11              Protect others around you: Learn how to safely use, store and throw away your medicines at www.disposemymeds.org.       Follow-ups after your visit       Care Instructions       Further instructions from your care team       [facility]   Procedure Discharge Instructions    Today you saw:    Dr. Ronald El     You had an:     facet joint injection      Medications used:  Lidocaine   Bupivacaine   Depo-medrol  Omnipaque         Be cautious when walking. Numbness and/or weakness in the lower extremities may occur for up to 6-8 hours after the procedure due to effect of the local anesthetic    Do not drive for 6 hours. The effect of the local anesthetic could slow your reflexes.     You may resume your regular activities after 24 hours    Avoid strenuous activity for the first 24 hours    You may shower, however avoid swimming, tub baths or hot tubs for 24 hours following your procedure    You may have a  mild to moderate increase in pain for several days following the injection.    It may take up to 14 days for the steroid medication to start working although you may feel the effect as early as a few days after the procedure.       You may use ice packs for 10-15 minutes, 3 to 4 times a day at the injection site for comfort    Do not use heat to painful areas for 6 to 8 hours. This will give the local anesthetic time to wear off and prevent you from accidentally burning your skin.     Unless you have been directed to avoid the use of anti-inflammatory medications (NSAIDS), you may use medications such as ibuprofen, Aleve or Tylenol for pain control if needed.     If you have diabetes, check your blood sugar more frequently than usual as your blood sugar may be higher than normal for 10-14 days following a steroid injection. Contact your doctor who manages your diabetes if your blood sugar is higher than usual    Possible side effects of steroids that you may experience include flushing, elevated blood pressure, increased appetite, mild headaches and restlessness.  All of these symptoms will get better with time.    If you experience any of the following, call the Pain Clinic during work hours at [phone] or the Provider Line after hours at [phone]:  -Fever over 100 degree F  -Swelling, bleeding, redness, drainage, warmth at the injection site  -Progressive weakness or numbness in your legs or arms  -Loss of bowel or bladder function  -Unusual headache that is not relieved by Tylenol or other pain reliever  -Unusual new onset of pain that is not improving          Additional Information About Your Visit       [employer] Information    [employer] gives you secure access to your electronic health record. If you see a primary care provider, you can also send messages to your care team and make appointments. If you have questions, please call your primary care clinic.  If you do not have a primary care provider,  please call [phone] and they will assist you.       Care EveryWhere ID    This is your Care EveryWhere ID. This could be used by other organizations to access your [facility] medical records  [id number]        Primary Care Provider Office Phone # Fax #    See Pantoja MD Yusra [phone]      Equal Access to Services    PRAVIN [facility] : Hadii aad ku hadasho Soomaali, waaxda luqadaha, qaybta kaalmada adeegyada, waxay chengin hayaan adeeg rajmelo laBenjamínaan . So [facility] [phone].    ATENCIÓN: Si habla español, tiene a huber disposición servicios gratuitos de asistencia lingüística. Llame al [phone].    We comply with applicable federal civil rights laws and [place] laws. We do not discriminate on the basis of race, color, national origin, age, disability, sex, sexual orientation, or gender identity.           Thank you!    Thank you for choosing [facility] for your care. Our goal is always to provide you with excellent care. Hearing back from our patients is one way we can continue to improve our services. Please take a few minutes to complete the written survey that you may receive in the mail after you visit with us. Thank you!            Medication List      Medications          Morning Afternoon Evening Bedtime As Needed    blood glucose lancets standard  Also known as:  NO BRAND SPECIFIED  INSTRUCTIONS:  Use to test blood sugar 1 time daily  Doctor's comments:  TRUEPLUS LANCETS                     blood glucose monitoring meter device kit  Also known as:  NO BRAND SPECIFIED  INSTRUCTIONS:  Use to test blood sugar 1 time daily  Doctor's comments:  HUMANA TRUE METRIX AIR METER                     blood glucose test strip  Also known as:  NO BRAND SPECIFIED  INSTRUCTIONS:  Use to test blood sugar 1 time daily  Doctor's comments:  HUMANA TRUE METRIX TEST STRIPS                       ASK your doctor about these medications          Morning Afternoon Evening Bedtime As Needed    ACE NOT PRESCRIBED  (INTENTIONAL)  INSTRUCTIONS:  ACE Inhibitor not prescribed due to Intolerance                     aspirin 81 MG tablet  Also known as:  ASA  INSTRUCTIONS:  1 TABLET DAILY                     brimonidine 0.2 % ophthalmic solution  Also known as:  ALPHAGAN  INSTRUCTIONS:  Place 1 drop into both eyes 2 times daily                     CALCIUM + D PO  INSTRUCTIONS:  1 TABLET DAILY                     flax seed oil 1000 MG capsule  INSTRUCTIONS:  1 CAPSULE DAILY                     ibuprofen 200 MG tablet  Also known as:  ADVIL/MOTRIN  INSTRUCTIONS:  Take 200 mg by mouth every 4 hours as needed Reported on 4/21/2017                     metFORMIN 500 MG tablet  Also known as:  GLUCOPHAGE  INSTRUCTIONS:  TAKE 1 TABLET 2 TIMES DAILY (WITH MEALS)                     Multi-vitamin tablet  INSTRUCTIONS:  DAILY  Generic drug:  multivitamin w/minerals                     Saw Palmetto 1000 MG Caps  INSTRUCTIONS:  Take 2 capsules by mouth daily                     STATIN NOT PRESCRIBED  Also known as:  INTENTIONAL  INSTRUCTIONS:  Statin not prescribed intentionally due to Intolerance                     TYLENOL 500 MG tablet  INSTRUCTIONS:  Take 1,000 mg by mouth every 6 hours as needed  Generic drug:  acetaminophen                     UNABLE TO FIND  INSTRUCTIONS:  MEDICATION NAME: CBD oil                     VITAMIN D3 PO  INSTRUCTIONS:  Take by mouth daily

## 2019-08-12 ENCOUNTER — OFFICE VISIT (OUTPATIENT)
Dept: FAMILY MEDICINE | Facility: CLINIC | Age: 79
End: 2019-08-12
Payer: COMMERCIAL

## 2019-08-12 VITALS
DIASTOLIC BLOOD PRESSURE: 94 MMHG | RESPIRATION RATE: 18 BRPM | SYSTOLIC BLOOD PRESSURE: 150 MMHG | BODY MASS INDEX: 32.65 KG/M2 | TEMPERATURE: 97.8 F | HEIGHT: 67 IN | HEART RATE: 68 BPM | WEIGHT: 208 LBS

## 2019-08-12 DIAGNOSIS — M54.16 LUMBAR RADICULOPATHY: Primary | ICD-10-CM

## 2019-08-12 DIAGNOSIS — I10 ESSENTIAL HYPERTENSION: ICD-10-CM

## 2019-08-12 LAB — TSH SERPL DL<=0.005 MIU/L-ACNC: 0.92 MU/L (ref 0.4–4)

## 2019-08-12 PROCEDURE — 99214 OFFICE O/P EST MOD 30 MIN: CPT | Performed by: FAMILY MEDICINE

## 2019-08-12 PROCEDURE — 84443 ASSAY THYROID STIM HORMONE: CPT | Performed by: FAMILY MEDICINE

## 2019-08-12 PROCEDURE — 36415 COLL VENOUS BLD VENIPUNCTURE: CPT | Performed by: FAMILY MEDICINE

## 2019-08-12 RX ORDER — TRAMADOL HYDROCHLORIDE 50 MG/1
50 TABLET ORAL EVERY 6 HOURS PRN
Qty: 20 TABLET | Refills: 0 | Status: SHIPPED | OUTPATIENT
Start: 2019-08-12 | End: 2019-09-27

## 2019-08-12 RX ORDER — GABAPENTIN 100 MG/1
100 CAPSULE ORAL 3 TIMES DAILY
Qty: 90 CAPSULE | Refills: 1 | Status: SHIPPED | OUTPATIENT
Start: 2019-08-12 | End: 2019-11-08

## 2019-08-12 ASSESSMENT — MIFFLIN-ST. JEOR: SCORE: 1627.11

## 2019-08-12 ASSESSMENT — PAIN SCALES - GENERAL: PAINLEVEL: EXTREME PAIN (8)
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
## 2019-08-12 NOTE — NURSING NOTE
"Chief Complaint   Patient presents with     Back Pain     Hypertension     BP (!) 150/94 (Cuff Size: Adult Regular)   Pulse 68   Temp 97.8  F (36.6  C) (Tympanic)   Resp 18   Ht 1.702 m (5' 7\")   Wt 94.3 kg (208 lb)   BMI 32.58 kg/m   Estimated body mass index is 32.58 kg/m  as calculated from the following:    Height as of this encounter: 1.702 m (5' 7\").    Weight as of this encounter: 94.3 kg (208 lb).  Patient presents to the clinic using No DME      Health Maintenance that is potentially due pending provider review:    Health Maintenance Due   Topic Date Due     EYE EXAM  1940     ZOSTER IMMUNIZATION (1 of 2) 12/04/1991     DTAP/TDAP/TD IMMUNIZATION (3 - Td) 08/20/2018     TSH W/FREE T4 REFLEX  06/09/2019        Possibly completing today per provider review.        "

## 2019-08-13 ENCOUNTER — TELEPHONE (OUTPATIENT)
Dept: FAMILY MEDICINE | Facility: CLINIC | Age: 79
End: 2019-08-13

## 2019-08-13 NOTE — TELEPHONE ENCOUNTER
Patient left a message stating he wanted to let Dr. Meng know he is feeling a little better and has his MRI scheduled for 7:00 am on Wednesday.    Paula Cantrell-[place]

## 2019-08-14 ENCOUNTER — HOSPITAL ENCOUNTER (OUTPATIENT)
Dept: MRI IMAGING | Facility: CLINIC | Age: 79
Discharge: HOME | End: 2019-08-14
Attending: FAMILY MEDICINE | Admitting: FAMILY MEDICINE
Payer: COMMERCIAL

## 2019-08-14 DIAGNOSIS — M54.16 LUMBAR RADICULOPATHY: ICD-10-CM

## 2019-08-14 LAB
CREAT BLD-MCNC: 0.8 MG/DL (ref 0.66–1.25)
GFR SERPL CREATININE-BSD FRML MDRD: >90 ML/MIN/{1.73_M2}

## 2019-08-14 PROCEDURE — 82565 ASSAY OF CREATININE: CPT

## 2019-08-14 PROCEDURE — 72158 MRI LUMBAR SPINE W/O & W/DYE: CPT

## 2019-08-14 PROCEDURE — A9585 GADOBUTROL INJECTION: HCPCS | Performed by: FAMILY MEDICINE

## 2019-08-14 PROCEDURE — 25500064 ZZH RX 255 OP 636: Performed by: FAMILY MEDICINE

## 2019-08-14 RX ORDER — GADOBUTROL 604.72 MG/ML
10 INJECTION INTRAVENOUS ONCE
Status: COMPLETED | OUTPATIENT
Start: 2019-08-14 | End: 2019-08-14

## 2019-08-14 RX ADMIN — GADOBUTROL 10 ML: 604.72 INJECTION INTRAVENOUS at 07:15

## 2019-08-15 DIAGNOSIS — M54.16 LUMBAR RADICULOPATHY: Primary | ICD-10-CM

## 2019-08-28 ENCOUNTER — TRANSFERRED RECORDS (OUTPATIENT)
Dept: HEALTH INFORMATION MANAGEMENT | Facility: CLINIC | Age: 79
End: 2019-08-28

## 2019-08-28 ENCOUNTER — TELEPHONE (OUTPATIENT)
Dept: PALLIATIVE MEDICINE | Facility: CLINIC | Age: 79
End: 2019-08-28

## 2019-08-28 NOTE — TELEPHONE ENCOUNTER
Pre-screening questions for MBB Injections:    Injection to be done at which interventional clinic site? [facility]     Instruct patient to arrive as directed prior to the scheduled appointment time:    [place]: 30 minutes before        Procedure ordered by Dr. Smith    Procedure ordered? Lumbar Medial Branch Block    What insurance would patient like us to bill for this procedure? [employer]      Worker's comp- Any injection DO NOT SCHEDULE and route to Shannon Reeves.      [employer] insurance - If scheduling an SI joint injection DO NOT SCHEDULE and route to Shannon Reeves.          MBB's must be scheduled at LEAST two weeks apart for insurance purposes       [employer] - Any injection besides hip/shoulder/knee joint DO NOT SCHEDULE and route to Shannon Reeves. She will obtain PA and call pt back to schedule procedure or notify pt of denial.       [employer]- PA required for all MBB's    Any chance of pregnancy? NO   If YES, do NOT schedule and route to RN pool    Is an  needed? No     Patient has a drive home? (mandatory) Yes     Is patient taking any blood thinners (plavix, coumadin, jantoven, warfarin, heparin, pradaxa or dabigatran )? No    If hold needed, do NOT schedule, route to RN pool     Is patient taking any aspirin products? YES 81mg    If more than 325mg/day do NOT schedule; route to RN pool     For CERVICAL procedures, hold all aspirin products for 6 days.      Does the patient have a bleeding or clotting disorder? No    If YES, okay to schedule AND route to RN nurse pool    **For any patients with platelet count <100, must be forwarded to provider**    Is patient diabetic? YES If YES, have them bring their glucometer.    Does patient have an active infection or treated for one within the past week? No    Is patient currently taking any antibiotics?  No    For patients on chronic, preventative, or prophylacti antibiotics, procedures can be scheduled.     For patients on  antibiotics for active or recent infection:    Viridiana Kern [place], [place], [place]-antibiotic course must have been completed for 4 days     Is patient currently taking any steroid medications? (i.e. Prednisone, Medrol)  No     For patients on steroid medications:    Valeriano Kern [place], [place]-steroid course must have been completed for 4 days    Review with patient:  If you are started on any steroids or antibiotics between now and your appointment, you must contact us because it may affect our ability to perform your procedure YES    Is patient actively being treated for cancer or immunocompromised? No   If YES, do NOT schedule and route to RN    Are you able to get on and off an exam table with minimal or no assistance? Yes   If NO, do NOT schedule and route to RN    Are you able to roll over and lay on your stomach with minimal or no assistance? Yes   If NO, do NOT schedule and route to RN    Any allergies to contrast dye, iodine, shellfish, or numbing and steroid medications? No  (If so, inform nursing and note in scheduling comments.)    Allergies: Nka [no known allergies]     Has the patient had a flu shot or any other vaccinations within 7 days before or after the procedure.  No     Does patient have an MRI/CT?  Not Applicable  (SI joint, hip injections, lumbar sympathetic blocks, and stellate ganglion blocks do not require an MRI)    Was the MRI done w/in the last 3 years?  NA    Was MRI done at [place]? No      If not, where was it done? N/A       If MRI was not done at [place], [facility] do NOT schedule and route to nursing.  If pt has an imaging disc, the injection may be scheduled but pt has to bring disc to appt. If they show up w/out disc the injection cannot be done    **Must be scheduled with elapsed time interval of at least 2 weeks and not more than 6 months between the First MBB and the Second MBB**       Medial Branch Block Pre-Procedure  Instructions    It is okay to take long acting pain medications (if you are on them) the day of the procedure but try not to take any short acting medications unless absolutely necessary. ok        Long acting meds would include: Gabapentin (Neurontin), MS Contin, Oxycontin        Short acting meds would include:  Percocet, Oxycodone, Vicodin, Ibuprofen     The day of the procedure, you should try to do things that provoke your pain, since the injection is being done to see if it will relieve your pain . ok    If your pain level is a 4 out of 10 or less on the day of the procedure, please call [phone] to reschedule.  ok  Reminders (please tell patient if applicable):      Instructed pt to arrive 30 minutes early for IV start if this is for a cervical procedure, ALL sympathetic (stellate ganglion, hypogastric, or lumbar sympathetic block) and all sedation procedures (RFA, spinal cord stimulation trials).         -IVs are not routinely placed for Dr. Muir cervical cases        -Dr. Villatoro: no IV needed for CMBB       If NPO for sedation, it is okay to take medications with sips of water (except if they are to hold blood thinners).    *DO take blood pressure medication if it is prescribed*      If this is for a cervical MBB aspirin needs to be held for 6 days.           Do not schedule procedures requiring IV placement in the first appointment of the day or first appointment after lunch. Do NOT schedule at 0745, 0815 or 1245.            For patients 85 or older we recommend having an adult stay w/ them for the remainder of the day.      Does the patient have any questions? no

## 2019-08-28 NOTE — TELEPHONE ENCOUNTER
Order received from Dr. Smith at [facility] for a Lumbar MBB #1 - Right L4-5, L5-S1.      Routing for scheduling.       Christy Presley    [place] [facility] include 234 beds at the [facility]

## 2019-09-13 ENCOUNTER — ALLIED HEALTH/NURSE VISIT (OUTPATIENT)
Dept: FAMILY MEDICINE | Facility: CLINIC | Age: 79
End: 2019-09-13
Payer: COMMERCIAL

## 2019-09-13 ENCOUNTER — HOSPITAL ENCOUNTER (OUTPATIENT)
Dept: RADIOLOGY | Facility: CLINIC | Age: 79
Discharge: HOME | End: 2019-09-13
Attending: ANESTHESIOLOGY | Admitting: ANESTHESIOLOGY
Payer: COMMERCIAL

## 2019-09-13 ENCOUNTER — RADIOLOGY INJECTION OFFICE VISIT (OUTPATIENT)
Dept: PALLIATIVE MEDICINE | Facility: CLINIC | Age: 79
End: 2019-09-13
Payer: COMMERCIAL

## 2019-09-13 VITALS — DIASTOLIC BLOOD PRESSURE: 89 MMHG | SYSTOLIC BLOOD PRESSURE: 148 MMHG | HEART RATE: 70 BPM | RESPIRATION RATE: 16 BRPM

## 2019-09-13 DIAGNOSIS — Z23 NEED FOR PROPHYLACTIC VACCINATION AND INOCULATION AGAINST INFLUENZA: Primary | ICD-10-CM

## 2019-09-13 DIAGNOSIS — M47.817 LUMBOSACRAL SPONDYLOSIS WITHOUT MYELOPATHY: Primary | ICD-10-CM

## 2019-09-13 DIAGNOSIS — M47.817 LUMBOSACRAL FACET JOINT SYNDROME: ICD-10-CM

## 2019-09-13 DIAGNOSIS — M47.816 SPONDYLOSIS WITHOUT MYELOPATHY OR RADICULOPATHY, LUMBAR REGION: ICD-10-CM

## 2019-09-13 DIAGNOSIS — I10 ESSENTIAL HYPERTENSION: ICD-10-CM

## 2019-09-13 LAB
ANION GAP SERPL CALCULATED.3IONS-SCNC: 5 MMOL/L (ref 3–14)
BUN SERPL-MCNC: 18 MG/DL (ref 7–30)
CALCIUM SERPL-MCNC: 9.4 MG/DL (ref 8.5–10.1)
CHLORIDE SERPL-SCNC: 99 MMOL/L (ref 94–109)
CO2 SERPL-SCNC: 27 MMOL/L (ref 20–32)
CREAT SERPL-MCNC: 0.78 MG/DL (ref 0.66–1.25)
GFR SERPL CREATININE-BSD FRML MDRD: 87 ML/MIN/{1.73_M2}
GLUCOSE SERPL-MCNC: 111 MG/DL (ref 70–99)
POTASSIUM SERPL-SCNC: 4.6 MMOL/L (ref 3.4–5.3)
SODIUM SERPL-SCNC: 131 MMOL/L (ref 133–144)

## 2019-09-13 PROCEDURE — 25000128 H RX IP 250 OP 636: Performed by: ANESTHESIOLOGY

## 2019-09-13 PROCEDURE — 25500064 ZZH RX 255 OP 636: Performed by: ANESTHESIOLOGY

## 2019-09-13 PROCEDURE — 27210202 XR MEDIAL BRANCH BLOCK LUMBAR RIGHT: Mod: TC

## 2019-09-13 PROCEDURE — 99207 ZZC NO CHARGE NURSE ONLY: CPT

## 2019-09-13 PROCEDURE — G0008 ADMIN INFLUENZA VIRUS VAC: HCPCS

## 2019-09-13 PROCEDURE — 90662 IIV NO PRSV INCREASED AG IM: CPT

## 2019-09-13 PROCEDURE — 64494 INJ PARAVERT F JNT L/S 2 LEV: CPT | Mod: RT | Performed by: ANESTHESIOLOGY

## 2019-09-13 PROCEDURE — 36415 COLL VENOUS BLD VENIPUNCTURE: CPT | Performed by: FAMILY MEDICINE

## 2019-09-13 PROCEDURE — 64495 INJ PARAVERT F JNT L/S 3 LEV: CPT | Mod: RT | Performed by: ANESTHESIOLOGY

## 2019-09-13 PROCEDURE — 80048 BASIC METABOLIC PNL TOTAL CA: CPT | Performed by: FAMILY MEDICINE

## 2019-09-13 PROCEDURE — 64493 INJ PARAVERT F JNT L/S 1 LEV: CPT | Mod: RT | Performed by: ANESTHESIOLOGY

## 2019-09-13 RX ORDER — BUPIVACAINE HYDROCHLORIDE 5 MG/ML
10 INJECTION, SOLUTION PERINEURAL ONCE
Status: COMPLETED | OUTPATIENT
Start: 2019-09-13 | End: 2019-09-13

## 2019-09-13 RX ORDER — LIDOCAINE HYDROCHLORIDE 10 MG/ML
5 INJECTION, SOLUTION EPIDURAL; INFILTRATION; INTRACAUDAL; PERINEURAL ONCE
Status: COMPLETED | OUTPATIENT
Start: 2019-09-13 | End: 2019-09-13

## 2019-09-13 RX ADMIN — IOHEXOL 1 ML: 300 INJECTION, SOLUTION INTRAVENOUS at 09:22

## 2019-09-13 RX ADMIN — LIDOCAINE HYDROCHLORIDE 1.5 ML: 10 INJECTION, SOLUTION EPIDURAL; INFILTRATION; INTRACAUDAL; PERINEURAL at 09:22

## 2019-09-13 RX ADMIN — BUPIVACAINE HYDROCHLORIDE 10 MG: 5 INJECTION, SOLUTION EPIDURAL; INTRACAUDAL; PERINEURAL at 09:22

## 2019-09-13 ASSESSMENT — PAIN SCALES - GENERAL
PAINLEVEL: MILD PAIN (3)
PAINLEVEL: MILD PAIN (2)
PAINLEVEL: SEVERE PAIN (7)

## 2019-09-13 NOTE — PROGRESS NOTES
Pre-procedure Intake    Have you been fasting? Yes    If yes, for how long? 12+hrs    Are you taking a prescribed blood thinner such as coumadin, Plavix, Xarelto?    No    If yes, when did you take your last dose?     Do you take aspirin?  Yes -   ASA    If cervical procedure, have you held aspirin for 6 days?   NA    Do you have any allergies to contrast dye, iodine, steroid and/or numbing medications?  NO    Are you currently taking antibiotics or have an active infection?  NO    Have you had a fever/elevated temperature within the past week? NO    Are you currently taking oral steroids? NO    Do you have a ? Yes       Are you pregnant or breastfeeding?  Not Applicable    Are the vital signs normal?  Yes

## 2019-09-13 NOTE — IP AVS SNAPSHOT
[facility]  [address]  [facility] [address]  Phone:  [phone]  Fax:  [phone]                                    After Visit Summary   9/13/2019    Ana Maria Duvall    MRN: [id number]           After Visit Summary Signature Page    I have received my discharge instructions, and my questions have been answered. I have discussed any challenges I see with this plan with the nurse or doctor.    ..........................................................................................................................................  Patient/Patient Representative Signature      ..........................................................................................................................................  Patient Representative Print Name and Relationship to Patient    ..................................................               ................................................  Date                                   Time    ..........................................................................................................................................  Reviewed by Signature/Title    ...................................................              ..............................................  Date                                               Time          22EPIC Rev 08/18

## 2019-09-13 NOTE — DISCHARGE INSTRUCTIONS
[facility]   Medial Branch Block Discharge Instructions      Your procedure was performed by:  Dr. Ronald El     Medications used include:  Lidocaine  Bupivicaine  Omnipaque       You will need to complete the Pain Scale Log form and return it to us as soon as possible.  Once we have received the form, we will review it and call you to determine the next steps.     The form can be faxed to [phone] or mailed to:   [facility]   [address]   [place] [address]      You may resume your regular medications    You may resume your regular activities    Be cautious with walking as numbness and/or weakness in the lower extremities may occur for up to 6-8 hours due to the effects of the anesthetic.    Avoid driving for 6 hours. The local anesthetic could slow your reflexes.     You may shower, however no swimming or tub baths or hot tubs for 24 hours following your procedure.    Your pain will return after the numbing medications have worn off.  You may use your current pain medications as needed.    Unless you have been directed to avoid the use of anti-inflammatory medications (NSAIDS), you may use medications such as ibuprofen, Aleve or Tylenol for pain control if needed.  Some people find it helpful to alternate ibuprofen and Tylenol every 3 hours for a couple of days.    You may use ice packs 10-15 minutes three to four times a day at the injection site for comfort.     Do not use heat to painful areas for 6 to 8 hours. This will give the local anesthetic time to wear off and prevent you from accidentally burning your skin.     If you experience any of the following, call the Pain Clinic during work hours at [phone] or the Provider Line after hours at [phone]:  -Fever over 100 degree F  -Swelling, bleeding, redness, drainage, warmth at the injection site  -Progressive weakness or numbness in your legs   -If lumbar, call if you have a loss  of bowel or bladder function  -If cervical, call if you have any unusual headache that is not relieved by Tylenol  -Unusual new onset of pain that is not improving

## 2019-09-13 NOTE — IP AVS SNAPSHOT
MRN:[id number]                      After Visit Summary   9/13/2019    Ana Maria Duvall    MRN: [id number]           Visit Information        Provider Department      9/13/2019  8:45 AM [facility]           Review of your medicines      UNREVIEWED medicines. Ask your doctor about these medicines       Dose / Directions   ACE NOT PRESCRIBED (INTENTIONAL)  Used for:  Cough      ACE Inhibitor not prescribed due to Intolerance  Quantity:  0 each  Refills:  0     aspirin 81 MG tablet  Commonly known as:  ASA      1 TABLET DAILY  Refills:  0     brimonidine 0.2 % ophthalmic solution  Commonly known as:  ALPHAGAN  Used for:  GLAUCOMA NOS      Dose:  1 drop  Place 1 drop into both eyes 2 times daily  Quantity:  1 Bottle  Refills:  2     CALCIUM + D PO      1 TABLET DAILY  Refills:  0     flax seed oil 1000 MG capsule      1 CAPSULE DAILY  Refills:  0     gabapentin 100 MG capsule  Commonly known as:  NEURONTIN  Used for:  Lumbar radiculopathy      Dose:  100 mg  Take 1 capsule (100 mg) by mouth 3 times daily  Quantity:  90 capsule  Refills:  1     ibuprofen 200 MG tablet  Commonly known as:  ADVIL/MOTRIN      Dose:  200 mg  Take 200 mg by mouth every 4 hours as needed Reported on 4/21/2017  Refills:  0     metFORMIN 500 MG tablet  Commonly known as:  GLUCOPHAGE  Used for:  Type 2 diabetes mellitus without complication, without long-term current use of insulin (H)      TAKE 1 TABLET 2 TIMES DAILY (WITH MEALS)  Quantity:  180 tablet  Refills:  3     Multi-vitamin tablet  Generic drug:  multivitamin w/minerals      DAILY  Refills:  0     Saw Palmetto 1000 MG Caps  Used for:  Type 2 diabetes mellitus without complication, unspecified long term insulin use status, Type 2 diabetes mellitus without complication, without long-term current use of insulin (H)      Dose:  2 capsule  Take 2 capsules by mouth daily  Quantity:  60 capsule  Refills:  0     STATIN NOT PRESCRIBED  Commonly known  as:  INTENTIONAL      Statin not prescribed intentionally due to Intolerance  Refills:  0     traMADol 50 MG tablet  Commonly known as:  ULTRAM  Used for:  Lumbar radiculopathy  Ask about: Should I take this medication?      Dose:  50 mg  Take 1 tablet (50 mg) by mouth every 6 hours as needed for severe pain  Quantity:  20 tablet  Refills:  0     TYLENOL 500 MG tablet  Generic drug:  acetaminophen      Dose:  1000 mg  Take 1,000 mg by mouth every 6 hours as needed  Refills:  0     UNABLE TO FIND      MEDICATION NAME: CBD oil  Refills:  0     VITAMIN D3 PO      Take by mouth daily  Refills:  0        CONTINUE these medicines which have NOT CHANGED       Dose / Directions   blood glucose lancets standard  Commonly known as:  NO BRAND SPECIFIED  Used for:  Type 2 diabetes mellitus without complication, without long-term current use of insulin (H)      Use to test blood sugar 1 time daily  Quantity:  100 each  Refills:  11     blood glucose monitoring meter device kit  Commonly known as:  NO BRAND SPECIFIED  Used for:  Type 2 diabetes mellitus without complication, without long-term current use of insulin (H)      Use to test blood sugar 1 time daily  Quantity:  1 kit  Refills:  0     blood glucose test strip  Commonly known as:  NO BRAND SPECIFIED  Used for:  Type 2 diabetes mellitus without complication, without long-term current use of insulin (H)      Use to test blood sugar 1 time daily  Quantity:  100 each  Refills:  11              Protect others around you: Learn how to safely use, store and throw away your medicines at www.disposemymeds.org.       Follow-ups after your visit       Your next 10 appointments already scheduled    Sep 13, 2019 11:00 AM CDT  LAB with PI LAB  [facility] ([address] [address]  [phone]   Please do not eat 10-12 hours before your appointment if you are coming in fasting for labs on lipids, cholesterol, or glucose  (sugar). Does not apply to pregnant women. Water, tea and black coffee (with nothing added) is okay. Do not drink other fluids, diet soda or gum. If you have concerns about taking your medications, please send a message by clicking on Secure Messaging, Message Your Care Team.     Sep 13, 2019 11:15 AM CDT  Nurse Only with [place] PI AVIVA/LPN  [facility] ([facility] [address] [facility] [address] [id number] [phone]         Care Instructions       Further instructions from your care team       [facility]   Medial Branch Block Discharge Instructions      Your procedure was performed by:  Dr. Ronald El     Medications used include:  Lidocaine  Bupivicaine  Omnipaque       You will need to complete the Pain Scale Log form and return it to us as soon as possible.  Once we have received the form, we will review it and call you to determine the next steps.     The form can be faxed to [phone] or mailed to:   [facility]   [id number] [place] [address] [address]   [place] [address]      You may resume your regular medications    You may resume your regular activities    Be cautious with walking as numbness and/or weakness in the lower extremities may occur for up to 6-8 hours due to the effects of the anesthetic.    Avoid driving for 6 hours. The local anesthetic could slow your reflexes.     You may shower, however no swimming or tub baths or hot tubs for 24 hours following your procedure.    Your pain will return after the numbing medications have worn off.  You may use your current pain medications as needed.    Unless you have been directed to avoid the use of anti-inflammatory medications (NSAIDS), you may use medications such as ibuprofen, Aleve or Tylenol for pain control if needed.  Some people find it helpful to alternate ibuprofen and Tylenol every 3 hours for a couple of days.    You may use ice packs 10-15 minutes three to four times  a day at the injection site for comfort.     Do not use heat to painful areas for 6 to 8 hours. This will give the local anesthetic time to wear off and prevent you from accidentally burning your skin.     If you experience any of the following, call the Pain Clinic during work hours at [phone] or the Provider Line after hours at [phone]:  -Fever over 100 degree F  -Swelling, bleeding, redness, drainage, warmth at the injection site  -Progressive weakness or numbness in your legs   -If lumbar, call if you have a loss of bowel or bladder function  -If cervical, call if you have any unusual headache that is not relieved by Tylenol  -Unusual new onset of pain that is not improving      Additional Information About Your Visit       [employer] Information    [employer] gives you secure access to your electronic health record. If you see a primary care provider, you can also send messages to your care team and make appointments. If you have questions, please call your primary care clinic.  If you do not have a primary care provider, please call [phone] and they will assist you.       Care EveryWhere ID    This is your Care EveryWhere ID. This could be used by other organizations to access your [facility] medical records  [id number]        Primary Care Provider Office Phone # Fax #    See Ur MD Yusra [phone]      Equal Access to Services    ROM ASCENCIO : Hadii veena williso Sodebbieali, waaxda luqadaha, qaybta kaalmada adeegyada, salvador cardenas. So [facility] [phone].    ATENCIÓN: Si habla español, tiene a huber disposición servicios gratuitos de asistencia lingüística. Llame al [phone].    We comply with applicable federal civil rights laws and [place] laws. We do not discriminate on the basis of race, color, national origin, age, disability, sex, sexual orientation, or gender identity.           Thank you!    Thank you for choosing [facility] for your care. Our  goal is always to provide you with excellent care. Hearing back from our patients is one way we can continue to improve our services. Please take a few minutes to complete the written survey that you may receive in the mail after you visit with us. Thank you!            Medication List      Medications          Morning Afternoon Evening Bedtime As Needed    blood glucose lancets standard  Also known as:  NO BRAND SPECIFIED  INSTRUCTIONS:  Use to test blood sugar 1 time daily  Doctor's comments:  TRUEPLUS LANCETS                     blood glucose monitoring meter device kit  Also known as:  NO BRAND SPECIFIED  INSTRUCTIONS:  Use to test blood sugar 1 time daily  Doctor's comments:  HUMANA TRUE METRIX AIR METER                     blood glucose test strip  Also known as:  NO BRAND SPECIFIED  INSTRUCTIONS:  Use to test blood sugar 1 time daily  Doctor's comments:  HUMANA TRUE METRIX TEST STRIPS                       ASK your doctor about these medications          Morning Afternoon Evening Bedtime As Needed    ACE NOT PRESCRIBED (INTENTIONAL)  INSTRUCTIONS:  ACE Inhibitor not prescribed due to Intolerance                     aspirin 81 MG tablet  Also known as:  ASA  INSTRUCTIONS:  1 TABLET DAILY                     brimonidine 0.2 % ophthalmic solution  Also known as:  ALPHAGAN  INSTRUCTIONS:  Place 1 drop into both eyes 2 times daily                     CALCIUM + D PO  INSTRUCTIONS:  1 TABLET DAILY                     flax seed oil 1000 MG capsule  INSTRUCTIONS:  1 CAPSULE DAILY                     gabapentin 100 MG capsule  Also known as:  NEURONTIN  INSTRUCTIONS:  Take 1 capsule (100 mg) by mouth 3 times daily                     ibuprofen 200 MG tablet  Also known as:  ADVIL/MOTRIN  INSTRUCTIONS:  Take 200 mg by mouth every 4 hours as needed Reported on 4/21/2017                     metFORMIN 500 MG tablet  Also known as:  GLUCOPHAGE  INSTRUCTIONS:  TAKE 1 TABLET 2 TIMES DAILY (WITH MEALS)                      Multi-vitamin tablet  INSTRUCTIONS:  DAILY  Generic drug:  multivitamin w/minerals                     Saw Palmetto 1000 MG Caps  INSTRUCTIONS:  Take 2 capsules by mouth daily                     STATIN NOT PRESCRIBED  Also known as:  INTENTIONAL  INSTRUCTIONS:  Statin not prescribed intentionally due to Intolerance                     traMADol 50 MG tablet  Also known as:  ULTRAM  INSTRUCTIONS:  Take 1 tablet (50 mg) by mouth every 6 hours as needed for severe pain  Ask about: Should I take this medication?                     TYLENOL 500 MG tablet  INSTRUCTIONS:  Take 1,000 mg by mouth every 6 hours as needed  Generic drug:  acetaminophen                     UNABLE TO FIND  INSTRUCTIONS:  MEDICATION NAME: CBD oil                     VITAMIN D3 PO  INSTRUCTIONS:  Take by mouth daily

## 2019-09-13 NOTE — PROGRESS NOTES
Pre procedure Diagnosis: lumbosacral facet arthropathy, lumbosacral spondylosis   Post procedure Diagnosis: Same  Procedure performed: lumbosacral medial branch block #1 at L3, L4, L5, Sacral ala on the right, fluoroscopically guided, contrast controlled  Indication:  Diagnostic   Anesthesia: none  Complications: none  Operators: Ronald El MD     Indications:   Ana Maria Duvall is a 78 year old male was sent by Dr. August Smith for lumbar facet procedures.  The patient has a history of chronic right lower back pain without radiation.  Exam shows well healed lumbar surgical scars, lumbar paraspinal tenderness, and increased pain with extension and lateral rotation of the lumbar spine and they have tried conservative treatment including physical therapy, medications, and interventional procedures.    MRI lumbar spine completed on 8/14/19 was read as:  FINDINGS: Postoperative change of L4-L5 laminectomy and left L5-S1  hemilaminotomy are demonstrated. Alignment is significant for slight  straightening. Bone marrow demonstrates T2 hyperintensity within the  left aspect of the L3 vertebral body superior endplate with  corresponding enhancement in T1 hypointensity. Multilevel mild disc  height loss is present. Multilevel mild facet arthropathy is present.  The conus medullaris and cauda equina are unremarkable. Paraspinal  soft tissues are unremarkable.     Segmental Analysis:      T12-L1: No significant spinal canal or foraminal stenosis. Findings at  this level are unchanged.     L1-L2: Disc bulge. Mild bilateral facet arthropathy. Mild bilateral  foraminal stenosis. Mild spinal canal stenosis. Findings at this level  are unchanged.     L2-L3: Disc bulge. Mild bilateral facet arthropathy. Mild bilateral  foraminal stenosis. Mild spinal canal stenosis. Findings at this level  are unchanged.     L3-L4: Disc bulge. Mild bilateral facet arthropathy. Moderate right  foraminal stenosis. Severe left foraminal  stenosis. Mild spinal canal  stenosis.  Findings at this level are unchanged.     L4-L5: Postoperative change of laminectomy/decompression. There is  fusion across the facet joints. Broad-based disc bulge is present mild  bilateral foraminal stenosis. No significant spinal canal stenosis.  There is T2 hyperintensity within the foraminal segment of the left  L4-L5 disc suspicious for small protrusion. Findings at this level are  unchanged.     L5-S1: Disc bulge. Moderate bilateral facet arthropathy. Moderate  right foraminal stenosis. Severe left foraminal stenosis. No  significant spinal canal stenosis. Postoperative change of left  hemilaminotomy are demonstrated.                                                                    IMPRESSION:   1. Postoperative change of L4-L5 laminectomy and fusion.  2. Postoperative change of left L5-S1 hemilaminectomy.  3. Foraminal stenosis, worst at L3-L4 and L4-L5, unchanged.  4. Probable small left foraminal disc protrusion at L4-L5.  5. New bone marrow abnormalities within the L3 vertebral body. These  could be degenerative, however subacute fracture cannot be excluded.  CT could be performed if indicated.     REICA GALAN MD    Options/alternatives, benefits and risks were discussed with the patient including but not limited to bleeding, infection, tissue trauma, exposure to radiation, reaction to medications, spinal cord injury, weakness, numbness and paralysis.  Questions were answered to his satisfaction and he agrees to proceed. Voluntary informed consent was obtained and signed.     Vitals were reviewed: Yes  BP (!) 148/89   Pulse 70   Resp 16   Allergies were reviewed:  Yes   Medications were reviewed:  Yes   Pre-procedure pain score: 7/10    Procedure:  After obtaining signed informed consent, the patient was brought into the procedure suite and was placed in a prone position on the procedure table.   A Pause for the Cause was performed.  The patient was prepped  and draped in the usual sterile fashion.     Under AP fluoroscopic guidance the L3, L4, L5 vertebral bodies were identified. The C-arm was rotated to the oblique view to afford optimal visualization the pedicles.  Lidocaine 1% was used to anesthetize the skin at each level.  Under intermittent fluoroscopy, 25 G 5 inch spinal needles were positioned inferior and lateral to the intersection of the transverse process and pedicle at the Right L3, L4 & L5 levels, as well as the corresponding sacral alar notch on the right. The needle positions were verified and optimized with AP and lateral views.    The anatomic targets for the L2, L3 & L4 medial branch nerves and L5 dorsal ramus (which functionally incorporates the medial branch) were the  L3, L4 & L5 transverse processes and sacral alar notch, with laterality as described above, resulting in blockade of the L3/4, L4/5 and L5/S1 facet joints.    Omnipaque-300 was injected at each site, and appropriate spread of contrast was noted without vascular uptake.  A total of 1 ml of Omnipaque was used.  9 ml was wasted. Bupivacaine 0.5% 0.5 ml was injected at each location.  The needles were removed.      Hemostasis was achieved, the area was cleaned, and bandaids were placed when appropriate.  The patient tolerated the procedure well, and was taken to the recovery room.    Images were saved to PACS.     The patient will continue to monitor progress, and they were given a pain diary to complete at home.  They will either fax or mail this back to us or bring it to their next appointment. We will determine the treatment plan after we review the diary.      Post-procedure pain score: 2/10 equalling a 71% pain reduction at the time of discharge  Follow-up includes:   -f/u phone call in one week  -will await diary for further planning for LMBB #2.    Ronald El MD  [facility]

## 2019-09-16 ENCOUNTER — MYC MEDICAL ADVICE (OUTPATIENT)
Dept: PALLIATIVE MEDICINE | Facility: CLINIC | Age: 79
End: 2019-09-16

## 2019-09-16 NOTE — TELEPHONE ENCOUNTER
Pt had a rightLumbar  medial branch block # 1 on 9/13/19.  The post medial branch block form was received.    According to pain diary pt would like to proceed with medial branch block #2.    Max relief from block is:    At rest:                 67%  Left fact load:       na%  Right facet load:  71%  Normal activity:    75%    Insurance:  [employer]      Does pt have adequate relief insurance-wise to proceed to medial branch block #2?  TBD     Physical therapy:      Last done in?: 2017     How many sessions?:  >10.      Where was it  done?  [facility] .     Routed to [place] to review. Does pt had enough relief insurance-wise to proceed to lumbar medial branch block #2?  If so please schedule.    Nitza Velasco, RN-BSN  [facility]

## 2019-09-17 ENCOUNTER — HOSPITAL ENCOUNTER (OUTPATIENT)
Dept: PHYSICAL THERAPY | Facility: CLINIC | Age: 79
Setting detail: THERAPIES SERIES
End: 2019-09-17
Attending: FAMILY MEDICINE
Payer: COMMERCIAL

## 2019-09-17 DIAGNOSIS — G89.29 CHRONIC BILATERAL LOW BACK PAIN WITH LEFT-SIDED SCIATICA: Primary | ICD-10-CM

## 2019-09-17 DIAGNOSIS — M54.42 CHRONIC BILATERAL LOW BACK PAIN WITH LEFT-SIDED SCIATICA: Primary | ICD-10-CM

## 2019-09-17 PROCEDURE — 97110 THERAPEUTIC EXERCISES: CPT | Mod: GP | Performed by: PHYSICAL MEDICINE & REHABILITATION

## 2019-09-17 PROCEDURE — 97162 PT EVAL MOD COMPLEX 30 MIN: CPT | Mod: GP | Performed by: PHYSICAL MEDICINE & REHABILITATION

## 2019-09-17 NOTE — PROGRESS NOTES
"[facility]          OUTPATIENT PHYSICAL THERAPY ORTHOPEDIC EVALUATION  PLAN OF TREATMENT FOR OUTPATIENT REHABILITATION  (COMPLETE FOR INITIAL CLAIMS ONLY)  Patient's Last Name, First Name, M.I.  YOB: 1940  Ana Maria Duvall    Provider s Name:  [facility]   Medical Record No.  [id number]   Start of Care Date:  09/17/19   Onset Date:  09/17/99(\"started over 20 years ago.\")   Type:     _X__PT   ___OT   ___SLP Medical Diagnosis:  Chronic bilateral low back pain with left-sided sciatica      PT Diagnosis:  chronic LBP   Visits from SOC:  1      _________________________________________________________________________________  Plan of Treatment/Functional Goals:  ADL retraining, IADL retraining, balance training, bed mobility training, gait training, joint mobilization, manual therapy, motor coordination training, neuromuscular re-education, orthotic fitting/training, ROM, strengthening, stretching, transfer training     Biofeedback, Contrast bath immersion, Cryotherapy, Electrical stimulation, Hot packs, Iontophoresis, Low level laser therapy, Shortwave diathermy, TENS, Traction, Ultrasound  as needed    Goals  Goal Identifier: 1  Goal Description: Pt will be able to stand for >5 min without pain in order to decrease difficulty with work.   Target Date: 10/01/19    Goal Identifier: 2  Goal Description: Pt will be able to walk 1/2 mile without increase in sx in order to decrease difficulty with going on walks with wife.   Target Date: 10/15/19    Goal Identifier: 3  Goal Description: Pt will be independent with HEP in order to self manage symptoms.   Target Date: 10/18/19      Therapy Frequency:  2 times/Week  Predicted Duration of Therapy Intervention:  4 weeks    Delores Duvall PT                 I CERTIFY THE NEED FOR THESE SERVICES FURNISHED UNDER        THIS PLAN OF TREATMENT AND WHILE UNDER MY CARE     (Physician co-signature of this document indicates " review and certification of the therapy plan).                       Certification Date From:  09/17/19   Certification Date To:  10/18/19    Referring Provider:  Dr. Meng    Initial Assessment        See Epic Evaluation Start of Care Date: 09/17/19
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
## 2019-09-17 NOTE — TELEPHONE ENCOUNTER
Called patient to schedule Lumbar MBB #2. Patient wanted to schedule in [place]. Reviewed pre-screening questions/instructions. No changes. Patient scheduled 10/4. Patient is currently starting his PT as well.         Christy Presley    [facility]

## 2019-09-17 NOTE — TELEPHONE ENCOUNTER
Alondra to schedule LMBB #2 for [place]- If at another location please send back for new PA      [employer] members may be eligible under the Plan for denervation of the facet joint*  via nonpulsed radiofrequency ablation (rhizotomy) when the following criteria are  met:    Severe neck or back pain that limits daily activities, thought to be due to facet joint  syndrome (symptoms of facet joint syndrome include absence of radiculopathy,  pain that is aggravated by extension, rotation or lateral bending of the spine and is  not typically associated with any neurological deficits); AND      Must be at least three months since initial diagnosis of neck or back pain that is not  responsive to conservative therapy including, but not limited to, rest, systemic  medications and/or physical therapy; AND      A diagnostic, temporary facet joint injection(s) has been performed and has  provided the individual a significant reduction in pain** (For information regarding  coverage determination/limitations for facet joint injections, please refer to  Injections for Chronic Pain Conditions Medical Coverage Policy); AND      A maximum of three levels of facet joints per side, per region (ie, cervical, thoracic  or lumbar) may be treated during a session; AND      Real-time imaging guidance (ie, computed tomography [CT] scan or fluoroscopy)  must be used with the denervation to assure proper placement of the RF probe  (this is considered integral to the primary procedure and not separately  Reimbursable)    *The nerve to the facet joint may be referred to as the medial branch.    **Significant reduction in pain after a diagnostic facet joint injection is defined as a  50% or greater reduction in pain and/or symptoms.    Shannon ALMANZAR    [facility]

## 2019-09-20 ENCOUNTER — HOSPITAL ENCOUNTER (OUTPATIENT)
Dept: PHYSICAL THERAPY | Facility: CLINIC | Age: 79
Setting detail: THERAPIES SERIES
End: 2019-09-20
Attending: FAMILY MEDICINE
Payer: COMMERCIAL

## 2019-09-20 PROCEDURE — 97140 MANUAL THERAPY 1/> REGIONS: CPT | Mod: GP | Performed by: PHYSICAL MEDICINE & REHABILITATION

## 2019-09-20 PROCEDURE — 97110 THERAPEUTIC EXERCISES: CPT | Mod: GP | Performed by: PHYSICAL MEDICINE & REHABILITATION

## 2019-09-25 ENCOUNTER — HOSPITAL ENCOUNTER (OUTPATIENT)
Dept: PHYSICAL THERAPY | Facility: CLINIC | Age: 79
Setting detail: THERAPIES SERIES
End: 2019-09-25
Attending: FAMILY MEDICINE
Payer: COMMERCIAL

## 2019-09-25 PROCEDURE — 97110 THERAPEUTIC EXERCISES: CPT | Mod: GP | Performed by: PHYSICAL MEDICINE & REHABILITATION

## 2019-09-27 ENCOUNTER — HOSPITAL ENCOUNTER (OUTPATIENT)
Dept: PHYSICAL THERAPY | Facility: CLINIC | Age: 79
Setting detail: THERAPIES SERIES
End: 2019-09-27
Attending: FAMILY MEDICINE
Payer: COMMERCIAL

## 2019-09-27 ENCOUNTER — OFFICE VISIT (OUTPATIENT)
Dept: FAMILY MEDICINE | Facility: CLINIC | Age: 79
End: 2019-09-27
Payer: COMMERCIAL

## 2019-09-27 VITALS
BODY MASS INDEX: 32.33 KG/M2 | DIASTOLIC BLOOD PRESSURE: 74 MMHG | HEIGHT: 67 IN | WEIGHT: 206 LBS | SYSTOLIC BLOOD PRESSURE: 132 MMHG | RESPIRATION RATE: 18 BRPM | TEMPERATURE: 97.8 F | HEART RATE: 70 BPM | OXYGEN SATURATION: 98 %

## 2019-09-27 DIAGNOSIS — J06.9 UPPER RESPIRATORY TRACT INFECTION, UNSPECIFIED TYPE: Primary | ICD-10-CM

## 2019-09-27 PROCEDURE — 97110 THERAPEUTIC EXERCISES: CPT | Mod: GP | Performed by: PHYSICAL MEDICINE & REHABILITATION

## 2019-09-27 PROCEDURE — 99213 OFFICE O/P EST LOW 20 MIN: CPT | Performed by: FAMILY MEDICINE

## 2019-09-27 ASSESSMENT — MIFFLIN-ST. JEOR: SCORE: 1618.04
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
## 2019-09-27 NOTE — PROGRESS NOTES
Outpatient Physical Therapy Progress Note     Patient: Ana Maria Duvall  : 1940    Beginning/End Dates of Reporting Period:  2019 to 2019    Referring Provider: Dr. Meng    Therapy Diagnosis: chronic LBP     Client Self Report: Pt reports compliance with HEP and good recall of exercises with no improvement in LBP. Looking to preceed with injections and nerve ablasion.     Objective Measurements:  Objective Measure: lumbar ROM  Details: no change since eval    Outcome Measures (most recent score):  Alexsandra: medium (medium at initial eval)  CARLOS: 44% (40% disability at initial eval)    Goals:  Goal Identifier 1   Goal Description Pt will be able to stand for >5 min without pain in order to decrease difficulty with work.    Target Date 10/01/19   Date Met      Progress: (no improvement)     Goal Identifier 2   Goal Description Pt will be able to walk 1/2 mile without increase in sx in order to decrease difficulty with going on walks with wife.    Target Date 10/15/19   Date Met      Progress: (no improvement)     Goal Identifier 3   Goal Description Pt will be independent with HEP in order to self manage symptoms.    Target Date 10/18/19   Date Met      Progress: (long term goal, I with current HEP)     Progress Toward Goals:   Progress this reporting period: Pt has attended 4 PT sessions, achieving 0/3 short term and long term goals. Pt reports no improvement in LBP despite good compliance with core stability, stretching, and LE strengthening exercises. PT and pt discussed and agreed upon return to MD at this time for further pain management.     Plan:  Return to MD. Then return to physical therapy per current plan of care if deemed appropriate by MD.    Discharge:  No    Please contact me with any questions or concerns.    Thank you for your referral,     Delores Duvall, PT, DPT  Physical Therapist  [address]  [id number] [phone]

## 2019-09-27 NOTE — NURSING NOTE
"Chief Complaint   Patient presents with     Cough     /74 (Cuff Size: Adult Regular)   Pulse 70   Temp 97.8  F (36.6  C) (Tympanic)   Resp 18   Ht 1.702 m (5' 7\")   Wt 93.4 kg (206 lb)   SpO2 98%   BMI 32.26 kg/m   Estimated body mass index is 32.26 kg/m  as calculated from the following:    Height as of this encounter: 1.702 m (5' 7\").    Weight as of this encounter: 93.4 kg (206 lb).  Patient presents to the clinic using No DME      Health Maintenance that is potentially due pending provider review:    Health Maintenance Due   Topic Date Due     ZOSTER IMMUNIZATION (1 of 2) 12/04/1991     DTAP/TDAP/TD IMMUNIZATION (3 - Td) 08/20/2018     LIPID  09/20/2019                "

## 2019-09-28 ENCOUNTER — HEALTH MAINTENANCE LETTER (OUTPATIENT)
Age: 79
End: 2019-09-28

## 2019-10-03 DIAGNOSIS — I10 ESSENTIAL HYPERTENSION: Primary | ICD-10-CM

## 2019-10-03 RX ORDER — LISINOPRIL 10 MG/1
TABLET ORAL
Qty: 90 TABLET | Refills: 1 | Status: SHIPPED | OUTPATIENT
Start: 2019-10-03 | End: 2019-10-25

## 2019-10-03 NOTE — TELEPHONE ENCOUNTER
"Requested Prescriptions   Pending Prescriptions Disp Refills     lisinopril (PRINIVIL/ZESTRIL) 10 MG tablet [Pharmacy Med Name: LISINOPRIL 10 MG Tablet] 90 tablet 1     Sig: TAKE 1 TABLET EVERY DAY       ACE Inhibitors (Including Combos) Protocol Failed - 10/3/2019 12:36 AM        Failed - Medication is active on med list        Passed - Blood pressure under 140/90 in past 12 months     BP Readings from Last 3 Encounters:   09/27/19 132/74   09/13/19 (!) 148/89   08/12/19 (!) 150/94                 Passed - Recent (12 mo) or future (30 days) visit within the authorizing provider's specialty     Patient has had an office visit with the authorizing provider or a provider within the authorizing providers department within the previous 12 mos or has a future within next 30 days. See \"Patient Info\" tab in inbasket, or \"Choose Columns\" in Meds & Orders section of the refill encounter.              Passed - Patient is age 18 or older        Passed - Normal serum creatinine on file in past 12 months     Recent Labs   Lab Test 09/13/19  1054 08/14/19  0717   CR 0.78  --    CREAT  --  0.8             Passed - Normal serum potassium on file in past 12 months     Recent Labs   Lab Test 09/13/19  1054   POTASSIUM 4.6             This patient wants a new prescription   "

## 2019-10-04 ENCOUNTER — HOSPITAL ENCOUNTER (OUTPATIENT)
Dept: RADIOLOGY | Facility: CLINIC | Age: 79
Discharge: HOME | End: 2019-10-04
Attending: ANESTHESIOLOGY | Admitting: ANESTHESIOLOGY
Payer: COMMERCIAL

## 2019-10-04 ENCOUNTER — RADIOLOGY INJECTION OFFICE VISIT (OUTPATIENT)
Dept: PALLIATIVE MEDICINE | Facility: CLINIC | Age: 79
End: 2019-10-04
Payer: COMMERCIAL

## 2019-10-04 VITALS — RESPIRATION RATE: 16 BRPM | HEART RATE: 72 BPM | DIASTOLIC BLOOD PRESSURE: 81 MMHG | SYSTOLIC BLOOD PRESSURE: 157 MMHG

## 2019-10-04 DIAGNOSIS — M47.817 LUMBOSACRAL SPONDYLOSIS WITHOUT MYELOPATHY: Primary | ICD-10-CM

## 2019-10-04 DIAGNOSIS — M47.816 SPONDYLOSIS OF LUMBAR REGION WITHOUT MYELOPATHY OR RADICULOPATHY: ICD-10-CM

## 2019-10-04 DIAGNOSIS — M47.817 LUMBOSACRAL FACET JOINT SYNDROME: ICD-10-CM

## 2019-10-04 PROCEDURE — 25000128 H RX IP 250 OP 636: Performed by: ANESTHESIOLOGY

## 2019-10-04 PROCEDURE — 25500064 ZZH RX 255 OP 636: Performed by: ANESTHESIOLOGY

## 2019-10-04 PROCEDURE — 64494 INJ PARAVERT F JNT L/S 2 LEV: CPT | Mod: RT | Performed by: ANESTHESIOLOGY

## 2019-10-04 PROCEDURE — 64495 INJ PARAVERT F JNT L/S 3 LEV: CPT | Mod: RT | Performed by: ANESTHESIOLOGY

## 2019-10-04 PROCEDURE — 27210202 XR MEDIAL BRANCH BLOCK LUMBAR RIGHT: Mod: TC

## 2019-10-04 PROCEDURE — 64493 INJ PARAVERT F JNT L/S 1 LEV: CPT | Mod: RT | Performed by: ANESTHESIOLOGY

## 2019-10-04 PROCEDURE — 64495 INJ PARAVERT F JNT L/S 3 LEV: CPT | Mod: RT

## 2019-10-04 PROCEDURE — 64494 INJ PARAVERT F JNT L/S 2 LEV: CPT | Mod: RT

## 2019-10-04 RX ORDER — BUPIVACAINE HYDROCHLORIDE 5 MG/ML
10 INJECTION, SOLUTION PERINEURAL ONCE
Status: COMPLETED | OUTPATIENT
Start: 2019-10-04 | End: 2019-10-04

## 2019-10-04 RX ORDER — LIDOCAINE HYDROCHLORIDE 10 MG/ML
5 INJECTION, SOLUTION EPIDURAL; INFILTRATION; INTRACAUDAL; PERINEURAL ONCE
Status: COMPLETED | OUTPATIENT
Start: 2019-10-04 | End: 2019-10-04

## 2019-10-04 RX ADMIN — LIDOCAINE HYDROCHLORIDE 1.5 ML: 10 INJECTION, SOLUTION EPIDURAL; INFILTRATION; INTRACAUDAL; PERINEURAL at 08:30

## 2019-10-04 RX ADMIN — BUPIVACAINE HYDROCHLORIDE 10 MG: 5 INJECTION, SOLUTION EPIDURAL; INTRACAUDAL; PERINEURAL at 08:30

## 2019-10-04 RX ADMIN — IOHEXOL 1 ML: 300 INJECTION, SOLUTION INTRAVENOUS at 08:30

## 2019-10-04 ASSESSMENT — PAIN SCALES - GENERAL
PAINLEVEL: MILD PAIN (2)
PAINLEVEL: MODERATE PAIN (5)

## 2019-10-04 NOTE — IP AVS SNAPSHOT
MRN:[id number]                      After Visit Summary   10/4/2019    Ana Maria Duvall    MRN: [id number]           Visit Information        Provider Department      10/4/2019  8:15 AM [facility]           Review of your medicines      UNREVIEWED medicines. Ask your doctor about these medicines       Dose / Directions   aspirin 81 MG tablet  Commonly known as:  ASA      1 TABLET DAILY  Refills:  0     brimonidine 0.2 % ophthalmic solution  Commonly known as:  ALPHAGAN  Used for:  GLAUCOMA NOS      Dose:  1 drop  Place 1 drop into both eyes 2 times daily  Quantity:  1 Bottle  Refills:  2     CALCIUM + D PO      1 TABLET DAILY  Refills:  0     flax seed oil 1000 MG capsule      1 CAPSULE DAILY  Refills:  0     gabapentin 100 MG capsule  Commonly known as:  NEURONTIN  Used for:  Lumbar radiculopathy      Dose:  100 mg  Take 1 capsule (100 mg) by mouth 3 times daily  Quantity:  90 capsule  Refills:  1     ibuprofen 200 MG tablet  Commonly known as:  ADVIL/MOTRIN      Dose:  200 mg  Take 200 mg by mouth every 4 hours as needed Reported on 4/21/2017  Refills:  0     lisinopril 10 MG tablet  Commonly known as:  PRINIVIL/ZESTRIL  Used for:  Essential hypertension      TAKE 1 TABLET EVERY DAY  Quantity:  90 tablet  Refills:  1     metFORMIN 500 MG tablet  Commonly known as:  GLUCOPHAGE  Used for:  Type 2 diabetes mellitus without complication, without long-term current use of insulin (H)      TAKE 1 TABLET 2 TIMES DAILY (WITH MEALS)  Quantity:  180 tablet  Refills:  3     Multi-vitamin tablet  Generic drug:  multivitamin w/minerals      DAILY  Refills:  0     Saw Palmetto 1000 MG Caps  Used for:  Type 2 diabetes mellitus without complication, unspecified long term insulin use status, Type 2 diabetes mellitus without complication, without long-term current use of insulin (H)      Dose:  2 capsule  Take 2 capsules by mouth daily  Quantity:  60 capsule  Refills:  0     STATIN NOT  PRESCRIBED  Commonly known as:  INTENTIONAL      Statin not prescribed intentionally due to Intolerance  Refills:  0     TYLENOL 500 MG tablet  Generic drug:  acetaminophen      Dose:  1,000 mg  Take 1,000 mg by mouth every 6 hours as needed  Refills:  0     UNABLE TO FIND      MEDICATION NAME: CBD oil  Refills:  0     VITAMIN D3 PO      Take by mouth daily  Refills:  0        CONTINUE these medicines which have NOT CHANGED       Dose / Directions   blood glucose lancets standard  Commonly known as:  NO BRAND SPECIFIED  Used for:  Type 2 diabetes mellitus without complication, without long-term current use of insulin (H)      Use to test blood sugar 1 time daily  Quantity:  100 each  Refills:  11     blood glucose monitoring meter device kit  Commonly known as:  NO BRAND SPECIFIED  Used for:  Type 2 diabetes mellitus without complication, without long-term current use of insulin (H)      Use to test blood sugar 1 time daily  Quantity:  1 kit  Refills:  0     blood glucose test strip  Commonly known as:  NO BRAND SPECIFIED  Used for:  Type 2 diabetes mellitus without complication, without long-term current use of insulin (H)      Use to test blood sugar 1 time daily  Quantity:  100 each  Refills:  11              Protect others around you: Learn how to safely use, store and throw away your medicines at www.disposemymeds.org.       Follow-ups after your visit       Care Instructions       Further instructions from your care team       [facility]   Medial Branch Block Discharge Instructions      Your procedure was performed by:  Dr. Ronald El     Your procedure:   Lumbosacral Medial Branch Block #2 Right L3, L4, L5, Sacral ala     Medications used include:  Lidocaine  Bupivicaine  Omnipaque      You will need to complete the Pain Scale Log form and return it to us as soon as possible.  Once we have received the form, we will review it and call you to determine the next steps.     The form  "can be faxed to [phone] or mailed to:   [facility]   [address]   [place] [address]      You may resume your regular medications    If you were holding your blood thinning medication, please restart taking it: {Not Applicable or free text:404111::\"N/A\"}    You may resume your regular activities    Be cautious with walking as numbness and/or weakness in the lower extremities may occur for up to 6-8 hours due to the effects of the anesthetic.    Avoid driving for 6 hours. The local anesthetic could slow your reflexes.     You may shower, however no swimming or tub baths or hot tubs for 24 hours following your procedure.    Your pain will return after the numbing medications have worn off.  You may use your current pain medications as needed.    Unless you have been directed to avoid the use of anti-inflammatory medications (NSAIDS), you may use medications such as ibuprofen, Aleve or Tylenol for pain control if needed.  Some people find it helpful to alternate ibuprofen and Tylenol every 3 hours for a couple of days.    You may use ice packs 10-15 minutes three to four times a day at the injection site for comfort.     Do not use heat to painful areas for 6 to 8 hours. This will give the local anesthetic time to wear off and prevent you from accidentally burning your skin.     If you experience any of the following, call the Pain Clinic during work hours at [phone] or the Provider Line after hours at [phone]:  -Fever over 100 degree F  -Swelling, bleeding, redness, drainage, warmth at the injection site  -Progressive weakness or numbness in your legs   -If lumbar, call if you have a loss of bowel or bladder function  -Unusual new onset of pain that is not improving            Additional Information About Your Visit       [employer] Information    [employer] gives you secure access to your electronic health record. If you see a primary care provider, you can also send " messages to your care team and make appointments. If you have questions, please call your primary care clinic.  If you do not have a primary care provider, please call [phone] and they will assist you.       Care EveryWhere ID    This is your Care EveryWhere ID. This could be used by other organizations to access your [facility] medical records  [id number]        Primary Care Provider Office Phone # Fax #    See Pantoja MD Yusra [phone]      Equal Access to Services    ROM ASCENCIO : Hadii aad ku hadasho Soomaali, waaxda luqadaha, qaybta kaalmada adeegyada, waxay idiin hayninin adeeg billyaramelo doyle . [facility] [phone].    ATENCIÓN: Si habla español, tiene a huber disposición servicios gratuitos de asistencia lingüística. Llame al [phone].    We comply with applicable federal civil rights laws and [place] laws. We do not discriminate on the basis of race, color, national origin, age, disability, sex, sexual orientation, or gender identity.           Thank you!    Thank you for choosing [facility] for your care. Our goal is always to provide you with excellent care. Hearing back from our patients is one way we can continue to improve our services. Please take a few minutes to complete the written survey that you may receive in the mail after you visit with us. Thank you!            Medication List      Medications          Morning Afternoon Evening Bedtime As Needed    blood glucose lancets standard  Also known as:  NO BRAND SPECIFIED  INSTRUCTIONS:  Use to test blood sugar 1 time daily  Doctor's comments:  TRUEPLUS LANCETS                     blood glucose monitoring meter device kit  Also known as:  NO BRAND SPECIFIED  INSTRUCTIONS:  Use to test blood sugar 1 time daily  Doctor's comments:  [employer] TRUE METRIX AIR METER                     blood glucose test strip  Also known as:  NO BRAND SPECIFIED  INSTRUCTIONS:  Use to test blood sugar 1 time daily  Doctor's comments:  HUMANA TRUE METRIX  TEST STRIPS                       ASK your doctor about these medications          Morning Afternoon Evening Bedtime As Needed    aspirin 81 MG tablet  Also known as:  ASA  INSTRUCTIONS:  1 TABLET DAILY                     brimonidine 0.2 % ophthalmic solution  Also known as:  ALPHAGAN  INSTRUCTIONS:  Place 1 drop into both eyes 2 times daily                     CALCIUM + D PO  INSTRUCTIONS:  1 TABLET DAILY                     flax seed oil 1000 MG capsule  INSTRUCTIONS:  1 CAPSULE DAILY                     gabapentin 100 MG capsule  Also known as:  NEURONTIN  INSTRUCTIONS:  Take 1 capsule (100 mg) by mouth 3 times daily                     ibuprofen 200 MG tablet  Also known as:  ADVIL/MOTRIN  INSTRUCTIONS:  Take 200 mg by mouth every 4 hours as needed Reported on 4/21/2017                     lisinopril 10 MG tablet  Also known as:  PRINIVIL/ZESTRIL  INSTRUCTIONS:  TAKE 1 TABLET EVERY DAY                     metFORMIN 500 MG tablet  Also known as:  GLUCOPHAGE  INSTRUCTIONS:  TAKE 1 TABLET 2 TIMES DAILY (WITH MEALS)                     Multi-vitamin tablet  INSTRUCTIONS:  DAILY  Generic drug:  multivitamin w/minerals                     Saw Palmetto 1000 MG Caps  INSTRUCTIONS:  Take 2 capsules by mouth daily                     STATIN NOT PRESCRIBED  Also known as:  INTENTIONAL  INSTRUCTIONS:  Statin not prescribed intentionally due to Intolerance                     TYLENOL 500 MG tablet  INSTRUCTIONS:  Take 1,000 mg by mouth every 6 hours as needed  Generic drug:  acetaminophen                     UNABLE TO FIND  INSTRUCTIONS:  MEDICATION NAME: CBD oil                     VITAMIN D3 PO  INSTRUCTIONS:  Take by mouth daily

## 2019-10-04 NOTE — IP AVS SNAPSHOT
[facility]  [address]  [facility] [address]  Phone:  [phone]  Fax:  [phone]                                    After Visit Summary   10/4/2019    Ana Maria Duvall    MRN: [id number]           After Visit Summary Signature Page    I have received my discharge instructions, and my questions have been answered. I have discussed any challenges I see with this plan with the nurse or doctor.    ..........................................................................................................................................  Patient/Patient Representative Signature      ..........................................................................................................................................  Patient Representative Print Name and Relationship to Patient    ..................................................               ................................................  Date                                   Time    ..........................................................................................................................................  Reviewed by Signature/Title    ...................................................              ..............................................  Date                                               Time          22EPIC Rev 08/18

## 2019-10-04 NOTE — PROGRESS NOTES
Pre procedure Diagnosis: lumbosacral facet arthropathy, lumbosacral spondylosis   Post procedure Diagnosis: Same  Procedure performed: lumbosacral medial branch block #2 at L3, L4, L5, Sacral ala on the right, fluoroscopically guided, contrast controlled  Indication:  Diagnostic   Anesthesia: none  Complications: none  Operators: Ronald El MD      Indications:   Ana Maria Duvall is a 78 year old male was sent by Dr. August Smith for lumbar facet procedures.  The patient has a history of chronic right lower back pain without radiation.  Exam shows well healed lumbar surgical scars, lumbar paraspinal tenderness, and increased pain with extension and lateral rotation of the lumbar spine and they have tried conservative treatment including physical therapy, medications, and interventional procedures.     MRI lumbar spine completed on 8/14/19 was read as:  FINDINGS: Postoperative change of L4-L5 laminectomy and left L5-S1  hemilaminotomy are demonstrated. Alignment is significant for slight  straightening. Bone marrow demonstrates T2 hyperintensity within the  left aspect of the L3 vertebral body superior endplate with  corresponding enhancement in T1 hypointensity. Multilevel mild disc  height loss is present. Multilevel mild facet arthropathy is present.  The conus medullaris and cauda equina are unremarkable. Paraspinal  soft tissues are unremarkable.     Segmental Analysis:      T12-L1: No significant spinal canal or foraminal stenosis. Findings at  this level are unchanged.     L1-L2: Disc bulge. Mild bilateral facet arthropathy. Mild bilateral  foraminal stenosis. Mild spinal canal stenosis. Findings at this level  are unchanged.     L2-L3: Disc bulge. Mild bilateral facet arthropathy. Mild bilateral  foraminal stenosis. Mild spinal canal stenosis. Findings at this level  are unchanged.     L3-L4: Disc bulge. Mild bilateral facet arthropathy. Moderate right  foraminal stenosis. Severe left foraminal  stenosis. Mild spinal canal  stenosis.  Findings at this level are unchanged.     L4-L5: Postoperative change of laminectomy/decompression. There is  fusion across the facet joints. Broad-based disc bulge is present mild  bilateral foraminal stenosis. No significant spinal canal stenosis.  There is T2 hyperintensity within the foraminal segment of the left  L4-L5 disc suspicious for small protrusion. Findings at this level are  unchanged.     L5-S1: Disc bulge. Moderate bilateral facet arthropathy. Moderate  right foraminal stenosis. Severe left foraminal stenosis. No  significant spinal canal stenosis. Postoperative change of left  hemilaminotomy are demonstrated.      IMPRESSION:   1. Postoperative change of L4-L5 laminectomy and fusion.  2. Postoperative change of left L5-S1 hemilaminectomy.  3. Foraminal stenosis, worst at L3-L4 and L4-L5, unchanged.  4. Probable small left foraminal disc protrusion at L4-L5.  5. New bone marrow abnormalities within the L3 vertebral body. These  could be degenerative, however subacute fracture cannot be excluded.  CT could be performed if indicated.     ERICA GALAN MD     Options/alternatives, benefits and risks were discussed with the patient including but not limited to bleeding, infection, tissue trauma, exposure to radiation, reaction to medications, spinal cord injury, weakness, numbness and paralysis.  Questions were answered to his satisfaction and he agrees to proceed. Voluntary informed consent was obtained and signed.      Vitals were reviewed: Yes  BP (!) 146/85   Pulse 73   Resp 16   Allergies were reviewed:  Yes   Medications were reviewed:  Yes   Pre-procedure pain score: 6/10     Procedure:  After obtaining signed informed consent, the patient was brought into the procedure suite and was placed in a prone position on the procedure table.   A Pause for the Cause was performed.  The patient was prepped and draped in the usual sterile fashion.      Under AP  fluoroscopic guidance the L3, L4, L5 vertebral bodies were identified. The C-arm was rotated to the oblique view to afford optimal visualization the pedicles.  Lidocaine 1% was used to anesthetize the skin at each level.  Under intermittent fluoroscopy, 25 G 5 inch spinal needles were positioned inferior and lateral to the intersection of the transverse process and pedicle at the Right L3, L4 & L5 levels, as well as the corresponding sacral alar notch on the right. The needle positions were verified and optimized with AP and lateral views.     The anatomic targets for the L2, L3 & L4 medial branch nerves and L5 dorsal ramus (which functionally incorporates the medial branch) were the  L3, L4 & L5 transverse processes and sacral alar notch, with laterality as described above, resulting in blockade of the L3/4, L4/5 and L5/S1 facet joints.     Omnipaque-300 was injected at each site, and appropriate spread of contrast was noted without vascular uptake.  A total of 1 ml of Omnipaque was used.  9 ml was wasted. Bupivacaine 0.5% 0.5 ml was injected at each location.  The needles were removed.       Hemostasis was achieved, the area was cleaned, and bandaids were placed when appropriate.  The patient tolerated the procedure well, and was taken to the recovery room.    Images were saved to PACS.      The patient will continue to monitor progress, and they were given a pain diary to complete at home.  They will either fax or mail this back to us or bring it to their next appointment. We will determine the treatment plan after we review the diary.       Post-procedure pain score: 2/10 equalling a 66% pain reduction at the time of discharge  Follow-up includes:   -f/u phone call in one week  -will await diary for further planning for Lumbar MB RFA.     Ronald El MD  [facility]

## 2019-10-04 NOTE — DISCHARGE INSTRUCTIONS
"[facility]   Medial Branch Block Discharge Instructions      Your procedure was performed by:  Dr. Ronald El     Your procedure:   Lumbosacral Medial Branch Block #2 Right L3, L4, L5, Sacral ala     Medications used include:  Lidocaine  Bupivicaine  Omnipaque      You will need to complete the Pain Scale Log form and return it to us as soon as possible.  Once we have received the form, we will review it and call you to determine the next steps.     The form can be faxed to [phone] or mailed to:   [facility]   [address]   [place] [address]      You may resume your regular medications    If you were holding your blood thinning medication, please restart taking it: {Not Applicable or free text:[id number]::\"N/A\"}    You may resume your regular activities    Be cautious with walking as numbness and/or weakness in the lower extremities may occur for up to 6-8 hours due to the effects of the anesthetic.    Avoid driving for 6 hours. The local anesthetic could slow your reflexes.     You may shower, however no swimming or tub baths or hot tubs for 24 hours following your procedure.    Your pain will return after the numbing medications have worn off.  You may use your current pain medications as needed.    Unless you have been directed to avoid the use of anti-inflammatory medications (NSAIDS), you may use medications such as ibuprofen, Aleve or Tylenol for pain control if needed.  Some people find it helpful to alternate ibuprofen and Tylenol every 3 hours for a couple of days.    You may use ice packs 10-15 minutes three to four times a day at the injection site for comfort.     Do not use heat to painful areas for 6 to 8 hours. This will give the local anesthetic time to wear off and prevent you from accidentally burning your skin.     If you experience any of the following, call the Pain Clinic during work hours at [phone] or the Provider Line " after hours at [phone]:  -Fever over 100 degree F  -Swelling, bleeding, redness, drainage, warmth at the injection site  -Progressive weakness or numbness in your legs   -If lumbar, call if you have a loss of bowel or bladder function  -Unusual new onset of pain that is not improving

## 2019-10-11 ENCOUNTER — TELEPHONE (OUTPATIENT)
Dept: PALLIATIVE MEDICINE | Facility: CLINIC | Age: 79
End: 2019-10-11

## 2019-10-11 NOTE — TELEPHONE ENCOUNTER
Patient had a lumbosacral medial branch block #2 at L3, L4, L5, Sacral ala on the right on 10/4/19.   Called patient for an update. Pt reported the following details:  #2 worked a lot better than the first.   If needed, remind pt that this procedure is not supposed to give them long term pain relief, only a few hours.     Has patient sent back the post injection form?Yes- Faxes    If this is medial branch block #1 does patient want to schedule medial branch block #2? NOT APPLICABLE    If this is medial branch block #2 does patient want to proceed w/ the radiofrequency ablation procedure? YES.     If yes, inform pt that we will have this information sent to our support staff to  check insurance coverage.  Once we hear from insurance pt will be called.  This can take anywhere from 1 week to 30 days.    Patient is requesting the RFA be scheduled on a Friday, if possible.

## 2019-10-11 NOTE — TELEPHONE ENCOUNTER
Pt requesting to schedule his LRFA with Dr Chloe El. He had the 2nd MBB on 10/4. He stated he received the approval from his [employer] insurance to schedule this .      Nico MENON    [facility]

## 2019-10-11 NOTE — TELEPHONE ENCOUNTER
Waiting for pain diary, if patient is going to [place] then please schedule and they will obtain PAAlisha ALMANZAR    [facility]

## 2019-10-14 ENCOUNTER — MYC MEDICAL ADVICE (OUTPATIENT)
Dept: PALLIATIVE MEDICINE | Facility: CLINIC | Age: 79
End: 2019-10-14

## 2019-10-14 NOTE — TELEPHONE ENCOUNTER
Shannon Reeves   Coordinator      Telephone Encounter   Signed   Creation Time:  10/11/2019 11:05 AM            Waiting for pain diary, if patient is going to [place] then please schedule and they will obtain CON ALMANZAR    [facility]

## 2019-10-14 NOTE — TELEPHONE ENCOUNTER
Alicia Mai QSigned  10/11/2019    Addend             Pt requesting to schedule his LRFA with Dr Chloe El. He had the 2nd MBB on 10/4. He stated he received the approval from his [employer] insurance to schedule this .      Alicia MENON    [facility]

## 2019-10-14 NOTE — TELEPHONE ENCOUNTER
Do NOT schedule radiofrequency ablation.  Still waiting on pain diary.    Nitza Velasco, RN-BSN  [facility]

## 2019-10-15 NOTE — TELEPHONE ENCOUNTER
Patient called and stated he already faxed his pain diary and wants to schedule his RFA.       Katarzyna Jaramillo    [facility]

## 2019-10-16 NOTE — TELEPHONE ENCOUNTER
PA and clinicals submitted via web portal for LRFA      Shannon ALMANZAR    [facility] [place]

## 2019-10-16 NOTE — TELEPHONE ENCOUNTER
Pt had a right Lumbar  medial branch block # 2 on 10/4/19.  The post medial branch block form was  received.      Max % of pain relief from medial branch block #1:          At rest:                 67%  Left fact load:       na%  Right facet load:  71%   Normal activity:    75%    Max relief from block #2 is:    At rest:                 80%  Left fact load:       na%  Right facet load:  67%  Normal activity:    89%    Insurance:  [employer]      Does pt have adequate relief insurance-wise to proceed to radiofrequency ablation?  TBD     Physical therapy:                  Last done in?:  2017.     How many sessions?:  >10.      Where was it done?  [place]     Called pt and informed him/her that insurance would be checked and will be  called once we get a response.  Done    The pain diary was faxed to  for insurance review.    Routed to  to check insurance coverage.     Nitza Velasco RN-BSN  [facility]

## 2019-10-16 NOTE — TELEPHONE ENCOUNTER
Received call from patient who is checking to see if his pain diary has been received as he would like to schedule his RFA. He states he faxed it last Monday. Advised that I would send a note to the care team and also advised to re-fax if possible. Patient stated that he would be re-faxing the pain diary to the [place] location. Verified fax number. Patient would like a call back. Phone #[phone]      Christy Presley    [facility]

## 2019-10-16 NOTE — TELEPHONE ENCOUNTER
Received call from patient who is checking to see if we received the fax he sent this morning. Reached out to [facility] staff and was advised by ELISA Nunez) that pain diary was received. Advised patient that we will check with his insurance and as soon as we have their approval we will contact him to get scheduled.         Christy Presley    [facility]

## 2019-10-18 NOTE — TELEPHONE ENCOUNTER
PA approved.  Effective date: 10/16/19-11/30/19  PA reference #: [id number]  Pt. notified:   Patient is scheduled        Shannon ALMANZAR    [facility]

## 2019-10-18 NOTE — TELEPHONE ENCOUNTER
Pre-screening Questions for RFA Procedure      Procedure ordered? Bilateral Lumbar RFA    What insurance are we billing for this procedure?  [employer]    Has patient had this injection before? No  Any chance of pregnancy? Not Applicable   If YES, do NOT schedule and route to RN pool    Is  Needed?: No  Will patient have a ?  Yes   If pt is given sedation meds, no driving for 24 hours.  Is pt taking a cab or transportation service? NO        If so will need to be accompanied by an adult too (friend/family member) in order for IV sedation to be given.      Per [facility] Policy:  Outpatients are to have responsible adult or family member to accompany them at discharge and drive them home. A service providing medically trained drivers or attendants would be acceptable. Public transportation would not be acceptable unless the patient is accompanied by a responsible adult or family member.  Is patient taking any blood thinners (plavix, coumadin, jantoven, warfarin, heparin, pradaxa or dabigatran )? No   If YES, do NOT schedule, and route to RN pool    Is patient taking any aspirin products? Yes - Pt takes 81mg daily; instructed to hold 0 day(s) prior to procedure.      If more than 325mg/day do NOT schedule and route to RN pool     For CERVICAL procedures, hold all aspirin products for 6 days.     Does the patient have a bleeding or clotting disorder? No     If YES, it it OKAY to schedule AND route to RN pool    **For any patients with platelet count <100, must be forwarded to provider**    Is patient diabetic? Yes If YES, have them bring their glucometer.    Does patient have an active infection or treated for one within the past week? No   If YES, do NOT schedule and route to RN nurse pool     Is patient currently taking any antibiotics?  No    For patients on chronic, preventative, or prophylactic antibiotics, procedures may be scheduled.     For patients on antibiotics for active or recent  infection:    Valeriano Kern Burton, Snitzer-antibiotic course must have been completed for 4 days    Is patient currently taking any steroid medications? (i.e. Prednisone, Medrol)  No     For patients on steroid medications:    Valeriano Kern Burton, Snitzer-steroid course must have been completed for 4 days    Reviewed with patient:  If you are started on any steroids or antibiotics between now and your appointment, you must contact us because it may affect our ability to perform your procedure.  Yes    Is patient actively being treated for cancer or immunocompromised, including the spleen having been removed? No  If YES, do NOT schedule and route to RN pool     Any history of complications with sedation medications?  NO   If YES, OK to schedule AND route to RN pool     Any history of sleep apnea?  NO   If YES, OK to schedule AND route to RN pool     Any cardiac history?  NO   If YES, OK to schedule AND route to RN pool     Do you have an implanted pacemaker, ICD (implanted cardiac device) or AICD (automatic implanted cardiac device)?  NO    If YES, do NOT schedule AND route to RN pool.     Obtain name of device : N/A      Obtain name of cardiologist: N/A      Do you have an implanted stimulator?  NO    If YES, OK to schedule AND route to nursing.     Instruct patient to bring in the remote to the appointment and it will need to be turned off.  reviewed and not discussed      Does patient have an allergy to contrast dye, iodine or shellfish?  No   If YES, OK to schedule. Route to RN pool AND add allergy information to appointment notes    Are you able to get on and off an exam table with minimal or no assistance? Yes   If NO, do NOT schedule and route to RN pool    Are you able to roll over and lay on your stomach with minimal or no assistance? Yes   If NO, do NOT schedule and route to RN pool    Informed patient that s/he needs to be NPO for 6 hours before procedure?  YES    Informed patient that it is OK to take normal medications with sips of water, especially blood pressure medications, before the procedure and must hold blood thinners as instructed.  Yes  Informed patient to arrive 30 minutes before procedure time to have an IV inserted.  reviewed   Do NOT schedule at 0745, 0815 or 1245.  reviewed   All radiofrequency ablations are in a 90 minute time slot except genicular radiofrequency ablation those are 60 minute time slot.  Reviewed      Christy Presley    [facility]

## 2019-10-22 ENCOUNTER — RADIOLOGY INJECTION OFFICE VISIT (OUTPATIENT)
Dept: PALLIATIVE MEDICINE | Facility: CLINIC | Age: 79
End: 2019-10-22
Payer: COMMERCIAL

## 2019-10-22 ENCOUNTER — HOSPITAL ENCOUNTER (OUTPATIENT)
Dept: RADIOLOGY | Facility: CLINIC | Age: 79
Discharge: HOME | End: 2019-10-22
Attending: ANESTHESIOLOGY | Admitting: ANESTHESIOLOGY
Payer: COMMERCIAL

## 2019-10-22 VITALS
HEART RATE: 74 BPM | OXYGEN SATURATION: 98 % | SYSTOLIC BLOOD PRESSURE: 170 MMHG | RESPIRATION RATE: 16 BRPM | DIASTOLIC BLOOD PRESSURE: 111 MMHG

## 2019-10-22 VITALS — DIASTOLIC BLOOD PRESSURE: 82 MMHG | RESPIRATION RATE: 18 BRPM | SYSTOLIC BLOOD PRESSURE: 164 MMHG | HEART RATE: 75 BPM

## 2019-10-22 DIAGNOSIS — M47.816 SPONDYLOSIS OF LUMBAR REGION WITHOUT MYELOPATHY OR RADICULOPATHY: ICD-10-CM

## 2019-10-22 DIAGNOSIS — M47.817 LUMBOSACRAL FACET JOINT SYNDROME: ICD-10-CM

## 2019-10-22 DIAGNOSIS — M47.817 LUMBOSACRAL SPONDYLOSIS WITHOUT MYELOPATHY: Primary | ICD-10-CM

## 2019-10-22 PROCEDURE — 64635 DESTROY LUMB/SAC FACET JNT: CPT | Mod: RT | Performed by: ANESTHESIOLOGY

## 2019-10-22 PROCEDURE — 25800030 ZZH RX IP 258 OP 636: Performed by: ANESTHESIOLOGY

## 2019-10-22 PROCEDURE — 64636 DESTROY L/S FACET JNT ADDL: CPT | Mod: RT | Performed by: ANESTHESIOLOGY

## 2019-10-22 PROCEDURE — 27210202 XR LUMBAR RADIOFREQ ABLATION UNILATERAL: Mod: TC

## 2019-10-22 PROCEDURE — 25000125 ZZHC RX 250: Performed by: ANESTHESIOLOGY

## 2019-10-22 PROCEDURE — 25000128 H RX IP 250 OP 636: Performed by: ANESTHESIOLOGY

## 2019-10-22 RX ORDER — FENTANYL CITRATE 50 UG/ML
50 INJECTION, SOLUTION INTRAMUSCULAR; INTRAVENOUS
Status: DISCONTINUED | OUTPATIENT
Start: 2019-10-22 | End: 2019-10-23 | Stop reason: HOSPADM

## 2019-10-22 RX ORDER — FENTANYL CITRATE 50 UG/ML
25 INJECTION, SOLUTION INTRAMUSCULAR; INTRAVENOUS EVERY 5 MIN PRN
Status: DISCONTINUED | OUTPATIENT
Start: 2019-10-22 | End: 2019-10-23 | Stop reason: HOSPADM

## 2019-10-22 RX ORDER — SODIUM CHLORIDE 9 MG/ML
INJECTION, SOLUTION INTRAVENOUS ONCE
Status: COMPLETED | OUTPATIENT
Start: 2019-10-22 | End: 2019-10-22

## 2019-10-22 RX ORDER — BUPIVACAINE HYDROCHLORIDE 5 MG/ML
10 INJECTION, SOLUTION PERINEURAL ONCE
Status: COMPLETED | OUTPATIENT
Start: 2019-10-22 | End: 2019-10-22

## 2019-10-22 RX ORDER — EPHEDRINE SULFATE 50 MG/ML
5 INJECTION, SOLUTION INTRAMUSCULAR; INTRAVENOUS; SUBCUTANEOUS ONCE
Status: COMPLETED | OUTPATIENT
Start: 2019-10-22 | End: 2019-10-22

## 2019-10-22 RX ADMIN — EPHEDRINE SULFATE 20 MG: 50 INJECTION, SOLUTION INTRAVENOUS at 12:08

## 2019-10-22 RX ADMIN — BUPIVACAINE HYDROCHLORIDE 6 ML: 5 INJECTION, SOLUTION EPIDURAL; INTRACAUDAL at 09:11

## 2019-10-22 RX ADMIN — SODIUM CHLORIDE 700 ML: 9 INJECTION, SOLUTION INTRAVENOUS at 11:54

## 2019-10-22 ASSESSMENT — PAIN SCALES - GENERAL
PAINLEVEL: MODERATE PAIN (5)
PAINLEVEL: NO PAIN (1)
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
## 2019-10-22 NOTE — OP NOTE
Pre procedure Diagnosis: lumbosacral facet arthropathy, lumbosacral spondylosis  Post procedure Diagnosis: Same  Procedure performed: lumbosacral radiofrequency ablation at L3, L4, L5, sacral ala on the right, fluoroscopically guided  Anesthesia:  local  Complications: patient had a vagal response during the procedure with drop in blood pressure and heart rate that responded well to Ephedrine 20 mg in divided doses with return of normal blood pressure and heart rate that allowed the procedure to be completed  Operators: Ronald El MD     Indications:   Ana Maria Duvall is a 78 year old male was sent by Dr. August Smith for lumbar facet procedures.  They have a history of chronic right lower back pain without radiation.  Exam shows lumbar tenderness and increased pain with extension and lateral rotation of the lumbar spine and they have tried conservative treatment including physical therapy, medications, and intervetional procedures.    Ana Maria Duvall had two diagnostic medial branch blocks showing appropriate pain relief, therefore radiofrequency ablation will be done.     Options/alternatives, benefits and risks were discussed with the patient including bleeding, infection, no pain relief, tissue trauma, exposure to radiation, reaction to medications including seizure, spinal cord injury,increased pain after the procedure, weakness, numbness or sensory changes and headache.   We also discussed risks of sedation, including reaction to medications and cardiovascular collapse.    Questions were answered to his satisfaction and he agrees to proceed. Voluntary informed consent was obtained and signed.     Vitals were reviewed: Yes  BP (!) 170/111 (BP Location: Right arm)   Pulse 74   Resp 16   SpO2 98%   Allergies were reviewed:  Yes   Medications were reviewed:  Yes   Pre-procedure pain score: 5/10    Procedure:  After obtaining signed, informed consent, the patient was brought into the procedure suite  and was placed in a prone position on the procedure table.   A Pause for the Cause was performed.  Patient was prepped and draped in sterile fashion.     Ana Maria SHAKIRA Duvall had an IV line placed prior the procedure.  The C-arm was positioned in the right oblique view to afford optimal view of the L3, L4, and L5 vertebral bodies. Lidocaine 1% was used to anesthetize the skin at each level.  At each level, a 10 cm, 20G [place] Ven[place] curved radiofrequency cannula with a 10mm active tip was positioned, overlying the intersection of the transverse process and pedicle at L3, L4 & L5, and was advanced under intermittent fluoroscopy until it contacted the transverse process and notch, and the tip slightly overran that process, just lateral to the mamillary process.  The position of each cannula was verified and optimized in the oblique view and AP views and it was verified that the electrode deployed in a V position at each level.    In the AP view, another cannula was placed at the sacral alar notch on the right.      Each position was tested for motor and sensory stimulation, and was positioned so that stimulation was negative for stimuli outside the immediate area of the desired lesion.  Sensory stimulation was completed at 50 Hz, with max stimulation up to 0.4V.  Motor stimulation was completed at 2Hz, up to 1.5V.   Bupivacaine 0.5 % 1ml was injected at each level.  After allowing the local anesthetic to set up and verifying the proper deployment of the electrode in a V position, a 90 second, 80 degree Celsius lesion was generated at all three levels.    The needles were flushed with the local anesthetic as they were withdrawn.        Hemostasis was achieved, the area was cleaned, and bandaids were placed when appropriate.  The patient tolerated the procedure well, and was taken to the recovery room.    Images were saved to PACS.    MD Time in:  0830  Start sedation time: n/a  MD time out: 0910    Post-procedure pain  score: 1/10  Follow-up includes:   -f/u phone call in one week  -post-procedure pain medications: none  -f/u with referring provider    Ronald El MD  [facility]

## 2019-10-22 NOTE — IP AVS SNAPSHOT
MRN:[id number]                      After Visit Summary   10/22/2019    Ana Maria Duvall    MRN: [id number]           Visit Information        Provider Department      10/22/2019  8:15 AM SEPIDE[facility]           Review of your medicines      UNREVIEWED medicines. Ask your doctor about these medicines       Dose / Directions   aspirin 81 MG tablet  Commonly known as:  ASA      1 TABLET DAILY  Refills:  0     brimonidine 0.2 % ophthalmic solution  Commonly known as:  ALPHAGAN  Used for:  GLAUCOMA NOS      Dose:  1 drop  Place 1 drop into both eyes 2 times daily  Quantity:  1 Bottle  Refills:  2     CALCIUM + D PO      1 TABLET DAILY  Refills:  0     flax seed oil 1000 MG capsule      1 CAPSULE DAILY  Refills:  0     gabapentin 100 MG capsule  Commonly known as:  NEURONTIN  Used for:  Lumbar radiculopathy      Dose:  100 mg  Take 1 capsule (100 mg) by mouth 3 times daily  Quantity:  90 capsule  Refills:  1     ibuprofen 200 MG tablet  Commonly known as:  ADVIL/MOTRIN      Dose:  200 mg  Take 200 mg by mouth every 4 hours as needed Reported on 4/21/2017  Refills:  0     lisinopril 10 MG tablet  Commonly known as:  PRINIVIL/ZESTRIL  Used for:  Essential hypertension      TAKE 1 TABLET EVERY DAY  Quantity:  90 tablet  Refills:  1     metFORMIN 500 MG tablet  Commonly known as:  GLUCOPHAGE  Used for:  Type 2 diabetes mellitus without complication, without long-term current use of insulin (H)      TAKE 1 TABLET 2 TIMES DAILY (WITH MEALS)  Quantity:  180 tablet  Refills:  3     Multi-vitamin tablet  Generic drug:  multivitamin w/minerals      DAILY  Refills:  0     Saw Palmetto 1000 MG Caps  Used for:  Type 2 diabetes mellitus without complication, unspecified long term insulin use status, Type 2 diabetes mellitus without complication, without long-term current use of insulin (H)      Dose:  2 capsule  Take 2 capsules by mouth daily  Quantity:  60 capsule  Refills:  0     STATIN NOT  PRESCRIBED  Commonly known as:  INTENTIONAL      Statin not prescribed intentionally due to Intolerance  Refills:  0     TYLENOL 500 MG tablet  Generic drug:  acetaminophen      Dose:  1,000 mg  Take 1,000 mg by mouth every 6 hours as needed  Refills:  0     UNABLE TO FIND      MEDICATION NAME: CBD oil  Refills:  0     VITAMIN D3 PO      Take by mouth daily  Refills:  0        CONTINUE these medicines which have NOT CHANGED       Dose / Directions   blood glucose lancets standard  Commonly known as:  NO BRAND SPECIFIED  Used for:  Type 2 diabetes mellitus without complication, without long-term current use of insulin (H)      Use to test blood sugar 1 time daily  Quantity:  100 each  Refills:  11     blood glucose monitoring meter device kit  Commonly known as:  NO BRAND SPECIFIED  Used for:  Type 2 diabetes mellitus without complication, without long-term current use of insulin (H)      Use to test blood sugar 1 time daily  Quantity:  1 kit  Refills:  0     blood glucose test strip  Commonly known as:  NO BRAND SPECIFIED  Used for:  Type 2 diabetes mellitus without complication, without long-term current use of insulin (H)      Use to test blood sugar 1 time daily  Quantity:  100 each  Refills:  11              Protect others around you: Learn how to safely use, store and throw away your medicines at www.disposemymeds.org.       Follow-ups after your visit       Your next 10 appointments already scheduled    Oct 25, 2019  8:20 AM CDT  SHORT with See Meng MD  [facility] ([address] [address]  [phone]         Care Instructions       Further instructions from your care team       [facility]   Radiofrequency Ablation (RFA) Discharge Instructions     Today you saw:    Dr. Ronald El        You should anticipate procedural pain for up to 2 weeks.     You may receive a prescription for pain medication  and you should take this as directed.     It may take up to 8 weeks to receive relief from the RFA     Follow up with your provider in 2 weeks.     If you received sedation before, during or after your procedure, for the next 24 hours you shall NOT:    -Drive    -Operate machinery    -Drink alcohol    -Sign any legal documents     You may resume your normal diet     You may resume your regular medications after the procedure     Be cautious with walking. Numbness and/or weakness in the lower extremities may occur for up to 6-8 hours due to effect of local anesthetic    Avoid strenuous activity for the first 24 hours     You may resume your regular activities after 24 hours     You may shower, however no swimming, tub baths or hot tubs for 24 hours following your procedure     You may use ice packs 10-15 minutes three to four times a day at the injection site for comfort     Do not use heat to painful areas for 6 to 8 hours. This will give the local anesthetic time to wear off and prevent you from accidentally burning your skin.     Unless you have been directed to avoid the use of anti-inflammatory medications (NSAIDS), you may use medications such as ibuprofen, Aleve or Tylenol for pain control if needed.     If you experience any of the following, call the Pain Clinic during work hours (Monday through Friday 8 am-4:30 pm) at [phone] or the Provider Line after hours at [phone]:   -Fever over 100 degree F    -Swelling, bleeding, redness, drainage, warmth at the injection site    -Progressive weakness or numbness in your legs     -Loss of bowel or bladder function    -Unusual headache that is not relieved by Tylenol    -Unusual new onset of pain that is not improving        Additional Information About Your Visit       [employer] Information    [employer] gives you secure access to your electronic health record. If you see a primary care provider, you can also send messages to your care team and make  appointments. If you have questions, please call your primary care clinic.  If you do not have a primary care provider, please call [phone] and they will assist you.       Care EveryWhere ID    This is your Care EveryWhere ID. This could be used by other organizations to access your [facility] medical records  [id number]        Primary Care Provider Office Phone # Fax #    See Pantoja MD Yusra [phone]      Equal Access to Services    [facility] : Hadii aad ku hadasho Soomaali, waaxda luqadaha, qaybta kaalmada adeegyada, waxay idiin hayninin adeeg billyvaleriemelo laalberto . So [facility] [phone].    ATENCIÓN: Si habla español, tiene a huber disposición servicios gratuitos de asistencia lingüística. Ll[facility] [phone].    We comply with applicable federal civil rights laws and [place] laws. We do not discriminate on the basis of race, color, national origin, age, disability, sex, sexual orientation, or gender identity.           Thank you!    Thank you for choosing [facility] for your care. Our goal is always to provide you with excellent care. Hearing back from our patients is one way we can continue to improve our services. Please take a few minutes to complete the written survey that you may receive in the mail after you visit with us. Thank you!            Medication List      Medications          Morning Afternoon Evening Bedtime As Needed    blood glucose lancets standard  Also known as:  NO BRAND SPECIFIED  INSTRUCTIONS:  Use to test blood sugar 1 time daily  Doctor's comments:  TRUEPLUS LANCETS                     blood glucose monitoring meter device kit  Also known as:  NO BRAND SPECIFIED  INSTRUCTIONS:  Use to test blood sugar 1 time daily  Doctor's comments:  HUMANA TRUE METRIX AIR METER                     blood glucose test strip  Also known as:  NO BRAND SPECIFIED  INSTRUCTIONS:  Use to test blood sugar 1 time daily  Doctor's comments:  HUMANA TRUE METRIX TEST STRIPS                        ASK your doctor about these medications          Morning Afternoon Evening Bedtime As Needed    aspirin 81 MG tablet  Also known as:  ASA  INSTRUCTIONS:  1 TABLET DAILY                     brimonidine 0.2 % ophthalmic solution  Also known as:  ALPHAGAN  INSTRUCTIONS:  Place 1 drop into both eyes 2 times daily                     CALCIUM + D PO  INSTRUCTIONS:  1 TABLET DAILY                     flax seed oil 1000 MG capsule  INSTRUCTIONS:  1 CAPSULE DAILY                     gabapentin 100 MG capsule  Also known as:  NEURONTIN  INSTRUCTIONS:  Take 1 capsule (100 mg) by mouth 3 times daily                     ibuprofen 200 MG tablet  Also known as:  ADVIL/MOTRIN  INSTRUCTIONS:  Take 200 mg by mouth every 4 hours as needed Reported on 4/21/2017                     lisinopril 10 MG tablet  Also known as:  PRINIVIL/ZESTRIL  INSTRUCTIONS:  TAKE 1 TABLET EVERY DAY                     metFORMIN 500 MG tablet  Also known as:  GLUCOPHAGE  INSTRUCTIONS:  TAKE 1 TABLET 2 TIMES DAILY (WITH MEALS)                     Multi-vitamin tablet  INSTRUCTIONS:  DAILY  Generic drug:  multivitamin w/minerals                     Saw Palmetto 1000 MG Caps  INSTRUCTIONS:  Take 2 capsules by mouth daily                     STATIN NOT PRESCRIBED  Also known as:  INTENTIONAL  INSTRUCTIONS:  Statin not prescribed intentionally due to Intolerance                     TYLENOL 500 MG tablet  INSTRUCTIONS:  Take 1,000 mg by mouth every 6 hours as needed  Generic drug:  acetaminophen                     UNABLE TO FIND  INSTRUCTIONS:  MEDICATION NAME: CBD oil                     VITAMIN D3 PO  INSTRUCTIONS:  Take by mouth daily
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
## 2019-10-22 NOTE — IP AVS SNAPSHOT
[facility]  [address]  [facility] [place] [address]  Phone:  [phone]  Fax:  [phone]                                    After Visit Summary   10/22/2019    Ana Maria Duvall    MRN: [id number]           After Visit Summary Signature Page    I have received my discharge instructions, and my questions have been answered. I have discussed any challenges I see with this plan with the nurse or doctor.    ..........................................................................................................................................  Patient/Patient Representative Signature      ..........................................................................................................................................  Patient Representative Print Name and Relationship to Patient    ..................................................               ................................................  Date                                   Time    ..........................................................................................................................................  Reviewed by Signature/Title    ...................................................              ..............................................  Date                                               Time          22EPIC Rev 08/18

## 2019-10-25 ENCOUNTER — ANCILLARY PROCEDURE (OUTPATIENT)
Dept: GENERAL RADIOLOGY | Facility: CLINIC | Age: 79
End: 2019-10-25
Attending: FAMILY MEDICINE
Payer: COMMERCIAL

## 2019-10-25 ENCOUNTER — OFFICE VISIT (OUTPATIENT)
Dept: FAMILY MEDICINE | Facility: CLINIC | Age: 79
End: 2019-10-25
Payer: COMMERCIAL

## 2019-10-25 VITALS
BODY MASS INDEX: 30.06 KG/M2 | DIASTOLIC BLOOD PRESSURE: 80 MMHG | HEIGHT: 70 IN | SYSTOLIC BLOOD PRESSURE: 130 MMHG | OXYGEN SATURATION: 97 % | WEIGHT: 210 LBS | RESPIRATION RATE: 14 BRPM | TEMPERATURE: 98.1 F | HEART RATE: 66 BPM

## 2019-10-25 DIAGNOSIS — I10 ESSENTIAL HYPERTENSION: ICD-10-CM

## 2019-10-25 DIAGNOSIS — Z85.828 HISTORY OF SKIN CANCER: ICD-10-CM

## 2019-10-25 DIAGNOSIS — G56.03 BILATERAL CARPAL TUNNEL SYNDROME: Primary | ICD-10-CM

## 2019-10-25 DIAGNOSIS — R05.3 CHRONIC COUGH: ICD-10-CM

## 2019-10-25 PROCEDURE — 99214 OFFICE O/P EST MOD 30 MIN: CPT | Performed by: FAMILY MEDICINE

## 2019-10-25 PROCEDURE — 71046 X-RAY EXAM CHEST 2 VIEWS: CPT | Mod: FY

## 2019-10-25 RX ORDER — AMLODIPINE BESYLATE 10 MG/1
10 TABLET ORAL DAILY
Qty: 90 TABLET | Refills: 1 | Status: SHIPPED | OUTPATIENT
Start: 2019-10-25 | End: 2019-12-20

## 2019-10-25 RX ORDER — AMLODIPINE BESYLATE 10 MG/1
10 TABLET ORAL DAILY
Qty: 90 TABLET | Refills: 1 | Status: SHIPPED | OUTPATIENT
Start: 2019-10-25 | End: 2019-10-25

## 2019-10-25 ASSESSMENT — MIFFLIN-ST. JEOR: SCORE: 1683.8
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
## 2019-10-28 NOTE — PROGRESS NOTES
Outpatient Physical Therapy Discharge Note     Patient: Ana Maria Duvall  : 1940    Beginning/End Dates of Reporting Period:  2019 to 2019    Referring Provider: Dr. Meng    Therapy Diagnosis: Chronic LBP    Patient did not return for follow up treatments.  Goal status and current objective information is therefore unknown.  Discharge from PT services at this time for this episode of treatment. Please see attached documentation under this episode of care for further information including dates of service, start of care date, referring physician, Dx, treatment plan, treatments, etc.    Please contact me with any questions or concerns.    Thank you for your referral.    Delores Duvall, PT, DPT  Physical Therapist   [address] [address] [phone]

## 2019-11-08 ENCOUNTER — OFFICE VISIT (OUTPATIENT)
Dept: FAMILY MEDICINE | Facility: CLINIC | Age: 79
End: 2019-11-08
Payer: COMMERCIAL

## 2019-11-08 VITALS
WEIGHT: 209 LBS | BODY MASS INDEX: 29.92 KG/M2 | HEIGHT: 70 IN | RESPIRATION RATE: 18 BRPM | HEART RATE: 64 BPM | TEMPERATURE: 98.2 F | DIASTOLIC BLOOD PRESSURE: 82 MMHG | SYSTOLIC BLOOD PRESSURE: 120 MMHG

## 2019-11-08 DIAGNOSIS — E11.9 TYPE 2 DIABETES MELLITUS WITHOUT COMPLICATION, UNSPECIFIED WHETHER LONG TERM INSULIN USE (H): ICD-10-CM

## 2019-11-08 DIAGNOSIS — Z00.00 ENCOUNTER FOR MEDICARE ANNUAL WELLNESS EXAM: Primary | ICD-10-CM

## 2019-11-08 DIAGNOSIS — Z80.42 FAMILY HX OF PROSTATE CANCER: ICD-10-CM

## 2019-11-08 DIAGNOSIS — Z12.5 ENCOUNTER FOR SCREENING FOR MALIGNANT NEOPLASM OF PROSTATE: ICD-10-CM

## 2019-11-08 LAB
ANION GAP SERPL CALCULATED.3IONS-SCNC: 4 MMOL/L (ref 3–14)
BUN SERPL-MCNC: 16 MG/DL (ref 7–30)
CALCIUM SERPL-MCNC: 9.1 MG/DL (ref 8.5–10.1)
CHLORIDE SERPL-SCNC: 102 MMOL/L (ref 94–109)
CHOLEST SERPL-MCNC: 180 MG/DL
CO2 SERPL-SCNC: 29 MMOL/L (ref 20–32)
CREAT SERPL-MCNC: 0.76 MG/DL (ref 0.66–1.25)
CREAT UR-MCNC: 66 MG/DL
GFR SERPL CREATININE-BSD FRML MDRD: 88 ML/MIN/{1.73_M2}
GLUCOSE SERPL-MCNC: 123 MG/DL (ref 70–99)
HBA1C MFR BLD: 6.1 % (ref 0–5.6)
HDLC SERPL-MCNC: 50 MG/DL
LDLC SERPL CALC-MCNC: 108 MG/DL
MICROALBUMIN UR-MCNC: 8 MG/L
MICROALBUMIN/CREAT UR: 12.49 MG/G CR (ref 0–17)
NONHDLC SERPL-MCNC: 130 MG/DL
POTASSIUM SERPL-SCNC: 4.5 MMOL/L (ref 3.4–5.3)
PSA SERPL-ACNC: 1.83 UG/L (ref 0–4)
SODIUM SERPL-SCNC: 135 MMOL/L (ref 133–144)
TRIGL SERPL-MCNC: 109 MG/DL

## 2019-11-08 PROCEDURE — 36415 COLL VENOUS BLD VENIPUNCTURE: CPT | Performed by: FAMILY MEDICINE

## 2019-11-08 PROCEDURE — 82043 UR ALBUMIN QUANTITATIVE: CPT | Performed by: FAMILY MEDICINE

## 2019-11-08 PROCEDURE — 99397 PER PM REEVAL EST PAT 65+ YR: CPT | Performed by: FAMILY MEDICINE

## 2019-11-08 PROCEDURE — G0103 PSA SCREENING: HCPCS | Performed by: FAMILY MEDICINE

## 2019-11-08 PROCEDURE — 83036 HEMOGLOBIN GLYCOSYLATED A1C: CPT | Performed by: FAMILY MEDICINE

## 2019-11-08 PROCEDURE — 80048 BASIC METABOLIC PNL TOTAL CA: CPT | Performed by: FAMILY MEDICINE

## 2019-11-08 PROCEDURE — 99207 C FOOT EXAM  NO CHARGE: CPT | Mod: 25 | Performed by: FAMILY MEDICINE

## 2019-11-08 PROCEDURE — 80061 LIPID PANEL: CPT | Performed by: FAMILY MEDICINE

## 2019-11-08 RX ORDER — GLUCOSAM/CHON-MSM1/C/MANG/BOSW 500-416.6
1 TABLET ORAL PRN
COMMUNITY
Start: 2019-09-17 | End: 2020-02-12

## 2019-11-08 RX ORDER — LATANOPROST 50 UG/ML
1 SOLUTION/ DROPS OPHTHALMIC AT BEDTIME
Refills: 11 | COMMUNITY
Start: 2019-11-01

## 2019-11-08 RX ORDER — TRAMADOL HYDROCHLORIDE 50 MG/1
1 TABLET ORAL EVERY 6 HOURS PRN
Refills: 0 | COMMUNITY
Start: 2019-08-12 | End: 2020-03-30

## 2019-11-08 RX ORDER — LISINOPRIL 10 MG/1
10 TABLET ORAL DAILY
COMMUNITY
Start: 2019-10-05 | End: 2019-12-20

## 2019-11-08 ASSESSMENT — ENCOUNTER SYMPTOMS
CHILLS: 0
HEMATOCHEZIA: 0
ARTHRALGIAS: 1
HEMATURIA: 0
PALPITATIONS: 0
SHORTNESS OF BREATH: 1
NAUSEA: 0
WEAKNESS: 1
HEARTBURN: 0
SORE THROAT: 0
DIARRHEA: 0
SHORTNESS OF BREATH: 0
MYALGIAS: 1
DYSURIA: 0
FEVER: 0
FREQUENCY: 0
NERVOUS/ANXIOUS: 0
DIZZINESS: 0
COUGH: 0
PARESTHESIAS: 0
ABDOMINAL PAIN: 0
JOINT SWELLING: 1
EYE PAIN: 0
CONSTIPATION: 0
HEADACHES: 0

## 2019-11-08 ASSESSMENT — MIFFLIN-ST. JEOR: SCORE: 1679.27

## 2019-11-08 ASSESSMENT — ACTIVITIES OF DAILY LIVING (ADL): CURRENT_FUNCTION: NO ASSISTANCE NEEDED

## 2019-11-08 NOTE — PROGRESS NOTES
"SUBJECTIVE:   Ana Maria Duvall is a 78 year old male who presents for Preventive Visit.    Are you in the first 12 months of your Medicare coverage?  No    Healthy Habits:     In general, how would you rate your overall health?  Good    Frequency of exercise:  None    Do you usually eat at least 4 servings of fruit and vegetables a day, include whole grains    & fiber and avoid regularly eating high fat or \"junk\" foods?  No    Taking medications regularly:  Yes    Medication side effects:  None    Ability to successfully perform activities of daily living:  No assistance needed    Home Safety:  No safety concerns identified    Hearing Impairment:  Difficulty following a conversation in a noisy restaurant or crowded room and need to ask people to speak up or repeat themselves    In the past 6 months, have you been bothered by leaking of urine? Yes    In general, how would you rate your overall mental or emotional health?  Excellent      PHQ-2 Total Score: 1    Additional concerns today:  No    Do you feel safe in your environment? Yes    Have you ever done Advance Care Planning? (For example, a Health Directive, POLST, or a discussion with a medical provider or your loved ones about your wishes): Yes, advance care planning is on file.      Fall risk  Fallen 2 or more times in the past year?: No  Any fall with injury in the past year?: No    Cognitive Screening   1) Repeat 3 items (Leader, Season, Table)    2) Clock draw: NORMAL  3) 3 item recall: Recalls 3 objects  Results: 3 items recalled: COGNITIVE IMPAIRMENT LESS LIKELY    Mini-CogTM Copyright CHARLI Mazariegos. Licensed by the author for use in [facility]; reprinted with permission (jesus@.[facility]). All rights reserved.      Do you have sleep apnea, excessive snoring or daytime drowsiness?: no    Reviewed and updated as needed this visit by clinical staff  Tobacco  Allergies  Meds         Reviewed and updated as needed this visit by Provider        Social " History     Tobacco Use     Smoking status: Former Smoker     Years: 20.00     Types: Cigarettes, Cigars, Pipe     Last attempt to quit: 12/15/1966     Years since quittin.9     Smokeless tobacco: Never Used   Substance Use Topics     Alcohol use: Yes     Comment: occ.         Alcohol Use 2019   Prescreen: >3 drinks/day or >7 drinks/week? No   Prescreen: >3 drinks/day or >7 drinks/week? -           PROBLEMS TO ADD ON...  None      Current providers sharing in care for this patient include:   Patient Care Team:  See Meng MD as PCP - General (Family Practice)  Ariana Bond RN as   See Meng MD as Assigned PCP    The following health maintenance items are reviewed in Epic and correct as of today:  Health Maintenance   Topic Date Due     ZOSTER IMMUNIZATION (1 of 2) 1991     DTAP/TDAP/TD IMMUNIZATION (3 - Td) 2018     LIPID  2019     MICROALBUMIN  2019     DIABETIC FOOT EXAM  2019     FALL RISK ASSESSMENT  2019     MEDICARE ANNUAL WELLNESS VISIT  2019     A1C  2019     EYE EXAM  2020     BMP  2020     TSH W/FREE T4 REFLEX  2021     ADVANCE CARE PLANNING  2024     PHQ-2  Completed     INFLUENZA VACCINE  Completed     PNEUMOCOCCAL IMMUNIZATION 65+ LOW/MEDIUM RISK  Completed     IPV IMMUNIZATION  Aged Out     MENINGITIS IMMUNIZATION  Aged Out     Lab work is in process  Labs reviewed in EPIC  BP Readings from Last 3 Encounters:   19 120/82   10/25/19 130/80   10/22/19 (!) 170/111    Wt Readings from Last 3 Encounters:   19 94.8 kg (209 lb)   10/25/19 95.3 kg (210 lb)   19 93.4 kg (206 lb)                  Patient Active Problem List   Diagnosis     Essential hypertension     Glaucoma     ERECTILE DYSFUNCTION     Diverticulitis of colon     Slowing of urinary stream     Sciatica     Degenerative arthritis of thumb     Type 2 diabetes mellitus without complications (H)     Advanced  directives, counseling/discussion     Health Care Home     Lumbar radiculopathy     CARDIOVASCULAR SCREENING; LDL GOAL LESS THAN 130     Nodular basal cell carcinoma     Statin intolerance     Past Surgical History:   Procedure Laterality Date     ARTHROSCOPY KNEE Right      BACK SURGERY       BACK SURGERY       COLONOSCOPY  2018    Repeat in 3 years; 12 mm polyp removed, diverticulosis, internal hemorrhoids     COLONOSCOPY  10/02/2008    Repeat in 10 years; diverticulosis     DECOMPRESSION LUMBAR MINIMALLY INVASIVE THREE+ LEVELS  2013    Lumbar 2-3, Lumbar 3-4, Lumbar 5-Sacral 1;  Surgeon: Param Jones MD;  Location: [facility] OR       Social History     Tobacco Use     Smoking status: Former Smoker     Years: 20.00     Types: Cigarettes, Cigars, Pipe     Last attempt to quit: 12/15/1966     Years since quittin.9     Smokeless tobacco: Never Used   Substance Use Topics     Alcohol use: Yes     Comment: occ.     Family History   Problem Relation Age of Onset     Cancer Mother      Alcohol/Drug Father         age 33 bleeding ulcers     Cancer Brother      Arthritis Brother      Eye Disorder Brother      Diabetes Sister      Eye Disorder Sister         glaucoma     Arthritis Sister      Cardiovascular Son      Eye Disorder Brother         glaucoma     Diabetes Brother      Arthritis Brother      Cancer Brother         LUNG CANCER WITH METS     Diabetes Brother      Cancer Brother         prostate     Arthritis Brother      Eye Disorder Brother      Arthritis Brother      Eye Disorder Brother      Arthritis Sister      Eye Disorder Sister          Current Outpatient Medications   Medication Sig Dispense Refill     acetaminophen (TYLENOL) 500 MG tablet Take 1,000 mg by mouth every 6 hours as needed       amLODIPine (NORVASC) 10 MG tablet Take 1 tablet (10 mg) by mouth daily 90 tablet 1     ASPIRIN 81 MG OR TABS 1 TABLET DAILY       blood glucose (NO BRAND SPECIFIED) lancets standard Use to test  blood sugar 1 time daily 100 each 11     blood glucose (NO BRAND SPECIFIED) test strip Use to test blood sugar 1 time daily 100 each 11     blood glucose monitoring (NO BRAND SPECIFIED) meter device kit Use to test blood sugar 1 time daily 1 kit 0     brimonidine (ALPHAGAN) 0.2 % ophthalmic solution Place 1 drop into both eyes 2 times daily 1 Bottle 2     CALCIUM + D OR 1 TABLET DAILY       Cholecalciferol (VITAMIN D3 PO) Take by mouth daily       FLAX SEED OIL 1000 MG OR CAPS 1 CAPSULE DAILY       ibuprofen (ADVIL,MOTRIN) 200 MG tablet Take 200 mg by mouth every 4 hours as needed Reported on 4/21/2017       latanoprost (XALATAN) 0.005 % ophthalmic solution Apply 1 drop to eye At Bedtime  11     lisinopril (PRINIVIL/ZESTRIL) 10 MG tablet Take 10 mg by mouth daily       metFORMIN (GLUCOPHAGE) 500 MG tablet TAKE 1 TABLET 2 TIMES DAILY (WITH MEALS) 180 tablet 3     MULTI-VITAMIN OR TABS DAILY       Saw [place] 1000 MG CAPS Take 2 capsules by mouth daily 60 capsule 0     STATIN NOT PRESCRIBED, INTENTIONAL, Statin not prescribed intentionally due to Intolerance       traMADol (ULTRAM) 50 MG tablet Take 1 tablet by mouth every 6 hours as needed for severe pain  0     TRUEPLUS LANCETS 33G MISC 1 lancet as needed       UNABLE TO FIND MEDICATION NAME: CBD oil       Allergies   Allergen Reactions     Nka [No Known Allergies]      Recent Labs   Lab Test 09/13/19  1054 08/14/19  0717 08/12/19  0845 05/17/19  0838 09/20/18  0755 01/19/18  1038 06/09/17  0805  08/01/16  1041 11/09/15  0831  11/23/13  0333  06/21/13  0735   A1C  --   --   --  6.2* 6.3* 6.6* 6.1*  --  5.8 6.0   < >  --   --  6.3*   LDL  --   --   --   --  110*  --  115*  --   --  123   < >  --   --  125   HDL  --   --   --   --  42  --  49  --   --  47   < >  --   --  41   TRIG  --   --   --   --  120  --  75  --   --  88   < >  --   --  83   ALT  --   --   --   --   --   --   --   --  32  --   --  37  --  24   CR 0.78  --   --   --  0.78  --  0.83  --  0.76  --    " < > 0.75   < > 0.74   GFRESTIMATED 87 >90  --   --  >90  --  >90  Non  GFR Calc     < > >90  Non  GFR Calc    --    < > >90   < > >90   GFRESTBLACK >90 >90  --   --  >90  --  >90  African American GFR Calc     < > >90   GFR Calc    --    < > >90   < > >90   POTASSIUM 4.6  --   --   --  5.0  --  4.3  --  5.0  --    < > 4.4   < > 4.5   TSH  --   --  0.92  --   --   --  1.52  --   --   --    < >  --   --   --     < > = values in this interval not displayed.          Review of Systems   Constitutional: Negative for chills and fever.   HENT: Negative for congestion, ear pain, hearing loss and sore throat.    Eyes: Negative for pain and visual disturbance.   Respiratory: Negative for cough and shortness of breath.    Cardiovascular: Negative for chest pain, palpitations and peripheral edema.   Gastrointestinal: Negative for abdominal pain, constipation, diarrhea, heartburn, hematochezia and nausea.   Genitourinary: Positive for impotence. Negative for discharge, dysuria, frequency, genital sores, hematuria and urgency.   Musculoskeletal: Positive for arthralgias, joint swelling and myalgias.   Skin: Negative for rash.   Neurological: Positive for weakness. Negative for dizziness, headaches and paresthesias.   Psychiatric/Behavioral: Negative for mood changes. The patient is not nervous/anxious.          OBJECTIVE:   /82 (Cuff Size: Adult Regular)   Pulse 64   Temp 98.2  F (36.8  C) (Tympanic)   Resp 18   Ht 1.778 m (5' 10\")   Wt 94.8 kg (209 lb)   BMI 29.99 kg/m   Estimated body mass index is 29.99 kg/m  as calculated from the following:    Height as of this encounter: 1.778 m (5' 10\").    Weight as of this encounter: 94.8 kg (209 lb).  Physical Exam  GENERAL: alert and no distress  EYES: Eyes grossly normal to inspection, PERRL and conjunctivae and sclerae normal  HENT: normal cephalic/atraumatic, nose and mouth without ulcers or lesions, oropharynx clear and " "oral mucous membranes moist  NECK: no adenopathy, no asymmetry, masses, or scars and thyroid normal to palpation  RESP: lungs clear to auscultation - no rales, rhonchi or wheezes  CV: regular rates and rhythm, normal S1 S2, no S3 or S4 and no murmur, click or rub  ABDOMEN: soft, nontender  MS: Chronic OA changes involving multiple joints, no joint swelling or skin discoloration noted  SKIN: no suspicious lesions or rashes  NEURO: Normal strength and tone, mentation intact and speech normal  PSYCH: mentation appears normal, affect normal/bright  Diabetic foot exam: slightly reduced vibration sensation, normal monofilament testing, pedal pulses 2+ bilaterally, no significant pedal edema noted      Lab Results   Component Value Date    A1C 6.2 05/17/2019    A1C 6.3 09/20/2018    A1C 6.6 01/19/2018    A1C 6.1 06/09/2017    A1C 5.8 08/01/2016       ASSESSMENT / PLAN:     (Z00.00) Encounter for Medicare annual wellness exam  (primary encounter diagnosis)  Comment: Medically stable, medications reviewed and no changes made.  Suggested to continue regular walks, balanced diet  Plan: Basic metabolic panel            (E11.9) Type 2 diabetes mellitus without complication, unspecified whether long term insulin use (H)  Comment: Continue metformin, aspirin and lisinopril, history of a statin intolerance  Plan: Albumin Random Urine Quantitative with Creat         Ratio, FOOT EXAM, Lipid panel reflex to direct         LDL Fasting, Hemoglobin A1c        (Z80.42) Family hx of prostate cancer  Comment: Brother had prostate cancer,, PSA ordered for prostate cancer screening  Plan: PSA, screen            (Z12.5) Encounter for screening for malignant neoplasm of prostate   Comment: as above  Plan: PSA, screen           COUNSELING:  Reviewed preventive health counseling, as reflected in patient instructions    Estimated body mass index is 29.99 kg/m  as calculated from the following:    Height as of this encounter: 1.778 m (5' 10\").    " Weight as of this encounter: 94.8 kg (209 lb).    Weight management plan: Discussed healthy diet and exercise guidelines     reports that he quit smoking about 52 years ago. His smoking use included cigarettes, cigars, and pipe. He quit after 20.00 years of use. He has never used smokeless tobacco.      Appropriate preventive services were discussed with this patient, including applicable screening as appropriate for cardiovascular disease, diabetes, osteopenia/osteoporosis, and glaucoma.  As appropriate for age/gender, discussed screening for colorectal cancer, prostate cancer, breast cancer, and cervical cancer. Checklist reviewing preventive services available has been given to the patient.    Reviewed patients plan of care and provided an AVS. The Basic Care Plan (routine screening as documented in Health Maintenance) for Ana Maria meets the Care Plan requirement. This Care Plan has been established and reviewed with the Patient.    Counseling Resources:  ATP IV Guidelines  Pooled Cohorts Equation Calculator  Breast Cancer Risk Calculator  FRAX Risk Assessment  ICSI Preventive Guidelines  Dietary Guidelines for Americans, 2010  [employer]'s My[employer]ate  ASA Prophylaxis  Lung CA Screening    See Meng MD  [facility]    Identified Health Risks:    He is at risk for lack of exercise and has been provided with information to increase physical activity for the benefit of his well-being.  The patient was counseled and encouraged to consider modifying their diet and eating habits. He was provided with information on recommended healthy diet options.  The patient was provided with written information regarding signs of hearing loss.  Information on urinary incontinence and treatment options given to patient.

## 2019-11-08 NOTE — PATIENT INSTRUCTIONS
Patient Education   Personalized Prevention Plan  You are due for the preventive services outlined below.  Your care team is available to assist you in scheduling these services.  If you have already completed any of these items, please share that information with your care team to update in your medical record.  Health Maintenance Due   Topic Date Due     Zoster (Shingles) Vaccine (1 of 2) 12/04/1991     Diptheria Tetanus Pertussis (DTAP/TDAP/TD) Vaccine (3 - Td) 08/20/2018     Cholesterol Lab  09/20/2019     Kidney Microalbumin Urine Test  11/02/2019     Diabetic Foot Exam  11/02/2019     FALL RISK ASSESSMENT  11/02/2019     Annual Wellness Visit  11/02/2019     A1C Lab  11/17/2019       Exercise for a Healthier Heart     Exercise with a friend. When activity is fun, you're more likely to stick with it.   You may wonder how you can improve the health of your heart. If you re thinking about exercise, you re on the right track. You don t need to become an athlete, but you do need a certain amount of brisk exercise to help strengthen your heart. If you have been diagnosed with a heart condition, your doctor may recommend exercise to help stabilize your condition. To help make exercise a habit, choose safe, fun activities.  Be sure to check with your healthcare provider before starting an exercise program.   Why exercise?  Exercising regularly offers many healthy rewards. It can help you do all of the following:    Improve your blood cholesterol level to help prevent further heart trouble    Lower your blood pressure to help prevent a stroke or heart attack    Control diabetes, or reduce your risk of getting this disease    Improve your heart and lung function    Reach and maintain a healthy weight    Make your muscles stronger and more limber so you can stay active    Prevent falls and fractures by slowing the loss of bone mass (osteoporosis)    Manage stress better    Reduce your blood pressure    Improve your  sense of self and your body image  Exercise tips  Ease into your routine. Set small goals. Then build on them.  Exercise on most days. Aim for a total of 150 or more minutes of moderate to  vigorous intensity activity each week. Consider 40 minutes, 3 to 4 times a week. For best results, activity should last for 40 minutes on average. It is OK to work up to the 40 minute period over time. Examples of moderate-intensity activity is walking 1 mile in 15 minutes or 30 to 45 minutes of yard work.  Step up your daily activity level. Along with your exercise program, try being more active throughout the day. Walk instead of drive. Do more household tasks or yard work.  Choose one or more activities you enjoy. Walking is one of the easiest things you can do. You can also try swimming, riding a bike, dancing, or taking an exercise class.  Stop exercising and call your doctor if you:    Have chest pain or feel dizzy or lightheaded    Feel burning, tightness, pressure, or heaviness in your chest, neck, shoulders, back, or arms    Have unusual shortness of breath    Have increased joint or muscle pain    Have palpitations or an irregular heartbeat   Date Last Reviewed: 5/1/2016 2000-2018 The [email or web address]. [address] [place] [address]. All rights reserved. This information is not intended as a substitute for professional medical care. Always follow your healthcare professional's instructions.          Understanding USDA MyPlate  The USDA (U.S. Department of Agriculture) has guidelines to help you make healthy food choices. These are called MyPlate. MyPlate shows the food groups that make up healthy meals using the image of a place setting. Before you eat, think about the healthiest choices for what to put onto your plate or into your cup or bowl. To learn more about building a healthy plate, visit www.choosemyplate.gov.    The food groups    Fruits. Any fruit or 100% fruit juice counts as part of the  Fruit Group. Fruits may be fresh, canned, frozen, or dried, and may be whole, cut-up, or pureed. Make half your plate fruits and vegetables.    Vegetables. Any vegetable or 100% vegetable juice counts as a member of the Vegetable Group. Vegetables may be fresh, frozen, canned, or dried. They can be served raw or cooked and may be whole, cut-up, or mashed. Make half your plate fruits and vegetables.    Grains. All foods made from grains are part of the Grains Group. These include wheat, rice, oats, cornmeal, and barley such as bread, pasta, oatmeal, cereal, tortillas, and grits. Grains should be no more than a quarter of your plate. At least half of your grains should be whole grains.    Protein. This group includes meat, poultry, seafood, beans and peas, eggs, processed soy products (like tofu), nuts (including nut butters), and seeds. Make protein choices no more than a quarter of your plate. Meat and poultry choices should be lean or low fat.    Dairy. All fluid milk products and foods made from milk that contain calcium, like yogurt and cheese, are part of the Dairy Group. (Foods that have little calcium, such as cream, butter, and cream cheese, are not part of the group.) Most dairy choices should be low-fat or fat-free.    Oils. These are fats that are liquid at room temperature. They include canola, corn, olive, soybean, and sunflower oil. Foods that are mainly oil include mayonnaise, certain salad dressings, and soft margarines. You should have only 5 to 7 teaspoons of oils a day. You probably already get this much from the food you eat.  Date Last Reviewed: 8/1/2017 2000-2018 [employer]. [address] [place] [address], [place] [address]. All rights reserved. This information is not intended as a substitute for professional medical care. Always follow your healthcare professional's instructions.          Signs of Hearing Loss     Hearing much better with one ear can be a sign of hearing loss.      Hearing loss is a problem shared by many people. In fact, it is one of the most common health conditions, particularly as people age. Most people over age 65 have some hearing loss, and by age 80, almost everyone does. Because hearing loss usually occurs slowly over the years, you may not realize your hearing ability has gotten worse.  Have your hearing checked  Contact your healthcare provider if you:    Have to strain to hear normal conversation    Have to watch other people s faces very carefully to follow what they re saying    Need to ask people to repeat what they ve said    Often misunderstand what people are saying    Turn the volume of the television or radio up so high that others complain    Feel that people are mumbling when they re talking to you    Find that the effort to hear leaves you feeling tired and irritated    Notice, when using the phone, that you hear better with one ear than the other  Date Last Reviewed: 12/1/2016 2000-2018 [employer]. [address] [place] [address]. All rights reserved. This information is not intended as a substitute for professional medical care. Always follow your healthcare professional's instructions.          Urinary Incontinence (Male)    Urinary incontinence means not being able to control the release of urine from the bladder.  Causes  Common causes of urinary incontinence in men include:    Infection    Certain medicines    Aging    Poor pelvic muscle tone    Bladder spasms    Obesity    Urinary retention  Nervous system diseases, diabetes, sleep apnea, urinary tract infections, prostate surgery, and pelvic trauma can also cause incontinence. Constipation and smoking have also been identified as risk factors.  Symptoms    Urge incontinence (also called  overactive bladder ) is a sudden urge to urinate even though there may not be much urine in the bladder. The need to urinate often during the night is common. It is due to bladder  spasms.    Stress incontinence is involuntary urine leakage that can occur with sneezing, coughing, and other actions that put stress on the bladder.  Treatment  Treatment of urinary incontinence depends on the cause. Infections of the bladder are treated with antibiotics. Urinary retention is treated with a bladder catheter.  Home care  Follow these guidelines when caring for yourself at home:    Don't consume foods and drinks that may irritate the bladder. These include drinks containing alcohol, caffeinate, or carbonation; chocolate; and acidic fruits and juices.    Limit fluid intake to 6 to 8 cups a day.    Lose weight if you are overweight. This will reduce your symptoms.    If needed, wear absorbent pads to catch urine. Change pads frequently to maintain hygiene and prevent skin and bladder infections.    Bathe daily to maintain good hygiene.    If an antibiotic was prescribed to treat a bladder infection, be sure to take it until finished, even if you are feeling better before then. This is to make sure your infection has cleared.    If a catheter was left in place, it is important to keep bacteria from getting into the collection bag. Don't disconnect the catheter from the collection bag.    Use a leg band to secure the catheter drainage tube, so it does not pull on the catheter. Drain the collection bag when it becomes full using the drain spout at the bottom of the bag. Don't disconnect the bag from the catheter.    Don't pull on or try to remove a catheter. The catheter must be removed by a healthcare provider.  Follow-up care  Follow up with your healthcare provider, or as advised.  When to seek medical advice  Call your healthcare provider right away if any of these occur:    Fever over 100.4 F (38 C), or as directed by your healthcare provider    Bladder pain or fullness    Abdominal swelling, nausea, or vomiting    Back pain    Weakness, dizziness, or fainting    If a catheter was left in place,  return if:  ? Catheter falls out  ? Catheter stops draining for 6 hours  Date Last Reviewed: 10/1/2017    8566-7122 The [employer]. [address] [place] [address], [place] [address]. All rights reserved. This information is not intended as a substitute for professional medical care. Always follow your healthcare professional's instructions.

## 2019-11-26 ENCOUNTER — TRANSFERRED RECORDS (OUTPATIENT)
Dept: HEALTH INFORMATION MANAGEMENT | Facility: CLINIC | Age: 79
End: 2019-11-26

## 2019-12-20 ENCOUNTER — OFFICE VISIT (OUTPATIENT)
Dept: DERMATOLOGY | Facility: CLINIC | Age: 79
End: 2019-12-20
Attending: FAMILY MEDICINE
Payer: COMMERCIAL

## 2019-12-20 ENCOUNTER — OFFICE VISIT (OUTPATIENT)
Dept: FAMILY MEDICINE | Facility: CLINIC | Age: 79
End: 2019-12-20
Payer: COMMERCIAL

## 2019-12-20 VITALS
WEIGHT: 211 LBS | DIASTOLIC BLOOD PRESSURE: 80 MMHG | RESPIRATION RATE: 16 BRPM | SYSTOLIC BLOOD PRESSURE: 130 MMHG | OXYGEN SATURATION: 97 % | HEIGHT: 70 IN | TEMPERATURE: 98 F | BODY MASS INDEX: 30.21 KG/M2 | HEART RATE: 80 BPM

## 2019-12-20 VITALS — SYSTOLIC BLOOD PRESSURE: 136 MMHG | DIASTOLIC BLOOD PRESSURE: 76 MMHG | HEART RATE: 79 BPM | OXYGEN SATURATION: 96 %

## 2019-12-20 DIAGNOSIS — L81.4 LENTIGO: ICD-10-CM

## 2019-12-20 DIAGNOSIS — L57.0 ACTINIC KERATOSIS: ICD-10-CM

## 2019-12-20 DIAGNOSIS — Z85.828 HISTORY OF BASAL CELL CANCER: ICD-10-CM

## 2019-12-20 DIAGNOSIS — L82.1 SEBORRHEIC KERATOSIS: ICD-10-CM

## 2019-12-20 DIAGNOSIS — R60.0 PEDAL EDEMA: ICD-10-CM

## 2019-12-20 DIAGNOSIS — I10 ESSENTIAL HYPERTENSION: Primary | ICD-10-CM

## 2019-12-20 DIAGNOSIS — R23.8 VENOUS LAKE: Primary | ICD-10-CM

## 2019-12-20 PROCEDURE — 99214 OFFICE O/P EST MOD 30 MIN: CPT | Performed by: FAMILY MEDICINE

## 2019-12-20 PROCEDURE — 17003 DESTRUCT PREMALG LES 2-14: CPT | Performed by: PHYSICIAN ASSISTANT

## 2019-12-20 PROCEDURE — 17000 DESTRUCT PREMALG LESION: CPT | Performed by: PHYSICIAN ASSISTANT

## 2019-12-20 PROCEDURE — 99203 OFFICE O/P NEW LOW 30 MIN: CPT | Mod: 25 | Performed by: PHYSICIAN ASSISTANT

## 2019-12-20 RX ORDER — LOSARTAN POTASSIUM 25 MG/1
25 TABLET ORAL DAILY
Qty: 90 TABLET | Refills: 1 | Status: SHIPPED | OUTPATIENT
Start: 2019-12-20 | End: 2020-01-10

## 2019-12-20 ASSESSMENT — MIFFLIN-ST. JEOR: SCORE: 1683.34

## 2019-12-20 NOTE — LETTER
[facility]  [address] [facility] [address]  [phone]        January 31, 2020    Ana Maria Duvall  [address] [facility] [address]              Dear Ana Maria Duvall    This is to remind you that your Basic profile is due.    You may call our office at [phone] to schedule an appointment.    Please disregard this notice if you have already had your labs drawn or made an appointment.        Sincerely,        See Meng MD/ varsha
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
## 2019-12-20 NOTE — PROGRESS NOTES
SUBJECTIVE   Ana Mariamecca Duvall is a 79 year old male who presents with     Concern - Edema   Onset: couple months ago    Description:   Started taking amlodipine and his legs and ankles started to swell     Intensity: moderate    Progression of Symptoms:  worsening    Accompanying Signs & Symptoms:  They itch at times, and are sore     Previous history of similar problem:   None     Precipitating factors:   Worsened by: noticed     Alleviating factors:  Improved by: non3    Therapies Tried and outcome: none       PCP   See Meng [phone]    Health Maintenance        Health Maintenance Due   Topic Date Due     ZOSTER IMMUNIZATION (1 of 2) 12/04/1991     DTAP/TDAP/TD IMMUNIZATION (3 - Td) 08/20/2018       HPI        Patient Active Problem List   Diagnosis     Essential hypertension     Glaucoma     ERECTILE DYSFUNCTION     Diverticulitis of colon     Slowing of urinary stream     Sciatica     Degenerative arthritis of thumb     Type 2 diabetes mellitus without complications (H)     Advanced directives, counseling/discussion     Health Care Home     Lumbar radiculopathy     CARDIOVASCULAR SCREENING; LDL GOAL LESS THAN 130     Nodular basal cell carcinoma     Statin intolerance     Current Outpatient Medications   Medication     acetaminophen (TYLENOL) 500 MG tablet     ASPIRIN 81 MG OR TABS     blood glucose (NO BRAND SPECIFIED) lancets standard     blood glucose (NO BRAND SPECIFIED) test strip     blood glucose monitoring (NO BRAND SPECIFIED) meter device kit     brimonidine (ALPHAGAN) 0.2 % ophthalmic solution     CALCIUM + D OR     Cholecalciferol (VITAMIN D3 PO)     FLAX SEED OIL 1000 MG OR CAPS     ibuprofen (ADVIL,MOTRIN) 200 MG tablet     latanoprost (XALATAN) 0.005 % ophthalmic solution     metFORMIN (GLUCOPHAGE) 500 MG tablet     MULTI-VITAMIN OR TABS     Saw [place] 1000 MG CAPS     STATIN NOT PRESCRIBED, INTENTIONAL,     traMADol (ULTRAM) 50 MG tablet     TRUEPLUS LANCETS 33G MISC     UNABLE  TO FIND     No current facility-administered medications for this visit.      Facility-Administered Medications Ordered in Other Visits   Medication     iohexol (OMNIPAQUE) 300 mg/mL injection 10 mL       Patient Active Problem List   Diagnosis     Essential hypertension     Glaucoma     ERECTILE DYSFUNCTION     Diverticulitis of colon     Slowing of urinary stream     Sciatica     Degenerative arthritis of thumb     Type 2 diabetes mellitus without complications (H)     Advanced directives, counseling/discussion     Health Care Home     Lumbar radiculopathy     CARDIOVASCULAR SCREENING; LDL GOAL LESS THAN 130     Nodular basal cell carcinoma     Statin intolerance     Past Surgical History:   Procedure Laterality Date     ARTHROSCOPY KNEE Right      BACK SURGERY       BACK SURGERY       COLONOSCOPY  2018    Repeat in 3 years; 12 mm polyp removed, diverticulosis, internal hemorrhoids     COLONOSCOPY  10/02/2008    Repeat in 10 years; diverticulosis     DECOMPRESSION LUMBAR MINIMALLY INVASIVE THREE+ LEVELS  2013    Lumbar 2-3, Lumbar 3-4, Lumbar 5-Sacral 1;  Surgeon: Param Jones MD;  Location:  OR       Social History     Tobacco Use     Smoking status: Former Smoker     Years: 20.00     Types: Cigarettes, Cigars, Pipe     Last attempt to quit: 12/15/1966     Years since quittin.0     Smokeless tobacco: Never Used   Substance Use Topics     Alcohol use: Yes     Comment: occ.     Family History   Problem Relation Age of Onset     Cancer Mother      Alcohol/Drug Father         age 33 bleeding ulcers     Cancer Brother      Arthritis Brother      Eye Disorder Brother      Diabetes Sister      Eye Disorder Sister         glaucoma     Arthritis Sister      Cardiovascular Son      Eye Disorder Brother         glaucoma     Diabetes Brother      Arthritis Brother      Cancer Brother         LUNG CANCER WITH METS     Diabetes Brother      Cancer Brother         prostate     Arthritis Brother       Eye Disorder Brother      Arthritis Brother      Eye Disorder Brother      Arthritis Sister      Eye Disorder Sister          Current Outpatient Medications   Medication Sig Dispense Refill     acetaminophen (TYLENOL) 500 MG tablet Take 1,000 mg by mouth every 6 hours as needed       ASPIRIN 81 MG OR TABS 1 TABLET DAILY       blood glucose (NO BRAND SPECIFIED) lancets standard Use to test blood sugar 1 time daily 100 each 11     blood glucose (NO BRAND SPECIFIED) test strip Use to test blood sugar 1 time daily 100 each 11     blood glucose monitoring (NO BRAND SPECIFIED) meter device kit Use to test blood sugar 1 time daily 1 kit 0     brimonidine (ALPHAGAN) 0.2 % ophthalmic solution Place 1 drop into both eyes 2 times daily 1 Bottle 2     CALCIUM + D OR 1 TABLET DAILY       Cholecalciferol (VITAMIN D3 PO) Take by mouth daily       FLAX SEED OIL 1000 MG OR CAPS 1 CAPSULE DAILY       ibuprofen (ADVIL,MOTRIN) 200 MG tablet Take 200 mg by mouth every 4 hours as needed Reported on 4/21/2017       latanoprost (XALATAN) 0.005 % ophthalmic solution Apply 1 drop to eye At Bedtime  11     metFORMIN (GLUCOPHAGE) 500 MG tablet TAKE 1 TABLET 2 TIMES DAILY (WITH MEALS) 180 tablet 3     MULTI-VITAMIN OR TABS DAILY       Saw [place] 1000 MG CAPS Take 2 capsules by mouth daily 60 capsule 0     STATIN NOT PRESCRIBED, INTENTIONAL, Statin not prescribed intentionally due to Intolerance       traMADol (ULTRAM) 50 MG tablet Take 1 tablet by mouth every 6 hours as needed for severe pain  0     TRUEPLUS LANCETS 33G MISC 1 lancet as needed       UNABLE TO FIND MEDICATION NAME: CBD oil       Allergies   Allergen Reactions     Nka [No Known Allergies]      Recent Labs   Lab Test 11/08/19  0847 09/13/19  1054  08/12/19  0845 05/17/19  0838 09/20/18  0755  06/09/17  0805  08/01/16  1041  11/23/13  0333  06/21/13  0735   A1C 6.1*  --   --   --  6.2* 6.3*   < > 6.1*  --  5.8   < >  --   --  6.3*   *  --   --   --   --  110*  --   "115*  --   --    < >  --   --  125   HDL 50  --   --   --   --  42  --  49  --   --    < >  --   --  41   TRIG 109  --   --   --   --  120  --  75  --   --    < >  --   --  83   ALT  --   --   --   --   --   --   --   --   --  32  --  37  --  24   CR 0.76 0.78  --   --   --  0.78  --  0.83  --  0.76   < > 0.75   < > 0.74   GFRESTIMATED 88 87   < >  --   --  >90  --  >90  Non  GFR Calc     < > >90  Non  GFR Calc     < > >90   < > >90   GFRESTBLACK >90 >90   < >  --   --  >90  --  >90  African American GFR Calc     < > >90   GFR Calc     < > >90   < > >90   POTASSIUM 4.5 4.6  --   --   --  5.0  --  4.3  --  5.0   < > 4.4   < > 4.5   TSH  --   --   --  0.92  --   --   --  1.52  --   --    < >  --   --   --     < > = values in this interval not displayed.      BP Readings from Last 3 Encounters:   12/20/19 130/80   12/20/19 136/76   11/08/19 120/82    Wt Readings from Last 3 Encounters:   12/20/19 95.7 kg (211 lb)   11/08/19 94.8 kg (209 lb)   10/25/19 95.3 kg (210 lb)                    Reviewed and updated:  Tobacco  Allergies  Meds  Med Hx  Surg Hx  Fam Hx  Soc Hx     ROS:  Constitutional, neuro, ENT, endocrine, pulmonary, cardiac, gastrointestinal, genitourinary, musculoskeletal, integument and psychiatric systems are negative, except as otherwise noted.    PHYSICAL EXAM   /80   Pulse 80   Temp 98  F (36.7  C) (Tympanic)   Resp 16   Ht 1.778 m (5' 10\")   Wt 95.7 kg (211 lb)   SpO2 97%   BMI 30.28 kg/m    Body mass index is 30.28 kg/m .  GENERAL: alert and no distress  EYES: Eyes grossly normal to inspection, PERRL and conjunctivae and sclerae normal  HENT: normal cephalic/atraumatic, nose and mouth without ulcers or lesions, oropharynx clear and oral mucous membranes moist  NECK: no adenopathy, no asymmetry, masses, or scars and thyroid normal to palpation  RESP: lungs clear to auscultation - no rales, rhonchi or wheezes  CV: regular rates and " rhythm, normal S1 S2, no S3 or S4 and no murmur, click or rub  ABDOMEN: soft, nontender  MS: Chronic arthritic changes involving both knees, pedal edema 2+ bilaterally, Homans sign negative  NEURO: Normal strength and tone, mentation intact and speech normal  PSYCH: mentation appears normal, affect normal/bright    Wt Readings from Last 10 Encounters:   12/20/19 95.7 kg (211 lb)   11/08/19 94.8 kg (209 lb)   10/25/19 95.3 kg (210 lb)   09/27/19 93.4 kg (206 lb)   08/12/19 94.3 kg (208 lb)   06/24/19 94.3 kg (208 lb)   05/17/19 94.3 kg (208 lb)   11/02/18 93.4 kg (206 lb)   09/27/18 93.9 kg (207 lb)   04/20/18 95.7 kg (211 lb)     Assessment & Plan     (I10) Essential hypertension  (primary encounter diagnosis)  Comment: Suspect bilateral pedal edema due to amlodipine, alterative treatment options discussed.  Losartan prescribed, common side effects discussed.  Suggested to continue regular walks, balanced diet and weight loss.  Follow-up with RN in 2 weeks for repeat blood pressure check and labs.  Patient understood and in agreement with above plan.  All questions were.  Plan: losartan (COZAAR) 25 MG tablet      (R60.0) Pedal edema  Comment: as above          See Meng MD  [facility]

## 2020-01-06 ENCOUNTER — TELEPHONE (OUTPATIENT)
Dept: FAMILY MEDICINE | Facility: CLINIC | Age: 80
End: 2020-01-06

## 2020-01-06 NOTE — TELEPHONE ENCOUNTER
Per 12-20-19 OV-  (I10) Essential hypertension  (primary encounter diagnosis)  Comment: Suspect bilateral pedal edema due to amlodipine, alterative treatment options discussed.  Losartan prescribed, common side effects discussed.  Suggested to continue regular walks, balanced diet and weight loss.  Follow-up with RN in 2 weeks for repeat blood pressure check and labs.  Patient understood and in agreement with above plan.  All questions were.  Plan: losartan (COZAAR) 25 MG tablet    Pt states did not start losartan as home BP readings running 120-130's/ 80's. 's on x2 since above visit. BP this /84. States pedal edema has subsided.   Advise RN visit for BP check, scheduled 01-10-20. To bring home readings with to appt. Will then update MD on readings and medication.  SILVIO Montiel RN

## 2020-01-06 NOTE — TELEPHONE ENCOUNTER
Patient is calling to speak with Dorothy regarding his blood pressure.     Paula Cantrell-[facility] [place]

## 2020-01-08 ENCOUNTER — TRANSFERRED RECORDS (OUTPATIENT)
Dept: HEALTH INFORMATION MANAGEMENT | Facility: CLINIC | Age: 80
End: 2020-01-08

## 2020-01-10 ENCOUNTER — ALLIED HEALTH/NURSE VISIT (OUTPATIENT)
Dept: FAMILY MEDICINE | Facility: CLINIC | Age: 80
End: 2020-01-10
Payer: COMMERCIAL

## 2020-01-10 VITALS — SYSTOLIC BLOOD PRESSURE: 134 MMHG | DIASTOLIC BLOOD PRESSURE: 66 MMHG | HEART RATE: 68 BPM

## 2020-01-10 DIAGNOSIS — I10 ESSENTIAL HYPERTENSION: Primary | ICD-10-CM

## 2020-01-10 PROCEDURE — 99207 ZZC NO CHARGE NURSE ONLY: CPT

## 2020-01-10 NOTE — PROGRESS NOTES
Ana Maria Duvall is a 79 year old year old patient who comes in today for a Blood Pressure check because of new medication (losartan 25 mg daily prescribed 12-20-19, did not start) and ongoing blood pressure monitoring.  BP Readings from Last 6 Encounters:   01/10/20 134/66   12/20/19 130/80   12/20/19 136/76   11/08/19 120/82   10/25/19 130/80   10/22/19 (!) 170/111     Initial / 80, HR 68  Vital Signs as repeated by /66  Patient is not taking medication as prescribed- does not want to start Rx  Patient is monitoring Blood Pressure at home.  Average readings if yes are upper 120's- low 140's/ 80/90's.   Current complaints: none  Disposition: RN discussed with Dr. Meng who advises OK to stop Losartan. Continue home BP monitoring, F/U with provider if BP's trending up >140/90. VORB. Pt informed, voices understanding/ agreement.  SILVIO Montiel RN

## 2020-01-24 ENCOUNTER — ALLIED HEALTH/NURSE VISIT (OUTPATIENT)
Dept: FAMILY MEDICINE | Facility: CLINIC | Age: 80
End: 2020-01-24
Payer: COMMERCIAL

## 2020-01-24 ENCOUNTER — OFFICE VISIT (OUTPATIENT)
Dept: FAMILY MEDICINE | Facility: CLINIC | Age: 80
End: 2020-01-24
Payer: COMMERCIAL

## 2020-01-24 VITALS
OXYGEN SATURATION: 97 % | SYSTOLIC BLOOD PRESSURE: 124 MMHG | DIASTOLIC BLOOD PRESSURE: 72 MMHG | TEMPERATURE: 97.8 F | HEIGHT: 70 IN | WEIGHT: 208 LBS | HEART RATE: 64 BPM | BODY MASS INDEX: 29.78 KG/M2 | RESPIRATION RATE: 18 BRPM

## 2020-01-24 VITALS — DIASTOLIC BLOOD PRESSURE: 86 MMHG | SYSTOLIC BLOOD PRESSURE: 136 MMHG | HEART RATE: 68 BPM

## 2020-01-24 DIAGNOSIS — G56.03 BILATERAL CARPAL TUNNEL SYNDROME: Primary | ICD-10-CM

## 2020-01-24 DIAGNOSIS — I10 ESSENTIAL HYPERTENSION: Primary | ICD-10-CM

## 2020-01-24 DIAGNOSIS — J06.9 UPPER RESPIRATORY TRACT INFECTION, UNSPECIFIED TYPE: ICD-10-CM

## 2020-01-24 PROCEDURE — 99213 OFFICE O/P EST LOW 20 MIN: CPT | Performed by: FAMILY MEDICINE

## 2020-01-24 PROCEDURE — 99207 ZZC NO CHARGE NURSE ONLY: CPT

## 2020-01-24 ASSESSMENT — MIFFLIN-ST. JEOR: SCORE: 1669.73

## 2020-01-24 NOTE — PROGRESS NOTES
Ana Maria Duvall is a 79 year old year old patient who comes in today for a Blood Pressure check because of ongoing blood pressure monitoring.  BP Readings from Last 6 Encounters:   01/24/20 136/86   01/24/20 124/72   01/10/20 134/66   12/20/19 130/80   12/20/19 136/76   11/08/19 120/82     HR 68  Vital Signs as repeated by RN yes  No antihypertensive  medication prescribed at this time as pt has deferred starting med.  Patient is monitoring Blood Pressure at home.  Average readings if yes are 120-130/ 70-80. BP reading with home monitor in clinic today 136/87.   Current complaints: none  Disposition:  Continue to monitor home BP. May consider new monitor due some questioned readings from older model monitor. BP goal < 140/90.  SILVIO Montiel RN

## 2020-01-24 NOTE — NURSING NOTE
"Chief Complaint   Patient presents with     Cts     Cough     /72 (Cuff Size: Adult Regular)   Pulse 64   Temp 97.8  F (36.6  C) (Tympanic)   Resp 18   Ht 1.778 m (5' 10\")   Wt 94.3 kg (208 lb)   SpO2 97%   BMI 29.84 kg/m   Estimated body mass index is 29.84 kg/m  as calculated from the following:    Height as of this encounter: 1.778 m (5' 10\").    Weight as of this encounter: 94.3 kg (208 lb).  Patient presents to the clinic using No DME      Health Maintenance that is potentially due pending provider review:    Health Maintenance Due   Topic Date Due     ZOSTER IMMUNIZATION (1 of 2) 12/04/1991     DTAP/TDAP/TD IMMUNIZATION (3 - Td) 08/20/2018                "

## 2020-01-24 NOTE — PROGRESS NOTES
SUBJECTIVE   Ana Mariamecca Duvall is a 79 year old male who presents with     Carpal Tunnel      Duration: 3 month follow up     Description (location/character/radiation): both hands and wrists     Intensity:  moderate    Accompanying signs and symptoms: has a cold that has been clinging on for over 2 weeks. Nothing seems to clear it up     History (similar episodes/previous evaluation): None    Precipitating or alleviating factors: None    Therapies tried and outcome: Had an EMG done with [facility] ortho- has a follow up appt with otho on Tuesday to make a plan          PCP   See Meng [phone]    Health Maintenance        Health Maintenance Due   Topic Date Due     ZOSTER IMMUNIZATION (1 of 2) 12/04/1991     DTAP/TDAP/TD IMMUNIZATION (3 - Td) 08/20/2018       HPI        Patient Active Problem List   Diagnosis     Essential hypertension     Glaucoma     ERECTILE DYSFUNCTION     Diverticulitis of colon     Slowing of urinary stream     Sciatica     Degenerative arthritis of thumb     Type 2 diabetes mellitus without complications (H)     Advanced directives, counseling/discussion     Health Care Home     Lumbar radiculopathy     CARDIOVASCULAR SCREENING; LDL GOAL LESS THAN 130     Nodular basal cell carcinoma     Statin intolerance     Current Outpatient Medications   Medication     acetaminophen (TYLENOL) 500 MG tablet     ASPIRIN 81 MG OR TABS     blood glucose (NO BRAND SPECIFIED) lancets standard     blood glucose (NO BRAND SPECIFIED) test strip     blood glucose monitoring (NO BRAND SPECIFIED) meter device kit     brimonidine (ALPHAGAN) 0.2 % ophthalmic solution     CALCIUM + D OR     Cholecalciferol (VITAMIN D3 PO)     FLAX SEED OIL 1000 MG OR CAPS     ibuprofen (ADVIL,MOTRIN) 200 MG tablet     latanoprost (XALATAN) 0.005 % ophthalmic solution     metFORMIN (GLUCOPHAGE) 500 MG tablet     MULTI-VITAMIN OR TABS     Saw [place] 1000 MG CAPS     STATIN NOT PRESCRIBED, INTENTIONAL,     traMADol  (ULTRAM) 50 MG tablet     TRUEPLUS LANCETS 33G MISC     UNABLE TO FIND     No current facility-administered medications for this visit.      Facility-Administered Medications Ordered in Other Visits   Medication     iohexol (OMNIPAQUE) 300 mg/mL injection 10 mL       Patient Active Problem List   Diagnosis     Essential hypertension     Glaucoma     ERECTILE DYSFUNCTION     Diverticulitis of colon     Slowing of urinary stream     Sciatica     Degenerative arthritis of thumb     Type 2 diabetes mellitus without complications (H)     Advanced directives, counseling/discussion     Health Care Home     Lumbar radiculopathy     CARDIOVASCULAR SCREENING; LDL GOAL LESS THAN 130     Nodular basal cell carcinoma     Statin intolerance     Past Surgical History:   Procedure Laterality Date     ARTHROSCOPY KNEE Right      BACK SURGERY       BACK SURGERY       COLONOSCOPY  2018    Repeat in 3 years; 12 mm polyp removed, diverticulosis, internal hemorrhoids     COLONOSCOPY  10/02/2008    Repeat in 10 years; diverticulosis     DECOMPRESSION LUMBAR MINIMALLY INVASIVE THREE+ LEVELS  2013    Lumbar 2-3, Lumbar 3-4, Lumbar 5-Sacral 1;  Surgeon: Param Jones MD;  Location: [facility] OR       Social History     Tobacco Use     Smoking status: Former Smoker     Years: 20.00     Types: Cigarettes, Cigars, Pipe     Last attempt to quit: 12/15/1966     Years since quittin.1     Smokeless tobacco: Never Used   Substance Use Topics     Alcohol use: Yes     Comment: occ.     Family History   Problem Relation Age of Onset     Cancer Mother      Alcohol/Drug Father         age 33 bleeding ulcers     Cancer Brother      Arthritis Brother      Eye Disorder Brother      Diabetes Sister      Eye Disorder Sister         glaucoma     Arthritis Sister      Cardiovascular Son      Eye Disorder Brother         glaucoma     Diabetes Brother      Arthritis Brother      Cancer Brother         LUNG CANCER WITH METS     Diabetes  Brother      Cancer Brother         prostate     Arthritis Brother      Eye Disorder Brother      Arthritis Brother      Eye Disorder Brother      Arthritis Sister      Eye Disorder Sister          Current Outpatient Medications   Medication Sig Dispense Refill     acetaminophen (TYLENOL) 500 MG tablet Take 1,000 mg by mouth every 6 hours as needed       ASPIRIN 81 MG OR TABS 1 TABLET DAILY       blood glucose (NO BRAND SPECIFIED) lancets standard Use to test blood sugar 1 time daily 100 each 11     blood glucose (NO BRAND SPECIFIED) test strip Use to test blood sugar 1 time daily 100 each 11     blood glucose monitoring (NO BRAND SPECIFIED) meter device kit Use to test blood sugar 1 time daily 1 kit 0     brimonidine (ALPHAGAN) 0.2 % ophthalmic solution Place 1 drop into both eyes 2 times daily 1 Bottle 2     CALCIUM + D OR 1 TABLET DAILY       Cholecalciferol (VITAMIN D3 PO) Take by mouth daily       FLAX SEED OIL 1000 MG OR CAPS 1 CAPSULE DAILY       ibuprofen (ADVIL,MOTRIN) 200 MG tablet Take 200 mg by mouth every 4 hours as needed Reported on 4/21/2017       latanoprost (XALATAN) 0.005 % ophthalmic solution Apply 1 drop to eye At Bedtime  11     metFORMIN (GLUCOPHAGE) 500 MG tablet TAKE 1 TABLET 2 TIMES DAILY (WITH MEALS) 180 tablet 3     MULTI-VITAMIN OR TABS DAILY       Saw [place] 1000 MG CAPS Take 2 capsules by mouth daily 60 capsule 0     STATIN NOT PRESCRIBED, INTENTIONAL, Statin not prescribed intentionally due to Intolerance       traMADol (ULTRAM) 50 MG tablet Take 1 tablet by mouth every 6 hours as needed for severe pain  0     TRUEPLUS LANCETS 33G MISC 1 lancet as needed       UNABLE TO FIND MEDICATION NAME: CBD oil       Allergies   Allergen Reactions     Amlodipine      Leg swelling      Lisinopril Cough     Nka [No Known Allergies]      Recent Labs   Lab Test 11/08/19  0847 09/13/19  1054  08/12/19  0845 05/17/19  0838 09/20/18  0755  06/09/17  0805  08/01/16  1041  11/23/13  0333  06/21/13  0735  "  A1C 6.1*  --   --   --  6.2* 6.3*   < > 6.1*  --  5.8   < >  --   --  6.3*   *  --   --   --   --  110*  --  115*  --   --    < >  --   --  125   HDL 50  --   --   --   --  42  --  49  --   --    < >  --   --  41   TRIG 109  --   --   --   --  120  --  75  --   --    < >  --   --  83   ALT  --   --   --   --   --   --   --   --   --  32  --  37  --  24   CR 0.76 0.78  --   --   --  0.78  --  0.83  --  0.76   < > 0.75   < > 0.74   GFRESTIMATED 88 87   < >  --   --  >90  --  >90  Non  GFR Calc     < > >90  Non  GFR Calc     < > >90   < > >90   GFRESTBLACK >90 >90   < >  --   --  >90  --  >90  African American GFR Calc     < > >90   GFR Calc     < > >90   < > >90   POTASSIUM 4.5 4.6  --   --   --  5.0  --  4.3  --  5.0   < > 4.4   < > 4.5   TSH  --   --   --  0.92  --   --   --  1.52  --   --    < >  --   --   --     < > = values in this interval not displayed.      BP Readings from Last 3 Encounters:   01/24/20 124/72   01/10/20 134/66   12/20/19 130/80    Wt Readings from Last 3 Encounters:   01/24/20 94.3 kg (208 lb)   12/20/19 95.7 kg (211 lb)   11/08/19 94.8 kg (209 lb)                    Reviewed and updated:  Tobacco  Allergies  Meds  Med Hx  Surg Hx  Fam Hx  Soc Hx     ROS:  Constitutional, HEENT, cardiovascular, pulmonary, gi and gu systems are negative, except as otherwise noted.    PHYSICAL EXAM   /72 (Cuff Size: Adult Regular)   Pulse 64   Temp 97.8  F (36.6  C) (Tympanic)   Resp 18   Ht 1.778 m (5' 10\")   Wt 94.3 kg (208 lb)   SpO2 97%   BMI 29.84 kg/m    Body mass index is 29.84 kg/m .  GENERAL: alert and no distress  EYES: Eyes grossly normal to inspection, PERRL and conjunctivae and sclerae normal  HENT: normal cephalic/atraumatic, ear canals and TM's normal, nose and mouth without ulcers or lesions, oropharynx clear and oral mucous membranes moist  NECK: no adenopathy, no asymmetry, masses, or scars and thyroid normal to " palpation  RESP: lungs clear to auscultation - no rales, rhonchi or wheezes  CV: regular rates and rhythm, normal S1 S2, no S3 or S4 and no murmur, click or rub  ABDOMEN: soft, nontender  MS: no gross musculoskeletal defects noted, no edema  NEURO: Normal strength and tone, mentation intact and speech normal    Assessment & Plan     (G56.03) Bilateral carpal tunnel syndrome  (primary encounter diagnosis)  Comment: EMG findings consistent with severe carpal tunnel syndrome, right ulnar neuropathy and C7 radiculopathy.  Patient will be following with orthopedic next week.  Suggested to continue carpal tunnel brace, over-the-counter analgesia for now      (J06.9) Upper respiratory tract infection, unspecified type  Comment: Complains of lingering cough for last 2 weeks, no fever, chills, shortness of breath or other relevant systemic symptoms.  Suspect post viral in etiology.  Recommended over-the-counter antitussive, well hydration, warm fluids        See Meng MD  [facility]

## 2020-01-28 ENCOUNTER — TRANSFERRED RECORDS (OUTPATIENT)
Dept: HEALTH INFORMATION MANAGEMENT | Facility: CLINIC | Age: 80
End: 2020-01-28

## 2020-02-11 DIAGNOSIS — E11.9 TYPE 2 DIABETES MELLITUS WITHOUT COMPLICATION, WITHOUT LONG-TERM CURRENT USE OF INSULIN (H): ICD-10-CM

## 2020-02-11 NOTE — TELEPHONE ENCOUNTER
"Requested Prescriptions   Pending Prescriptions Disp Refills     TRUE METRIX BLOOD GLUCOSE TEST test strip [Pharmacy Med Name: TRUE METRIX SELF MONITORING BLOOD GLUCOSE STRIPS   Strip] 100 strip      Sig: TEST BLOOD SUGAR EVERY DAY       Diabetic Supplies Protocol Failed - 2/11/2020  2:23 PM        Failed - Medication is active on med list        Passed - Patient is 18 years of age or older        Passed - Recent (6 mo) or future (30 days) visit within the authorizing provider's specialty     Patient had office visit in the last 6 months or has a visit in the next 30 days with authorizing provider.  See \"Patient Info\" tab in inbasket, or \"Choose Columns\" in Meds & Orders section of the refill encounter.    Last Written Prescription Date:  1/28/19  Last Fill Quantity: 100,  # refills: 11   Last office visit: 1/24/2020 with prescribing provider:     Future Office Visit:   Next 5 appointments (look out 90 days)    Feb 21, 2020 10:20 AM CST  [facility] [address] [place] [id number]  [phone]                 TRUEplus Lancets 33G MISC [Pharmacy Med Name: TRUEPLUS LANCETS 33G   ]       Sig: TEST BLOOD SUGAR ONE TIME DAILY       Diabetic Supplies Protocol Passed - 2/11/2020  2:23 PM        Passed - Medication is active on med list        Passed - Patient is 18 years of age or older        Passed - Recent (6 mo) or future (30 days) visit within the authorizing provider's specialty     Patient had office visit in the last 6 months or has a visit in the next 30 days with authorizing provider.  See \"Patient Info\" tab in inbasket, or \"Choose Columns\" in Meds & Orders section of the refill encounter.    Last Written Prescription Date:  1/28/19  Last Fill Quantity: 100,  # refills: 11   Last office visit: 1/24/2020 with prescribing provider:     Future Office Visit:   Next 5 appointments (look out 90 days)    Feb 21, 2020 " 10:20 AM CST  [facility]eng [facility]  [facility] [address] [place] [id number] [phone]

## 2020-02-12 RX ORDER — GLUCOSAM/CHON-MSM1/C/MANG/BOSW 500-416.6
TABLET ORAL
Qty: 100 EACH | Refills: 3 | Status: SHIPPED | OUTPATIENT
Start: 2020-02-12 | End: 2021-05-20

## 2020-02-12 RX ORDER — CALCIUM CITRATE/VITAMIN D3 200MG-6.25
TABLET ORAL
Qty: 100 STRIP | Refills: 3 | Status: SHIPPED | OUTPATIENT
Start: 2020-02-12 | End: 2021-05-20

## 2020-02-14 ENCOUNTER — OFFICE VISIT (OUTPATIENT)
Dept: FAMILY MEDICINE | Facility: CLINIC | Age: 80
End: 2020-02-14
Payer: COMMERCIAL

## 2020-02-14 VITALS
BODY MASS INDEX: 30.06 KG/M2 | WEIGHT: 210 LBS | SYSTOLIC BLOOD PRESSURE: 138 MMHG | HEIGHT: 70 IN | RESPIRATION RATE: 16 BRPM | HEART RATE: 83 BPM | DIASTOLIC BLOOD PRESSURE: 74 MMHG | OXYGEN SATURATION: 95 %

## 2020-02-14 DIAGNOSIS — E87.1 HYPONATREMIA: Primary | ICD-10-CM

## 2020-02-14 DIAGNOSIS — S49.91XA INJURY OF RIGHT SHOULDER, INITIAL ENCOUNTER: Primary | ICD-10-CM

## 2020-02-14 DIAGNOSIS — I10 ESSENTIAL HYPERTENSION: ICD-10-CM

## 2020-02-14 LAB
ANION GAP SERPL CALCULATED.3IONS-SCNC: 6 MMOL/L (ref 3–14)
BUN SERPL-MCNC: 15 MG/DL (ref 7–30)
CALCIUM SERPL-MCNC: 8.7 MG/DL (ref 8.5–10.1)
CHLORIDE SERPL-SCNC: 100 MMOL/L (ref 94–109)
CO2 SERPL-SCNC: 23 MMOL/L (ref 20–32)
CREAT SERPL-MCNC: 0.68 MG/DL (ref 0.66–1.25)
GFR SERPL CREATININE-BSD FRML MDRD: >90 ML/MIN/{1.73_M2}
GLUCOSE SERPL-MCNC: 129 MG/DL (ref 70–99)
POTASSIUM SERPL-SCNC: 4.4 MMOL/L (ref 3.4–5.3)
SODIUM SERPL-SCNC: 129 MMOL/L (ref 133–144)

## 2020-02-14 PROCEDURE — 99213 OFFICE O/P EST LOW 20 MIN: CPT | Performed by: FAMILY MEDICINE

## 2020-02-14 PROCEDURE — 80048 BASIC METABOLIC PNL TOTAL CA: CPT | Performed by: FAMILY MEDICINE

## 2020-02-14 PROCEDURE — 36415 COLL VENOUS BLD VENIPUNCTURE: CPT | Performed by: FAMILY MEDICINE

## 2020-02-14 ASSESSMENT — MIFFLIN-ST. JEOR: SCORE: 1678.8

## 2020-02-14 NOTE — PROGRESS NOTES
SUBJECTIVE   Ana Maria Duvall is a 79 year old male who presents with       ARM Pain     Onset: today     Description:   Location: right shoulder and elbow   Character: Sharp and Dull ache, with movement arm hurts     Intensity: moderate    Progression of Symptoms: same    Accompanying Signs & Symptoms:  Other symptoms: none    History:   Previous similar pain: no       Precipitating factors:   Trauma or overuse: YES- was out taking bird feeder to the stand and fell on ice     Alleviating factors:  Improved by: nothing    Therapies Tried and outcome: none       PCP   See Meng [phone]    Health Maintenance        Health Maintenance Due   Topic Date Due     ZOSTER IMMUNIZATION (1 of 2) 12/04/1991     DTAP/TDAP/TD IMMUNIZATION (3 - Td) 08/20/2018       HPI        Patient Active Problem List   Diagnosis     Essential hypertension     Glaucoma     ERECTILE DYSFUNCTION     Diverticulitis of colon     Slowing of urinary stream     Sciatica     Degenerative arthritis of thumb     Type 2 diabetes mellitus without complications (H)     Advanced directives, counseling/discussion     Health Care Home     Lumbar radiculopathy     CARDIOVASCULAR SCREENING; LDL GOAL LESS THAN 130     Nodular basal cell carcinoma     Statin intolerance     Current Outpatient Medications   Medication     acetaminophen (TYLENOL) 500 MG tablet     ASPIRIN 81 MG OR TABS     brimonidine (ALPHAGAN) 0.2 % ophthalmic solution     CALCIUM + D OR     Cholecalciferol (VITAMIN D3 PO)     FLAX SEED OIL 1000 MG OR CAPS     ibuprofen (ADVIL,MOTRIN) 200 MG tablet     latanoprost (XALATAN) 0.005 % ophthalmic solution     metFORMIN (GLUCOPHAGE) 500 MG tablet     MULTI-VITAMIN OR TABS     Saw [place] 1000 MG CAPS     STATIN NOT PRESCRIBED, INTENTIONAL,     traMADol (ULTRAM) 50 MG tablet     TRUE METRIX BLOOD GLUCOSE TEST test strip     TRUEplus Lancets 33G MISC     UNABLE TO FIND     No current facility-administered medications for this visit.       Facility-Administered Medications Ordered in Other Visits   Medication     iohexol (OMNIPAQUE) 300 mg/mL injection 10 mL       Patient Active Problem List   Diagnosis     Essential hypertension     Glaucoma     ERECTILE DYSFUNCTION     Diverticulitis of colon     Slowing of urinary stream     Sciatica     Degenerative arthritis of thumb     Type 2 diabetes mellitus without complications (H)     Advanced directives, counseling/discussion     Health Care Home     Lumbar radiculopathy     CARDIOVASCULAR SCREENING; LDL GOAL LESS THAN 130     Nodular basal cell carcinoma     Statin intolerance     Past Surgical History:   Procedure Laterality Date     ARTHROSCOPY KNEE Right      BACK SURGERY       BACK SURGERY       COLONOSCOPY  2018    Repeat in 3 years; 12 mm polyp removed, diverticulosis, internal hemorrhoids     COLONOSCOPY  10/02/2008    Repeat in 10 years; diverticulosis     DECOMPRESSION LUMBAR MINIMALLY INVASIVE THREE+ LEVELS  2013    Lumbar 2-3, Lumbar 3-4, Lumbar 5-Sacral 1;  Surgeon: Param Jones MD;  Location: [facility] OR       Social History     Tobacco Use     Smoking status: Former Smoker     Years: 20.00     Types: Cigarettes, Cigars, Pipe     Last attempt to quit: 12/15/1966     Years since quittin.2     Smokeless tobacco: Never Used   Substance Use Topics     Alcohol use: Yes     Comment: occ.     Family History   Problem Relation Age of Onset     Cancer Mother      Alcohol/Drug Father         age 33 bleeding ulcers     Cancer Brother      Arthritis Brother      Eye Disorder Brother      Diabetes Sister      Eye Disorder Sister         glaucoma     Arthritis Sister      Cardiovascular Son      Eye Disorder Brother         glaucoma     Diabetes Brother      Arthritis Brother      Cancer Brother         LUNG CANCER WITH METS     Diabetes Brother      Cancer Brother         prostate     Arthritis Brother      Eye Disorder Brother      Arthritis Brother      Eye Disorder  Brother      Arthritis Sister      Eye Disorder Sister          Current Outpatient Medications   Medication Sig Dispense Refill     acetaminophen (TYLENOL) 500 MG tablet Take 1,000 mg by mouth every 6 hours as needed       ASPIRIN 81 MG OR TABS 1 TABLET DAILY       brimonidine (ALPHAGAN) 0.2 % ophthalmic solution Place 1 drop into both eyes 2 times daily 1 Bottle 2     CALCIUM + D OR 1 TABLET DAILY       Cholecalciferol (VITAMIN D3 PO) Take by mouth daily       FLAX SEED OIL 1000 MG OR CAPS 1 CAPSULE DAILY       ibuprofen (ADVIL,MOTRIN) 200 MG tablet Take 200 mg by mouth every 4 hours as needed Reported on 4/21/2017       latanoprost (XALATAN) 0.005 % ophthalmic solution Apply 1 drop to eye At Bedtime  11     metFORMIN (GLUCOPHAGE) 500 MG tablet TAKE 1 TABLET 2 TIMES DAILY (WITH MEALS) 180 tablet 3     MULTI-VITAMIN OR TABS DAILY       Saw [place] 1000 MG CAPS Take 2 capsules by mouth daily 60 capsule 0     STATIN NOT PRESCRIBED, INTENTIONAL, Statin not prescribed intentionally due to Intolerance       traMADol (ULTRAM) 50 MG tablet Take 1 tablet by mouth every 6 hours as needed for severe pain  0     TRUE METRIX BLOOD GLUCOSE TEST test strip TEST BLOOD SUGAR EVERY  strip 3     TRUEplus Lancets 33G MISC TEST BLOOD SUGAR ONE TIME DAILY 100 each 3     UNABLE TO FIND MEDICATION NAME: CBD oil       Allergies   Allergen Reactions     Amlodipine      Leg swelling      Lisinopril Cough     Nka [No Known Allergies]      Recent Labs   Lab Test 11/08/19  0847 09/13/19  1054  08/12/19  0845 05/17/19  0838 09/20/18  0755  06/09/17  0805  08/01/16  1041  11/23/13  0333  06/21/13  0735   A1C 6.1*  --   --   --  6.2* 6.3*   < > 6.1*  --  5.8   < >  --   --  6.3*   *  --   --   --   --  110*  --  115*  --   --    < >  --   --  125   HDL 50  --   --   --   --  42  --  49  --   --    < >  --   --  41   TRIG 109  --   --   --   --  120  --  75  --   --    < >  --   --  83   ALT  --   --   --   --   --   --   --   --    "--  32  --  37  --  24   CR 0.76 0.78  --   --   --  0.78  --  0.83  --  0.76   < > 0.75   < > 0.74   GFRESTIMATED 88 87   < >  --   --  >90  --  >90  Non  GFR Calc     < > >90  Non  GFR Calc     < > >90   < > >90   GFRESTBLACK >90 >90   < >  --   --  >90  --  >90  African American GFR Calc     < > >90   GFR Calc     < > >90   < > >90   POTASSIUM 4.5 4.6  --   --   --  5.0  --  4.3  --  5.0   < > 4.4   < > 4.5   TSH  --   --   --  0.92  --   --   --  1.52  --   --    < >  --   --   --     < > = values in this interval not displayed.      BP Readings from Last 3 Encounters:   02/14/20 138/74   01/24/20 136/86   01/24/20 124/72    Wt Readings from Last 3 Encounters:   02/14/20 95.3 kg (210 lb)   01/24/20 94.3 kg (208 lb)   12/20/19 95.7 kg (211 lb)                    Reviewed and updated:  Tobacco  Allergies  Meds  Med Hx  Surg Hx  Fam Hx  Soc Hx     ROS:  Constitutional, HEENT, cardiovascular, pulmonary, gi and gu systems are negative, except as otherwise noted.    PHYSICAL EXAM   /74   Pulse 83   Resp 16   Ht 1.778 m (5' 10\")   Wt 95.3 kg (210 lb)   SpO2 95%   BMI 30.13 kg/m    Body mass index is 30.13 kg/m .  GENERAL: healthy, alert and no distress  NECK: no adenopathy, no asymmetry, masses, or scars and thyroid normal to palpation  RESP: lungs clear to auscultation - no rales, rhonchi or wheezes  CV: regular rates and rhythm, normal S1 S2, no S3 or S4 and no murmur, click or rub  ABDOMEN: soft, nontender  MS: Mildly tender right elbow medially, shoulder and elbow range of movement normal, no joint swelling or skin discoloration noted, radial pulses 3+, normal cervical spinal exam, sensation to touch and pressure intact intact  SKIN: no suspicious lesions or rashes  NEURO: Normal strength and tone, sensory exam grossly normal, mentation intact, speech normal, cranial nerves 2-12 intact and DTR's normal and symmetric bilaterally, GCS " 15/15      Assessment & Plan     (S49.91XA) Injury of right shoulder, initial encounter  (primary encounter diagnosis)  Comment: Suspect symptoms secondary to soft tissue injury.  Musculoskeletal/neurological exam unremarkable.  Shared decision made to continue conservative management.  Suggested to continue Tylenol, icing and activity as tolerated.  Follow-up if symptoms persist or worsen.  All questions answered.           See Meng MD  [facility]

## 2020-02-14 NOTE — NURSING NOTE
"Chief Complaint   Patient presents with     Arm Pain     fell on ice this morning        Initial /74   Pulse 83   Resp 16   Ht 1.778 m (5' 10\")   Wt 95.3 kg (210 lb)   SpO2 95%   BMI 30.13 kg/m   Estimated body mass index is 30.13 kg/m  as calculated from the following:    Height as of this encounter: 1.778 m (5' 10\").    Weight as of this encounter: 95.3 kg (210 lb).    Patient presents to the clinic using No DME    Health Maintenance that is potentially due pending provider review:  NONE    n/a    Is there anyone who you would like to be able to receive your results? No  If yes have patient fill out ANA    "

## 2020-02-21 ENCOUNTER — OFFICE VISIT (OUTPATIENT)
Dept: FAMILY MEDICINE | Facility: CLINIC | Age: 80
End: 2020-02-21
Payer: COMMERCIAL

## 2020-02-21 VITALS
HEIGHT: 70 IN | WEIGHT: 209 LBS | TEMPERATURE: 98.2 F | DIASTOLIC BLOOD PRESSURE: 80 MMHG | BODY MASS INDEX: 29.92 KG/M2 | SYSTOLIC BLOOD PRESSURE: 132 MMHG | HEART RATE: 64 BPM | RESPIRATION RATE: 18 BRPM

## 2020-02-21 DIAGNOSIS — G56.03 BILATERAL CARPAL TUNNEL SYNDROME: ICD-10-CM

## 2020-02-21 DIAGNOSIS — I10 ESSENTIAL HYPERTENSION: ICD-10-CM

## 2020-02-21 DIAGNOSIS — M54.16 LUMBAR RADICULOPATHY: ICD-10-CM

## 2020-02-21 DIAGNOSIS — N40.0 BENIGN PROSTATIC HYPERPLASIA WITHOUT LOWER URINARY TRACT SYMPTOMS: ICD-10-CM

## 2020-02-21 DIAGNOSIS — E11.9 TYPE 2 DIABETES MELLITUS WITHOUT COMPLICATION, WITHOUT LONG-TERM CURRENT USE OF INSULIN (H): ICD-10-CM

## 2020-02-21 DIAGNOSIS — Z01.818 PREOP GENERAL PHYSICAL EXAM: Primary | ICD-10-CM

## 2020-02-21 PROCEDURE — 99214 OFFICE O/P EST MOD 30 MIN: CPT | Performed by: FAMILY MEDICINE

## 2020-02-21 ASSESSMENT — MIFFLIN-ST. JEOR: SCORE: 1674.27
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
## 2020-02-21 NOTE — NURSING NOTE
"Chief Complaint   Patient presents with     Pre-Op Exam     /80 (Cuff Size: Adult Regular)   Pulse 64   Temp 98.2  F (36.8  C) (Tympanic)   Resp 18   Ht 1.778 m (5' 10\")   Wt 94.8 kg (209 lb)   BMI 29.99 kg/m   Estimated body mass index is 29.99 kg/m  as calculated from the following:    Height as of this encounter: 1.778 m (5' 10\").    Weight as of this encounter: 94.8 kg (209 lb).  Patient presents to the clinic using No DME      Health Maintenance that is potentially due pending provider review:    Health Maintenance Due   Topic Date Due     ZOSTER IMMUNIZATION (1 of 2) 12/04/1991     DTAP/TDAP/TD IMMUNIZATION (3 - Td) 08/20/2018                "

## 2020-02-27 ENCOUNTER — ANESTHESIA EVENT (OUTPATIENT)
Dept: SURGERY | Facility: CLINIC | Age: 80
End: 2020-02-27
Payer: COMMERCIAL

## 2020-03-03 ENCOUNTER — HOSPITAL ENCOUNTER (OUTPATIENT)
Facility: CLINIC | Age: 80
Discharge: HOME | End: 2020-03-03
Attending: ORTHOPAEDIC SURGERY | Admitting: ORTHOPAEDIC SURGERY
Payer: COMMERCIAL

## 2020-03-03 ENCOUNTER — ANESTHESIA (OUTPATIENT)
Dept: SURGERY | Facility: CLINIC | Age: 80
End: 2020-03-03
Payer: COMMERCIAL

## 2020-03-03 VITALS
HEIGHT: 70 IN | HEART RATE: 80 BPM | OXYGEN SATURATION: 93 % | WEIGHT: 209 LBS | RESPIRATION RATE: 20 BRPM | BODY MASS INDEX: 29.92 KG/M2 | SYSTOLIC BLOOD PRESSURE: 153 MMHG | DIASTOLIC BLOOD PRESSURE: 90 MMHG | TEMPERATURE: 97.5 F

## 2020-03-03 DIAGNOSIS — G56.01 CARPAL TUNNEL SYNDROME OF RIGHT WRIST: Primary | ICD-10-CM

## 2020-03-03 LAB — GLUCOSE BLDC GLUCOMTR-MCNC: 98 MG/DL (ref 70–99)

## 2020-03-03 PROCEDURE — 36000058 ZZH SURGERY LEVEL 3 EA 15 ADDTL MIN: Performed by: ORTHOPAEDIC SURGERY

## 2020-03-03 PROCEDURE — 25800030 ZZH RX IP 258 OP 636: Performed by: ORTHOPAEDIC SURGERY

## 2020-03-03 PROCEDURE — 25000128 H RX IP 250 OP 636: Performed by: NURSE ANESTHETIST, CERTIFIED REGISTERED

## 2020-03-03 PROCEDURE — 37000009 ZZH ANESTHESIA TECHNICAL FEE, EACH ADDTL 15 MIN: Performed by: ORTHOPAEDIC SURGERY

## 2020-03-03 PROCEDURE — 25000132 ZZH RX MED GY IP 250 OP 250 PS 637: Performed by: PHYSICIAN ASSISTANT

## 2020-03-03 PROCEDURE — 25000128 H RX IP 250 OP 636: Performed by: PHYSICIAN ASSISTANT

## 2020-03-03 PROCEDURE — 27210794 ZZH OR GENERAL SUPPLY STERILE: Performed by: ORTHOPAEDIC SURGERY

## 2020-03-03 PROCEDURE — 36000056 ZZH SURGERY LEVEL 3 1ST 30 MIN: Performed by: ORTHOPAEDIC SURGERY

## 2020-03-03 PROCEDURE — 25000125 ZZHC RX 250: Performed by: NURSE ANESTHETIST, CERTIFIED REGISTERED

## 2020-03-03 PROCEDURE — 37000008 ZZH ANESTHESIA TECHNICAL FEE, 1ST 30 MIN: Performed by: ORTHOPAEDIC SURGERY

## 2020-03-03 PROCEDURE — 71000012 ZZH RECOVERY PHASE 1 LEVEL 1 FIRST HR: Performed by: ORTHOPAEDIC SURGERY

## 2020-03-03 PROCEDURE — 71000027 ZZH RECOVERY PHASE 2 EACH 15 MINS: Performed by: ORTHOPAEDIC SURGERY

## 2020-03-03 PROCEDURE — 82962 GLUCOSE BLOOD TEST: CPT

## 2020-03-03 PROCEDURE — 25000125 ZZHC RX 250: Performed by: ORTHOPAEDIC SURGERY

## 2020-03-03 PROCEDURE — 40000306 ZZH STATISTIC PRE PROC ASSESS II: Performed by: ORTHOPAEDIC SURGERY

## 2020-03-03 RX ORDER — MEPERIDINE HYDROCHLORIDE 25 MG/ML
12.5 INJECTION INTRAMUSCULAR; INTRAVENOUS; SUBCUTANEOUS
Status: DISCONTINUED | OUTPATIENT
Start: 2020-03-03 | End: 2020-03-03 | Stop reason: HOSPADM

## 2020-03-03 RX ORDER — SODIUM CHLORIDE, SODIUM LACTATE, POTASSIUM CHLORIDE, CALCIUM CHLORIDE 600; 310; 30; 20 MG/100ML; MG/100ML; MG/100ML; MG/100ML
INJECTION, SOLUTION INTRAVENOUS CONTINUOUS
Status: DISCONTINUED | OUTPATIENT
Start: 2020-03-03 | End: 2020-03-03 | Stop reason: HOSPADM

## 2020-03-03 RX ORDER — FENTANYL CITRATE 50 UG/ML
25-50 INJECTION, SOLUTION INTRAMUSCULAR; INTRAVENOUS
Status: CANCELLED | OUTPATIENT
Start: 2020-03-03

## 2020-03-03 RX ORDER — BUPIVACAINE HYDROCHLORIDE AND EPINEPHRINE 5; 5 MG/ML; UG/ML
INJECTION, SOLUTION PERINEURAL PRN
Status: DISCONTINUED | OUTPATIENT
Start: 2020-03-03 | End: 2020-03-03 | Stop reason: HOSPADM

## 2020-03-03 RX ORDER — GABAPENTIN 100 MG/1
100 CAPSULE ORAL
Status: COMPLETED | OUTPATIENT
Start: 2020-03-03 | End: 2020-03-03

## 2020-03-03 RX ORDER — HYDROCODONE BITARTRATE AND ACETAMINOPHEN 5; 325 MG/1; MG/1
1-2 TABLET ORAL EVERY 4 HOURS PRN
Qty: 20 TABLET | Refills: 0
Start: 2020-03-03 | End: 2020-03-30

## 2020-03-03 RX ORDER — CELECOXIB 200 MG/1
200 CAPSULE ORAL
Status: CANCELLED | OUTPATIENT
Start: 2020-03-03

## 2020-03-03 RX ORDER — HYDROCODONE BITARTRATE AND ACETAMINOPHEN 5; 325 MG/1; MG/1
2 TABLET ORAL
Status: CANCELLED | OUTPATIENT
Start: 2020-03-03

## 2020-03-03 RX ORDER — LIDOCAINE HYDROCHLORIDE 10 MG/ML
INJECTION, SOLUTION INFILTRATION; PERINEURAL PRN
Status: DISCONTINUED | OUTPATIENT
Start: 2020-03-03 | End: 2020-03-03 | Stop reason: HOSPADM

## 2020-03-03 RX ORDER — CEFAZOLIN SODIUM 2 G/100ML
2 INJECTION, SOLUTION INTRAVENOUS
Status: COMPLETED | OUTPATIENT
Start: 2020-03-03 | End: 2020-03-03

## 2020-03-03 RX ORDER — NALOXONE HYDROCHLORIDE 0.4 MG/ML
.1-.4 INJECTION, SOLUTION INTRAMUSCULAR; INTRAVENOUS; SUBCUTANEOUS
Status: DISCONTINUED | OUTPATIENT
Start: 2020-03-03 | End: 2020-03-03 | Stop reason: HOSPADM

## 2020-03-03 RX ORDER — PROPOFOL 10 MG/ML
INJECTION, EMULSION INTRAVENOUS PRN
Status: DISCONTINUED | OUTPATIENT
Start: 2020-03-03 | End: 2020-03-03

## 2020-03-03 RX ORDER — ONDANSETRON 4 MG/1
4 TABLET, ORALLY DISINTEGRATING ORAL EVERY 30 MIN PRN
Status: DISCONTINUED | OUTPATIENT
Start: 2020-03-03 | End: 2020-03-03 | Stop reason: HOSPADM

## 2020-03-03 RX ORDER — BUPIVACAINE HYDROCHLORIDE 5 MG/ML
INJECTION, SOLUTION PERINEURAL PRN
Status: DISCONTINUED | OUTPATIENT
Start: 2020-03-03 | End: 2020-03-03 | Stop reason: HOSPADM

## 2020-03-03 RX ORDER — EPHEDRINE SULFATE 50 MG/ML
INJECTION, SOLUTION INTRAMUSCULAR; INTRAVENOUS; SUBCUTANEOUS PRN
Status: DISCONTINUED | OUTPATIENT
Start: 2020-03-03 | End: 2020-03-03

## 2020-03-03 RX ORDER — ONDANSETRON 2 MG/ML
INJECTION INTRAMUSCULAR; INTRAVENOUS PRN
Status: DISCONTINUED | OUTPATIENT
Start: 2020-03-03 | End: 2020-03-03

## 2020-03-03 RX ORDER — PROPOFOL 10 MG/ML
INJECTION, EMULSION INTRAVENOUS CONTINUOUS PRN
Status: DISCONTINUED | OUTPATIENT
Start: 2020-03-03 | End: 2020-03-03

## 2020-03-03 RX ORDER — CEFAZOLIN SODIUM 1 G/50ML
1 INJECTION, SOLUTION INTRAVENOUS SEE ADMIN INSTRUCTIONS
Status: DISCONTINUED | OUTPATIENT
Start: 2020-03-03 | End: 2020-03-03 | Stop reason: HOSPADM

## 2020-03-03 RX ORDER — ACETAMINOPHEN 325 MG/1
975 TABLET ORAL ONCE
Status: CANCELLED | OUTPATIENT
Start: 2020-03-03 | End: 2020-03-03

## 2020-03-03 RX ORDER — FENTANYL CITRATE 50 UG/ML
INJECTION, SOLUTION INTRAMUSCULAR; INTRAVENOUS PRN
Status: DISCONTINUED | OUTPATIENT
Start: 2020-03-03 | End: 2020-03-03

## 2020-03-03 RX ORDER — FENTANYL CITRATE 50 UG/ML
25-50 INJECTION, SOLUTION INTRAMUSCULAR; INTRAVENOUS
Status: DISCONTINUED | OUTPATIENT
Start: 2020-03-03 | End: 2020-03-03 | Stop reason: HOSPADM

## 2020-03-03 RX ORDER — HYDROXYZINE HYDROCHLORIDE 50 MG/1
50 TABLET, FILM COATED ORAL EVERY 6 HOURS PRN
Status: DISCONTINUED | OUTPATIENT
Start: 2020-03-03 | End: 2020-03-03 | Stop reason: HOSPADM

## 2020-03-03 RX ORDER — DEXAMETHASONE SODIUM PHOSPHATE 4 MG/ML
INJECTION, SOLUTION INTRA-ARTICULAR; INTRALESIONAL; INTRAMUSCULAR; INTRAVENOUS; SOFT TISSUE PRN
Status: DISCONTINUED | OUTPATIENT
Start: 2020-03-03 | End: 2020-03-03

## 2020-03-03 RX ORDER — HYDROXYZINE HYDROCHLORIDE 25 MG/1
25 TABLET, FILM COATED ORAL EVERY 6 HOURS PRN
Status: DISCONTINUED | OUTPATIENT
Start: 2020-03-03 | End: 2020-03-03 | Stop reason: HOSPADM

## 2020-03-03 RX ORDER — ONDANSETRON 2 MG/ML
4 INJECTION INTRAMUSCULAR; INTRAVENOUS EVERY 30 MIN PRN
Status: DISCONTINUED | OUTPATIENT
Start: 2020-03-03 | End: 2020-03-03 | Stop reason: HOSPADM

## 2020-03-03 RX ORDER — ACETAMINOPHEN 325 MG/1
975 TABLET ORAL ONCE
Status: COMPLETED | OUTPATIENT
Start: 2020-03-03 | End: 2020-03-03

## 2020-03-03 RX ORDER — LIDOCAINE HYDROCHLORIDE 10 MG/ML
INJECTION, SOLUTION INFILTRATION; PERINEURAL PRN
Status: DISCONTINUED | OUTPATIENT
Start: 2020-03-03 | End: 2020-03-03

## 2020-03-03 RX ADMIN — Medication 5 MG: at 15:47

## 2020-03-03 RX ADMIN — Medication 7.5 MG: at 15:32

## 2020-03-03 RX ADMIN — MIDAZOLAM 2 MG: 1 INJECTION INTRAMUSCULAR; INTRAVENOUS at 15:02

## 2020-03-03 RX ADMIN — GABAPENTIN 100 MG: 100 CAPSULE ORAL at 13:28

## 2020-03-03 RX ADMIN — DEXAMETHASONE SODIUM PHOSPHATE 8 MG: 4 INJECTION, SOLUTION INTRA-ARTICULAR; INTRALESIONAL; INTRAMUSCULAR; INTRAVENOUS; SOFT TISSUE at 15:11

## 2020-03-03 RX ADMIN — LIDOCAINE HYDROCHLORIDE 50 MG: 10 INJECTION, SOLUTION INFILTRATION; PERINEURAL at 15:11

## 2020-03-03 RX ADMIN — PROPOFOL 150 MCG/KG/MIN: 10 INJECTION, EMULSION INTRAVENOUS at 15:13

## 2020-03-03 RX ADMIN — FENTANYL CITRATE 25 MCG: 50 INJECTION, SOLUTION INTRAMUSCULAR; INTRAVENOUS at 15:59

## 2020-03-03 RX ADMIN — FENTANYL CITRATE 25 MCG: 50 INJECTION, SOLUTION INTRAMUSCULAR; INTRAVENOUS at 16:02

## 2020-03-03 RX ADMIN — ACETAMINOPHEN 975 MG: 325 TABLET, FILM COATED ORAL at 13:28

## 2020-03-03 RX ADMIN — LIDOCAINE HYDROCHLORIDE 1 ML: 10 INJECTION, SOLUTION EPIDURAL; INFILTRATION; INTRACAUDAL; PERINEURAL at 13:27

## 2020-03-03 RX ADMIN — CEFAZOLIN SODIUM 2 G: 2 INJECTION, SOLUTION INTRAVENOUS at 15:02

## 2020-03-03 RX ADMIN — PROPOFOL 200 MG: 10 INJECTION, EMULSION INTRAVENOUS at 15:11

## 2020-03-03 RX ADMIN — FENTANYL CITRATE 100 MCG: 50 INJECTION, SOLUTION INTRAMUSCULAR; INTRAVENOUS at 15:11

## 2020-03-03 RX ADMIN — HYDROMORPHONE HYDROCHLORIDE 0.5 MG: 1 INJECTION, SOLUTION INTRAMUSCULAR; INTRAVENOUS; SUBCUTANEOUS at 16:55

## 2020-03-03 RX ADMIN — HYDROMORPHONE HYDROCHLORIDE 0.5 MG: 1 INJECTION, SOLUTION INTRAMUSCULAR; INTRAVENOUS; SUBCUTANEOUS at 16:42

## 2020-03-03 RX ADMIN — HYDROMORPHONE HYDROCHLORIDE 0.5 MG: 1 INJECTION, SOLUTION INTRAMUSCULAR; INTRAVENOUS; SUBCUTANEOUS at 16:31

## 2020-03-03 RX ADMIN — ONDANSETRON 4 MG: 2 INJECTION INTRAMUSCULAR; INTRAVENOUS at 15:11

## 2020-03-03 RX ADMIN — SODIUM CHLORIDE, POTASSIUM CHLORIDE, SODIUM LACTATE AND CALCIUM CHLORIDE: 600; 310; 30; 20 INJECTION, SOLUTION INTRAVENOUS at 15:54

## 2020-03-03 RX ADMIN — SODIUM CHLORIDE, POTASSIUM CHLORIDE, SODIUM LACTATE AND CALCIUM CHLORIDE: 600; 310; 30; 20 INJECTION, SOLUTION INTRAVENOUS at 13:27

## 2020-03-03 ASSESSMENT — MIFFLIN-ST. JEOR: SCORE: 1674.27

## 2020-03-03 ASSESSMENT — LIFESTYLE VARIABLES: TOBACCO_USE: 1

## 2020-03-03 NOTE — ANESTHESIA PROCEDURE NOTES
Airway    Patient location during procedure: OR    General Information and Staff   Resident/CRNA: Hilda Alvarenga APRN CRNA  Performed: CRNA     Consent for Airway   Urgency: elective        Indications and Patient Condition  Indications for airway management: sima-procedural and airway protection  Induction type:intravenous  Mask difficulty assessment: easy    Final Airway Details  Final airway type: supraglottic airway  Successful airway:LMA  Supraglottic airway: Size 4.5    Number of attempts at approach: 1  Number of other approaches attempted: 0    Secured with:tape  Ease of procedure: easy

## 2020-03-03 NOTE — ANESTHESIA POSTPROCEDURE EVALUATION
Patient: Ana Maria Duvall    Procedure(s):  Open Carpal Tunnel Release  ENDOSCOPIC Cubital Tunnel Release    Diagnosis:Right carpal tunnel syndrome [G56.01]  Diagnosis Additional Information: No value filed.    Anesthesia Type:  General, LMA, ETT    Note:  Anesthesia Post Evaluation    Patient location during evaluation: Bedside  Patient participation: Able to fully participate in evaluation  Level of consciousness: awake and alert  Pain management: adequate  Airway patency: patent  Cardiovascular status: acceptable  Respiratory status: acceptable  Hydration status: acceptable  PONV: none     Anesthetic complications: None          Last vitals:  Vitals:    03/03/20 1645 03/03/20 1700 03/03/20 1706   BP: (!) 154/83 (!) 146/91    Pulse: 74 71    Resp: 16 16    Temp:   36.4  C (97.5  F)   SpO2: 95% 97%          Electronically Signed By: Carmen Young CRNA, APRN CRNA  March 3, 2020  5:12 PM

## 2020-03-03 NOTE — OP NOTE
[facility]   Operative Note    SURGEON:  Carlos Jordan M.D.    ASSISTANT:  Carmen Pearce PA-C  A first assistant/PA was necessary in this case to provide careful retraction, protect the neurovascular structures, and to assure safe and smooth progression throughout the case. The PA was present throughout the case including position and through application of the dressing.    PREOPERATIVE DIAGNOSES  1.  right carpal tunnel syndrome.  2.  right cubital tunnel syndrome.    POSTOPERATIVE DIAGNOSES  1.  right carpal tunnel syndrome.  2.  right cubital tunnel syndrome.    OPERATIONS  1.  right open carpal tunnel release.  2.  right endoscopic cubital tunnel release.    ANESTHESIA  General.    ESTIMATED BLOOD LOSS  * No values recorded between 3/3/2020  3:33 PM and 3/3/2020  4:10 PM *    PROCEDURE:  The patient was taken to the main operating suite.  Once there, the patient was transferred over to the operating table.  Anesthesia was induced.  The right upper extremity was prepped and draped in the usual sterile fashion.  The volar aspect of the hand and distal forearm was infiltrated with 1% lidocaine plain.  The arm was exsanguinated with an Esmarch bandage and the tourniquet was inflated to 250 mmHg.  A #15 blade knife was utilized to make a skin incision in line with the ring finger starting at Clay s line and extending proximally approximately 2 cm.  Dissection was carried down to the subcutaneous tissues with Rich scissors.  Bipolar cautery was utilized to achieve hemostasis.  Transverse carpal ligament was identified and carefully incised in its midsubstance with a [place] blade knife.  Dissection was carried out distally achieving a complete distal release.  This revealed the fat surrounding the superficial palmar arch.  Care was taken to avoid violating the fat in order to avoid damaging the arch.  The most proximal aspect of the transverse carpal ligament as well as the  distal volar forearm fascia was incised with Rich scissors.  This was done after first bluntly dissecting superficial then deep to the ligament.  The tips of the scissors were placed around the ligament angling away from the median nerve.  The scissors were passed bluntly proximally.  This achieved a complete proximal release confirmed with a [place] elevator.  In this fashion, a radial-based flap of transverse carpal ligament was created completely overlying the median nerve to avoid having the median nerve caught in the healing scar tissue.  The median nerve was identified and was noted to be flattened and with mild gross evidence of scarring.  The wound was irrigated with copious amounts of normal saline with antibiotic.  The skin was closed with 4-0 nylon in horizontal mattress fashion.  A dressing consisting of Adaptic gauze, 4 x 4 fluff, sterile Webril was applied.      Attention was then turned to the elbow.  A 3-cm incision was made just posterior to the medial epicondyle.  The medial antebrachial cutaneous nerve was carefully protected throughout the course of the case.  The cubital tunnel was entered.  Ba s ligament was somewhat thickened.  Using the A.M. surgical endoscopic system, the elevator was utilized.  I first elevated the soft tissues off the ulnar nerve proximally.  The 4-mm clear cannula was then put in place along with a 4-mm camera.  The ulnar nerve was identified and the fascia overlying the ulnar nerve was appreciated.  The slot in the cannula was placed opposite the nerve.  The blade was then attached to the scope and the fascia was completely incised to a level of 10-cm proximal to the incision.  Attention was then turned distally.  The elevator was utilized to elevate the fascia off the ulnar nerve distally.  The 4-mm clear cannula was then put in place distally along the course of the nerve.  The nerve was identified and protected deep to the cannula and the fascia was identified  superficial to the slot on the cannula.  The two heads of the flexor carpi ulnaris were very clearly converged.  The blade was then attached and the fascia was released.  The ulnar nerve was once again confirmed in a deep position and protected throughout the course of the case.  In this fashion, the fascia was released to a level about 10 cm distal to the incision.  The elbow was taken through a full range of motion and no subluxation of the nerve was appreciated.  The nerve was appreciated to have flattened and with mild gross evidence of scarring.  The incisions were then infiltrated with 0.5% Marcaine with epinephrine.  The skin was closed with 3-0 Stratafix in a running subcuticular fashion for the elbow incision, 4-0 nylon in horizontal mattress fashion for the wrist incision.  A dressing consisting of Adaptic gauze, 4 x 4 fluffs, sterile Webril, and an Ace bandage was then applied.  Tourniquet was let down for a total of tourniquet time of 40 minutes.  The patient s fingers pinked up briskly.  He was awakened, transferred to the transport cart, and taken to the recovery room in stable condition breathing spontaneously.
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
## 2020-03-04 NOTE — DISCHARGE INSTRUCTIONS
Same Day Surgery Discharge Instructions  Special Precautions After Surgery - Adult    1. It is not unusual to feel lightheaded or faint, up to 24 hours after surgery or while taking pain medication.  If you have these symptoms; sit for a few minutes before standing and have someone assist you when getting up.  2. You should rest and relax for the next 24 hours and must have someone stay with you for at least 24 hours after your discharge.  3. DO NOT DRIVE any vehicle or operate mechanical equipment for 24 hours following the end of your surgery.  DO NOT DRIVE while taking narcotic pain medications that have been prescribed by your physician.  If you had a limb operated on, you must be able to use it fully to drive.  4. DO NOT drink alcoholic beverages for 24 hours following surgery or while taking prescription pain medication.  5. Drink clear liquids (apple juice, ginger ale, broth, 7-Up, etc.).  Progress to your regular diet as you feel able.  6. Any questions call your physician and do not make important decisions for 24 hours.    __________________________________________________________________________________________________________________________________  IMPORTANT NUMBERS:    [facility] Main Number:  [phone], [phone]  Pharmacy:  [phone]  Same Day Surgery:  [phone], Monday - Friday until 8:30 p.m.  Urgent Care:  [phone]  Emergency Room:  [phone]      [facility]:  [phone]     [place] [facility]:  [phone]

## 2020-03-05 ENCOUNTER — TELEPHONE (OUTPATIENT)
Dept: FAMILY MEDICINE | Facility: CLINIC | Age: 80
End: 2020-03-05

## 2020-03-05 NOTE — TELEPHONE ENCOUNTER
Patient was told to return for a BMP, reminder letter was sent to patient on 01/31/2020, patient has still not scheduled an appointment.

## 2020-03-06 DIAGNOSIS — E87.1 HYPONATREMIA: ICD-10-CM

## 2020-03-06 LAB
ANION GAP SERPL CALCULATED.3IONS-SCNC: 6 MMOL/L (ref 3–14)
BUN SERPL-MCNC: 18 MG/DL (ref 7–30)
CALCIUM SERPL-MCNC: 9 MG/DL (ref 8.5–10.1)
CHLORIDE SERPL-SCNC: 100 MMOL/L (ref 94–109)
CO2 SERPL-SCNC: 23 MMOL/L (ref 20–32)
CREAT SERPL-MCNC: 0.7 MG/DL (ref 0.66–1.25)
GFR SERPL CREATININE-BSD FRML MDRD: >90 ML/MIN/{1.73_M2}
GLUCOSE SERPL-MCNC: 129 MG/DL (ref 70–99)
POTASSIUM SERPL-SCNC: 4.2 MMOL/L (ref 3.4–5.3)
SODIUM SERPL-SCNC: 129 MMOL/L (ref 133–144)

## 2020-03-06 PROCEDURE — 80048 BASIC METABOLIC PNL TOTAL CA: CPT | Performed by: FAMILY MEDICINE

## 2020-03-06 PROCEDURE — 36415 COLL VENOUS BLD VENIPUNCTURE: CPT | Performed by: FAMILY MEDICINE

## 2020-03-11 DIAGNOSIS — E11.9 TYPE 2 DIABETES MELLITUS WITHOUT COMPLICATION, WITHOUT LONG-TERM CURRENT USE OF INSULIN (H): ICD-10-CM

## 2020-03-12 NOTE — TELEPHONE ENCOUNTER
"Requested Prescriptions   Pending Prescriptions Disp Refills     metFORMIN (GLUCOPHAGE) 500 MG tablet [Pharmacy Med Name: METFORMIN HYDROCHLORIDE 500 MG Tablet] 180 tablet 3     Sig: TAKE 1 TABLET 2 TIMES DAILY (WITH MEALS)       Biguanide Agents Failed - 3/11/2020  8:01 PM        Failed - Blood pressure less than 140/90 in past 6 months     BP Readings from Last 3 Encounters:   03/03/20 (!) 153/90   02/21/20 132/80   02/14/20 138/74                 Passed - Patient has documented LDL within the past 12 mos.     Recent Labs   Lab Test 11/08/19  0847   *             Passed - Patient has had a Microalbumin in the past 15 mos.     Recent Labs   Lab Test 11/08/19  0951   MICROL 8   UMALCR 12.49             Passed - Patient is age 10 or older        Passed - Patient has documented A1c within the specified period of time.     If HgbA1C is 8 or greater, it needs to be on file within the past 3 months.  If less than 8, must be on file within the past 6 months.     Recent Labs   Lab Test 11/08/19  0847   A1C 6.1*             Passed - Patient's CR is NOT>1.4 OR Patient's EGFR is NOT<45 within past 12 mos.     Recent Labs   Lab Test 03/06/20  0927   GFRESTIMATED >90   GFRESTBLACK >90       Recent Labs   Lab Test 03/06/20  0927   CR 0.70             Passed - Patient does NOT have a diagnosis of CHF.        Passed - Medication is active on med list        Passed - Recent (6 mo) or future (30 days) visit within the authorizing provider's specialty     Patient had office visit in the last 6 months or has a visit in the next 30 days with authorizing provider or within the authorizing provider's specialty.  See \"Patient Info\" tab in inbasket, or \"Choose Columns\" in Meds & Orders section of the refill encounter.               Last Written Prescription Date:  5/17/19  Last Fill Quantity: 180,  # refills: 3   Last office visit: 2/21/2020 with prescribing provider:     Future Office Visit:      "

## 2020-03-17 ENCOUNTER — TRANSFERRED RECORDS (OUTPATIENT)
Dept: HEALTH INFORMATION MANAGEMENT | Facility: CLINIC | Age: 80
End: 2020-03-17

## 2020-03-30 ENCOUNTER — VIRTUAL VISIT (OUTPATIENT)
Dept: FAMILY MEDICINE | Facility: CLINIC | Age: 80
End: 2020-03-30
Payer: COMMERCIAL

## 2020-03-30 DIAGNOSIS — M25.511 RIGHT SHOULDER PAIN, UNSPECIFIED CHRONICITY: Primary | ICD-10-CM

## 2020-03-30 DIAGNOSIS — K30 STOMACH UPSET: ICD-10-CM

## 2020-03-30 PROCEDURE — 99213 OFFICE O/P EST LOW 20 MIN: CPT | Mod: TEL | Performed by: FAMILY MEDICINE

## 2020-03-30 NOTE — PROGRESS NOTES
"Subjective     Ana Maria Duvall is a 79 year old male who is being evaluated via a billable telephone visit.      The patient has been notified of following:     \"This telephone visit will be conducted via a call between you and your physician/provider. We have found that certain health care needs can be provided without the need for a physical exam.  This service lets us provide the care you need with a short phone conversation.  If a prescription is necessary we can send it directly to your pharmacy.  If lab work is needed we can place an order for that and you can then stop by our lab to have the test done at a later time.    If during the course of the call the physician/provider feels a telephone visit is not appropriate, you will not be charged for this service.\"     Physician has received verbal consent for a Telephone Visit from the patient? Yes    Ana Maria Duvall complains of   Chief Complaint   Patient presents with     stomach upset     Shoulder Pain       ALLERGIES  Amlodipine; Lisinopril; and Nka [no known allergies]    Musculoskeletal problem/pain      Duration: on and off since surgery.     Description  Location: Right shoulder     Intensity:  moderate    Accompanying signs and symptoms: radiation of pain from the middle of the chest on the right side and into the right shoulder and down the arm     History  Previous similar problem: YES- had surgery on march 3rd   Previous evaluation:  x-ray and orthopedic evaluation    Precipitating or alleviating factors:  Trauma or overuse: no   Aggravating factors include: lifting, exercise and overuse    Therapies tried and outcome: rest/inactivity and acetaminophen    Upset stomach       Duration: couple days - will come and go    Description (location/character/radiation): stomach feels upset. Maybe a little bit of pain in the upper abdomen.     Intensity:  mild    Accompanying signs and symptoms: no fever, diahrea, or vomiting     History (similar " episodes/previous evaluation): None    Precipitating or alleviating factors: None    Therapies tried and outcome: tums- seems to help a little bit for just a little bit         Patient Active Problem List   Diagnosis     Essential hypertension     Glaucoma     ERECTILE DYSFUNCTION     Diverticulitis of colon     Slowing of urinary stream     Sciatica     Degenerative arthritis of thumb     Type 2 diabetes mellitus without complications (H)     Advanced directives, counseling/discussion     Health Care Home     Lumbar radiculopathy     CARDIOVASCULAR SCREENING; LDL GOAL LESS THAN 130     Nodular basal cell carcinoma     Statin intolerance     Past Surgical History:   Procedure Laterality Date     ARTHROSCOPY KNEE Right      BACK SURGERY       BACK SURGERY       COLONOSCOPY  2018    Repeat in 3 years; 12 mm polyp removed, diverticulosis, internal hemorrhoids     COLONOSCOPY  10/02/2008    Repeat in 10 years; diverticulosis     DECOMPRESSION LUMBAR MINIMALLY INVASIVE THREE+ LEVELS  2013    Lumbar 2-3, Lumbar 3-4, Lumbar 5-Sacral 1;  Surgeon: Param Jones MD;  Location: [facility]     ENDOSCOPIC RELEASE ULNAR NERVE (ELBOW) Right 3/3/2020    Procedure: ENDOSCOPIC Cubital Tunnel Release;  Surgeon: Carlos Jordan MD;  Location: [facility]     RELEASE CARPAL TUNNEL Right 3/3/2020    Procedure: Open Carpal Tunnel Release;  Surgeon: Carlos Jordan MD;  Location: [facility]       Social History     Tobacco Use     Smoking status: Former Smoker     Years: 20.00     Types: Cigarettes, Cigars, Pipe     Last attempt to quit: 12/15/1966     Years since quittin.3     Smokeless tobacco: Never Used   Substance Use Topics     Alcohol use: Yes     Comment: occ.     Family History   Problem Relation Age of Onset     Cancer Mother      Alcohol/Drug Father         age 33 bleeding ulcers     Cancer Brother      Arthritis Brother      Eye Disorder Brother      Diabetes Sister      Eye Disorder Sister          glaucoma     Arthritis Sister      Cardiovascular Son      Eye Disorder Brother         glaucoma     Diabetes Brother      Arthritis Brother      Cancer Brother         LUNG CANCER WITH METS     Diabetes Brother      Cancer Brother         prostate     Arthritis Brother      Eye Disorder Brother      Arthritis Brother      Eye Disorder Brother      Arthritis Sister      Eye Disorder Sister          Current Outpatient Medications   Medication Sig Dispense Refill     acetaminophen (TYLENOL) 500 MG tablet Take 1,000 mg by mouth every 6 hours as needed       ASPIRIN 81 MG OR TABS 1 TABLET DAILY       blood glucose (NO BRAND SPECIFIED) lancets standard Use to test blood sugar 1 time daily 100 each 11     blood glucose monitoring (NO BRAND SPECIFIED) meter device kit Use to test blood sugar 1 time daily 1 kit 0     brimonidine (ALPHAGAN) 0.2 % ophthalmic solution Place 1 drop into both eyes 2 times daily 1 Bottle 2     CALCIUM + D OR 1 TABLET DAILY       Cholecalciferol (VITAMIN D3 PO) Take by mouth daily       FLAX SEED OIL 1000 MG OR CAPS 1 CAPSULE DAILY       ibuprofen (ADVIL,MOTRIN) 200 MG tablet Take 200 mg by mouth every 4 hours as needed Reported on 4/21/2017       latanoprost (XALATAN) 0.005 % ophthalmic solution Apply 1 drop to eye At Bedtime  11     metFORMIN (GLUCOPHAGE) 500 MG tablet TAKE 1 TABLET 2 TIMES DAILY (WITH MEALS) 180 tablet 0     MULTI-VITAMIN OR TABS DAILY       Saw [place] 1000 MG CAPS Take 2 capsules by mouth daily 60 capsule 0     STATIN NOT PRESCRIBED, INTENTIONAL, Statin not prescribed intentionally due to Intolerance       TRUE METRIX BLOOD GLUCOSE TEST test strip TEST BLOOD SUGAR EVERY  strip 3     TRUEplus Lancets 33G MISC TEST BLOOD SUGAR ONE TIME DAILY 100 each 3     UNABLE TO FIND MEDICATION NAME: CBD oil       Allergies   Allergen Reactions     Amlodipine      Leg swelling      Lisinopril Cough     Nka [No Known Allergies]      Recent Labs   Lab Test 03/06/20  4746  02/14/20  1032 11/08/19  0847  08/12/19  0845 05/17/19  0838 09/20/18  0755  06/09/17  0805  08/01/16  1041  11/23/13  0333  06/21/13  0735   A1C  --   --  6.1*  --   --  6.2* 6.3*   < > 6.1*  --  5.8   < >  --   --  6.3*   LDL  --   --  108*  --   --   --  110*  --  115*  --   --    < >  --   --  125   HDL  --   --  50  --   --   --  42  --  49  --   --    < >  --   --  41   TRIG  --   --  109  --   --   --  120  --  75  --   --    < >  --   --  83   ALT  --   --   --   --   --   --   --   --   --   --  32  --  37  --  24   CR 0.70 0.68 0.76   < >  --   --  0.78  --  0.83  --  0.76   < > 0.75   < > 0.74   GFRESTIMATED >90 >90 88   < >  --   --  >90  --  >90  Non  GFR Calc     < > >90  Non  GFR Calc     < > >90   < > >90   GFRESTBLACK >90 >90 >90   < >  --   --  >90  --  >90  African American GFR Calc     < > >90   GFR Calc     < > >90   < > >90   POTASSIUM 4.2 4.4 4.5   < >  --   --  5.0  --  4.3  --  5.0   < > 4.4   < > 4.5   TSH  --   --   --   --  0.92  --   --   --  1.52  --   --    < >  --   --   --     < > = values in this interval not displayed.      BP Readings from Last 3 Encounters:   03/03/20 (!) 153/90   02/21/20 132/80   02/14/20 138/74    Wt Readings from Last 3 Encounters:   03/03/20 94.8 kg (209 lb)   02/21/20 94.8 kg (209 lb)   02/14/20 95.3 kg (210 lb)                    Reviewed and updated as needed this visit by Provider         Review of Systems   ROS COMP: Constitutional, HEENT, cardiovascular, pulmonary, gi and gu systems are negative, except as otherwise noted.       Objective   Speaking in full sentences, not dyspneic         Assessment/Plan:  1. Right shoulder pain, unspecified chronicity  --Differentials discussed in detail including shoulder arthritis, cervical radiculopathy and rotator cuff injury.  Patient mentioned that range of movement is good, denies any neck pain.  We will do imaging once covid- 19 pandemic restrictions gets lifted.   Suggested to continue topical/oral analgesia and icing    2. Stomach upset  --Possibly related to recent antibiotic use.  Patient denies any watery diarrhea, nausea, vomiting.  Suggested well hydration, soft diet, probiotics and avoiding using dairy products.  Will consider stool studies if develops any diarrhea.  Previous colonoscopy results reviewed.          Phone call duration:  12 minutes          See Meng MD

## 2020-05-07 DIAGNOSIS — E11.9 TYPE 2 DIABETES MELLITUS WITHOUT COMPLICATION, WITHOUT LONG-TERM CURRENT USE OF INSULIN (H): ICD-10-CM

## 2020-07-17 DIAGNOSIS — E11.9 TYPE 2 DIABETES MELLITUS WITHOUT COMPLICATION, WITHOUT LONG-TERM CURRENT USE OF INSULIN (H): ICD-10-CM

## 2020-07-20 NOTE — TELEPHONE ENCOUNTER
Routing refill request to provider for review/approval because:  Labs not current    Lab Results   Component Value Date    A1C 6.1 11/08/2019    A1C 6.2 05/17/2019    A1C 6.3 09/20/2018    A1C 6.6 01/19/2018    A1C 6.1 06/09/2017     Jennifer SAUCEDA RN, BSN

## 2020-09-18 ENCOUNTER — ALLIED HEALTH/NURSE VISIT (OUTPATIENT)
Dept: FAMILY MEDICINE | Facility: CLINIC | Age: 80
End: 2020-09-18
Payer: COMMERCIAL

## 2020-09-18 DIAGNOSIS — Z23 NEED FOR PROPHYLACTIC VACCINATION AND INOCULATION AGAINST INFLUENZA: Primary | ICD-10-CM

## 2020-09-18 PROCEDURE — 90662 IIV NO PRSV INCREASED AG IM: CPT

## 2020-09-18 PROCEDURE — 99207 ZZC NO CHARGE NURSE ONLY: CPT

## 2020-09-18 PROCEDURE — G0008 ADMIN INFLUENZA VIRUS VAC: HCPCS

## 2020-10-01 ENCOUNTER — NURSE TRIAGE (OUTPATIENT)
Dept: FAMILY MEDICINE | Facility: CLINIC | Age: 80
End: 2020-10-01

## 2020-10-01 ENCOUNTER — OFFICE VISIT (OUTPATIENT)
Dept: FAMILY MEDICINE | Facility: CLINIC | Age: 80
End: 2020-10-01
Payer: COMMERCIAL

## 2020-10-01 VITALS
HEIGHT: 70 IN | HEART RATE: 68 BPM | DIASTOLIC BLOOD PRESSURE: 92 MMHG | SYSTOLIC BLOOD PRESSURE: 144 MMHG | TEMPERATURE: 97.8 F | WEIGHT: 207 LBS | RESPIRATION RATE: 18 BRPM | BODY MASS INDEX: 29.63 KG/M2

## 2020-10-01 DIAGNOSIS — K57.92 ACUTE DIVERTICULITIS: Primary | ICD-10-CM

## 2020-10-01 PROCEDURE — 99214 OFFICE O/P EST MOD 30 MIN: CPT | Performed by: FAMILY MEDICINE

## 2020-10-01 RX ORDER — CIPROFLOXACIN 500 MG/1
500 TABLET, FILM COATED ORAL 2 TIMES DAILY
Qty: 20 TABLET | Refills: 1 | Status: SHIPPED | OUTPATIENT
Start: 2020-10-01 | End: 2020-10-12

## 2020-10-01 RX ORDER — METRONIDAZOLE 500 MG/1
500 TABLET ORAL 3 TIMES DAILY
Qty: 30 TABLET | Refills: 1 | Status: SHIPPED | OUTPATIENT
Start: 2020-10-01 | End: 2020-10-01

## 2020-10-01 RX ORDER — METRONIDAZOLE 500 MG/1
500 TABLET ORAL 3 TIMES DAILY
Qty: 30 TABLET | Refills: 1 | Status: SHIPPED | OUTPATIENT
Start: 2020-10-01 | End: 2020-10-12

## 2020-10-01 RX ORDER — CIPROFLOXACIN 500 MG/1
500 TABLET, FILM COATED ORAL 2 TIMES DAILY
Qty: 20 TABLET | Refills: 1 | Status: SHIPPED | OUTPATIENT
Start: 2020-10-01 | End: 2020-10-01

## 2020-10-01 ASSESSMENT — MIFFLIN-ST. JEOR: SCORE: 1665.2

## 2020-10-01 NOTE — NURSING NOTE
"Chief Complaint   Patient presents with     Abdominal Pain     BP (!) 144/92 (Cuff Size: Adult Large)   Pulse 68   Temp 97.8  F (36.6  C) (Tympanic)   Resp 18   Ht 1.778 m (5' 10\")   Wt 93.9 kg (207 lb)   BMI 29.70 kg/m   Estimated body mass index is 29.7 kg/m  as calculated from the following:    Height as of this encounter: 1.778 m (5' 10\").    Weight as of this encounter: 93.9 kg (207 lb).  Patient presents to the clinic using No DME      Health Maintenance that is potentially due pending provider review:    Health Maintenance Due   Topic Date Due     HEPATITIS B IMMUNIZATION (1 of 3 - Risk 3-dose series) 12/04/1960     ZOSTER IMMUNIZATION (1 of 2) 12/04/1991     Pneumococcal Vaccine: 65+ Years (1 of 1 - PPSV23) 05/13/2016     DTAP/TDAP/TD IMMUNIZATION (3 - Td) 08/20/2018     A1C  05/08/2020     EYE EXAM  06/28/2020                "

## 2020-10-01 NOTE — TELEPHONE ENCOUNTER
"  Additional Information    Constant abdominal pain lasting > 2 hours    Answer Assessment - Initial Assessment Questions  1. LOCATION: \"Where does it hurt?\"       Lower left abdominal pain  2. RADIATION: \"Does the pain shoot anywhere else?\" (e.g., chest, back)      Goes to the lower back  3. ONSET: \"When did the pain begin?\" (Minutes, hours or days ago)       4 days ago  4. SUDDEN: \"Gradual or sudden onset?\"      Sudden, has a history of Diverticulits  5. PATTERN \"Does the pain come and go, or is it constant?\"     - If constant: \"Is it getting better, staying the same, or worsening?\"       (Note: Constant means the pain never goes away completely; most serious pain is constant and it progresses)      - If intermittent: \"How long does it last?\" \"Do you have pain now?\"      (Note: Intermittent means the pain goes away completely between bouts)      constant  6. SEVERITY: \"How bad is the pain?\"  (e.g., Scale 1-10; mild, moderate, or severe)     - MILD (1-3): doesn't interfere with normal activities, abdomen soft and not tender to touch      - MODERATE (4-7): interferes with normal activities or awakens from sleep, tender to touch      - SEVERE (8-10): excruciating pain, doubled over, unable to do any normal activities        Severe when he touches it and rates 10/10, says it is manageable if he does not touch it  7. RECURRENT SYMPTOM: \"Have you ever had this type of abdominal pain before?\" If so, ask: \"When was the last time?\" and \"What happened that time?\"       Yes, says this is like Diverticulitis attack before  8. CAUSE: \"What do you think is causing the abdominal pain?\"      Thinks it is his Diverticulitis  9. RELIEVING/AGGRAVATING FACTORS: \"What makes it better or worse?\" (e.g., movement, antacids, bowel movement)      Touching it  10. OTHER SYMPTOMS: \"Has there been any vomiting, diarrhea, constipation, or urine problems?\"        None, normal bowels, no blood, no fever    Protocols used: ABDOMINAL PAIN - " MALE-A-OH

## 2020-10-01 NOTE — PROGRESS NOTES
SUBJECTIVE   Ana Maria Duvall is a 79 year old male who presents with     Abdominal/Flank Pain  Onset/Duration: 4 days   Description:  patient says developed lower left abdominal pain that radiates to the back and says if he touches it is a 10/10, if he does not touch it the pain is constant but manageable. Denies abnormal bowel movement, no swellling, no blood in stool, no nausea or weakness.  Character: Cramping  Location: left lower quadrant  Radiation: Back  Intensity: moderate  Progression of Symptoms:  worsening  Accompanying Signs & Symptoms:  Fever/Chills: no  Gas/Bloating: no  Nausea: YES  Vomitting: no  Diarrhea: no  Constipation: no  Dysuria or Hematuria: no  History:   Trauma: no  Previous similar pain: YES- Diverticulitis   Previous tests done: Colonoscopy   Precipitating factors:   Does the pain change with:     Food: no    Bowel Movement: no    Urination: no   Other factors:  no  Therapies tried and outcome: none       PCP   See Meng [phone]    Health Maintenance        Health Maintenance Due   Topic Date Due     HEPATITIS B IMMUNIZATION (1 of 3 - Risk 3-dose series) 12/04/1960     ZOSTER IMMUNIZATION (1 of 2) 12/04/1991     Pneumococcal Vaccine: 65+ Years (1 of 1 - PPSV23) 05/13/2016     DTAP/TDAP/TD IMMUNIZATION (3 - Td) 08/20/2018     A1C  05/08/2020     EYE EXAM  06/28/2020       HPI        Patient Active Problem List   Diagnosis     Essential hypertension     Glaucoma     ERECTILE DYSFUNCTION     Diverticulitis of colon     Slowing of urinary stream     Sciatica     Degenerative arthritis of thumb     Type 2 diabetes mellitus without complications (H)     Advanced directives, counseling/discussion     Health Care Home     Lumbar radiculopathy     CARDIOVASCULAR SCREENING; LDL GOAL LESS THAN 130     Nodular basal cell carcinoma     Statin intolerance     Current Outpatient Medications   Medication     acetaminophen (TYLENOL) 500 MG tablet     ASPIRIN 81 MG OR TABS     blood  glucose (NO BRAND SPECIFIED) lancets standard     blood glucose monitoring (NO BRAND SPECIFIED) meter device kit     brimonidine (ALPHAGAN) 0.2 % ophthalmic solution     CALCIUM + D OR     Cholecalciferol (VITAMIN D3 PO)     FLAX SEED OIL 1000 MG OR CAPS     ibuprofen (ADVIL,MOTRIN) 200 MG tablet     latanoprost (XALATAN) 0.005 % ophthalmic solution     metFORMIN (GLUCOPHAGE) 500 MG tablet     MULTI-VITAMIN OR TABS     Saw [place] 1000 MG CAPS     STATIN NOT PRESCRIBED, INTENTIONAL,     TRUE METRIX BLOOD GLUCOSE TEST test strip     TRUEplus Lancets 33G MISC     UNABLE TO FIND     No current facility-administered medications for this visit.      Facility-Administered Medications Ordered in Other Visits   Medication     iohexol (OMNIPAQUE) 300 mg/mL injection 10 mL       Patient Active Problem List   Diagnosis     Essential hypertension     Glaucoma     ERECTILE DYSFUNCTION     Diverticulitis of colon     Slowing of urinary stream     Sciatica     Degenerative arthritis of thumb     Type 2 diabetes mellitus without complications (H)     Advanced directives, counseling/discussion     Health Care Home     Lumbar radiculopathy     CARDIOVASCULAR SCREENING; LDL GOAL LESS THAN 130     Nodular basal cell carcinoma     Statin intolerance     Past Surgical History:   Procedure Laterality Date     ARTHROSCOPY KNEE Right      BACK SURGERY  1995     BACK SURGERY  1993     COLONOSCOPY  04/20/2018    Repeat in 3 years; 12 mm polyp removed, diverticulosis, internal hemorrhoids     COLONOSCOPY  10/02/2008    Repeat in 10 years; diverticulosis     DECOMPRESSION LUMBAR MINIMALLY INVASIVE THREE+ LEVELS  11/20/2013    Lumbar 2-3, Lumbar 3-4, Lumbar 5-Sacral 1;  Surgeon: Param Jones MD;  Location: [facility]     ENDOSCOPIC RELEASE ULNAR NERVE (ELBOW) Right 3/3/2020    Procedure: ENDOSCOPIC Cubital Tunnel Release;  Surgeon: Carlos Jordan MD;  Location: [facility]     RELEASE CARPAL TUNNEL Right 3/3/2020    Procedure: Open Carpal  Tunnel Release;  Surgeon: Carlos Jordan MD;  Location: [facility]       Social History     Tobacco Use     Smoking status: Former Smoker     Years: 20.00     Types: Cigarettes, Cigars, Pipe     Quit date: 12/15/1966     Years since quittin.8     Smokeless tobacco: Never Used   Substance Use Topics     Alcohol use: Yes     Comment: occ.     Family History   Problem Relation Age of Onset     Cancer Mother      Alcohol/Drug Father         age 33 bleeding ulcers     Cancer Brother      Arthritis Brother      Eye Disorder Brother      Diabetes Sister      Eye Disorder Sister         glaucoma     Arthritis Sister      Cardiovascular Son      Eye Disorder Brother         glaucoma     Diabetes Brother      Arthritis Brother      Cancer Brother         LUNG CANCER WITH METS     Diabetes Brother      Cancer Brother         prostate     Arthritis Brother      Eye Disorder Brother      Arthritis Brother      Eye Disorder Brother      Arthritis Sister      Eye Disorder Sister          Current Outpatient Medications   Medication Sig Dispense Refill     acetaminophen (TYLENOL) 500 MG tablet Take 1,000 mg by mouth every 6 hours as needed       ASPIRIN 81 MG OR TABS 1 TABLET DAILY       blood glucose (NO BRAND SPECIFIED) lancets standard Use to test blood sugar 1 time daily 100 each 11     blood glucose monitoring (NO BRAND SPECIFIED) meter device kit Use to test blood sugar 1 time daily 1 kit 0     brimonidine (ALPHAGAN) 0.2 % ophthalmic solution Place 1 drop into both eyes 2 times daily 1 Bottle 2     CALCIUM + D OR 1 TABLET DAILY       Cholecalciferol (VITAMIN D3 PO) Take by mouth daily       FLAX SEED OIL 1000 MG OR CAPS 1 CAPSULE DAILY       ibuprofen (ADVIL,MOTRIN) 200 MG tablet Take 200 mg by mouth every 4 hours as needed Reported on 2017       latanoprost (XALATAN) 0.005 % ophthalmic solution Apply 1 drop to eye At Bedtime  11     metFORMIN (GLUCOPHAGE) 500 MG tablet TAKE 1 TABLET 2 TIMES DAILY (WITH MEALS) NEEDS  OFFICE VISIT FOR FURTHER REFILLS 60 tablet 0     MULTI-VITAMIN OR TABS DAILY       Saw [place] 1000 MG CAPS Take 2 capsules by mouth daily 60 capsule 0     STATIN NOT PRESCRIBED, INTENTIONAL, Statin not prescribed intentionally due to Intolerance       TRUE METRIX BLOOD GLUCOSE TEST test strip TEST BLOOD SUGAR EVERY  strip 3     TRUEplus Lancets 33G MISC TEST BLOOD SUGAR ONE TIME DAILY 100 each 3     UNABLE TO FIND MEDICATION NAME: CBD oil       Allergies   Allergen Reactions     Amlodipine      Leg swelling      Lisinopril Cough     Nka [No Known Allergies]      Recent Labs   Lab Test 03/06/20  0927 02/14/20  1032 11/08/19  0847 08/12/19  0845 08/12/19  0845 05/17/19  0838 09/20/18  0755 06/09/17  0805 06/09/17  0805 08/01/16  1041 08/01/16  1041 11/23/13  0333 11/23/13  0333 06/21/13  0735 06/21/13  0735   A1C  --   --  6.1*  --   --  6.2* 6.3*   < > 6.1*  --  5.8   < >  --   --  6.3*   LDL  --   --  108*  --   --   --  110*  --  115*  --   --    < >  --   --  125   HDL  --   --  50  --   --   --  42  --  49  --   --    < >  --   --  41   TRIG  --   --  109  --   --   --  120  --  75  --   --    < >  --   --  83   ALT  --   --   --   --   --   --   --   --   --   --  32  --  37  --  24   CR 0.70 0.68 0.76   < >  --   --  0.78  --  0.83  --  0.76   < > 0.75   < > 0.74   GFRESTIMATED >90 >90 88   < >  --   --  >90  --  >90  Non  GFR Calc     < > >90  Non  GFR Calc     < > >90   < > >90   GFRESTBLACK >90 >90 >90   < >  --   --  >90  --  >90  African American GFR Calc     < > >90   GFR Calc     < > >90   < > >90   POTASSIUM 4.2 4.4 4.5   < >  --   --  5.0  --  4.3  --  5.0   < > 4.4   < > 4.5   TSH  --   --   --   --  0.92  --   --   --  1.52  --   --    < >  --   --   --     < > = values in this interval not displayed.      BP Readings from Last 3 Encounters:   10/01/20 (!) 144/92   03/03/20 (!) 153/90   02/21/20 132/80    Wt Readings from Last 3 Encounters:  "  10/01/20 93.9 kg (207 lb)   03/03/20 94.8 kg (209 lb)   02/21/20 94.8 kg (209 lb)                    Reviewed and updated:  Tobacco  Allergies  Meds  Med Hx  Surg Hx  Fam Hx  Soc Hx     ROS:  Constitutional, neuro, ENT, endocrine, pulmonary, cardiac, gastrointestinal, genitourinary, musculoskeletal, integument and psychiatric systems are negative, except as otherwise noted.    PHYSICAL EXAM   BP (!) 144/92 (Cuff Size: Adult Large)   Pulse 68   Temp 97.8  F (36.6  C) (Tympanic)   Resp 18   Ht 1.778 m (5' 10\")   Wt 93.9 kg (207 lb)   BMI 29.70 kg/m    Body mass index is 29.7 kg/m .  GENERAL: alert and no distress  EYES: Eyes grossly normal to inspection, PERRL and conjunctivae and sclerae normal  NECK: no adenopathy, no asymmetry, masses, or scars and thyroid normal to palpation  RESP: lungs clear to auscultation - no rales, rhonchi or wheezes  CV: regular rates and rhythm, normal S1 S2, no S3 or S4 and no murmur, click or rub  ABDOMEN: soft, moderately tender LLQ, no organomegaly   MS: no gross musculoskeletal defects noted, no edema  NEURO: Normal strength and tone, mentation intact and speech normal  PSYCH: mentation appears normal, affect normal/bright        Assessment & Plan     Acute diverticulitis  --79-year-old male presented with left lower abdominal pain which started about 4 days ago.  Physical examination remarkable for moderately tender left lower quadrant.  Differentials discussed in detail, suspect symptoms secondary to acute diverticulitis, last flareup about 1 year ago.  Previous colonoscopy results reviewed.  Ciprofloxacin and metronidazole prescribed, common side effects discussed.  Suggested to continue well hydration, soft diet and to go ER if symptoms persist or worsen.  Patient understood and in agreement with above plan.  All questions answered.  - ciprofloxacin (CIPRO) 500 MG tablet; Take 1 tablet (500 mg) by mouth 2 times daily  - metroNIDAZOLE (FLAGYL) 500 MG tablet; Take 1 " tablet (500 mg) by mouth 3 times daily       Patient Instructions     Patient Education     Diverticulitis    Some people get pouches along the wall of the colon as they get older. The pouches, called diverticuli, usually cause no symptoms. If the pouches become blocked, you can get an infection. This infection is called diverticulitis. It causes pain in your lower abdomen and fever. If not treated, it can become a serious condition, causing an abscess to form inside the pouch. The abscess may block the intestinal tract even or rupture, spreading infection throughout the abdomen.  When treatment is started early, oral antibiotics alone may be enough to cure diverticulitis. This method is tried first. But, if you don't improve or if your condition gets worse while using oral antibiotics, you may need to be admitted to the hospital for IV antibiotics. Severe cases may require surgery.  Home care  The following guidelines will help you care for yourself at home:    During the acute illness, rest and follow your healthcare provider's instructions about diet. Sometimes you will need to follow a clear liquid diet to rest your bowel. Once your symptoms are better, you may be told to follow a low-fiber diet for some time. Include foods like:  ? Flake cereal, mashed potatoes, pancakes, waffles, pasta, white bread, rice, applesauce, bananas, eggs, fish, poultry, tofu, and cooked soft vegetables    Take antibiotics exactly as instructed. Don't miss any doses or stop taking the medication, even if you feel better.    Monitor your temperature and tell your healthcare provider if you have rising temperatures.  Preventing future attacks  Once you have an episode of diverticulitis, you are at risk for having it again. After you have recovered from this episode, you may be able to lower your risk by eating a high-fiber diet (20 gm/day to 35 gm/day of fiber). This cleans out the colon pouches that already exist and may prevent new  ones from forming. Foods high in fiber include fresh fruits and edible peelings, raw or lightly cooked vegetables, whole grain cereals and breads, dried beans and peas, and bran.  Other steps that can help prevent future attacks include:    Take your medicines, such as antibiotics, as your healthcare provider says.    Drink 6 to 8 glasses of water every day, unless told otherwise.    Use a heating pad or hot water bottle to help abdominal cramping or pain.    Begin an exercise program. Ask your healthcare provider how to get started. You can benefit from simple activities such as walking or gardening.    Treat diarrhea with a bland diet. Start with liquids only; then slowly add fiber over time.    Watch for changes in your bowel movements (constipation to diarrhea). Avoid constipation by eating a high fiber diet and taking a stool softener if needed.    Get plenty of rest and sleep.  Follow-up care  Follow up with your healthcare provider as advised or sooner if you are not getting better in the next 2 days.  When to seek medical advice  Call your healthcare provider right away if any of these occur:    Fever of 100.4 F (38 C) or higher, or as directed by your healthcare provider    Repeated vomiting or swelling of the abdomen    Weakness, dizziness, light-headedness    Pain in your abdomen that gets worse, severe, or spreads to your back    Pain that moves to the right lower abdomen    Rectal bleeding (stools that are red, black or maroon color)    Unexpected vaginal bleeding  Date Last Reviewed: 9/1/2016 2000-2019 [employer]. [address] [place] [address], [place] [address]. All rights reserved. This information is not intended as a substitute for professional medical care. Always follow your healthcare professional's instructions.               See Meng MD  [facility]

## 2020-10-01 NOTE — TELEPHONE ENCOUNTER
S-(situation): see below for full triage note for lower left abdominal paink    B-(background): started 4 days ago    A-(assessment): patient says developed lower left abdominal pain that radiates to the back and says if he touches it is a 10/10, if he does not touch it the pain is constant but manageable. Denies abnormal bowel movement, no swellling, no blood in stool, no nausea or weakness.    R-(recommendations): advised ER for evaluation, the patient declines this, says he has had this before and an antibiotic is helpful in the past and would like to know if PCP would prescribe or see him, does not want to go to the ER.      Routing to PCP to advise, do you want to see him? ER? Could see at tomorrow at 8:20 per your CMA?      ROSE Ivey

## 2020-10-01 NOTE — TELEPHONE ENCOUNTER
See Meng MD  You 5 minutes ago (10:09 AM)     plz schedule an appointment for this afternoon          CMA ok'd to use the 1:40, the patient was called and he can make this today at 1:40.    ROSE Ivey

## 2020-10-01 NOTE — TELEPHONE ENCOUNTER
Thanks Lucero,    I agree with your recommendation to go ER, otherwise can double book appointment for this afternoon.    Dr Meng

## 2020-10-01 NOTE — PATIENT INSTRUCTIONS
Patient Education     Diverticulitis    Some people get pouches along the wall of the colon as they get older. The pouches, called diverticuli, usually cause no symptoms. If the pouches become blocked, you can get an infection. This infection is called diverticulitis. It causes pain in your lower abdomen and fever. If not treated, it can become a serious condition, causing an abscess to form inside the pouch. The abscess may block the intestinal tract even or rupture, spreading infection throughout the abdomen.  When treatment is started early, oral antibiotics alone may be enough to cure diverticulitis. This method is tried first. But, if you don't improve or if your condition gets worse while using oral antibiotics, you may need to be admitted to the hospital for IV antibiotics. Severe cases may require surgery.  Home care  The following guidelines will help you care for yourself at home:    During the acute illness, rest and follow your healthcare provider's instructions about diet. Sometimes you will need to follow a clear liquid diet to rest your bowel. Once your symptoms are better, you may be told to follow a low-fiber diet for some time. Include foods like:  ? Flake cereal, mashed potatoes, pancakes, waffles, pasta, white bread, rice, applesauce, bananas, eggs, fish, poultry, tofu, and cooked soft vegetables    Take antibiotics exactly as instructed. Don't miss any doses or stop taking the medication, even if you feel better.    Monitor your temperature and tell your healthcare provider if you have rising temperatures.  Preventing future attacks  Once you have an episode of diverticulitis, you are at risk for having it again. After you have recovered from this episode, you may be able to lower your risk by eating a high-fiber diet (20 gm/day to 35 gm/day of fiber). This cleans out the colon pouches that already exist and may prevent new ones from forming. Foods high in fiber include fresh fruits and edible  peelings, raw or lightly cooked vegetables, whole grain cereals and breads, dried beans and peas, and bran.  Other steps that can help prevent future attacks include:    Take your medicines, such as antibiotics, as your healthcare provider says.    Drink 6 to 8 glasses of water every day, unless told otherwise.    Use a heating pad or hot water bottle to help abdominal cramping or pain.    Begin an exercise program. Ask your healthcare provider how to get started. You can benefit from simple activities such as walking or gardening.    Treat diarrhea with a bland diet. Start with liquids only; then slowly add fiber over time.    Watch for changes in your bowel movements (constipation to diarrhea). Avoid constipation by eating a high fiber diet and taking a stool softener if needed.    Get plenty of rest and sleep.  Follow-up care  Follow up with your healthcare provider as advised or sooner if you are not getting better in the next 2 days.  When to seek medical advice  Call your healthcare provider right away if any of these occur:    Fever of 100.4 F (38 C) or higher, or as directed by your healthcare provider    Repeated vomiting or swelling of the abdomen    Weakness, dizziness, light-headedness    Pain in your abdomen that gets worse, severe, or spreads to your back    Pain that moves to the right lower abdomen    Rectal bleeding (stools that are red, black or maroon color)    Unexpected vaginal bleeding  Date Last Reviewed: 9/1/2016 2000-2019 [employer]. [address] [place] [address], [place] [address]. All rights reserved. This information is not intended as a substitute for professional medical care. Always follow your healthcare professional's instructions.

## 2020-10-01 NOTE — TELEPHONE ENCOUNTER
Reason for call:  Patient reporting a symptom    Symptom or request: Abdominal left sided pain. Pt has Diverticulitis . This started few days ago. Pt is asking for a script for this.     Phone Number patient can be reached at:  Home number on file [phone] (home)    Best Time:  Any Time      Can we leave a detailed message on this number:  YES    Call taken on 10/1/2020 at 8:40 AM by Corinna Nj

## 2020-10-12 ENCOUNTER — OFFICE VISIT (OUTPATIENT)
Dept: FAMILY MEDICINE | Facility: CLINIC | Age: 80
End: 2020-10-12
Payer: COMMERCIAL

## 2020-10-12 VITALS
TEMPERATURE: 98 F | HEIGHT: 70 IN | RESPIRATION RATE: 18 BRPM | BODY MASS INDEX: 29.78 KG/M2 | WEIGHT: 208 LBS | DIASTOLIC BLOOD PRESSURE: 80 MMHG | HEART RATE: 64 BPM | SYSTOLIC BLOOD PRESSURE: 136 MMHG

## 2020-10-12 DIAGNOSIS — K57.92 ACUTE DIVERTICULITIS: Primary | ICD-10-CM

## 2020-10-12 LAB
ALBUMIN SERPL-MCNC: 4 G/DL (ref 3.4–5)
ALP SERPL-CCNC: 56 U/L (ref 40–150)
ALT SERPL W P-5'-P-CCNC: 35 U/L (ref 0–70)
ANION GAP SERPL CALCULATED.3IONS-SCNC: 4 MMOL/L (ref 3–14)
AST SERPL W P-5'-P-CCNC: 28 U/L (ref 0–45)
BILIRUB SERPL-MCNC: 0.6 MG/DL (ref 0.2–1.3)
BUN SERPL-MCNC: 13 MG/DL (ref 7–30)
CALCIUM SERPL-MCNC: 9.1 MG/DL (ref 8.5–10.1)
CHLORIDE SERPL-SCNC: 98 MMOL/L (ref 94–109)
CO2 SERPL-SCNC: 27 MMOL/L (ref 20–32)
CREAT SERPL-MCNC: 0.81 MG/DL (ref 0.66–1.25)
ERYTHROCYTE [DISTWIDTH] IN BLOOD BY AUTOMATED COUNT: 13.5 % (ref 10–15)
GFR SERPL CREATININE-BSD FRML MDRD: 84 ML/MIN/{1.73_M2}
GLUCOSE SERPL-MCNC: 101 MG/DL (ref 70–99)
HCT VFR BLD AUTO: 42 % (ref 40–53)
HGB BLD-MCNC: 14.8 G/DL (ref 13.3–17.7)
MCH RBC QN AUTO: 29.6 PG (ref 26.5–33)
MCHC RBC AUTO-ENTMCNC: 35.2 G/DL (ref 31.5–36.5)
MCV RBC AUTO: 84 FL (ref 78–100)
PLATELET # BLD AUTO: 304 10E9/L (ref 150–450)
POTASSIUM SERPL-SCNC: 4.6 MMOL/L (ref 3.4–5.3)
PROT SERPL-MCNC: 7.5 G/DL (ref 6.8–8.8)
RBC # BLD AUTO: 5 10E12/L (ref 4.4–5.9)
SODIUM SERPL-SCNC: 129 MMOL/L (ref 133–144)
WBC # BLD AUTO: 8.2 10E9/L (ref 4–11)

## 2020-10-12 PROCEDURE — 99214 OFFICE O/P EST MOD 30 MIN: CPT | Performed by: FAMILY MEDICINE

## 2020-10-12 PROCEDURE — 80053 COMPREHEN METABOLIC PANEL: CPT | Performed by: FAMILY MEDICINE

## 2020-10-12 PROCEDURE — 85027 COMPLETE CBC AUTOMATED: CPT | Performed by: FAMILY MEDICINE

## 2020-10-12 RX ORDER — METRONIDAZOLE 500 MG/1
500 TABLET ORAL 3 TIMES DAILY
Qty: 30 TABLET | Refills: 0 | Status: SHIPPED | OUTPATIENT
Start: 2020-10-12 | End: 2020-10-22

## 2020-10-12 ASSESSMENT — MIFFLIN-ST. JEOR: SCORE: 1669.73

## 2020-10-12 NOTE — NURSING NOTE
"Chief Complaint   Patient presents with     diverticulitis     /80 (Cuff Size: Adult Regular)   Pulse 64   Temp 98  F (36.7  C) (Tympanic)   Resp 18   Ht 1.778 m (5' 10\")   Wt 94.3 kg (208 lb)   BMI 29.84 kg/m   Estimated body mass index is 29.84 kg/m  as calculated from the following:    Height as of this encounter: 1.778 m (5' 10\").    Weight as of this encounter: 94.3 kg (208 lb).  Patient presents to the clinic using No DME      Health Maintenance that is potentially due pending provider review:    Health Maintenance Due   Topic Date Due     HEPATITIS B IMMUNIZATION (1 of 3 - Risk 3-dose series) 12/04/1960     ZOSTER IMMUNIZATION (1 of 2) 12/04/1991     DTAP/TDAP/TD IMMUNIZATION (3 - Td) 08/20/2018     A1C  05/08/2020     EYE EXAM  06/28/2020     MEDICARE ANNUAL WELLNESS VISIT  11/08/2020     LIPID  11/08/2020     MICROALBUMIN  11/08/2020     DIABETIC FOOT EXAM  11/08/2020     FALL RISK ASSESSMENT  11/08/2020                "

## 2020-10-12 NOTE — PROGRESS NOTES
CSUBJECTIVE   Ana Maria Duvall is a 79 year old male who presents with     Concern - Diverticulitis   Onset: Recheck   Description: Feeling better, but is still really sore.   Intensity: moderate  Progression of Symptoms:  improving  Therapies tried and outcome: cipro & Flagyl - done now  No fever, chills, nausea, vomiting, diarrhea, constipation or other relevant systemic symptoms      PCP   See Meng [phone]    Health Maintenance        Health Maintenance Due   Topic Date Due     HEPATITIS B IMMUNIZATION (1 of 3 - Risk 3-dose series) 12/04/1960     ZOSTER IMMUNIZATION (1 of 2) 12/04/1991     DTAP/TDAP/TD IMMUNIZATION (3 - Td) 08/20/2018     A1C  05/08/2020     EYE EXAM  06/28/2020     MEDICARE ANNUAL WELLNESS VISIT  11/08/2020     LIPID  11/08/2020     MICROALBUMIN  11/08/2020     DIABETIC FOOT EXAM  11/08/2020     FALL RISK ASSESSMENT  11/08/2020       HPI        Patient Active Problem List   Diagnosis     Essential hypertension     Glaucoma     ERECTILE DYSFUNCTION     Diverticulitis of colon     Slowing of urinary stream     Sciatica     Degenerative arthritis of thumb     Type 2 diabetes mellitus without complications (H)     Advanced directives, counseling/discussion     Health Care Home     Lumbar radiculopathy     CARDIOVASCULAR SCREENING; LDL GOAL LESS THAN 130     Nodular basal cell carcinoma     Statin intolerance     Current Outpatient Medications   Medication     acetaminophen (TYLENOL) 500 MG tablet     ASPIRIN 81 MG OR TABS     blood glucose (NO BRAND SPECIFIED) lancets standard     blood glucose monitoring (NO BRAND SPECIFIED) meter device kit     brimonidine (ALPHAGAN) 0.2 % ophthalmic solution     CALCIUM + D OR     Cholecalciferol (VITAMIN D3 PO)     FLAX SEED OIL 1000 MG OR CAPS     ibuprofen (ADVIL,MOTRIN) 200 MG tablet     latanoprost (XALATAN) 0.005 % ophthalmic solution     metFORMIN (GLUCOPHAGE) 500 MG tablet     MULTI-VITAMIN OR TABS     Saw Salome 1000 MG CAPS     STATIN  NOT PRESCRIBED, INTENTIONAL,     TRUE METRIX BLOOD GLUCOSE TEST test strip     TRUEplus Lancets 33G MISC     UNABLE TO FIND     No current facility-administered medications for this visit.      Facility-Administered Medications Ordered in Other Visits   Medication     iohexol (OMNIPAQUE) 300 mg/mL injection 10 mL       Patient Active Problem List   Diagnosis     Essential hypertension     Glaucoma     ERECTILE DYSFUNCTION     Diverticulitis of colon     Slowing of urinary stream     Sciatica     Degenerative arthritis of thumb     Type 2 diabetes mellitus without complications (H)     Advanced directives, counseling/discussion     Health Care Home     Lumbar radiculopathy     CARDIOVASCULAR SCREENING; LDL GOAL LESS THAN 130     Nodular basal cell carcinoma     Statin intolerance     Past Surgical History:   Procedure Laterality Date     ARTHROSCOPY KNEE Right      BACK SURGERY       BACK SURGERY       COLONOSCOPY  2018    Repeat in 3 years; 12 mm polyp removed, diverticulosis, internal hemorrhoids     COLONOSCOPY  10/02/2008    Repeat in 10 years; diverticulosis     DECOMPRESSION LUMBAR MINIMALLY INVASIVE THREE+ LEVELS  2013    Lumbar 2-3, Lumbar 3-4, Lumbar 5-Sacral 1;  Surgeon: Param Jones MD;  Location: [facility]     ENDOSCOPIC RELEASE ULNAR NERVE (ELBOW) Right 3/3/2020    Procedure: ENDOSCOPIC Cubital Tunnel Release;  Surgeon: Carlos Jordan MD;  Location: [facility]     RELEASE CARPAL TUNNEL Right 3/3/2020    Procedure: Open Carpal Tunnel Release;  Surgeon: Carlos Jordan MD;  Location: [facility]       Social History     Tobacco Use     Smoking status: Former Smoker     Years: 20.00     Types: Cigarettes, Cigars, Pipe     Quit date: 12/15/1966     Years since quittin.8     Smokeless tobacco: Never Used   Substance Use Topics     Alcohol use: Yes     Comment: occ.     Family History   Problem Relation Age of Onset     Cancer Mother      Alcohol/Drug Father         age 33 bleeding  ulcers     Cancer Brother      Arthritis Brother      Eye Disorder Brother      Diabetes Sister      Eye Disorder Sister         glaucoma     Arthritis Sister      Cardiovascular Son      Eye Disorder Brother         glaucoma     Diabetes Brother      Arthritis Brother      Cancer Brother         LUNG CANCER WITH METS     Diabetes Brother      Cancer Brother         prostate     Arthritis Brother      Eye Disorder Brother      Arthritis Brother      Eye Disorder Brother      Arthritis Sister      Eye Disorder Sister          Current Outpatient Medications   Medication Sig Dispense Refill     acetaminophen (TYLENOL) 500 MG tablet Take 1,000 mg by mouth every 6 hours as needed       ASPIRIN 81 MG OR TABS 1 TABLET DAILY       blood glucose (NO BRAND SPECIFIED) lancets standard Use to test blood sugar 1 time daily 100 each 11     blood glucose monitoring (NO BRAND SPECIFIED) meter device kit Use to test blood sugar 1 time daily 1 kit 0     brimonidine (ALPHAGAN) 0.2 % ophthalmic solution Place 1 drop into both eyes 2 times daily 1 Bottle 2     CALCIUM + D OR 1 TABLET DAILY       Cholecalciferol (VITAMIN D3 PO) Take by mouth daily       FLAX SEED OIL 1000 MG OR CAPS 1 CAPSULE DAILY       ibuprofen (ADVIL,MOTRIN) 200 MG tablet Take 200 mg by mouth every 4 hours as needed Reported on 4/21/2017       latanoprost (XALATAN) 0.005 % ophthalmic solution Apply 1 drop to eye At Bedtime  11     metFORMIN (GLUCOPHAGE) 500 MG tablet TAKE 1 TABLET 2 TIMES DAILY (WITH MEALS) NEEDS OFFICE VISIT FOR FURTHER REFILLS 60 tablet 0     MULTI-VITAMIN OR TABS DAILY       Saw Powell 1000 MG CAPS Take 2 capsules by mouth daily 60 capsule 0     STATIN NOT PRESCRIBED, INTENTIONAL, Statin not prescribed intentionally due to Intolerance       TRUE METRIX BLOOD GLUCOSE TEST test strip TEST BLOOD SUGAR EVERY  strip 3     TRUEplus Lancets 33G MISC TEST BLOOD SUGAR ONE TIME DAILY 100 each 3     UNABLE TO FIND MEDICATION NAME: CBD oil        Allergies   Allergen Reactions     Amlodipine      Leg swelling      Lisinopril Cough     Nka [No Known Allergies]      Recent Labs   Lab Test 03/06/20  0927 02/14/20  1032 11/08/19  0847 08/12/19  0845 08/12/19  0845 05/17/19  0838 09/20/18  0755 06/09/17  0805 06/09/17  0805 08/01/16  1041 08/01/16  1041 11/23/13  0333 11/23/13  0333 06/21/13  0735 06/21/13  0735   A1C  --   --  6.1*  --   --  6.2* 6.3*   < > 6.1*  --  5.8   < >  --   --  6.3*   LDL  --   --  108*  --   --   --  110*  --  115*  --   --    < >  --   --  125   HDL  --   --  50  --   --   --  42  --  49  --   --    < >  --   --  41   TRIG  --   --  109  --   --   --  120  --  75  --   --    < >  --   --  83   ALT  --   --   --   --   --   --   --   --   --   --  32  --  37  --  24   CR 0.70 0.68 0.76   < >  --   --  0.78  --  0.83  --  0.76   < > 0.75   < > 0.74   GFRESTIMATED >90 >90 88   < >  --   --  >90  --  >90  Non  GFR Calc     < > >90  Non  GFR Calc     < > >90   < > >90   GFRESTBLACK >90 >90 >90   < >  --   --  >90  --  >90  African American GFR Calc     < > >90   GFR Calc     < > >90   < > >90   POTASSIUM 4.2 4.4 4.5   < >  --   --  5.0  --  4.3  --  5.0   < > 4.4   < > 4.5   TSH  --   --   --   --  0.92  --   --   --  1.52  --   --    < >  --   --   --     < > = values in this interval not displayed.      BP Readings from Last 3 Encounters:   10/12/20 136/80   10/01/20 (!) 144/92   03/03/20 (!) 153/90    Wt Readings from Last 3 Encounters:   10/12/20 94.3 kg (208 lb)   10/01/20 93.9 kg (207 lb)   03/03/20 94.8 kg (209 lb)                    Reviewed and updated:  Tobacco  Allergies  Meds  Med Hx  Surg Hx  Fam Hx  Soc Hx     ROS:  Constitutional, neuro, ENT, endocrine, pulmonary, cardiac, gastrointestinal, genitourinary, musculoskeletal, integument and psychiatric systems are negative, except as otherwise noted.    PHYSICAL EXAM   /80 (Cuff Size: Adult Regular)   Pulse 64    "Temp 98  F (36.7  C) (Tympanic)   Resp 18   Ht 1.778 m (5' 10\")   Wt 94.3 kg (208 lb)   BMI 29.84 kg/m    Body mass index is 29.84 kg/m .  GENERAL: alert and no distress  NECK: no adenopathy, no asymmetry, masses, or scars and thyroid normal to palpation  RESP: lungs clear to auscultation - no rales, rhonchi or wheezes  CV: regular rate and rhythm, normal S1 S2, no S3 or S4, no murmur, click or rub, no peripheral edema and peripheral pulses strong  ABDOMEN: Soft, mildly tender left lower quadrant, bowel sounds normal  MS: no gross musculoskeletal defects noted, no edema  SKIN: no suspicious lesions or rashes  NEURO: Normal strength and tone, mentation intact and speech normal  PSYCH: mentation appears normal, affect normal/bright    Assessment & Plan     Acute diverticulitis  --Patient was treated for suspected diverticulitis with ciprofloxacin and Flagyl about 10 days ago, complaints of mild lingering left abdominal pain.  Differential discussed in detail including partially treated acute diverticulitis.  Augmentin and Flagyl prescribed, common side effect discussed.  CBC, CMP and CT abdomen/pelvis ordered for further evaluation.  Instructed to go ER if symptoms persist or worsen.  Patient understood and in agreement with above plan.  All questions answered.  - CT Abdomen Pelvis w Contrast; Future  - amoxicillin-clavulanate (AUGMENTIN) 875-125 MG tablet; Take 1 tablet by mouth 2 times daily for 10 days  - metroNIDAZOLE (FLAGYL) 500 MG tablet; Take 1 tablet (500 mg) by mouth 3 times daily for 10 days  - CBC with platelets  - Comprehensive metabolic panel (BMP + Alb, Alk Phos, ALT, AST, Total. Bili, TP)     BMI:   Estimated body mass index is 29.84 kg/m  as calculated from the following:    Height as of this encounter: 1.778 m (5' 10\").    Weight as of this encounter: 94.3 kg (208 lb).                No follow-ups on file.    See Meng MD  [facility]      Risks, benefits, side " effects and rationale for treatment plan fully discussed with the patient and understanding expressed.

## 2020-10-12 NOTE — PATIENT INSTRUCTIONS
Patient Education     Diverticulitis    Some people get pouches along the wall of the colon as they get older. The pouches, called diverticuli, usually cause no symptoms. If the pouches become blocked, you can get an infection. This infection is called diverticulitis. It causes pain in your lower abdomen and fever. If not treated, it can become a serious condition, causing an abscess to form inside the pouch. The abscess may block the intestinal tract even or rupture, spreading infection throughout the abdomen.  When treatment is started early, oral antibiotics alone may be enough to cure diverticulitis. This method is tried first. But, if you don't improve or if your condition gets worse while using oral antibiotics, you may need to be admitted to the hospital for IV antibiotics. Severe cases may require surgery.  Home care  The following guidelines will help you care for yourself at home:    During the acute illness, rest and follow your healthcare provider's instructions about diet. Sometimes you will need to follow a clear liquid diet to rest your bowel. Once your symptoms are better, you may be told to follow a low-fiber diet for some time. Include foods like:  ? Flake cereal, mashed potatoes, pancakes, waffles, pasta, white bread, rice, applesauce, bananas, eggs, fish, poultry, tofu, and cooked soft vegetables    Take antibiotics exactly as instructed. Don't miss any doses or stop taking the medication, even if you feel better.    Monitor your temperature and tell your healthcare provider if you have rising temperatures.  Preventing future attacks  Once you have an episode of diverticulitis, you are at risk for having it again. After you have recovered from this episode, you may be able to lower your risk by eating a high-fiber diet (20 gm/day to 35 gm/day of fiber). This cleans out the colon pouches that already exist and may prevent new ones from forming. Foods high in fiber include fresh fruits and edible  peelings, raw or lightly cooked vegetables, whole grain cereals and breads, dried beans and peas, and bran.  Other steps that can help prevent future attacks include:    Take your medicines, such as antibiotics, as your healthcare provider says.    Drink 6 to 8 glasses of water every day, unless told otherwise.    Use a heating pad or hot water bottle to help abdominal cramping or pain.    Begin an exercise program. Ask your healthcare provider how to get started. You can benefit from simple activities such as walking or gardening.    Treat diarrhea with a bland diet. Start with liquids only; then slowly add fiber over time.    Watch for changes in your bowel movements (constipation to diarrhea). Avoid constipation by eating a high fiber diet and taking a stool softener if needed.    Get plenty of rest and sleep.  Follow-up care  Follow up with your healthcare provider as advised or sooner if you are not getting better in the next 2 days.  When to seek medical advice  Call your healthcare provider right away if any of these occur:    Fever of 100.4 F (38 C) or higher, or as directed by your healthcare provider    Repeated vomiting or swelling of the abdomen    Weakness, dizziness, light-headedness    Pain in your abdomen that gets worse, severe, or spreads to your back    Pain that moves to the right lower abdomen    Rectal bleeding (stools that are red, black or maroon color)    Unexpected vaginal bleeding  Date Last Reviewed: 9/1/2016 2000-2019 [employer]. [address] [place] [address], [place] [address]. All rights reserved. This information is not intended as a substitute for professional medical care. Always follow your healthcare professional's instructions.

## 2020-10-13 ENCOUNTER — HOSPITAL ENCOUNTER (OUTPATIENT)
Dept: CT IMAGING | Facility: CLINIC | Age: 80
Discharge: HOME | End: 2020-10-13
Attending: FAMILY MEDICINE | Admitting: FAMILY MEDICINE
Payer: COMMERCIAL

## 2020-10-13 DIAGNOSIS — E87.1 HYPONATREMIA: Primary | ICD-10-CM

## 2020-10-13 DIAGNOSIS — K57.92 ACUTE DIVERTICULITIS: ICD-10-CM

## 2020-10-13 PROCEDURE — 250N000011 HC RX IP 250 OP 636: Performed by: FAMILY MEDICINE

## 2020-10-13 PROCEDURE — 250N000009 HC RX 250: Performed by: FAMILY MEDICINE

## 2020-10-13 PROCEDURE — 74177 CT ABD & PELVIS W/CONTRAST: CPT

## 2020-10-13 RX ORDER — IOPAMIDOL 755 MG/ML
100 INJECTION, SOLUTION INTRAVASCULAR ONCE
Status: COMPLETED | OUTPATIENT
Start: 2020-10-13 | End: 2020-10-13

## 2020-10-13 RX ADMIN — IOPAMIDOL 100 ML: 755 INJECTION, SOLUTION INTRAVENOUS at 12:07

## 2020-10-13 RX ADMIN — SODIUM CHLORIDE 65 ML: 9 INJECTION, SOLUTION INTRAVENOUS at 12:07

## 2020-10-16 ENCOUNTER — OFFICE VISIT (OUTPATIENT)
Dept: FAMILY MEDICINE | Facility: CLINIC | Age: 80
End: 2020-10-16
Payer: COMMERCIAL

## 2020-10-16 VITALS
TEMPERATURE: 97.7 F | HEART RATE: 68 BPM | DIASTOLIC BLOOD PRESSURE: 74 MMHG | HEIGHT: 70 IN | RESPIRATION RATE: 18 BRPM | SYSTOLIC BLOOD PRESSURE: 130 MMHG | WEIGHT: 208 LBS | BODY MASS INDEX: 29.78 KG/M2

## 2020-10-16 DIAGNOSIS — E11.9 TYPE 2 DIABETES MELLITUS WITHOUT COMPLICATION, WITHOUT LONG-TERM CURRENT USE OF INSULIN (H): Primary | ICD-10-CM

## 2020-10-16 DIAGNOSIS — E87.1 HYPONATREMIA: ICD-10-CM

## 2020-10-16 DIAGNOSIS — Z23 NEED FOR SHINGLES VACCINE: ICD-10-CM

## 2020-10-16 LAB
HBA1C MFR BLD: 5.9 % (ref 0–5.6)
OSMOLALITY SERPL: 292 MMOL/KG (ref 280–301)
OSMOLALITY UR: 566 MMOL/KG (ref 100–1200)

## 2020-10-16 PROCEDURE — 83930 ASSAY OF BLOOD OSMOLALITY: CPT | Performed by: FAMILY MEDICINE

## 2020-10-16 PROCEDURE — 99000 SPECIMEN HANDLING OFFICE-LAB: CPT | Performed by: FAMILY MEDICINE

## 2020-10-16 PROCEDURE — 83935 ASSAY OF URINE OSMOLALITY: CPT | Mod: 90 | Performed by: FAMILY MEDICINE

## 2020-10-16 PROCEDURE — 99214 OFFICE O/P EST MOD 30 MIN: CPT | Performed by: FAMILY MEDICINE

## 2020-10-16 PROCEDURE — 83036 HEMOGLOBIN GLYCOSYLATED A1C: CPT | Performed by: FAMILY MEDICINE

## 2020-10-16 PROCEDURE — 36415 COLL VENOUS BLD VENIPUNCTURE: CPT | Performed by: FAMILY MEDICINE

## 2020-10-16 ASSESSMENT — MIFFLIN-ST. JEOR: SCORE: 1669.73

## 2020-10-16 NOTE — NURSING NOTE
"Chief Complaint   Patient presents with     Diabetes     /74 (Cuff Size: Adult Large)   Pulse 68   Temp 97.7  F (36.5  C) (Tympanic)   Resp 18   Ht 1.778 m (5' 10\")   Wt 94.3 kg (208 lb)   BMI 29.84 kg/m   Estimated body mass index is 29.84 kg/m  as calculated from the following:    Height as of this encounter: 1.778 m (5' 10\").    Weight as of this encounter: 94.3 kg (208 lb).  Patient presents to the clinic using No DME      Health Maintenance that is potentially due pending provider review:    Health Maintenance Due   Topic Date Due     HEPATITIS B IMMUNIZATION (1 of 3 - Risk 3-dose series) 12/04/1960     ZOSTER IMMUNIZATION (1 of 2) 12/04/1991     DTAP/TDAP/TD IMMUNIZATION (3 - Td) 08/20/2018     A1C  05/08/2020     EYE EXAM  06/28/2020     MEDICARE ANNUAL WELLNESS VISIT  11/08/2020     LIPID  11/08/2020     MICROALBUMIN  11/08/2020     DIABETIC FOOT EXAM  11/08/2020     FALL RISK ASSESSMENT  11/08/2020                "

## 2020-10-16 NOTE — LETTER
October 16, 2020      Ana Maria Duvall  [id number] [facility] [id number]        Dear ,    We are writing to inform you of your test results.      Hemoglobin A1c 5.9, consistent with well-controlled diabetes. Let us know if there are any questions.     Resulted Orders   Hemoglobin A1c   Result Value Ref Range    Hemoglobin A1C 5.9 (H) 0 - 5.6 %      Comment:      Normal <5.7% Prediabetes 5.7-6.4%  Diabetes 6.5% or higher - adopted from ADA   consensus guidelines.         If you have any questions or concerns, please call the clinic at the number listed above.       Sincerely,        See Meng MD/pj

## 2020-10-16 NOTE — PROGRESS NOTES
SUBJECTIVE   Ana Maria Duvall is a 79 year old male who presents with     Diabetes Follow-up    How often are you checking your blood sugar? One time daily  What time of day are you checking your blood sugars (select all that apply)?  Before meals  Have you had any blood sugars above 200?  No  Have you had any blood sugars below 70?  No    What symptoms do you notice when your blood sugar is low?  None    What concerns do you have today about your diabetes? None     Do you have any of these symptoms? (Select all that apply)  Burning in feet    Have you had a diabetic eye exam in the last 12 months? YES         BP Readings from Last 2 Encounters:   10/16/20 130/74   10/12/20 136/80     Hemoglobin A1C (%)   Date Value   11/08/2019 6.1 (H)   05/17/2019 6.2 (H)     LDL Cholesterol Calculated (mg/dL)   Date Value   11/08/2019 108 (H)   09/20/2018 110 (H)         PCP   See Meng [phone]    Health Maintenance        Health Maintenance Due   Topic Date Due     HEPATITIS B IMMUNIZATION (1 of 3 - Risk 3-dose series) 12/04/1960     ZOSTER IMMUNIZATION (1 of 2) 12/04/1991     DTAP/TDAP/TD IMMUNIZATION (3 - Td) 08/20/2018     A1C  05/08/2020     EYE EXAM  06/28/2020     MEDICARE ANNUAL WELLNESS VISIT  11/08/2020     LIPID  11/08/2020     MICROALBUMIN  11/08/2020     DIABETIC FOOT EXAM  11/08/2020     FALL RISK ASSESSMENT  11/08/2020       HPI        Patient Active Problem List   Diagnosis     Essential hypertension     Glaucoma     ERECTILE DYSFUNCTION     Diverticulitis of colon     Slowing of urinary stream     Sciatica     Degenerative arthritis of thumb     Type 2 diabetes mellitus without complications (H)     Advanced directives, counseling/discussion     Health Care Home     Lumbar radiculopathy     CARDIOVASCULAR SCREENING; LDL GOAL LESS THAN 130     Nodular basal cell carcinoma     Statin intolerance     Current Outpatient Medications   Medication     acetaminophen (TYLENOL) 500 MG tablet      amoxicillin-clavulanate (AUGMENTIN) 875-125 MG tablet     ASPIRIN 81 MG OR TABS     blood glucose (NO BRAND SPECIFIED) lancets standard     blood glucose monitoring (NO BRAND SPECIFIED) meter device kit     brimonidine (ALPHAGAN) 0.2 % ophthalmic solution     CALCIUM + D OR     Cholecalciferol (VITAMIN D3 PO)     FLAX SEED OIL 1000 MG OR CAPS     ibuprofen (ADVIL,MOTRIN) 200 MG tablet     latanoprost (XALATAN) 0.005 % ophthalmic solution     metFORMIN (GLUCOPHAGE) 500 MG tablet     metroNIDAZOLE (FLAGYL) 500 MG tablet     MULTI-VITAMIN OR TABS     Saw [place] 1000 MG CAPS     STATIN NOT PRESCRIBED, INTENTIONAL,     TRUE METRIX BLOOD GLUCOSE TEST test strip     TRUEplus Lancets 33G MISC     UNABLE TO FIND     No current facility-administered medications for this visit.      Facility-Administered Medications Ordered in Other Visits   Medication     iohexol (OMNIPAQUE) 300 mg/mL injection 10 mL       Patient Active Problem List   Diagnosis     Essential hypertension     Glaucoma     ERECTILE DYSFUNCTION     Diverticulitis of colon     Slowing of urinary stream     Sciatica     Degenerative arthritis of thumb     Type 2 diabetes mellitus without complications (H)     Advanced directives, counseling/discussion     Health Care Home     Lumbar radiculopathy     CARDIOVASCULAR SCREENING; LDL GOAL LESS THAN 130     Nodular basal cell carcinoma     Statin intolerance     Past Surgical History:   Procedure Laterality Date     ARTHROSCOPY KNEE Right      BACK SURGERY  1995     BACK SURGERY  1993     COLONOSCOPY  04/20/2018    Repeat in 3 years; 12 mm polyp removed, diverticulosis, internal hemorrhoids     COLONOSCOPY  10/02/2008    Repeat in 10 years; diverticulosis     DECOMPRESSION LUMBAR MINIMALLY INVASIVE THREE+ LEVELS  11/20/2013    Lumbar 2-3, Lumbar 3-4, Lumbar 5-Sacral 1;  Surgeon: Param Jones MD;  [facility]     ENDOSCOPIC RELEASE ULNAR NERVE (ELBOW) Right 3/3/2020    Procedure: ENDOSCOPIC Cubital Tunnel  Release;  Surgeon: Carlos Jordan MD;  Location: [facility]     RELEASE CARPAL TUNNEL Right 3/3/2020    Procedure: Open Carpal Tunnel Release;  Surgeon: Carlos Jordan MD;  Location: [facility]       Social History     Tobacco Use     Smoking status: Former Smoker     Years: 20.00     Types: Cigarettes, Cigars, Pipe     Quit date: 12/15/1966     Years since quittin.8     Smokeless tobacco: Never Used   Substance Use Topics     Alcohol use: Yes     Comment: occ.     Family History   Problem Relation Age of Onset     Cancer Mother      Alcohol/Drug Father         age 33 bleeding ulcers     Cancer Brother      Arthritis Brother      Eye Disorder Brother      Diabetes Sister      Eye Disorder Sister         glaucoma     Arthritis Sister      Cardiovascular Son      Eye Disorder Brother         glaucoma     Diabetes Brother      Arthritis Brother      Cancer Brother         LUNG CANCER WITH METS     Diabetes Brother      Cancer Brother         prostate     Arthritis Brother      Eye Disorder Brother      Arthritis Brother      Eye Disorder Brother      Arthritis Sister      Eye Disorder Sister          Current Outpatient Medications   Medication Sig Dispense Refill     acetaminophen (TYLENOL) 500 MG tablet Take 1,000 mg by mouth every 6 hours as needed       amoxicillin-clavulanate (AUGMENTIN) 875-125 MG tablet Take 1 tablet by mouth 2 times daily for 10 days 20 tablet 0     ASPIRIN 81 MG OR TABS 1 TABLET DAILY       blood glucose (NO BRAND SPECIFIED) lancets standard Use to test blood sugar 1 time daily 100 each 11     blood glucose monitoring (NO BRAND SPECIFIED) meter device kit Use to test blood sugar 1 time daily 1 kit 0     brimonidine (ALPHAGAN) 0.2 % ophthalmic solution Place 1 drop into both eyes 2 times daily 1 Bottle 2     CALCIUM + D OR 1 TABLET DAILY       Cholecalciferol (VITAMIN D3 PO) Take by mouth daily       FLAX SEED OIL 1000 MG OR CAPS 1 CAPSULE DAILY       ibuprofen (ADVIL,MOTRIN) 200 MG tablet  Take 200 mg by mouth every 4 hours as needed Reported on 4/21/2017       latanoprost (XALATAN) 0.005 % ophthalmic solution Apply 1 drop to eye At Bedtime  11     metFORMIN (GLUCOPHAGE) 500 MG tablet TAKE 1 TABLET 2 TIMES DAILY (WITH MEALS) NEEDS OFFICE VISIT FOR FURTHER REFILLS 60 tablet 0     metroNIDAZOLE (FLAGYL) 500 MG tablet Take 1 tablet (500 mg) by mouth 3 times daily for 10 days 30 tablet 0     MULTI-VITAMIN OR TABS DAILY       Saw [place] 1000 MG CAPS Take 2 capsules by mouth daily 60 capsule 0     STATIN NOT PRESCRIBED, INTENTIONAL, Statin not prescribed intentionally due to Intolerance       TRUE METRIX BLOOD GLUCOSE TEST test strip TEST BLOOD SUGAR EVERY  strip 3     TRUEplus Lancets 33G MISC TEST BLOOD SUGAR ONE TIME DAILY 100 each 3     UNABLE TO FIND MEDICATION NAME: CBD oil       Allergies   Allergen Reactions     Amlodipine      Leg swelling      Lisinopril Cough     Nka [No Known Allergies]      Recent Labs   Lab Test 10/12/20  1647 03/06/20  0927 11/08/19  0847 11/08/19  0847 08/12/19  0845 08/12/19  0845 05/17/19  0838 09/20/18  0755 06/09/17  0805 06/09/17  0805 08/01/16  1041 08/01/16  1041 11/23/13  0333 11/23/13  0333   A1C  --   --   --  6.1*  --   --  6.2* 6.3*   < > 6.1*  --  5.8   < >  --    LDL  --   --   --  108*  --   --   --  110*  --  115*  --   --    < >  --    HDL  --   --   --  50  --   --   --  42  --  49  --   --    < >  --    TRIG  --   --   --  109  --   --   --  120  --  75  --   --    < >  --    ALT 35  --   --   --   --   --   --   --   --   --   --  32  --  37   CR 0.81 0.70   < > 0.76   < >  --   --  0.78  --  0.83  --  0.76   < > 0.75   GFRESTIMATED 84 >90   < > 88   < >  --   --  >90  --  >90  Non  GFR Calc     < > >90  Non  GFR Calc     < > >90   GFRESTBLACK >90 >90   < > >90   < >  --   --  >90  --  >90  African American GFR Calc     < > >90   GFR Calc     < > >90   POTASSIUM 4.6 4.2   < > 4.5   < >  --   --  5.0  " --  4.3  --  5.0   < > 4.4   TSH  --   --   --   --   --  0.92  --   --   --  1.52  --   --    < >  --     < > = values in this interval not displayed.      BP Readings from Last 3 Encounters:   10/16/20 130/74   10/12/20 136/80   10/01/20 (!) 144/92    Wt Readings from Last 3 Encounters:   10/16/20 94.3 kg (208 lb)   10/12/20 94.3 kg (208 lb)   10/01/20 93.9 kg (207 lb)                    Reviewed and updated:  Tobacco  Allergies  Meds  Med Hx  Surg Hx  Fam Hx  Soc Hx     ROS:  Constitutional, neuro, ENT, endocrine, pulmonary, cardiac, gastrointestinal, genitourinary, musculoskeletal, integument and psychiatric systems are negative, except as otherwise noted.    PHYSICAL EXAM   /74 (Cuff Size: Adult Large)   Pulse 68   Temp 97.7  F (36.5  C) (Tympanic)   Resp 18   Ht 1.778 m (5' 10\")   Wt 94.3 kg (208 lb)   BMI 29.84 kg/m    Body mass index is 29.84 kg/m .  GENERAL: alert, no distress and over weight  EYES: Eyes grossly normal to inspection, PERRL and conjunctivae and sclerae normal  NECK: no adenopathy, no asymmetry, masses, or scars and thyroid normal to palpation  RESP: lungs clear to auscultation - no rales, rhonchi or wheezes  CV: regular rate and rhythm, normal S1 S2, no S3 or S4, no murmur, click or rub, no peripheral edema and peripheral pulses strong  ABDOMEN: soft, nontender, no hepatosplenomegaly, no masses and bowel sounds normal  MS: no gross musculoskeletal defects noted, no edema  NEURO: Normal strength and tone, mentation intact and speech normal  PSYCH: mentation appears normal, affect normal/bright      Lab Results   Component Value Date    A1C 6.1 11/08/2019    A1C 6.2 05/17/2019    A1C 6.3 09/20/2018    A1C 6.6 01/19/2018    A1C 6.1 06/09/2017       Assessment & Plan     Type 2 diabetes mellitus without complication, without long-term current use of insulin (H)  --Hemoglobin A1c 6.1, consistent with well-controlled diabetes.  Suggested to continue regular walks, diabetic diet " and weight loss.  Other medications reviewed and no changes made  - Hemoglobin A1c  - metFORMIN (GLUCOPHAGE) 500 MG tablet; Take 1 tablet (500 mg) by mouth 2 times daily (with meals)    Need for shingles vaccine  --Suggested to discuss with insurance company about Shingrix vaccine coverage        See Meng MD  [facility]

## 2020-11-05 ENCOUNTER — OFFICE VISIT (OUTPATIENT)
Dept: FAMILY MEDICINE | Facility: CLINIC | Age: 80
End: 2020-11-05
Payer: COMMERCIAL

## 2020-11-05 VITALS
WEIGHT: 208 LBS | HEART RATE: 68 BPM | BODY MASS INDEX: 29.78 KG/M2 | DIASTOLIC BLOOD PRESSURE: 82 MMHG | SYSTOLIC BLOOD PRESSURE: 140 MMHG | HEIGHT: 70 IN | TEMPERATURE: 97.9 F | RESPIRATION RATE: 18 BRPM

## 2020-11-05 DIAGNOSIS — M54.41 ACUTE RIGHT-SIDED LOW BACK PAIN WITH RIGHT-SIDED SCIATICA: Primary | ICD-10-CM

## 2020-11-05 PROCEDURE — 99213 OFFICE O/P EST LOW 20 MIN: CPT | Performed by: FAMILY MEDICINE

## 2020-11-05 RX ORDER — METHYLPREDNISOLONE 4 MG
TABLET, DOSE PACK ORAL
Qty: 21 TABLET | Refills: 0 | Status: SHIPPED | OUTPATIENT
Start: 2020-11-05 | End: 2021-05-20

## 2020-11-05 ASSESSMENT — PAIN SCALES - GENERAL: PAINLEVEL: EXTREME PAIN (8)

## 2020-11-05 ASSESSMENT — ENCOUNTER SYMPTOMS
BACK PAIN: 1
CHILLS: 0
SORE THROAT: 0
FEVER: 0
SHORTNESS OF BREATH: 0
ABDOMINAL PAIN: 0
COLOR CHANGE: 0
DYSURIA: 0

## 2020-11-05 ASSESSMENT — MIFFLIN-ST. JEOR: SCORE: 1669.73

## 2020-11-05 NOTE — PROGRESS NOTES
SUBJECTIVE   Ana Maria Duvall is a 79 year old male who presents with     Musculoskeletal problem/pain  Onset/Duration: flair up started about 4 days ago   Description  Location: Low back- right side   Pain: YES  Warmth: no  Intensity:  moderate  Progression of Symptoms:  worsening  Accompanying signs and symptoms:   Fevers: no  Numbness/tingling/weakness: YES- going down the right leg   History  Trauma to the area: YES- no injury but over did it this weekend putting up snow fence   Recent illness:  no  Previous similar problem: YES  Previous evaluation:  YES  Precipitating or alleviating factors:  Aggravating factors include: standing, walking, climbing stairs, exercise and overuse  Therapies tried and outcome: rest/inactivity, ice, chiropractor, Kineseo tape     Acting up since Monday, continued to get worse. Has been seen by chiropractor twice this week without improvement. Was taped up at chiro yesterday, instructed to wear this x5 days. Instructed to ice. Was putting up snow fence 5 days ago. Was also laying on the floor to change a filter. Has chronic back pain, some days worse than others. Feels a little better today after he has been resting. Burning sensation down the back of the right leg. No new weakness in the legs. No loss of control of bowel or bladder. Pain flare is on the right side. Initially was right in the middle, but after chiropractor moved to the right side.     PCP   See Meng [phone]    Health Maintenance        Health Maintenance Due   Topic Date Due     HEPATITIS C SCREENING  12/04/1959     HEPATITIS B IMMUNIZATION (1 of 3 - Risk 3-dose series) 12/04/1960     ZOSTER IMMUNIZATION (1 of 2) 12/04/1991     DTAP/TDAP/TD IMMUNIZATION (3 - Td) 08/20/2018     EYE EXAM  06/28/2020     MEDICARE ANNUAL WELLNESS VISIT  11/08/2020     LIPID  11/08/2020     MICROALBUMIN  11/08/2020     DIABETIC FOOT EXAM  11/08/2020     FALL RISK ASSESSMENT  11/08/2020       HPI        Patient Active  Problem List   Diagnosis     Essential hypertension     Glaucoma     ERECTILE DYSFUNCTION     Diverticulitis of colon     Slowing of urinary stream     Sciatica     Degenerative arthritis of thumb     Type 2 diabetes mellitus without complications (H)     Advanced directives, counseling/discussion     Health Care Home     Lumbar radiculopathy     CARDIOVASCULAR SCREENING; LDL GOAL LESS THAN 130     Nodular basal cell carcinoma     Statin intolerance     Current Outpatient Medications   Medication     acetaminophen (TYLENOL) 500 MG tablet     ASPIRIN 81 MG OR TABS     blood glucose (NO BRAND SPECIFIED) lancets standard     blood glucose monitoring (NO BRAND SPECIFIED) meter device kit     brimonidine (ALPHAGAN) 0.2 % ophthalmic solution     CALCIUM + D OR     Cholecalciferol (VITAMIN D3 PO)     FLAX SEED OIL 1000 MG OR CAPS     ibuprofen (ADVIL,MOTRIN) 200 MG tablet     latanoprost (XALATAN) 0.005 % ophthalmic solution     metFORMIN (GLUCOPHAGE) 500 MG tablet     MULTI-VITAMIN OR TABS     Saw [place] 1000 MG CAPS     STATIN NOT PRESCRIBED, INTENTIONAL,     TRUE METRIX BLOOD GLUCOSE TEST test strip     TRUEplus Lancets 33G MISC     UNABLE TO FIND     No current facility-administered medications for this visit.      Facility-Administered Medications Ordered in Other Visits   Medication     iohexol (OMNIPAQUE) 300 mg/mL injection 10 mL       Patient Active Problem List   Diagnosis     Essential hypertension     Glaucoma     ERECTILE DYSFUNCTION     Diverticulitis of colon     Slowing of urinary stream     Sciatica     Degenerative arthritis of thumb     Type 2 diabetes mellitus without complications (H)     Advanced directives, counseling/discussion     Health Care Home     Lumbar radiculopathy     CARDIOVASCULAR SCREENING; LDL GOAL LESS THAN 130     Nodular basal cell carcinoma     Statin intolerance     Past Surgical History:   Procedure Laterality Date     ARTHROSCOPY KNEE Right      BACK SURGERY  1995     BACK  SURGERY  1993     COLONOSCOPY  2018    Repeat in 3 years; 12 mm polyp removed, diverticulosis, internal hemorrhoids     COLONOSCOPY  10/02/2008    Repeat in 10 years; diverticulosis     DECOMPRESSION LUMBAR MINIMALLY INVASIVE THREE+ LEVELS  2013    Lumbar 2-3, Lumbar 3-4, Lumbar 5-Sacral 1;  Surgeon: Param Jones MD;  [facility]     ENDOSCOPIC RELEASE ULNAR NERVE (ELBOW) Right 3/3/2020    Procedure: ENDOSCOPIC Cubital Tunnel Release;  Surgeon: Carlos Jordan MD;  Location: [facility]     RELEASE CARPAL TUNNEL Right 3/3/2020    Procedure: Open Carpal Tunnel Release;  Surgeon: Carlos Jordan MD;  Location: [facility]       Social History     Tobacco Use     Smoking status: Former Smoker     Years: 20.00     Types: Cigarettes, Cigars, Pipe     Quit date: 12/15/1966     Years since quittin.9     Smokeless tobacco: Never Used   Substance Use Topics     Alcohol use: Yes     Comment: occ.     Family History   Problem Relation Age of Onset     Cancer Mother      Alcohol/Drug Father         age 33 bleeding ulcers     Cancer Brother      Arthritis Brother      Eye Disorder Brother      Diabetes Sister      Eye Disorder Sister         glaucoma     Arthritis Sister      Cardiovascular Son      Eye Disorder Brother         glaucoma     Diabetes Brother      Arthritis Brother      Cancer Brother         LUNG CANCER WITH METS     Diabetes Brother      Cancer Brother         prostate     Arthritis Brother      Eye Disorder Brother      Arthritis Brother      Eye Disorder Brother      Arthritis Sister      Eye Disorder Sister          Current Outpatient Medications   Medication Sig Dispense Refill     acetaminophen (TYLENOL) 500 MG tablet Take 1,000 mg by mouth every 6 hours as needed       ASPIRIN 81 MG OR TABS 1 TABLET DAILY       blood glucose (NO BRAND SPECIFIED) lancets standard Use to test blood sugar 1 time daily 100 each 11     blood glucose monitoring (NO BRAND SPECIFIED) meter device kit Use to  test blood sugar 1 time daily 1 kit 0     brimonidine (ALPHAGAN) 0.2 % ophthalmic solution Place 1 drop into both eyes 2 times daily 1 Bottle 2     CALCIUM + D OR 1 TABLET DAILY       Cholecalciferol (VITAMIN D3 PO) Take by mouth daily       FLAX SEED OIL 1000 MG OR CAPS 1 CAPSULE DAILY       ibuprofen (ADVIL,MOTRIN) 200 MG tablet Take 200 mg by mouth every 4 hours as needed Reported on 4/21/2017       latanoprost (XALATAN) 0.005 % ophthalmic solution Apply 1 drop to eye At Bedtime  11     metFORMIN (GLUCOPHAGE) 500 MG tablet Take 1 tablet (500 mg) by mouth 2 times daily (with meals) 180 tablet 1     MULTI-VITAMIN OR TABS DAILY       Saw [place] 1000 MG CAPS Take 2 capsules by mouth daily 60 capsule 0     STATIN NOT PRESCRIBED, INTENTIONAL, Statin not prescribed intentionally due to Intolerance       TRUE METRIX BLOOD GLUCOSE TEST test strip TEST BLOOD SUGAR EVERY  strip 3     TRUEplus Lancets 33G MISC TEST BLOOD SUGAR ONE TIME DAILY 100 each 3     UNABLE TO FIND MEDICATION NAME: CBD oil       Allergies   Allergen Reactions     Amlodipine      Leg swelling      Lisinopril Cough     Nka [No Known Allergies]      Recent Labs   Lab Test 10/16/20  0803 10/12/20  1647 03/06/20  0927 11/08/19  0847 11/08/19  0847 08/12/19  0845 08/12/19  0845 05/17/19  0838 09/20/18  0755 06/09/17  0805 06/09/17  0805 08/01/16  1041 08/01/16  1041 11/23/13  0333 11/23/13  0333   A1C 5.9*  --   --   --  6.1*  --   --  6.2* 6.3*   < > 6.1*  --  5.8   < >  --    LDL  --   --   --   --  108*  --   --   --  110*  --  115*  --   --    < >  --    HDL  --   --   --   --  50  --   --   --  42  --  49  --   --    < >  --    TRIG  --   --   --   --  109  --   --   --  120  --  75  --   --    < >  --    ALT  --  35  --   --   --   --   --   --   --   --   --   --  32  --  37   CR  --  0.81 0.70   < > 0.76   < >  --   --  0.78  --  0.83  --  0.76   < > 0.75   GFRESTIMATED  --  84 >90   < > 88   < >  --   --  >90  --  >90  Non   "GFR Calc     < > >90  Non  GFR Calc     < > >90   GFRESTBLACK  --  >90 >90   < > >90   < >  --   --  >90  --  >90  African American GFR Calc     < > >90   GFR Calc     < > >90   POTASSIUM  --  4.6 4.2   < > 4.5   < >  --   --  5.0  --  4.3  --  5.0   < > 4.4   TSH  --   --   --   --   --   --  0.92  --   --   --  1.52  --   --    < >  --     < > = values in this interval not displayed.      BP Readings from Last 3 Encounters:   11/05/20 (!) 140/82   10/16/20 130/74   10/12/20 136/80    Wt Readings from Last 3 Encounters:   11/05/20 94.3 kg (208 lb)   10/16/20 94.3 kg (208 lb)   10/12/20 94.3 kg (208 lb)                    Reviewed and updated:  Tobacco  Allergies  Meds  Med Hx  Surg Hx  Fam Hx  Soc Hx     ROS:  Review of Systems   Constitutional: Negative for chills and fever.   HENT: Negative for sore throat.    Respiratory: Negative for shortness of breath.    Cardiovascular: Negative for chest pain.   Gastrointestinal: Negative for abdominal pain.        No loss of control of bowel   Genitourinary: Negative for dysuria.        No loss of control of bladder   Musculoskeletal: Positive for back pain (chronic, acute right sided back pain).        Burning sensation down posterior right leg   Skin: Negative for color change and rash.   All other systems reviewed and are negative.      PHYSICAL EXAM   BP (!) 140/82 (Cuff Size: Adult Large)   Pulse 68   Temp 97.9  F (36.6  C) (Tympanic)   Resp 18   Ht 1.778 m (5' 10\")   Wt 94.3 kg (208 lb)   BMI 29.84 kg/m    Body mass index is 29.84 kg/m .  Physical Exam  Constitutional:       Appearance: Normal appearance.   HENT:      Head: Normocephalic.   Eyes:      General: No scleral icterus.     Extraocular Movements: Extraocular movements intact.      Conjunctiva/sclera: Conjunctivae normal.   Neck:      Musculoskeletal: Normal range of motion.   Cardiovascular:      Rate and Rhythm: Normal rate.   Pulmonary:      Effort: Pulmonary " "effort is normal.   Musculoskeletal: Normal range of motion.      Comments: Normal strength of bilateral lower extremities. No tenderness over spine. Tenderness over musculature on right lumbar region.    Skin:     General: Skin is warm and dry.   Neurological:      General: No focal deficit present.      Mental Status: He is alert and oriented to person, place, and time.         Assessment & Plan     Acute right-sided low back pain with right-sided sciatica  Patient was very active this weekend (about 5 days ago). Since then, he has had increased pain in his right low back (has chronic back pain s/p 5x lumbar spine surgeries). He has been seen by chiropractor 2x in the last 4 days. Was taped with kineseo tape yesterday. Today has been taking it easy, icing more consistently and feels a little better. He has associated radiation of burning sensation down the right leg posteriorly. He is given return precautions. Offered medrol dose ruby, which was ordered. He will pick this up if he is not improving over the weekend. He is instructed to continue icing, using ibuprofen and tylenol scheduled for the next few days.     BMI:   Estimated body mass index is 29.84 kg/m  as calculated from the following:    Height as of this encounter: 1.778 m (5' 10\").    Weight as of this encounter: 94.3 kg (208 lb).   Did not address today.          See Meng MD  [facility]          "

## 2020-11-05 NOTE — NURSING NOTE
"Chief Complaint   Patient presents with     Musculoskeletal Problem     BP (!) 140/82 (Cuff Size: Adult Large)   Pulse 68   Temp 97.9  F (36.6  C) (Tympanic)   Resp 18   Ht 1.778 m (5' 10\")   Wt 94.3 kg (208 lb)   BMI 29.84 kg/m   Estimated body mass index is 29.84 kg/m  as calculated from the following:    Height as of this encounter: 1.778 m (5' 10\").    Weight as of this encounter: 94.3 kg (208 lb).  Patient presents to the clinic using No DME      Health Maintenance that is potentially due pending provider review:    Health Maintenance Due   Topic Date Due     HEPATITIS C SCREENING  12/04/1959     HEPATITIS B IMMUNIZATION (1 of 3 - Risk 3-dose series) 12/04/1960     ZOSTER IMMUNIZATION (1 of 2) 12/04/1991     DTAP/TDAP/TD IMMUNIZATION (3 - Td) 08/20/2018     EYE EXAM  06/28/2020     MEDICARE ANNUAL WELLNESS VISIT  11/08/2020     LIPID  11/08/2020     MICROALBUMIN  11/08/2020     DIABETIC FOOT EXAM  11/08/2020     FALL RISK ASSESSMENT  11/08/2020                "

## 2020-11-05 NOTE — PATIENT INSTRUCTIONS
Patient Education     Understanding Lumbar Radiculopathy    Lumbar radiculopathy is irritation or inflammation of a nerve root in the low back. It causes symptoms that spread out from the back down one or both legs. To understand this condition, it helps to understand the parts of the spine:    Vertebrae. These are bones that stack to form the spine. The lumbar spine contains the 5 bottom vertebrae.    Disks. These are soft pads of tissue between the vertebrae. They act as shock absorbers for the spine.    Spinal canal. This is a tunnel formed within the stacked vertebrae. In the lumbar spine, nerves run through this canal.    Nerves. These branch off and leave the spinal canal, traveling out to parts of the body. As they leave the spinal canal, nerves pass through openings between the vertebrae. The nerve root is the part of the nerve that is closest to the spinal canal.    Sciatic nerve. This is a large nerve formed from several nerve roots in the low back. This nerve extends down the back of the leg to the foot.  With lumbar radiculopathy, nerve roots in the low back become irritated. This leads to pain and symptoms. The sciatic nerve is commonly involved, so the condition is often called sciatica.  What causes lumbar radiculopathy?  Aging, injury, poor posture, extra body weight, and other issues can lead to problems in the low back. These problems may then irritate nerve roots. They include:    Damage to a disk in the lumbar spine. The damaged disk may then press on nearby nerve roots.    Degeneration from wear and tear, and aging. This can lead to narrowing (stenosis) of the openings between the vertebrae. The narrowed openings press on nerve roots as they leave the spinal canal.    Unstable spine. This is when a vertebra slips forward. It can then press on a nerve root.  Other, less common things can put pressure on nerves in the low back. These include diabetes, infection, or a tumor.  Symptoms of lumbar  radiculopathy  These include:    Pain in the low back    Pain, numbness, tingling, or weakness that travels into the buttocks, hip, groin, or leg    Muscle spasms  Treatment for lumbar radiculopathy  In most cases, your healthcare provider will first try treatments that help relieve symptoms. These may include:    Prescription and over-the-counter pain medicines. These help relieve pain, swelling, and irritation.    Limits on positions and activities that increase pain. But lying in bed or avoiding all movement is only recommended for a short period of time.    Physical therapy, including exercises and stretches. This helps decrease pain and increase movement and function.    Steroid shots into the lower back. This may help relieve symptoms for a time.    Weight-loss program. If you are overweight, losing extra pounds may help relieve symptoms.  In some cases, you may need surgery to fix the underlying problem. This depends on the cause, the symptoms, and how long the pain has lasted.  Possible complications  Over time, an irritated and inflamed nerve may become damaged. This may lead to long-lasting (permanent) numbness or weakness in your legs and feet. If symptoms change suddenly or get worse, be sure to let your healthcare provider know.  When to call your healthcare provider  Call your healthcare provider right away if you have any of these:    New pain or pain that gets worse    New or increasing weakness, tingling, or numbness in your leg or foot    Problems controlling your bladder or bowel   Penelope last reviewed this educational content on 3/10/2016    6695-7875 [employer]. [address] [place] [address], [place] [address]. All rights reserved. This information is not intended as a substitute for professional medical care. Always follow your healthcare professional's instructions.

## 2020-11-13 ENCOUNTER — OFFICE VISIT (OUTPATIENT)
Dept: FAMILY MEDICINE | Facility: CLINIC | Age: 80
End: 2020-11-13
Payer: COMMERCIAL

## 2020-11-13 VITALS
DIASTOLIC BLOOD PRESSURE: 84 MMHG | OXYGEN SATURATION: 96 % | SYSTOLIC BLOOD PRESSURE: 130 MMHG | RESPIRATION RATE: 18 BRPM | HEART RATE: 68 BPM | BODY MASS INDEX: 29.63 KG/M2 | WEIGHT: 207 LBS | HEIGHT: 70 IN | TEMPERATURE: 97.9 F

## 2020-11-13 DIAGNOSIS — Z00.00 ENCOUNTER FOR MEDICARE ANNUAL WELLNESS EXAM: Primary | ICD-10-CM

## 2020-11-13 DIAGNOSIS — Z12.5 SPECIAL SCREENING FOR MALIGNANT NEOPLASM OF PROSTATE: ICD-10-CM

## 2020-11-13 DIAGNOSIS — Z23 NEED FOR SHINGLES VACCINE: ICD-10-CM

## 2020-11-13 LAB
CHOLEST SERPL-MCNC: 192 MG/DL
CREAT UR-MCNC: 96 MG/DL
HDLC SERPL-MCNC: 60 MG/DL
LDLC SERPL CALC-MCNC: 106 MG/DL
MICROALBUMIN UR-MCNC: 32 MG/L
MICROALBUMIN/CREAT UR: 33.61 MG/G CR (ref 0–17)
NONHDLC SERPL-MCNC: 132 MG/DL
PSA SERPL-ACNC: 1.59 UG/L (ref 0–4)
TRIGL SERPL-MCNC: 130 MG/DL

## 2020-11-13 PROCEDURE — 82043 UR ALBUMIN QUANTITATIVE: CPT | Performed by: FAMILY MEDICINE

## 2020-11-13 PROCEDURE — 80061 LIPID PANEL: CPT | Performed by: FAMILY MEDICINE

## 2020-11-13 PROCEDURE — G0103 PSA SCREENING: HCPCS | Performed by: FAMILY MEDICINE

## 2020-11-13 PROCEDURE — 36415 COLL VENOUS BLD VENIPUNCTURE: CPT | Performed by: FAMILY MEDICINE

## 2020-11-13 PROCEDURE — 99397 PER PM REEVAL EST PAT 65+ YR: CPT | Performed by: FAMILY MEDICINE

## 2020-11-13 ASSESSMENT — ENCOUNTER SYMPTOMS
JOINT SWELLING: 1
HEARTBURN: 0
HEMATOCHEZIA: 0
PARESTHESIAS: 0
MYALGIAS: 1
NERVOUS/ANXIOUS: 0
DYSURIA: 0
CHILLS: 0
ARTHRALGIAS: 1
WEAKNESS: 1
EYE PAIN: 0
FEVER: 0
PALPITATIONS: 0
ABDOMINAL PAIN: 0
CONSTIPATION: 0
HEADACHES: 0
DIARRHEA: 0
NAUSEA: 0
DIZZINESS: 0
WEAKNESS: 0
FREQUENCY: 0
HEMATURIA: 0
SORE THROAT: 0
SHORTNESS OF BREATH: 0
COUGH: 0

## 2020-11-13 ASSESSMENT — ACTIVITIES OF DAILY LIVING (ADL): CURRENT_FUNCTION: NO ASSISTANCE NEEDED

## 2020-11-13 ASSESSMENT — MIFFLIN-ST. JEOR: SCORE: 1665.2

## 2020-11-13 NOTE — PROGRESS NOTES
"SUBJECTIVE:   Ana Maria Duvall is a 79 year old male who presents for Preventive Visit.      Patient has been advised of split billing requirements and indicates understanding: Yes   Are you in the first 12 months of your Medicare coverage?  No    Healthy Habits:     In general, how would you rate your overall health?  Good    Frequency of exercise:  1 day/week    Duration of exercise:  Less than 15 minutes    Do you usually eat at least 4 servings of fruit and vegetables a day, include whole grains    & fiber and avoid regularly eating high fat or \"junk\" foods?  No    Taking medications regularly:  No    Barriers to taking medications:  None    Medication side effects:  None    Ability to successfully perform activities of daily living:  No assistance needed    Home Safety:  No safety concerns identified    Hearing Impairment:  No hearing concerns    In the past 6 months, have you been bothered by leaking of urine? Yes    In general, how would you rate your overall mental or emotional health?  Excellent      PHQ-2 Total Score: 0    Additional concerns today:  No    Do you feel safe in your environment? Yes    Have you ever done Advance Care Planning? (For example, a Health Directive, POLST, or a discussion with a medical provider or your loved ones about your wishes): Yes, advance care planning is on file.      Fall risk  Fallen 2 or more times in the past year?: No  Any fall with injury in the past year?: No    Cognitive Screening   1) Repeat 3 items (Leader, Season, Table)    2) Clock draw: NORMAL  3) 3 item recall: Recalls 3 objects  Results: 3 items recalled: COGNITIVE IMPAIRMENT LESS LIKELY    Mini-CogTM Copyright CHARLI Mazariegos. Licensed by the author for use in [facility]; reprinted with permission (jesus@.[facility]). All rights reserved.          Reviewed and updated as needed this visit by clinical staff  Tobacco  Allergies  Meds              Reviewed and updated as needed this visit by Provider     "            Social History     Tobacco Use     Smoking status: Former Smoker     Years: 20.00     Types: Cigarettes, Cigars, Pipe     Quit date: 12/15/1966     Years since quittin.9     Smokeless tobacco: Never Used   Substance Use Topics     Alcohol use: Yes     Comment: occ.         Alcohol Use 2020   Prescreen: >3 drinks/day or >7 drinks/week? No   Prescreen: >3 drinks/day or >7 drinks/week? -         PROBLEMS TO ADD ON...  none    Current providers sharing in care for this patient include:   Patient Care Team:  See Meng MD as PCP - General (Family Practice)  Ariana Bond RN as   See Meng MD as Assigned PCP    The following health maintenance items are reviewed in Epic and correct as of today:  Health Maintenance   Topic Date Due     HEPATITIS C SCREENING  1958     ZOSTER IMMUNIZATION (1 of 2) 1991     DTAP/TDAP/TD IMMUNIZATION (3 - Td) 2018     EYE EXAM  2020     LIPID  2020     MICROALBUMIN  2020     DIABETIC FOOT EXAM  2020     FALL RISK ASSESSMENT  2020     A1C  2021     COLORECTAL CANCER SCREENING  2021     BMP  10/12/2021     MEDICARE ANNUAL WELLNESS VISIT  2021     ADVANCE CARE PLANNING  2025     PHQ-2  Completed     INFLUENZA VACCINE  Completed     Pneumococcal Vaccine: 65+ Years  Completed     Pneumococcal Vaccine: Pediatrics (0 to 5 Years) and At-Risk Patients (6 to 64 Years)  Aged Out     IPV IMMUNIZATION  Aged Out     MENINGITIS IMMUNIZATION  Aged Out         Review of Systems   Constitutional: Negative for chills and fever.   HENT: Negative for congestion, ear pain, hearing loss and sore throat.    Eyes: Positive for visual disturbance. Negative for pain.   Respiratory: Negative for cough and shortness of breath.    Cardiovascular: Negative for chest pain, palpitations and peripheral edema.   Gastrointestinal: Negative for abdominal pain, constipation, diarrhea, heartburn,  "hematochezia and nausea.   Genitourinary: Positive for impotence. Negative for discharge, dysuria, frequency, genital sores, hematuria and urgency.   Musculoskeletal: Positive for arthralgias, joint swelling and myalgias.   Skin: Negative for rash.   Neurological: Negative for dizziness, weakness, headaches and paresthesias.   Psychiatric/Behavioral: Negative for mood changes. The patient is not nervous/anxious.          OBJECTIVE:   /84 (Cuff Size: Adult Regular)   Pulse 68   Temp 97.9  F (36.6  C) (Tympanic)   Resp 18   Ht 1.778 m (5' 10\")   Wt 93.9 kg (207 lb)   SpO2 96%   BMI 29.70 kg/m   Estimated body mass index is 29.7 kg/m  as calculated from the following:    Height as of this encounter: 1.778 m (5' 10\").    Weight as of this encounter: 93.9 kg (207 lb).  Physical Exam  GENERAL: alert, no distress and over weight  EYES: Eyes grossly normal to inspection, PERRL and conjunctivae and sclerae normal  NECK: no adenopathy, no asymmetry, masses, or scars and thyroid normal to palpation  RESP: lungs clear to auscultation - no rales, rhonchi or wheezes  CV: regular rate and rhythm, normal S1 S2, no S3 or S4, no murmur, click or rub, no peripheral edema and peripheral pulses strong  ABDOMEN: soft, nontender, no hepatosplenomegaly, no masses and bowel sounds normal  MS: Chronic OA changes involving multiple joints, no joint swelling or skin discoloration noted  SKIN: no suspicious lesions or rashes  NEURO: Normal strength and tone, mentation intact and speech normal  PSYCH: mentation appears normal, affect normal/bright      ASSESSMENT / PLAN:   (Z00.00) Encounter for Medicare annual wellness exam  (primary encounter diagnosis)  Comment: Medically stable.  Medications reviewed and no changes made.  Plan: Lipid panel reflex to direct LDL Fasting,         Albumin Random Urine Quantitative with Creat         Ratio         (Z23) Need for shingles vaccine  Comment: Suggested to discuss with insurance company " "about Shingrix vaccine coverage      (Z12.5) Special screening for malignant neoplasm of prostate  Comment:   Plan: PSA, screen                COUNSELING:  Reviewed preventive health counseling, as reflected in patient instructions    Estimated body mass index is 29.7 kg/m  as calculated from the following:    Height as of this encounter: 1.778 m (5' 10\").    Weight as of this encounter: 93.9 kg (207 lb).    Weight management plan: Discussed healthy diet and exercise guidelines    He reports that he quit smoking about 53 years ago. His smoking use included cigarettes, cigars, and pipe. He quit after 20.00 years of use. He has never used smokeless tobacco.      Appropriate preventive services were discussed with this patient, including applicable screening as appropriate for cardiovascular disease, diabetes, osteopenia/osteoporosis, and glaucoma.  As appropriate for age/gender, discussed screening for colorectal cancer, prostate cancer, breast cancer, and cervical cancer. Checklist reviewing preventive services available has been given to the patient.    Reviewed patients plan of care and provided an AVS. The Basic Care Plan (routine screening as documented in Health Maintenance) for Ana Maria meets the Care Plan requirement. This Care Plan has been established and reviewed with the Patient.    Counseling Resources:  ATP IV Guidelines  Pooled Cohorts Equation Calculator  Breast Cancer Risk Calculator  Breast Cancer: Medication to Reduce Risk  FRAX Risk Assessment  ICSI Preventive Guidelines  Dietary Guidelines for Americans, 2010  USDA's MyPlate  ASA Prophylaxis  Lung CA Screening    See Meng MD  [facility]      "

## 2020-11-13 NOTE — PATIENT INSTRUCTIONS
Patient Education   Personalized Prevention Plan  You are due for the preventive services outlined below.  Your care team is available to assist you in scheduling these services.  If you have already completed any of these items, please share that information with your care team to update in your medical record.  Health Maintenance Due   Topic Date Due     Hepatitis C Screening  12/04/1958     Zoster (Shingles) Vaccine (1 of 2) 12/04/1991     Diptheria Tetanus Pertussis (DTAP/TDAP/TD) Vaccine (3 - Td) 08/20/2018     Eye Exam  06/28/2020     Cholesterol Lab  11/08/2020     Kidney Microalbumin Urine Test  11/08/2020     Diabetic Foot Exam  11/08/2020     FALL RISK ASSESSMENT  11/08/2020       Exercise for a Healthier Heart     Exercise with a friend. When activity is fun, you're more likely to stick with it.   You may wonder how you can improve the health of your heart. If you re thinking about exercise, you re on the right track. You don t need to become an athlete. But you do need a certain amount of brisk exercise to help strengthen your heart. If you have been diagnosed with a heart condition, your healthcare provider may advise exercise to help stabilize your condition. To help make exercise a habit, choose safe, fun activities.   Before you start  Check with your healthcare provider before starting an exercise program. This is especially important if you have not been active for a while. It's also important if you have a long-term (chronic) health problem such as heart disease, diabetes, or obesity. Or if you are at high risk for having these problems.   Why exercise?  Exercising regularly offers many healthy rewards. It can help you do all of the following:    Improve your blood cholesterol level to help prevent further heart trouble    Lower your blood pressure to help prevent a stroke or heart attack    Control diabetes, or reduce your risk of getting this disease    Improve your heart and lung  function    Reach and stay at a healthy weight    Make your muscles stronger so you can stay active    Prevent falls and fractures by slowing the loss of bone mass (osteoporosis)    Manage stress better    Reduce your blood pressure    Improve your sense of self and your body image  Exercise tips      Ease into your routine. Set small goals. Then build on them. If you are not sure what your activity level should be, talk with your healthcare provider first before starting an exercise routine.    Exercise on most days. Aim for a total of 150 minutes (2 hours and 30 minutes) or more of moderate-intensity aerobic activity each week. Or 75 minutes (1 hour and 15 minutes) or more of vigorous-intensity aerobic activity each week. Or try for a combination of both. Moderate activity means that you breathe heavier and your heart rate increases but you can still talk. Think about doing 40 minutes of moderate exercise, 3 to 4 times a week. For best results, activity should last for about 40 minutes to lower blood pressure and cholesterol. It's OK to work up to the 40-minute period over time. Examples of moderate-intensity activity are walking 1 mile in 15 minutes. Or doing 30 to 45 minutes of yard work.    Step up your daily activity level.  Along with your exercise program, try being more active the whole day. Walk instead of drive. Or park further away so that you take more steps each day. Do more household tasks or yard work. You may not be able to meet the advised mount of physical activity. But doing some moderate- or vigorous-intensity aerobic activity can help reduce your risk for heart disease. Your healthcare provider can help you figure out what is best for you.    Choose 1 or more activities you enjoy.  Walking is one of the easiest things you can do. You can also try swimming, riding a bike, dancing, or taking an exercise class.    When to call your healthcare provider  Call your healthcare provider if you have any  of these:     Chest pain or feel dizzy or lightheaded    Burning, tightness, pressure, or heaviness in your chest, neck, shoulders, back, or arms    Abnormal shortness of breath    More joint or muscle pain    A very fast or irregular heartbeat (palpitations)  Penelope last reviewed this educational content on 7/1/2019 2000-2020 [employer]. [address] [place] [address]. All rights reserved. This information is not intended as a substitute for professional medical care. Always follow your healthcare professional's instructions.          Understanding USDA MyPlate  The USDA (U.S. Department of Agriculture) has guidelines to help you make healthy food choices. These are called MyPlate. MyPlate shows the food groups that make up healthy meals using the image of a place setting. Before you eat, think about the healthiest choices for what to put onto your plate or into your cup or bowl. To learn more about building a healthy plate, visit www.choosemyplate.gov.    The food groups    Fruits. Any fruit or 100% fruit juice counts as part of the Fruit Group. Fruits may be fresh, canned, frozen, or dried, and may be whole, cut-up, or pureed. Make half your plate fruits and vegetables.    Vegetables. Any vegetable or 100% vegetable juice counts as a member of the Vegetable Group. Vegetables may be fresh, frozen, canned, or dried. They can be served raw or cooked and may be whole, cut-up, or mashed. Make half your plate fruits and vegetables.    Grains. All foods made from grains are part of the Grains Group. These include wheat, rice, oats, cornmeal, and barley such as bread, pasta, oatmeal, cereal, tortillas, and grits. Grains should be no more than a quarter of your plate. At least half of your grains should be whole grains.    Protein. This group includes meat, poultry, seafood, beans and peas, eggs, processed soy products (like tofu), nuts (including nut butters), and seeds. Make protein  choices no more than a quarter of your plate. Meat and poultry choices should be lean or low fat.    Dairy. All fluid milk products and foods made from milk that contain calcium, like yogurt and cheese, are part of the Dairy Group. (Foods that have little calcium, such as cream, butter, and cream cheese, are not part of the group.) Most dairy choices should be low-fat or fat-free.    Oils. These are fats that are liquid at room temperature. They include canola, corn, olive, soybean, and sunflower oil. Foods that are mainly oil include mayonnaise, certain salad dressings, and soft margarines. You should have only 5 to 7 teaspoons of oils a day. You probably already get this much from the food you eat.  [employer]Well last reviewed this educational content on 8/1/2017 2000-2020 [employer]. [address] [place] [address]. All rights reserved. This information is not intended as a substitute for professional medical care. Always follow your healthcare professional's instructions.          Urinary Incontinence (Male)    Urinary incontinence means not being able to control the release of urine from the bladder.   Causes  Common causes of urinary incontinence in men include:    Infection    Certain medicines    Aging    Poor pelvic muscle tone    Bladder spasms    Obesity    Trouble urinating and fully emptying the bladder (urinary retention)  Other things that can cause incontinence are:     Nervous system diseases    Diabetes    Sleep apnea    Urinary tract infections    Prostate surgery    Pelvic injury  Constipation and smoking have also been identified as risk factors.   Symptoms    Urge incontinence (overactive bladder). This is a sudden urge to urinate. It occurs even though there may not be much urine in the bladder. The need to urinate often during the night is common. It's due to bladder spasms.    Stress incontinence. This is urine leakage that you can't control. It can occur with  sneezing, coughing, and other actions that put stress on the bladder.    Treatment  Treatment depends on what is causing the condition. Bladder infections are treated with antibiotics. Urinary retention is treated with a bladder catheter.   Home care  Follow these guidelines when caring for yourself at home:    Don't have any foods and drinks that may irritate the bladder. This includes:  ? Chocolate  ? Alcohol  ? Caffeine  ? Carbonated drinks  ? Acidic fruits and juices    Limit fluids to 6 to 8 cups a day.    Lose weight if you are overweight. This will reduce your symptoms.    If advised, do regular pelvic muscle-strengthening exercises such as Kegel exercises.    If needed, wear absorbent pads to catch urine. Change the pads often. This is for good hygiene and to prevent skin and bladder infections.    Bathe daily for good hygiene.    If an antibiotic was prescribed to treat a bladder infection, take it until it's finished. Keep taking it even if you are feeling better. This is to make sure your infection has cleared.    If a catheter was left in place, keep bacteria from getting into the collection bag. Don't disconnect the catheter from the collection bag.    Use a leg band to secure the catheter drainage tube, so it does not pull on the catheter. Drain the collection bag when it becomes full. To do this, use the drain spout at the bottom of the bag. Don't disconnect the bag from the catheter.    Don't pull on or try to remove a catheter. The catheter must be removed by a healthcare provider.    If you smoke, stop. Ask your provider for help if you can't do this on your own.  Follow-up care  Follow up with your healthcare provider, or as advised.  When to get medical advice  Call your healthcare provider right away if any of these occur:    Fever over 100.4 F (38 C), or as directed by your provider    Bladder pain or fullness    Belly swelling, nausea, or vomiting    Back pain    Weakness, dizziness, or  fainting    If a catheter was left in place, return if:  ? The catheter falls out  ? The catheter stops draining for 6 hours  ? Your urine gets cloudy or smells bad  Penelope last reviewed this educational content on 1/1/2020 2000-2020 [employer]. [address] [place] [address]. All rights reserved. This information is not intended as a substitute for professional medical care. Always follow your healthcare professional's instructions.

## 2020-11-13 NOTE — NURSING NOTE
"Chief Complaint   Patient presents with     Physical     /84 (Cuff Size: Adult Regular)   Pulse 68   Temp 97.9  F (36.6  C) (Tympanic)   Resp 18   Ht 1.778 m (5' 10\")   Wt 93.9 kg (207 lb)   SpO2 96%   BMI 29.70 kg/m   Estimated body mass index is 29.7 kg/m  as calculated from the following:    Height as of this encounter: 1.778 m (5' 10\").    Weight as of this encounter: 93.9 kg (207 lb).  Patient presents to the clinic using No DME      Health Maintenance that is potentially due pending provider review:    Health Maintenance Due   Topic Date Due     HEPATITIS C SCREENING  12/04/1958     ZOSTER IMMUNIZATION (1 of 2) 12/04/1991     DTAP/TDAP/TD IMMUNIZATION (3 - Td) 08/20/2018     EYE EXAM  06/28/2020     LIPID  11/08/2020     MICROALBUMIN  11/08/2020     DIABETIC FOOT EXAM  11/08/2020     FALL RISK ASSESSMENT  11/08/2020                "

## 2020-11-15 NOTE — TELEPHONE ENCOUNTER
Per 10-02-08 colonoscopy-  Impression:          - Diverticulosis sigmoid colon and descending colon.                       - Inflamed mucosa in the sigmoid colon.  Recommendation:      - High fiber diet indefinitely.                       - Repeat colonoscopy in 10 years for surveillance.      Spoke with pt, explained FIT VS colonoscopy. Recommended to proceed with colonoscopy scheduled 04-20-18. Has prep info.  Pt voices understanding/ agreement.  SILVIO Montiel RN     No

## 2021-02-16 ENCOUNTER — TELEPHONE (OUTPATIENT)
Dept: FAMILY MEDICINE | Facility: CLINIC | Age: 81
End: 2021-02-16

## 2021-02-16 DIAGNOSIS — E11.9 TYPE 2 DIABETES MELLITUS WITHOUT COMPLICATION, WITHOUT LONG-TERM CURRENT USE OF INSULIN (H): Primary | ICD-10-CM

## 2021-02-16 RX ORDER — LANCETS
EACH MISCELLANEOUS
Qty: 30 EACH | Refills: 6 | Status: SHIPPED | OUTPATIENT
Start: 2021-02-16 | End: 2021-12-09

## 2021-04-29 ENCOUNTER — TELEPHONE (OUTPATIENT)
Dept: FAMILY MEDICINE | Facility: CLINIC | Age: 81
End: 2021-04-29

## 2021-04-29 DIAGNOSIS — E11.9 TYPE 2 DIABETES MELLITUS WITHOUT COMPLICATION, WITHOUT LONG-TERM CURRENT USE OF INSULIN (H): Primary | ICD-10-CM

## 2021-04-29 RX ORDER — BLOOD-GLUCOSE METER
KIT MISCELLANEOUS
Qty: 1 KIT | Refills: 0 | Status: SHIPPED | OUTPATIENT
Start: 2021-04-29

## 2021-04-29 RX ORDER — BLOOD-GLUCOSE METER
KIT MISCELLANEOUS
Qty: 100 STRIP | Refills: 3 | Status: SHIPPED | OUTPATIENT
Start: 2021-04-29 | End: 2021-12-09

## 2021-04-29 NOTE — TELEPHONE ENCOUNTER
Reason for Call:  Other call back    Detailed comments: pt new glucose meter [employer]estyle the insurance is not paying for it so pt needs the Dr. Meng To authorize the meter for the pt. The authorization line to call [phone]    Pt has been stable with this monitor and its easy to use for him therefor has better compliance.   Phone Number Patient can be reached at: Cell number on file:    Telephone Information:   Mobile [phone]     [facility]  No insurance info given.    Best Time: any time    Can we leave a detailed message on this number? YES    Call taken on 4/29/2021 at 12:05 PM by Corinna Alcaraz

## 2021-04-30 ENCOUNTER — TELEPHONE (OUTPATIENT)
Dept: FAMILY MEDICINE | Facility: CLINIC | Age: 81
End: 2021-04-30

## 2021-04-30 NOTE — TELEPHONE ENCOUNTER
Central Prior Authorization Team   Phone: [phone]      Prior Authorization Approval    Authorization Effective Date: 3/31/2021  Authorization Expiration Date: 4/29/2022  Medication: freestyle meter - APPROVED  Approved Dose/Quantity: 1 FOR 30  Reference #:     Insurance Company: [employer] - Phone [phone] Fax [phone]  Expected CoPay:       CoPay Card Available:      Foundation Assistance Needed:    Which Pharmacy is filling the prescription (Not needed for infusion/clinic administered): [facility] [address]  Pharmacy Notified: Yes  Patient Notified: Yes (**Instructed pharmacy to notify patient when script is ready to /ship.**)

## 2021-04-30 NOTE — TELEPHONE ENCOUNTER
Central Prior Authorization Team   Phone: [phone]      Prior Authorization Approval    Authorization Effective Date: 3/31/2021  Authorization Expiration Date: 4/30/2022  Medication: blood glucose (FREESTYLE LITE) test strip - APPROVED  Approved Dose/Quantity: 100   Reference #:     Insurance Company: [employer] - Phone [phone] Fax [phone]  Expected CoPay:       CoPay Card Available:      Foundation Assistance Needed:    Which Pharmacy is filling the prescription (Not needed for infusion/clinic administered): [facility] - [address]  Pharmacy Notified: Yes  Patient Notified: Yes (**Instructed pharmacy to notify patient when script is ready to /ship.**)

## 2021-05-08 ENCOUNTER — HEALTH MAINTENANCE LETTER (OUTPATIENT)
Age: 81
End: 2021-05-08

## 2021-05-20 ENCOUNTER — OFFICE VISIT (OUTPATIENT)
Dept: FAMILY MEDICINE | Facility: CLINIC | Age: 81
End: 2021-05-20
Payer: COMMERCIAL

## 2021-05-20 VITALS
RESPIRATION RATE: 18 BRPM | HEIGHT: 70 IN | WEIGHT: 214 LBS | TEMPERATURE: 97.8 F | SYSTOLIC BLOOD PRESSURE: 150 MMHG | DIASTOLIC BLOOD PRESSURE: 90 MMHG | HEART RATE: 80 BPM | BODY MASS INDEX: 30.64 KG/M2

## 2021-05-20 DIAGNOSIS — Z12.11 COLON CANCER SCREENING: ICD-10-CM

## 2021-05-20 DIAGNOSIS — E11.69 TYPE 2 DIABETES MELLITUS WITH OTHER SPECIFIED COMPLICATION, WITHOUT LONG-TERM CURRENT USE OF INSULIN (H): Primary | ICD-10-CM

## 2021-05-20 DIAGNOSIS — I10 BENIGN ESSENTIAL HYPERTENSION: ICD-10-CM

## 2021-05-20 LAB — HBA1C MFR BLD: 6.4 % (ref 0–5.6)

## 2021-05-20 PROCEDURE — 36415 COLL VENOUS BLD VENIPUNCTURE: CPT | Performed by: FAMILY MEDICINE

## 2021-05-20 PROCEDURE — 99214 OFFICE O/P EST MOD 30 MIN: CPT | Performed by: FAMILY MEDICINE

## 2021-05-20 PROCEDURE — 99207 PR FOOT EXAM NO CHARGE: CPT | Performed by: FAMILY MEDICINE

## 2021-05-20 PROCEDURE — 83036 HEMOGLOBIN GLYCOSYLATED A1C: CPT | Performed by: FAMILY MEDICINE

## 2021-05-20 RX ORDER — FLASH GLUCOSE SENSOR
1 KIT MISCELLANEOUS
Qty: 2 EACH | Refills: 5 | Status: SHIPPED | OUTPATIENT
Start: 2021-05-20 | End: 2021-11-18

## 2021-05-20 ASSESSMENT — MIFFLIN-ST. JEOR: SCORE: 1691.95

## 2021-05-20 NOTE — PATIENT INSTRUCTIONS
Patient Education     Low-Salt Choices  Eating salt (sodium) can make your body retain too much water. Extra water makes your heart work harder. Canned, packaged, and frozen foods are easy to prepare. But they are often high in sodium. Here are some ideas for low-salt foods you can easily make yourself.   For breakfast    Fruit or 100% fruit juice. It's better to have whole fruit instead of 100% fruit juice.    Whole-wheat bread or an English muffin. Look for sodium content on Nutrition Facts labels.    Low-fat milk or yogurt    Unsalted eggs    Shredded wheat    Corn tortillas    Unsalted steamed rice    Regular (not instant) hot cereal, made without salt    Stay away from    Sausage, majano, and ham    Flour tortillas    Packaged muffins, pancakes, and biscuits    Instant hot cereals    Cottage cheese    For lunch and dinner    Fresh fish, chicken, turkey, or meat--baked, broiled, or roasted without salt    Dry beans, cooked without salt    Tofu, stir-fried without salt    Unsalted fresh fruit and vegetables, or frozen or canned fruit and vegetables with no added salt  Stay away from    Lunch or deli meat that is cured or smoked    Cheese    Tomato juice and ketchup    Canned vegetables, soups, and fish not labeled as no-salt-added or reduced sodium    Packaged gravies and sauces    Olives, pickles, and relish    Bottled salad dressings    For snacks and desserts    Yogurt    Unsalted, air-popped popcorn    Unsalted nuts or seeds  Stay away from    Pies and cakes    Packaged dessert mixes    Pizza    Canned and packaged puddings    Pretzels, chips, crackers, and nuts--unless the label says unsalted    [employer] last reviewed this educational content on 11/1/2019 2000-2021 [employer]. All rights reserved. This information is not intended as a substitute for professional medical care. Always follow your healthcare professional's instructions.

## 2021-05-20 NOTE — PROGRESS NOTES
"  Assessment & Plan       ICD-10-CM    1. Type 2 diabetes mellitus with other specified complication, without long-term current use of insulin (H)  E11.69 Hemoglobin A1c     FOOT EXAM     Continuous Blood Gluc Sensor (FREESTYLE JANET 14 DAY SENSOR) MISC   2. Benign essential hypertension  I10    3. Colon cancer screening  Z12.11 GASTROENTEROLOGY ADULT REF PROCEDURE ONLY       Type 2 diabetes mellitus with other specified complication, without long-term current use of insulin (H)  Hemoglobin A1c 6.4, consistent with well-controlled diabetes.  Sister to continue Metformin, regular walks, strict diabetic diet.  Patient has been using CGM for glucose monitoring.  Other medications reviewed and no changes made  Lab Results   Component Value Date    A1C 6.4 05/20/2021    A1C 5.9 10/16/2020    A1C 6.1 11/08/2019    A1C 6.2 05/17/2019    A1C 6.3 09/20/2018     - Hemoglobin A1c  - FOOT EXAM  - Continuous Blood Gluc Sensor (FREESTYLE JANET 14 DAY SENSOR) MISC; 1 each every 14 days Change every 14 days.    Benign essential hypertension  Blood pressure elevated, previous readings reviewed.  Discussed about treatment options.  Patient deferred pharmacotherapy, involved risks explained in detail      Recommended to get Covid vaccine.  Screening colonoscopy ordered.  Follow-up in 3 months or earlier if needed.  All questions answered.      BMI:   Estimated body mass index is 30.71 kg/m  as calculated from the following:    Height as of this encounter: 1.778 m (5' 10\").    Weight as of this encounter: 97.1 kg (214 lb).   Weight management plan: Discussed healthy diet and exercise guidelines      Patient Instructions     Patient Education     Low-Salt Choices  Eating salt (sodium) can make your body retain too much water. Extra water makes your heart work harder. Canned, packaged, and frozen foods are easy to prepare. But they are often high in sodium. Here are some ideas for low-salt foods you can easily make yourself.   For " breakfast    Fruit or 100% fruit juice. It's better to have whole fruit instead of 100% fruit juice.    Whole-wheat bread or an English muffin. Look for sodium content on Nutrition Facts labels.    Low-fat milk or yogurt    Unsalted eggs    Shredded wheat    Corn tortillas    Unsalted steamed rice    Regular (not instant) hot cereal, made without salt    Stay away from    Sausage, majano, and ham    Flour tortillas    Packaged muffins, pancakes, and biscuits    Instant hot cereals    Cottage cheese    For lunch and dinner    Fresh fish, chicken, turkey, or meat--baked, broiled, or roasted without salt    Dry beans, cooked without salt    Tofu, stir-fried without salt    Unsalted fresh fruit and vegetables, or frozen or canned fruit and vegetables with no added salt  Stay away from    Lunch or deli meat that is cured or smoked    Cheese    Tomato juice and ketchup    Canned vegetables, soups, and fish not labeled as no-salt-added or reduced sodium    Packaged gravies and sauces    Olives, pickles, and relish    Bottled salad dressings    For snacks and desserts    Yogurt    Unsalted, air-popped popcorn    Unsalted nuts or seeds  Stay away from    Pies and cakes    Packaged dessert mixes    Pizza    Canned and packaged puddings    Pretzels, chips, crackers, and nuts--unless the label says unsalted    [employer] last reviewed this educational content on 11/1/2019 2000-2021 [employer]. All rights reserved. This information is not intended as a substitute for professional medical care. Always follow your healthcare professional's instructions.                 See Meng MD  [facility]    Amanda Orona is a 80 year old who presents for the following health issues     HPI     Diabetes Follow-up    How often are you checking your blood sugar? Continuous glucose monitor  What time of day are you checking your blood sugars (select all that apply)?  Before meals and At  "bedtime  Have you had any blood sugars above 200?  No  Have you had any blood sugars below 70?  No    What symptoms do you notice when your blood sugar is low?  None    What concerns do you have today about your diabetes? None     Do you have any of these symptoms? (Select all that apply)  Burning in feet    Have you had a diabetic eye exam in the last 12 months? No getting new glasses soon    Has the CGM on his arm and would like to keep using this system instead of going back to pricking his finger.     BP Readings from Last 2 Encounters:   05/20/21 (!) 150/90   11/13/20 130/84     Hemoglobin A1C (%)   Date Value   10/16/2020 5.9 (H)   11/08/2019 6.1 (H)     LDL Cholesterol Calculated (mg/dL)   Date Value   11/13/2020 106 (H)   11/08/2019 108 (H)               Hypertension Follow-up      Do you check your blood pressure regularly outside of the clinic? Yes     Are you following a low salt diet? No    Are your blood pressures ever more than 140 on the top number (systolic) OR more   than 90 on the bottom number (diastolic), for example 140/90? Yes      Review of Systems   Constitutional, HEENT, cardiovascular, pulmonary, GI, , musculoskeletal, neuro, skin, endocrine and psych systems are negative, except as otherwise noted.      Objective    BP (!) 150/90 (Cuff Size: Adult Regular)   Pulse 80   Temp 97.8  F (36.6  C) (Tympanic)   Resp 18   Ht 1.778 m (5' 10\")   Wt 97.1 kg (214 lb)   BMI 30.71 kg/m    Body mass index is 30.71 kg/m .  Physical Exam   GENERAL: alert and no distress  EYES: Eyes grossly normal to inspection, PERRL and conjunctivae and sclerae normal  NECK: no adenopathy, no asymmetry, masses, or scars and thyroid normal to palpation  RESP: lungs clear to auscultation - no rales, rhonchi or wheezes  CV: regular rate and rhythm, normal S1 S2, no S3 or S4, no murmur, click or rub  ABDOMEN: soft, nontender, no hepatosplenomegaly  MS: Chronic OA changes involving multiple joints  NEURO: Normal " strength and tone, mentation intact and speech normal  Diabetic foot exam: Normal monofilament testing, sensation to touch and pressure intact, minimal pedal edema bilaterally, gait antalgic      Lab Results   Component Value Date    A1C 5.9 10/16/2020    A1C 6.1 11/08/2019    A1C 6.2 05/17/2019    A1C 6.3 09/20/2018    A1C 6.6 01/19/2018       Wt Readings from Last 10 Encounters:   05/20/21 97.1 kg (214 lb)   11/13/20 93.9 kg (207 lb)   11/05/20 94.3 kg (208 lb)   10/16/20 94.3 kg (208 lb)   10/12/20 94.3 kg (208 lb)   10/01/20 93.9 kg (207 lb)   03/03/20 94.8 kg (209 lb)   02/21/20 94.8 kg (209 lb)   02/14/20 95.3 kg (210 lb)   01/24/20 94.3 kg (208 lb)

## 2021-05-20 NOTE — NURSING NOTE
"No chief complaint on file.    BP (!) 150/90 (Cuff Size: Adult Regular)   Pulse 80   Temp 97.8  F (36.6  C) (Tympanic)   Resp 18   Ht 1.778 m (5' 10\")   Wt 97.1 kg (214 lb)   BMI 30.71 kg/m   Estimated body mass index is 30.71 kg/m  as calculated from the following:    Height as of this encounter: 1.778 m (5' 10\").    Weight as of this encounter: 97.1 kg (214 lb).  Patient presents to the clinic using No DME      Health Maintenance that is potentially due pending provider review:    Health Maintenance Due   Topic Date Due     ANNUAL REVIEW OF HM ORDERS  Never done     COVID-19 Vaccine (1) Never done     HEPATITIS C SCREENING  Never done     ZOSTER IMMUNIZATION (1 of 2) Never done     DTAP/TDAP/TD IMMUNIZATION (3 - Td) 08/20/2018     EYE EXAM  06/28/2020     DIABETIC FOOT EXAM  11/08/2020     PHQ-2  01/01/2021     A1C  04/16/2021     COLORECTAL CANCER SCREENING  04/20/2021                "

## 2021-05-27 DIAGNOSIS — Z11.59 ENCOUNTER FOR SCREENING FOR OTHER VIRAL DISEASES: ICD-10-CM

## 2021-06-15 DIAGNOSIS — Z11.59 ENCOUNTER FOR SCREENING FOR OTHER VIRAL DISEASES: ICD-10-CM

## 2021-06-15 LAB
LABORATORY COMMENT REPORT: NORMAL
SARS-COV-2 RNA RESP QL NAA+PROBE: NEGATIVE
SARS-COV-2 RNA RESP QL NAA+PROBE: NORMAL
SPECIMEN SOURCE: NORMAL
SPECIMEN SOURCE: NORMAL

## 2021-06-15 PROCEDURE — U0003 INFECTIOUS AGENT DETECTION BY NUCLEIC ACID (DNA OR RNA); SEVERE ACUTE RESPIRATORY SYNDROME CORONAVIRUS 2 (SARS-COV-2) (CORONAVIRUS DISEASE [COVID-19]), AMPLIFIED PROBE TECHNIQUE, MAKING USE OF HIGH THROUGHPUT TECHNOLOGIES AS DESCRIBED BY CMS-2020-01-R: HCPCS | Performed by: SURGERY

## 2021-06-15 PROCEDURE — U0005 INFEC AGEN DETEC AMPLI PROBE: HCPCS | Performed by: SURGERY

## 2021-06-16 ENCOUNTER — ANESTHESIA EVENT (OUTPATIENT)
Dept: GASTROENTEROLOGY | Facility: CLINIC | Age: 81
End: 2021-06-16
Payer: COMMERCIAL

## 2021-06-16 ASSESSMENT — LIFESTYLE VARIABLES: TOBACCO_USE: 1
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
## 2021-06-18 ENCOUNTER — HOSPITAL ENCOUNTER (OUTPATIENT)
Facility: CLINIC | Age: 81
Discharge: HOME | End: 2021-06-18
Attending: SURGERY | Admitting: SURGERY
Payer: COMMERCIAL

## 2021-06-18 ENCOUNTER — ANESTHESIA (OUTPATIENT)
Dept: GASTROENTEROLOGY | Facility: CLINIC | Age: 81
End: 2021-06-18
Payer: COMMERCIAL

## 2021-06-18 VITALS
RESPIRATION RATE: 16 BRPM | BODY MASS INDEX: 32.43 KG/M2 | TEMPERATURE: 98.8 F | DIASTOLIC BLOOD PRESSURE: 94 MMHG | HEART RATE: 83 BPM | HEIGHT: 68 IN | OXYGEN SATURATION: 98 % | SYSTOLIC BLOOD PRESSURE: 168 MMHG | WEIGHT: 214 LBS

## 2021-06-18 LAB — COLONOSCOPY: NORMAL

## 2021-06-18 PROCEDURE — 250N000011 HC RX IP 250 OP 636: Performed by: NURSE ANESTHETIST, CERTIFIED REGISTERED

## 2021-06-18 PROCEDURE — 88305 TISSUE EXAM BY PATHOLOGIST: CPT | Mod: TC | Performed by: SURGERY

## 2021-06-18 PROCEDURE — 88305 TISSUE EXAM BY PATHOLOGIST: CPT | Mod: 26 | Performed by: PATHOLOGY

## 2021-06-18 PROCEDURE — 370N000017 HC ANESTHESIA TECHNICAL FEE, PER MIN: Performed by: SURGERY

## 2021-06-18 PROCEDURE — 45380 COLONOSCOPY AND BIOPSY: CPT | Mod: PT | Performed by: SURGERY

## 2021-06-18 PROCEDURE — 258N000003 HC RX IP 258 OP 636: Performed by: SURGERY

## 2021-06-18 PROCEDURE — 250N000009 HC RX 250: Performed by: SURGERY

## 2021-06-18 PROCEDURE — 250N000009 HC RX 250: Performed by: NURSE ANESTHETIST, CERTIFIED REGISTERED

## 2021-06-18 RX ORDER — PROPOFOL 10 MG/ML
INJECTION, EMULSION INTRAVENOUS CONTINUOUS PRN
Status: DISCONTINUED | OUTPATIENT
Start: 2021-06-18 | End: 2021-06-18

## 2021-06-18 RX ORDER — NALOXONE HYDROCHLORIDE 0.4 MG/ML
0.2 INJECTION, SOLUTION INTRAMUSCULAR; INTRAVENOUS; SUBCUTANEOUS
Status: DISCONTINUED | OUTPATIENT
Start: 2021-06-18 | End: 2021-06-18 | Stop reason: HOSPADM

## 2021-06-18 RX ORDER — LIDOCAINE 40 MG/G
CREAM TOPICAL
Status: DISCONTINUED | OUTPATIENT
Start: 2021-06-18 | End: 2021-06-18 | Stop reason: HOSPADM

## 2021-06-18 RX ORDER — SODIUM CHLORIDE, SODIUM LACTATE, POTASSIUM CHLORIDE, CALCIUM CHLORIDE 600; 310; 30; 20 MG/100ML; MG/100ML; MG/100ML; MG/100ML
INJECTION, SOLUTION INTRAVENOUS CONTINUOUS
Status: DISCONTINUED | OUTPATIENT
Start: 2021-06-18 | End: 2021-06-18 | Stop reason: HOSPADM

## 2021-06-18 RX ORDER — FLUMAZENIL 0.1 MG/ML
0.2 INJECTION, SOLUTION INTRAVENOUS
Status: DISCONTINUED | OUTPATIENT
Start: 2021-06-18 | End: 2021-06-18 | Stop reason: HOSPADM

## 2021-06-18 RX ORDER — ONDANSETRON 2 MG/ML
4 INJECTION INTRAMUSCULAR; INTRAVENOUS
Status: DISCONTINUED | OUTPATIENT
Start: 2021-06-18 | End: 2021-06-18 | Stop reason: HOSPADM

## 2021-06-18 RX ORDER — PROPOFOL 10 MG/ML
INJECTION, EMULSION INTRAVENOUS PRN
Status: DISCONTINUED | OUTPATIENT
Start: 2021-06-18 | End: 2021-06-18

## 2021-06-18 RX ORDER — GLYCOPYRROLATE 0.2 MG/ML
INJECTION, SOLUTION INTRAMUSCULAR; INTRAVENOUS PRN
Status: DISCONTINUED | OUTPATIENT
Start: 2021-06-18 | End: 2021-06-18

## 2021-06-18 RX ORDER — LIDOCAINE HYDROCHLORIDE 10 MG/ML
INJECTION, SOLUTION INFILTRATION; PERINEURAL PRN
Status: DISCONTINUED | OUTPATIENT
Start: 2021-06-18 | End: 2021-06-18

## 2021-06-18 RX ORDER — NALOXONE HYDROCHLORIDE 0.4 MG/ML
0.4 INJECTION, SOLUTION INTRAMUSCULAR; INTRAVENOUS; SUBCUTANEOUS
Status: DISCONTINUED | OUTPATIENT
Start: 2021-06-18 | End: 2021-06-18 | Stop reason: HOSPADM

## 2021-06-18 RX ADMIN — GLYCOPYRROLATE 0.2 MG: 0.2 INJECTION, SOLUTION INTRAMUSCULAR; INTRAVENOUS at 10:22

## 2021-06-18 RX ADMIN — SODIUM CHLORIDE, POTASSIUM CHLORIDE, SODIUM LACTATE AND CALCIUM CHLORIDE: 600; 310; 30; 20 INJECTION, SOLUTION INTRAVENOUS at 10:10

## 2021-06-18 RX ADMIN — SODIUM CHLORIDE, POTASSIUM CHLORIDE, SODIUM LACTATE AND CALCIUM CHLORIDE: 600; 310; 30; 20 INJECTION, SOLUTION INTRAVENOUS at 09:37

## 2021-06-18 RX ADMIN — PROPOFOL 50 MG: 10 INJECTION, EMULSION INTRAVENOUS at 10:22

## 2021-06-18 RX ADMIN — LIDOCAINE HYDROCHLORIDE 1 ML: 10 INJECTION, SOLUTION EPIDURAL; INFILTRATION; INTRACAUDAL; PERINEURAL at 09:37

## 2021-06-18 RX ADMIN — LIDOCAINE HYDROCHLORIDE 60 MG: 10 INJECTION, SOLUTION INFILTRATION; PERINEURAL at 10:24

## 2021-06-18 RX ADMIN — PROPOFOL 200 MCG/KG/MIN: 10 INJECTION, EMULSION INTRAVENOUS at 10:22

## 2021-06-18 ASSESSMENT — MIFFLIN-ST. JEOR: SCORE: 1660.2
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
## 2021-06-18 NOTE — ANESTHESIA POSTPROCEDURE EVALUATION
Patient: Ana Maria Duvall    Procedure(s):  COLONOSCOPY, WITH POLYPECTOMY AND BIOPSY    Diagnosis:Colon cancer screening [Z12.11]  Diagnosis Additional Information: No value filed.    Anesthesia Type:  General    Note:  Disposition: Outpatient   Postop Pain Control: Uneventful            Sign Out: Well controlled pain   PONV: No   Neuro/Psych: Uneventful            Sign Out: Acceptable/Baseline neuro status   Airway/Respiratory: Uneventful            Sign Out: Acceptable/Baseline resp. status   CV/Hemodynamics: Uneventful            Sign Out: Acceptable CV status; No obvious hypovolemia; No obvious fluid overload   Other NRE: NONE   DID A NON-ROUTINE EVENT OCCUR? No           Last vitals:  Vitals:    06/18/21 0907   BP: 136/82   Pulse: 76   Resp: 16   Temp: 37.1  C (98.8  F)   SpO2: 96%       Last vitals prior to Anesthesia Care Transfer:  CRNA VITALS  6/18/2021 1008 - 6/18/2021 1044      6/18/2021             Pulse:  78    Ht Rate:  77    SpO2:  96 %          Electronically Signed By: NAN Walker CRNA  June 18, 2021  10:44 AM

## 2021-06-18 NOTE — ANESTHESIA CARE TRANSFER NOTE
Patient: Ana Maria Duvall    Procedure(s):  COLONOSCOPY, WITH POLYPECTOMY AND BIOPSY    Diagnosis: Colon cancer screening [Z12.11]  Diagnosis Additional Information: No value filed.    Anesthesia Type:   General     Note:    Oropharynx: spontaneously breathing and oral airway in place  Level of Consciousness: drowsy  Oxygen Supplementation: face mask  Level of Supplemental Oxygen (L/min / FiO2): 6  Independent Airway: airway patency satisfactory and stable  Dentition: dentition unchanged  Vital Signs Stable: post-procedure vital signs reviewed and stable  Report to RN Given: handoff report given  Patient transferred to: Phase II    Handoff Report: Identifed the Patient, Identified the Reponsible Provider, Reviewed the pertinent medical history, Discussed the surgical course, Reviewed Intra-OP anesthesia mangement and issues during anesthesia, Set expectations for post-procedure period and Allowed opportunity for questions and acknowledgement of understanding      Vitals: (Last set prior to Anesthesia Care Transfer)  CRNA VITALS  6/18/2021 1008 - 6/18/2021 1044      6/18/2021             Pulse:  78    Ht Rate:  77    SpO2:  96 %        Electronically Signed By: NAN Walker CRNA  June 18, 2021  10:44 AM

## 2021-06-22 LAB — COPATH REPORT: NORMAL

## 2021-06-25 ENCOUNTER — MEDICAL CORRESPONDENCE (OUTPATIENT)
Dept: HEALTH INFORMATION MANAGEMENT | Facility: CLINIC | Age: 81
End: 2021-06-25

## 2021-06-28 ENCOUNTER — TELEPHONE (OUTPATIENT)
Dept: FAMILY MEDICINE | Facility: CLINIC | Age: 81
End: 2021-06-28

## 2021-07-07 ENCOUNTER — MEDICAL CORRESPONDENCE (OUTPATIENT)
Dept: HEALTH INFORMATION MANAGEMENT | Facility: CLINIC | Age: 81
End: 2021-07-07

## 2021-07-14 NOTE — DISCHARGE INSTRUCTIONS
[facility]   Radiofrequency Ablation (RFA) Discharge Instructions     Today you saw:    Dr. Ronald El        You should anticipate procedural pain for up to 2 weeks.     You may receive a prescription for pain medication and you should take this as directed.     It may take up to 8 weeks to receive relief from the RFA     Follow up with your provider in 2 weeks.     If you received sedation before, during or after your procedure, for the next 24 hours you shall NOT:    -Drive    -Operate machinery    -Drink alcohol    -Sign any legal documents     You may resume your normal diet     You may resume your regular medications after the procedure     Be cautious with walking. Numbness and/or weakness in the lower extremities may occur for up to 6-8 hours due to effect of local anesthetic    Avoid strenuous activity for the first 24 hours     You may resume your regular activities after 24 hours     You may shower, however no swimming, tub baths or hot tubs for 24 hours following your procedure     You may use ice packs 10-15 minutes three to four times a day at the injection site for comfort     Do not use heat to painful areas for 6 to 8 hours. This will give the local anesthetic time to wear off and prevent you from accidentally burning your skin.     Unless you have been directed to avoid the use of anti-inflammatory medications (NSAIDS), you may use medications such as ibuprofen, Aleve or Tylenol for pain control if needed.     If you experience any of the following, call the Pain Clinic during work hours (Monday through Friday 8 am-4:30 pm) at [phone] or the Provider Line after hours at [phone]:   -Fever over 100 degree F    -Swelling, bleeding, redness, drainage, warmth at the injection site    -Progressive weakness or numbness in your legs     -Loss of bowel or bladder function    -Unusual headache that is not relieved by Tylenol    -Unusual new onset of pain that  is not improving       S/P drug eluting coronary stent placement

## 2021-09-29 DIAGNOSIS — E11.9 TYPE 2 DIABETES MELLITUS WITHOUT COMPLICATION, WITHOUT LONG-TERM CURRENT USE OF INSULIN (H): ICD-10-CM

## 2021-10-23 ENCOUNTER — HEALTH MAINTENANCE LETTER (OUTPATIENT)
Age: 81
End: 2021-10-23

## 2021-11-18 DIAGNOSIS — E11.69 TYPE 2 DIABETES MELLITUS WITH OTHER SPECIFIED COMPLICATION, WITHOUT LONG-TERM CURRENT USE OF INSULIN (H): ICD-10-CM

## 2021-11-18 RX ORDER — FLASH GLUCOSE SENSOR
KIT MISCELLANEOUS
Qty: 2 EACH | Refills: 5 | Status: SHIPPED | OUTPATIENT
Start: 2021-11-18 | End: 2022-04-28

## 2021-11-23 ENCOUNTER — LAB (OUTPATIENT)
Dept: FAMILY MEDICINE | Facility: CLINIC | Age: 81
End: 2021-11-23
Attending: NURSE PRACTITIONER
Payer: COMMERCIAL

## 2021-11-23 ENCOUNTER — VIRTUAL VISIT (OUTPATIENT)
Dept: FAMILY MEDICINE | Facility: CLINIC | Age: 81
End: 2021-11-23
Payer: COMMERCIAL

## 2021-11-23 DIAGNOSIS — Z20.822 SUSPECTED 2019 NOVEL CORONAVIRUS INFECTION: ICD-10-CM

## 2021-11-23 DIAGNOSIS — Z20.822 SUSPECTED 2019 NOVEL CORONAVIRUS INFECTION: Primary | ICD-10-CM

## 2021-11-23 PROCEDURE — U0003 INFECTIOUS AGENT DETECTION BY NUCLEIC ACID (DNA OR RNA); SEVERE ACUTE RESPIRATORY SYNDROME CORONAVIRUS 2 (SARS-COV-2) (CORONAVIRUS DISEASE [COVID-19]), AMPLIFIED PROBE TECHNIQUE, MAKING USE OF HIGH THROUGHPUT TECHNOLOGIES AS DESCRIBED BY CMS-2020-01-R: HCPCS

## 2021-11-23 PROCEDURE — 99213 OFFICE O/P EST LOW 20 MIN: CPT | Mod: 95 | Performed by: NURSE PRACTITIONER

## 2021-11-23 PROCEDURE — U0005 INFEC AGEN DETEC AMPLI PROBE: HCPCS

## 2021-11-23 NOTE — PROGRESS NOTES
"Yeyo is a 80 year old who is being evaluated via a billable video visit.      How would you like to obtain your AVS? MyChart  If the video visit is dropped, the invitation should be resent by: Text to cell phone: [phone] TEXT PT\"S PHONE FOR VISIT   Will anyone else be joining your video visit? No      Video Start Time: 9:55 AM    Assessment & Plan     Suspected 2019 novel coronavirus infection  Current symptoms of a headache. He is not vaccinated against COVID 19.   - Symptomatic COVID-19 Virus (Coronavirus) by PCR Nose; Future             BMI:   Estimated body mass index is 32.54 kg/m  as calculated from the following:    Height as of 6/18/21: 1.727 m (5' 8\").    Weight as of 6/18/21: 97.1 kg (214 lb).           Return in about 1 week (around 11/30/2021) for ongoing symptoms if not improving.    Lissette Ruiz, NAN CNP  [facility]   Yeyo is a 80 year old who presents for the following health issues     HPI   Wants covid test  Acute Illness  Acute illness concerns: headaches  Onset/Duration: 3 days  Symptoms:  Fever: no  Chills/Sweats: no  Headache (location?): YES  Sinus Pressure: no  Conjunctivitis:  no  Ear Pain: no  Rhinorrhea: no  Congestion: no  Sore Throat: no  Cough: no  Wheeze: no  Decreased Appetite: no  Nausea: no  Vomiting: no  Diarrhea: no  Dysuria/Freq.: no  Dysuria or Hematuria: no  Fatigue/Achiness: no  Sick/Strep Exposure: no  Therapies tried and outcome: tylenol and advil      Review of Systems   Constitutional, HEENT, cardiovascular, pulmonary, gi and gu systems are negative, except as otherwise noted.      Objective           Vitals:  No vitals were obtained today due to virtual visit.    Physical Exam   GENERAL: Healthy, alert and no distress  EYES: Eyes grossly normal to inspection.  No discharge or erythema, or obvious scleral/conjunctival abnormalities.  RESP: No audible wheeze, cough, or visible cyanosis.  No visible retractions or increased " work of breathing.    SKIN: Visible skin clear. No significant rash, abnormal pigmentation or lesions.  NEURO: Cranial nerves grossly intact.  Mentation and speech appropriate for age.  PSYCH: Mentation appears normal, affect normal/bright, judgement and insight intact, normal speech and appearance well-groomed.                Video-Visit Details    Type of service:  Video Visit failed, switched to telephone visit.     Video End Time:10:01 AM    Originating Location (pt. Location): Home    Distant Location (provider location):  [facility]     Platform used for Video Visit: Unable to complete video visit

## 2021-11-24 LAB — SARS-COV-2 RNA RESP QL NAA+PROBE: NEGATIVE

## 2021-12-09 ENCOUNTER — OFFICE VISIT (OUTPATIENT)
Dept: FAMILY MEDICINE | Facility: CLINIC | Age: 81
End: 2021-12-09
Payer: COMMERCIAL

## 2021-12-09 VITALS
HEIGHT: 68 IN | WEIGHT: 211 LBS | SYSTOLIC BLOOD PRESSURE: 150 MMHG | BODY MASS INDEX: 31.98 KG/M2 | TEMPERATURE: 97.4 F | RESPIRATION RATE: 18 BRPM | DIASTOLIC BLOOD PRESSURE: 92 MMHG | HEART RATE: 64 BPM

## 2021-12-09 DIAGNOSIS — E11.69 TYPE 2 DIABETES MELLITUS WITH OTHER SPECIFIED COMPLICATION, WITHOUT LONG-TERM CURRENT USE OF INSULIN (H): ICD-10-CM

## 2021-12-09 DIAGNOSIS — Z23 NEED FOR PROPHYLACTIC VACCINATION AND INOCULATION AGAINST INFLUENZA: ICD-10-CM

## 2021-12-09 DIAGNOSIS — I10 ESSENTIAL HYPERTENSION: ICD-10-CM

## 2021-12-09 DIAGNOSIS — H61.23 BILATERAL IMPACTED CERUMEN: ICD-10-CM

## 2021-12-09 DIAGNOSIS — Z00.00 ENCOUNTER FOR MEDICARE ANNUAL WELLNESS EXAM: Primary | ICD-10-CM

## 2021-12-09 DIAGNOSIS — Z12.5 SCREENING FOR PROSTATE CANCER: ICD-10-CM

## 2021-12-09 DIAGNOSIS — G56.02 CARPAL TUNNEL SYNDROME OF LEFT WRIST: ICD-10-CM

## 2021-12-09 LAB
ANION GAP SERPL CALCULATED.3IONS-SCNC: 5 MMOL/L (ref 3–14)
BUN SERPL-MCNC: 14 MG/DL (ref 7–30)
CALCIUM SERPL-MCNC: 9.3 MG/DL (ref 8.5–10.1)
CHLORIDE BLD-SCNC: 98 MMOL/L (ref 94–109)
CHOLEST SERPL-MCNC: 180 MG/DL
CO2 SERPL-SCNC: 27 MMOL/L (ref 20–32)
CREAT SERPL-MCNC: 0.73 MG/DL (ref 0.66–1.25)
FASTING STATUS PATIENT QL REPORTED: YES
GFR SERPL CREATININE-BSD FRML MDRD: 88 ML/MIN/1.73M2
GLUCOSE BLD-MCNC: 116 MG/DL (ref 70–99)
HBA1C MFR BLD: 6.3 % (ref 0–5.6)
HDLC SERPL-MCNC: 46 MG/DL
LDLC SERPL CALC-MCNC: 116 MG/DL
NONHDLC SERPL-MCNC: 134 MG/DL
POTASSIUM BLD-SCNC: 4.8 MMOL/L (ref 3.4–5.3)
PSA SERPL-MCNC: 1.59 UG/L (ref 0–4)
SODIUM SERPL-SCNC: 130 MMOL/L (ref 133–144)
TRIGL SERPL-MCNC: 92 MG/DL

## 2021-12-09 PROCEDURE — G0103 PSA SCREENING: HCPCS | Performed by: FAMILY MEDICINE

## 2021-12-09 PROCEDURE — 80048 BASIC METABOLIC PNL TOTAL CA: CPT | Performed by: FAMILY MEDICINE

## 2021-12-09 PROCEDURE — 69209 REMOVE IMPACTED EAR WAX UNI: CPT | Mod: 50 | Performed by: FAMILY MEDICINE

## 2021-12-09 PROCEDURE — 99397 PER PM REEVAL EST PAT 65+ YR: CPT | Mod: 25 | Performed by: FAMILY MEDICINE

## 2021-12-09 PROCEDURE — 90662 IIV NO PRSV INCREASED AG IM: CPT | Performed by: FAMILY MEDICINE

## 2021-12-09 PROCEDURE — 99214 OFFICE O/P EST MOD 30 MIN: CPT | Mod: 25 | Performed by: FAMILY MEDICINE

## 2021-12-09 PROCEDURE — G0008 ADMIN INFLUENZA VIRUS VAC: HCPCS | Performed by: FAMILY MEDICINE

## 2021-12-09 PROCEDURE — 36415 COLL VENOUS BLD VENIPUNCTURE: CPT | Performed by: FAMILY MEDICINE

## 2021-12-09 PROCEDURE — 80061 LIPID PANEL: CPT | Performed by: FAMILY MEDICINE

## 2021-12-09 PROCEDURE — 83036 HEMOGLOBIN GLYCOSYLATED A1C: CPT | Performed by: FAMILY MEDICINE

## 2021-12-09 RX ORDER — SPIRONOLACTONE 25 MG/1
25 TABLET ORAL DAILY
Qty: 90 TABLET | Refills: 3 | Status: SHIPPED | OUTPATIENT
Start: 2021-12-09 | End: 2022-12-08

## 2021-12-09 ASSESSMENT — ENCOUNTER SYMPTOMS
CONSTIPATION: 0
MYALGIAS: 0
FEVER: 0
SORE THROAT: 0
WEAKNESS: 1
HEMATOCHEZIA: 0
HEADACHES: 0
DYSURIA: 0
HEARTBURN: 0
NAUSEA: 0
ABDOMINAL PAIN: 0
PALPITATIONS: 0
CHILLS: 0
SHORTNESS OF BREATH: 0
DIZZINESS: 0
ADENOPATHY: 0
ARTHRALGIAS: 1
NERVOUS/ANXIOUS: 0
EYE PAIN: 0
DIZZINESS: 1
FREQUENCY: 0
JOINT SWELLING: 1
COUGH: 0
DIARRHEA: 0
PARESTHESIAS: 0
SHORTNESS OF BREATH: 1
HEMATURIA: 0
BRUISES/BLEEDS EASILY: 0

## 2021-12-09 ASSESSMENT — PAIN SCALES - GENERAL: PAINLEVEL: NO PAIN (0)

## 2021-12-09 ASSESSMENT — ACTIVITIES OF DAILY LIVING (ADL): CURRENT_FUNCTION: NO ASSISTANCE NEEDED

## 2021-12-09 ASSESSMENT — MIFFLIN-ST. JEOR: SCORE: 1641.59

## 2021-12-09 NOTE — PROGRESS NOTES
"SUBJECTIVE:   Ana Maria Duvall is a 81 year old male who presents for Preventive Visit.    Patient has been advised of split billing requirements and indicates understanding: Yes   Are you in the first 12 months of your Medicare coverage?  No    Healthy Habits:     In general, how would you rate your overall health?  Good    Frequency of exercise:  None    Do you usually eat at least 4 servings of fruit and vegetables a day, include whole grains    & fiber and avoid regularly eating high fat or \"junk\" foods?  No    Taking medications regularly:  Yes    Medication side effects:  None    Ability to successfully perform activities of daily living:  No assistance needed    Home Safety:  Throw rugs in the hallway and lack of grab bars in the bathroom    Hearing Impairment:  Difficulty following a conversation in a noisy restaurant or crowded room and difficulty understanding soft or whispered speech    In the past 6 months, have you been bothered by leaking of urine? Yes    In general, how would you rate your overall mental or emotional health?  Good      PHQ-2 Total Score: 0    Additional concerns today:  Yes    Do you feel safe in your environment? Yes    Have you ever done Advance Care Planning? (For example, a Health Directive, POLST, or a discussion with a medical provider or your loved ones about your wishes): Yes, advance care planning is on file.       Fall risk  Fallen 2 or more times in the past year?: Yes  Any fall with injury in the past year?: No  Timed Up and Go Test (>13.5 is fall risk; contact physician) : 8    Cognitive Screening   1) Repeat 3 items (Leader, Season, Table)    2) Clock draw: NORMAL  3) 3 item recall: Recalls 1 object   Results: NORMAL clock, 1-2 items recalled: COGNITIVE IMPAIRMENT LESS LIKELY    Mini-CogTM Copyright CHARLI Mazariegos. Licensed by the author for use in [facility]; reprinted with permission (jesus@.[facility]). All rights reserved.          Reviewed and updated as needed " this visit by clinical staff  Tobacco  Allergies  Meds  Problems            Reviewed and updated as needed this visit by Provider               Social History     Tobacco Use     Smoking status: Former Smoker     Years: 20.00     Types: Cigarettes, Cigars, Pipe     Quit date: 12/15/1966     Years since quittin.0     Smokeless tobacco: Never Used   Substance Use Topics     Alcohol use: Yes     Comment: occ.         Alcohol Use 2021   Prescreen: >3 drinks/day or >7 drinks/week? No   Prescreen: >3 drinks/day or >7 drinks/week? -         PROBLEMS TO ADD ON...  Left hand- Carpal tunnel. Not getting better.       Current providers sharing in care for this patient include:   Patient Care Team:  See Meng MD as PCP - General (Family Practice)  Ariana Bond RN as   See Meng MD as Assigned PCP    The following health maintenance items are reviewed in Epic and correct as of today:  Health Maintenance Due   Topic Date Due     ANNUAL REVIEW OF HM ORDERS  Never done     COVID-19 Vaccine (1) Never done     ZOSTER IMMUNIZATION (1 of 2) Never done     DTAP/TDAP/TD IMMUNIZATION (3 - Td or Tdap) 2018     EYE EXAM  2020     INFLUENZA VACCINE (1) 2021     BMP  10/12/2021     LIPID  2021     MICROALBUMIN  2021     FALL RISK ASSESSMENT  2021     A1C  2021     Lab work is in process  Labs reviewed in EPIC  BP Readings from Last 3 Encounters:   21 (!) 150/92   21 (!) 168/94   21 (!) 150/90    Wt Readings from Last 3 Encounters:   21 95.7 kg (211 lb)   21 97.1 kg (214 lb)   21 97.1 kg (214 lb)                  Patient Active Problem List   Diagnosis     Essential hypertension     Glaucoma     ERECTILE DYSFUNCTION     Diverticulitis of colon     Slowing of urinary stream     Sciatica     Degenerative arthritis of thumb     Type 2 diabetes mellitus without complications (H)     Advanced directives,  counseling/discussion     Health Care Home     Lumbar radiculopathy     CARDIOVASCULAR SCREENING; LDL GOAL LESS THAN 130     Nodular basal cell carcinoma     Statin intolerance     Past Surgical History:   Procedure Laterality Date     ARTHROSCOPY KNEE Right      BACK SURGERY       BACK SURGERY       COLONOSCOPY  2018    Repeat in 3 years; 12 mm polyp removed, diverticulosis, internal hemorrhoids     COLONOSCOPY  10/02/2008    Repeat in 10 years; diverticulosis     COLONOSCOPY N/A 2021    Procedure: COLONOSCOPY, WITH POLYPECTOMY AND BIOPSY;  Surgeon: Xu Ramachandran DO;  Location: [facility]     DECOMPRESSION LUMBAR MINIMALLY INVASIVE THREE+ LEVELS  2013    Lumbar 2-3, Lumbar 3-4, Lumbar 5-Sacral 1;  Surgeon: Param Jones MD;  Location:  OR     ENDOSCOPIC RELEASE ULNAR NERVE (ELBOW) Right 3/3/2020    Procedure: ENDOSCOPIC Cubital Tunnel Release;  Surgeon: Carlos Jordan MD;  Location: [facility]     RELEASE CARPAL TUNNEL Right 3/3/2020    Procedure: Open Carpal Tunnel Release;  Surgeon: Carlos Jordan MD;  Location: [facility]       Social History     Tobacco Use     Smoking status: Former Smoker     Years: 20.00     Types: Cigarettes, Cigars, Pipe     Quit date: 12/15/1966     Years since quittin.0     Smokeless tobacco: Never Used   Substance Use Topics     Alcohol use: Yes     Comment: occ.     Family History   Problem Relation Age of Onset     Cancer Mother      Alcohol/Drug Father         age 33 bleeding ulcers     Cancer Brother      Arthritis Brother      Eye Disorder Brother      Diabetes Sister      Eye Disorder Sister         glaucoma     Arthritis Sister      Cardiovascular Son      Eye Disorder Brother         glaucoma     Diabetes Brother      Arthritis Brother      Cancer Brother         LUNG CANCER WITH METS     Diabetes Brother      Cancer Brother         prostate     Arthritis Brother      Eye Disorder Brother      Arthritis Brother      Eye Disorder  Brother      Arthritis Sister      Eye Disorder Sister          Current Outpatient Medications   Medication Sig Dispense Refill     acetaminophen (TYLENOL) 500 MG tablet Take 1,000 mg by mouth every 6 hours as needed       ASPIRIN 81 MG OR TABS 1 TABLET DAILY       blood glucose monitoring (FREESTYLE FREEDOM LITE) meter device kit Use to test blood sugar 0ne times daily. 1 kit 0     brimonidine (ALPHAGAN) 0.2 % ophthalmic solution Place 1 drop into both eyes 2 times daily 1 Bottle 2     CALCIUM + D OR 1 TABLET DAILY       Cholecalciferol (VITAMIN D3 PO) Take by mouth daily       Continuous Blood Gluc Sensor (FREESTYLE JANET 14 DAY SENSOR) MISC use 1 each every 14 days Change every 14 days. 2 each 5     ibuprofen (ADVIL,MOTRIN) 200 MG tablet Take 200 mg by mouth every 4 hours as needed Reported on 4/21/2017       latanoprost (XALATAN) 0.005 % ophthalmic solution Apply 1 drop to eye At Bedtime  11     metFORMIN (GLUCOPHAGE) 500 MG tablet Take 1 tablet (500 mg) by mouth 2 times daily (with meals) 180 tablet 1     MULTI-VITAMIN OR TABS DAILY       Saw [place] 1000 MG CAPS Take 2 capsules by mouth daily 60 capsule 0     spironolactone (ALDACTONE) 25 MG tablet Take 1 tablet (25 mg) by mouth daily 90 tablet 3     STATIN NOT PRESCRIBED, INTENTIONAL, Statin not prescribed intentionally due to Intolerance       UNABLE TO FIND MEDICATION NAME: CBD oil       blood glucose monitoring (NO BRAND SPECIFIED) meter device kit Use to test blood sugar one times daily.. Preferred blood glucose meter OR supplies to accompany: Blood Glucose Monitor Brands: per insurance. Patient requesting ONE TOUCH. (Patient not taking: Reported on 12/9/2021) 1 kit 0     Allergies   Allergen Reactions     Amlodipine      Leg swelling      Dorzolamide Other (See Comments)     Dry eyes  Nicholas Avitia MD  Ophthalmology   5/4/2021   2:19 PM     Lisinopril Cough     Nka [No Known Allergies]      Timolol Other (See Comments)     Dry eyes  Nicholas Avitia  MD  Ophthalmology   5/4/2021   2:18 PM     Recent Labs   Lab Test 05/20/21  1613 11/13/20  0808 10/16/20  0803 10/12/20  1647 03/06/20  0927 02/14/20  1032 11/08/19  0847 08/14/19  0717 08/12/19  0845 05/17/19  0838 09/20/18  0755 01/19/18  1038 06/09/17  0805 02/03/17  1041 08/01/16  1041 11/07/14  0855 11/23/13  0333   A1C 6.4*  --  5.9*  --   --   --  6.1*  --   --    < > 6.3*   < > 6.1*  --  5.8   < >  --    LDL  --  106*  --   --   --   --  108*  --   --   --  110*  --  115*  --   --    < >  --    HDL  --  60  --   --   --   --  50  --   --   --  42  --  49  --   --    < >  --    TRIG  --  130  --   --   --   --  109  --   --   --  120  --  75  --   --    < >  --    ALT  --   --   --  35  --   --   --   --   --   --   --   --   --   --  32  --  37   CR  --   --   --  0.81 0.70   < > 0.76   < >  --   --  0.78  --  0.83  --  0.76   < > 0.75   GFRESTIMATED  --   --   --  84 >90   < > 88   < >  --   --  >90  --  >90  Non  GFR Calc     < > >90  Non  GFR Calc     < > >90   GFRESTBLACK  --   --   --  >90 >90   < > >90   < >  --   --  >90  --  >90  African American GFR Calc     < > >90   GFR Calc     < > >90   POTASSIUM  --   --   --  4.6 4.2   < > 4.5   < >  --   --  5.0  --  4.3  --  5.0   < > 4.4   TSH  --   --   --   --   --   --   --   --  0.92  --   --   --  1.52  --   --    < >  --     < > = values in this interval not displayed.        Review of Systems   Constitutional: Negative for chills and fever.   HENT: Negative for congestion, ear pain, hearing loss and sore throat.    Eyes: Positive for visual disturbance. Negative for pain.   Respiratory: Negative for cough and shortness of breath.    Cardiovascular: Negative for chest pain, palpitations and peripheral edema.   Gastrointestinal: Negative for abdominal pain, constipation, diarrhea, heartburn, hematochezia and nausea.   Genitourinary: Positive for impotence. Negative for dysuria, frequency, genital sores,  "hematuria, penile discharge and urgency.   Musculoskeletal: Positive for arthralgias and joint swelling. Negative for myalgias.   Skin: Negative for rash.   Allergic/Immunologic: Negative for environmental allergies and food allergies.   Neurological: Positive for weakness. Negative for dizziness, headaches and paresthesias.   Hematological: Negative for adenopathy. Does not bruise/bleed easily.   Psychiatric/Behavioral: Negative for mood changes. Decreased concentration:  The patient is not nervous/anxious.        OBJECTIVE:   BP (!) 150/92 (Cuff Size: Adult Regular)   Pulse 64   Temp 97.4  F (36.3  C) (Tympanic)   Resp 18   Ht 1.727 m (5' 8\")   Wt 95.7 kg (211 lb)   BMI 32.08 kg/m   Estimated body mass index is 32.08 kg/m  as calculated from the following:    Height as of this encounter: 1.727 m (5' 8\").    Weight as of this encounter: 95.7 kg (211 lb).  Physical Exam  GENERAL: alert, no distress and obese  EYES: Eyes grossly normal to inspection, PERRL and conjunctivae and sclerae normal  HENT: normal cephalic/atraumatic, right ear: occluded with wax, left ear: occluded with wax, nose and mouth without ulcers or lesions, oropharynx clear and oral mucous membranes moist  NECK: no adenopathy, no asymmetry, masses, or scars and thyroid normal to palpation  RESP: lungs clear to auscultation - no rales, rhonchi or wheezes  CV: regular rates and rhythm, normal S1 S2, no S3 or S4 and no murmur, click or rub  ABDOMEN: soft, nontender  MS: Chronic OA changes involving multiple joints, no joint swelling or skin discoloration noted, gait antalgic, radial pulses 3+, small cyst noted involving the right wrist  NEURO: Normal strength and tone, mentation intact and speech normal  PSYCH: mentation appears normal, affect normal/bright      ASSESSMENT / PLAN:   (Z00.00) Encounter for Medicare annual wellness exam  (primary encounter diagnosis)  Comment: Blood pressure above target goal of less than 140/90.  Previous blood " "pressure readings reviewed.  Spironolactone prescribed, common side effects discussed.  Recommended regular walks, healthy diet and limiting salt.  Bilateral impacted cerumen in nature irrigation by MA.  Influenza vaccine administered in office today.  Follow-up with RN in 2 weeks for repeat blood pressure check and labs      (Z23) Need for prophylactic vaccination and inoculation against influenza  Comment:   Plan: INFLUENZA, QUAD, HIGH DOSE, PF, 65YR + (FLUZONE        HD)        (E11.69) Type 2 diabetes mellitus with other specified complication, without long-term current use of insulin (H)  Comment: Last hemoglobin A1c 6.4, consistent with well-controlled diabetes.  Recommended to continue Metformin, diabetic diet  Lab Results   Component Value Date    A1C 6.4 05/20/2021    A1C 5.9 10/16/2020    A1C 6.1 11/08/2019    A1C 6.2 05/17/2019    A1C 6.3 09/20/2018   Plan: Basic metabolic panel  (Ca, Cl, CO2, Creat,         Gluc, K, Na, BUN), Hemoglobin A1c, Albumin         Random Urine Quantitative with Creat Ratio,         Lipid panel reflex to direct LDL Fasting            (G56.02) Carpal tunnel syndrome of left wrist  Comment: Known to have carpal tunnel syndrome, symptoms worsening, orthopedic referral placed  Plan: Orthopedic  Referral      (Z12.5) Screening for prostate cancer  Comment:   Plan: PSA, screen       (I10) Essential hypertension  Comment: As above  Plan: spironolactone (ALDACTONE) 25 MG tablet,         **Basic metabolic panel FUTURE 14d         Bilateral impacted cerumen cleaned with saline irrigation by MA.    COUNSELING:  Reviewed preventive health counseling, as reflected in patient instructions    Estimated body mass index is 32.08 kg/m  as calculated from the following:    Height as of this encounter: 1.727 m (5' 8\").    Weight as of this encounter: 95.7 kg (211 lb).    Weight management plan: Discussed healthy diet and exercise guidelines    He reports that he quit smoking about 55 years " ago. His smoking use included cigarettes, cigars, and pipe. He quit after 20.00 years of use. He has never used smokeless tobacco.      Appropriate preventive services were discussed with this patient, including applicable screening as appropriate for cardiovascular disease, diabetes, osteopenia/osteoporosis, and glaucoma.  As appropriate for age/gender, discussed screening for colorectal cancer, prostate cancer, breast cancer, and cervical cancer. Checklist reviewing preventive services available has been given to the patient.    Reviewed patients plan of care and provided an AVS. The Basic Care Plan (routine screening as documented in Health Maintenance) for Ana Maria meets the Care Plan requirement. This Care Plan has been established and reviewed with the Patient.    Counseling Resources:  ATP IV Guidelines  Pooled Cohorts Equation Calculator  Breast Cancer Risk Calculator  Breast Cancer: Medication to Reduce Risk  FRAX Risk Assessment  ICSI Preventive Guidelines  Dietary Guidelines for Americans, 2010  DSI MET-TECH's MyPlate  ASA Prophylaxis  Lung CA Screening    See Meng MD  [facility]    Identified Health Risks:    He is at risk for lack of exercise and has been provided with information to increase physical activity for the benefit of his well-being.  The patient was counseled and encouraged to consider modifying their diet and eating habits. He was provided with information on recommended healthy diet options.  The patient was provided with written information regarding signs of hearing loss.  Information on urinary incontinence and treatment options given to patient.  He is at risk for falling and has been provided with information to reduce the risk of falling at home.

## 2021-12-09 NOTE — NURSING NOTE
"Chief Complaint   Patient presents with     Physical     BP (!) 150/92 (Cuff Size: Adult Regular)   Pulse 64   Temp 97.4  F (36.3  C) (Tympanic)   Resp 18   Ht 1.727 m (5' 8\")   Wt 95.7 kg (211 lb)   BMI 32.08 kg/m   Estimated body mass index is 32.08 kg/m  as calculated from the following:    Height as of this encounter: 1.727 m (5' 8\").    Weight as of this encounter: 95.7 kg (211 lb).  Patient presents to the clinic using Cane      Health Maintenance that is potentially due pending provider review:    Health Maintenance Due   Topic Date Due     ANNUAL REVIEW OF HM ORDERS  Never done     COVID-19 Vaccine (1) Never done     ZOSTER IMMUNIZATION (1 of 2) Never done     DTAP/TDAP/TD IMMUNIZATION (3 - Td or Tdap) 08/20/2018     EYE EXAM  06/28/2020     INFLUENZA VACCINE (1) 09/01/2021     BMP  10/12/2021     LIPID  11/13/2021     MICROALBUMIN  11/13/2021     FALL RISK ASSESSMENT  11/13/2021     A1C  11/20/2021                "

## 2021-12-09 NOTE — PATIENT INSTRUCTIONS
Patient Education   Personalized Prevention Plan  You are due for the preventive services outlined below.  Your care team is available to assist you in scheduling these services.  If you have already completed any of these items, please share that information with your care team to update in your medical record.  Health Maintenance Due   Topic Date Due     ANNUAL REVIEW OF HM ORDERS  Never done     COVID-19 Vaccine (1) Never done     Zoster (Shingles) Vaccine (1 of 2) Never done     Diptheria Tetanus Pertussis (DTAP/TDAP/TD) Vaccine (3 - Td or Tdap) 08/20/2018     Eye Exam  06/28/2020     Flu Vaccine (1) 09/01/2021     Basic Metabolic Panel  10/12/2021     Cholesterol Lab  11/13/2021     Kidney Microalbumin Urine Test  11/13/2021     FALL RISK ASSESSMENT  11/13/2021     A1C Lab  11/20/2021       Exercise for a Healthier Heart  You may wonder how you can improve the health of your heart. If you re thinking about exercise, you re on the right track. You don t need to become an athlete. But you do need a certain amount of brisk exercise to help strengthen your heart. If you have been diagnosed with a heart condition, your healthcare provider may advise exercise to help stabilize your condition. To help make exercise a habit, choose safe, fun activities.      Exercise with a friend. When activity is fun, you're more likely to stick with it.   Before you start  Check with your healthcare provider before starting an exercise program. This is especially important if you have not been active for a while. It's also important if you have a long-term (chronic) health problem such as heart disease, diabetes, or obesity. Or if you are at high risk for having these problems.   Why exercise?  Exercising regularly offers many healthy rewards. It can help you do all of the following:     Improve your blood cholesterol level to help prevent further heart trouble    Lower your blood pressure to help prevent a stroke or heart  attack    Control diabetes, or reduce your risk of getting this disease    Improve your heart and lung function    Reach and stay at a healthy weight    Make your muscles stronger so you can stay active    Prevent falls and fractures by slowing the loss of bone mass (osteoporosis)    Manage stress better    Reduce your blood pressure    Improve your sense of self and your body image  Exercise tips      Ease into your routine. Set small goals. Then build on them. If you are not sure what your activity level should be, talk with your healthcare provider first before starting an exercise routine.    Exercise on most days. Aim for a total of 150 minutes (2 hours and 30 minutes) or more of moderate-intensity aerobic activity each week. Or 75 minutes (1 hour and 15 minutes) or more of vigorous-intensity aerobic activity each week. Or try for a combination of both. Moderate activity means that you breathe heavier and your heart rate increases but you can still talk. Think about doing 40 minutes of moderate exercise, 3 to 4 times a week. For best results, activity should last for about 40 minutes to lower blood pressure and cholesterol. It's OK to work up to the 40-minute period over time. Examples of moderate-intensity activity are walking 1 mile in 15 minutes. Or doing 30 to 45 minutes of yard work.    Step up your daily activity level.  Along with your exercise program, try being more active the whole day. Walk instead of drive. Or park further away so that you take more steps each day. Do more household tasks or yard work. You may not be able to meet the advised mount of physical activity. But doing some moderate- or vigorous-intensity aerobic activity can help reduce your risk for heart disease. Your healthcare provider can help you figure out what is best for you.    Choose 1 or more activities you enjoy.  Walking is one of the easiest things you can do. You can also try swimming, riding a bike, dancing, or taking an  exercise class.    When to call your healthcare provider  Call your healthcare provider if you have any of these:     Chest pain or feel dizzy or lightheaded    Burning, tightness, pressure, or heaviness in your chest, neck, shoulders, back, or arms    Abnormal shortness of breath    More joint or muscle pain    A very fast or irregular heartbeat (palpitations)  [employer] last reviewed this educational content on 7/1/2019 2000-2021 [employer]. All rights reserved. This information is not intended as a substitute for professional medical care. Always follow your healthcare professional's instructions.          Understanding USDA MyPlate  The USDA has guidelines to help you make healthy food choices. These are called MyPlate. MyPlate shows the food groups that make up healthy meals using the image of a place setting. Before you eat, think about the healthiest choices for what to put on your plate or in your cup or bowl. To learn more about building a healthy plate, visit www.choosemyplate.gov.    The food groups    Fruits. Any fruit or 100% fruit juice counts as part of the Fruit Group. Fruits may be fresh, canned, frozen, or dried, and may be whole, cut-up, or pureed. Make 1/2 of your plate fruits and vegetables.    Vegetables. Any vegetable or 100% vegetable juice counts as a member of the Vegetable Group. Vegetables may be fresh, frozen, canned, or dried. They can be served raw or cooked and may be whole, cut-up, or mashed. Make 1/2 of your plate fruits and vegetables.    Grains. All foods made from grains are part of the Grains Group. These include wheat, rice, oats, cornmeal, and barley. Grains are often used to make foods such as bread, pasta, oatmeal, cereal, tortillas, and grits. Grains should be no more than 1/4 of your plate. At least half of your grains should be whole grains.    Protein. This group includes meat, poultry, seafood, beans and peas, eggs, processed soy products (such as  tofu), nuts (including nut butters), and seeds. Make protein choices no more than 1/4 of your plate. Meat and poultry choices should be lean or low fat.    Dairy. The Dairy Group includes all fluid milk products and foods made from milk that contain calcium, such as yogurt and cheese. (Foods that have little calcium, such as cream, butter, and cream cheese, are not part of this group.) Most dairy choices should be low-fat or fat-free.    Oils. Oils aren't a food group, but they do contain essential nutrients. However it's important to watch your intake of oils. These are fats that are liquid at room temperature. They include canola, corn, olive, soybean, vegetable, and sunflower oil. Foods that are mainly oil include mayonnaise, certain salad dressings, and soft margarines. You likely already get your daily oil allowance from the foods you eat.  Things to limit  Eating healthy also means limiting these things in your diet:       Salt (sodium). Many processed foods have a lot of sodium. To keep sodium intake down, eat fresh vegetables, meats, poultry, and seafood when possible. Purchase low-sodium, reduced-sodium, or no-salt-added food products at the store. And don't add salt to your meals at home. Instead, season them with herbs and spices such as dill, oregano, cumin, and paprika. Or try adding flavor with lemon or lime zest and juice.    Saturated fat. Saturated fats are most often found in animal products such as beef, pork, and chicken. They are often solid at room temperature, such as butter. To reduce your saturated fat intake, choose leaner cuts of meat and poultry. And try healthier cooking methods such as grilling, broiling, roasting, or baking. For a simple lower-fat swap, use plain nonfat yogurt instead of mayonnaise when making potato salad or macaroni salad.    Added sugars. These are sugars added to foods. They are in foods such as ice cream, candy, soda, fruit drinks, sports drinks, energy drinks,  cookies, pastries, jams, and syrups. Cut down on added sugars by sharing sweet treats with a family member or friend. You can also choose fruit for dessert, and drink water or other unsweetened beverages.     [employer] last reviewed this educational content on 6/1/2020 2000-2021 [employer]. All rights reserved. This information is not intended as a substitute for professional medical care. Always follow your healthcare professional's instructions.          Signs of Hearing Loss      Hearing much better with one ear can be a sign of hearing loss.   Hearing loss is a problem shared by many people. In fact, it is one of the most common health problems, particularly as people age. Most people age 65 and older have some hearing loss. By age 80, almost everyone does. Hearing loss often occurs slowly over the years. So you may not realize your hearing has gotten worse.  Have your hearing checked  Call your healthcare provider if you:    Have to strain to hear normal conversation    Have to watch other people s faces very carefully to follow what they re saying    Need to ask people to repeat what they ve said    Often misunderstand what people are saying    Turn the volume of the television or radio up so high that others complain    Feel that people are mumbling when they re talking to you    Find that the effort to hear leaves you feeling tired and irritated    Notice, when using the phone, that you hear better with one ear than the other  [employer] last reviewed this educational content on 1/1/2020 2000-2021 [employer]. All rights reserved. This information is not intended as a substitute for professional medical care. Always follow your healthcare professional's instructions.          Urinary Incontinence (Male)    Urinary incontinence means not being able to control the release of urine from the bladder.   Causes  Common causes of urinary incontinence in men  include:    Infection    Certain medicines    Aging    Poor pelvic muscle tone    Bladder spasms    Obesity    Trouble urinating and fully emptying the bladder (urinary retention)  Other things that can cause incontinence are:     Nervous system diseases    Diabetes    Sleep apnea    Urinary tract infections    Prostate surgery    Pelvic injury  Constipation and smoking have also been identified as risk factors.   Symptoms    Urge incontinence (overactive bladder). This is a sudden urge to urinate. It occurs even though there may not be much urine in the bladder. The need to urinate often during the night is common. It's due to bladder spasms.    Stress incontinence. This is urine leakage that you can't control. It can occur with sneezing, coughing, and other actions that put stress on the bladder.    Treatment  Treatment depends on what is causing the condition. Bladder infections are treated with antibiotics. Urinary retention is treated with a bladder catheter.   Home care  Follow these guidelines when caring for yourself at home:    Don't have any foods and drinks that may irritate the bladder. This includes:  ? Chocolate  ? Alcohol  ? Caffeine  ? Carbonated drinks  ? Acidic fruits and juices    Limit fluids to 6 to 8 cups a day.    Lose weight if you are overweight. This will reduce your symptoms.    If advised, do regular pelvic muscle-strengthening exercises such as Kegel exercises.    If needed, wear absorbent pads to catch urine. Change the pads often. This is for good hygiene and to prevent skin and bladder infections.    Bathe daily for good hygiene.    If an antibiotic was prescribed to treat a bladder infection, take it until it's finished. Keep taking it even if you are feeling better. This is to make sure your infection has cleared.    If a catheter was left in place, keep bacteria from getting into the collection bag. Don't disconnect the catheter from the collection bag.    Use a leg band to secure  the catheter drainage tube, so it does not pull on the catheter. Drain the collection bag when it becomes full. To do this, use the drain spout at the bottom of the bag. Don't disconnect the bag from the catheter.    Don't pull on or try to remove a catheter. The catheter must be removed by a healthcare provider.    If you smoke, stop. Ask your provider for help if you can't do this on your own.  Follow-up care  Follow up with your healthcare provider, or as advised.  When to get medical advice  Call your healthcare provider right away if any of these occur:    Fever over 100.4 F (38 C), or as directed by your provider    Bladder pain or fullness    Belly swelling, nausea, or vomiting    Back pain    Weakness, dizziness, or fainting    If a catheter was left in place, return if:  ? The catheter falls out  ? The catheter stops draining for 6 hours  ? Your urine gets cloudy or smells bad  [employer] last reviewed this educational content on 1/1/2020 2000-2021 [employer]. All rights reserved. This information is not intended as a substitute for professional medical care. Always follow your healthcare professional's instructions.          Preventing Falls at Home  A person can fall for many reasons. Older adults may fall because reaction time slows down as we age. Your muscles and joints may get stiff, weak, or less flexible because of illness, medicines, or a physical condition.   Other health problems that make falls more likely include:     Arthritis    Dizziness or lightheadedness when you stand up (orthostatic hypotension)    History of a stroke    Dizziness    Anemia    Certain medicines taken for mental illness or to control blood pressure.    Problems with balance or gait    Bladder or urinary problems    History of falling    Changes in vision (vision impairment)    Changes in thinking skills and memory (cognitive impairment)  Injuries from a fall can include serious injuries such as broken  bones, dislocated joints, internal bleeding and cuts. Injuries like these can limit your independence.   Prevention tips  To help prevent falls and fall-related injuries, follow the tips below.    Floors  To make floors safer:     Put nonskid pads under area rugs.    Remove small rugs.    Replace worn floor coverings.    Tack carpets firmly to each step on carpeted stairs. Put nonskid strips on the edges of uncarpeted stairs.    Keep floors and stairs free of clutter and cords.    Arrange furniture so there are clear pathways.    Clean up any spills right away.  Bathrooms    To make bathrooms safer:     Install grab bars in the tub or shower.    Apply nonskid strips or put a nonskid rubber mat in the tub or shower.    Sit on a bath chair to bathe.    Use bathmats with nonskid backing.  Lighting  To improve visibility in your home:      Keep a flashlight in each room. Or put a lamp next to the bed within easy reach.    Put nightlights in the bedrooms, hallways, kitchen, and bathrooms.    Make sure all stairways have good lighting.    Take your time when going up and down stairs.    Put handrails on both sides of stairs and in walkways for more support. To prevent injury to your wrist or arm, don t use handrails to pull yourself up.    Install grab bars to pull yourself up.    Move or rearrange items that you use often. This will make them easier to find or reach.    Look at your home to find any safety hazards. Especially look at doorways, walkways, and the driveway. Remove or repair any safety problems that you find.  Other changes to make    Look around to find any safety hazards. Look closely at doorways, walkways, and the driveway. Remove or repair any safety problems that you find.    Wear shoes that fit well.    Take your time when going up and down stairs.    Put handrails on both sides of stairs and in walkways for more support. To prevent injury to your wrist or arm, don t use handrails to pull yourself  up.    Install grab bars wherever needed to pull yourself up.    Arrange items that you use often. This will make them easier to find or reach.    [employer] last reviewed this educational content on 3/1/2020    3966-1906 [employer]. All rights reserved. This information is not intended as a substitute for professional medical care. Always follow your healthcare professional's instructions.           Patient Education     Prevention Guidelines, Men Ages 65 and Older  Screening tests and vaccines are an important part of managing your health.A screening test is done to find possible disorders or diseases in people who don't have any symptoms. The goal is to find a disease early so lifestyle changes can be made and you can be watched more closely to reduce the risk of disease, or to detect it early enough to treat it most effectively. Screening tests are not considered diagnostic, but are used to determine if more testing is needed.  Health counseling is essential, too. Below are guidelines for these, for men ages 65 and older. Talk with your healthcare provider to make sure you re up-to-date on what you need.  Screening Who needs it How often   Abdominal aortic aneurysm Men ages 65 to 75 who have ever smoked. Men in this age group who have never smoked could still be offered screening, depending on their family history or other risk factors they may have. 1-time ultrasound   Unhealthy alcohol use All men in this age group At routine exams   Blood pressure All men in this age group Yearly checkup if your blood pressure is normal  Normal blood pressure is less than 120/80 mm Hg  If your blood pressure reading is higher than normal, follow the advice of your healthcare provider   Colorectal cancer All men at average risk in this age group through age 75 who are in good health. For men ages 76 to 85, talk with your healthcare provider to see if you should continue screening. For men 85 and older, screening  is not needed. Several tests are available and are used at different times. Possible tests include:    Flexible sigmoidoscopy every 5 years, or    Colonoscopy every 10 years, or    CT colonography (virtual colonoscopy) every 5 years, or    Yearly fecal occult blood test, or    Stool DNA test every 3 years  If you choose a test other than a colonoscopy and have an abnormal test result, you will need to have a colonoscopy. Screening recommendations vary among expert groups. Talk with your healthcare provider about which tests are best for you.  Some men should be screened using a different schedule because of their personal or family history. Talk with your healthcare provider about your health history.   Depression All men in this age group At routine exams   Type 2 diabetes or prediabetes All men beginning at age 45 and men without symptoms at any age who are overweight or obese and have one or more other risk factors for diabetes At least every 3 years (yearly if your blood sugar has already begun to rise)   Type 2 diabetes All men with prediabetes Every year   Hepatitis C Men at increased risk for infection; those born between 1945 and 1965 At routine exams   High cholesterol or triglycerides All men in this age group At least every 5 years   HIV Men at increased risk for infection At routine exams   Lung cancer Men ages 55 to 74 who are in fairly good health and are at higher risk for lung cancer    Currently smoke or who have quite within the past 15 years    30-pack year smoking history  Eligibility criteria and age limit (possibly up to age 80) may vary across major organizations  Talk with your healthcare provider for more information Yearly lung screening in smokers with low-dose CT scan (LDCT)   Obesity All men in this age group At yearly routine exams   Prostate cancer All men in this age group, talk with your healthcare provider about risks and benefits of testing with digital rectal exam (HARRIET) and  prostate-specific antigen (PSA) screening At routine exams if you decide to be tested   Syphilis Men at increased risk for infection At routine exams   Tuberculosis Men at increased risk for infection Talk with your healthcare provider   Vision All men in this age group Every 1 to 2 years; if you have a chronic health condition, ask your healthcare provider if you need exams more often   Vaccine Who needs it How often   Chickenpox (varicella) All men in this age group who have no record of this infection or vaccine 2 doses; second dose should be given at least 4 weeks after the first dose   Hepatitis A Men at increased risk for infection 2 or 3 doses (depending on vaccine) given at least 6 months apart   Hepatitis B Men at increased risk for infection 2 or 3 doses (depending on the vaccine); second dose should be given 1 month after the first dose; if a third dose, it should be given at least 2 months after the second dose and at least 4 months after the first dose   Haemophilus influenzae type B (HIB) Men at increased risk for infection 1 to 3 doses   Influenza (flu) All men in this age group Once a year   Meningococcal Men at increased risk for infection 1, 2, or 3 doses (depending on vaccine); ask your healthcare provider if you need a booster dose   Pneumococcal conjugate vaccine (PCV13) and pneumococcal polysaccharide vaccine (PPSV23) PPSV 23: All men in this age group who have not been vaccinated or have not had infection  PCV 13: Men at risk for infection PPSV 23: 1 dose  PCV 13: 1 dose   Tetanus/diphtheria/pertussis (Td/Tdap) booster All men in this age group Td every 10 years, or a 1-time dose of Tdap instead of a Td booster, then Td every 10 years   Zoster (shingles) All men in this age group 2 vaccines are available:    Recombinant zoster vaccine (RZV; Shingrix) is the preferred shingles vaccine. It's given in a series of 2 doses. The second dose is given 2 to 6 months after the first. This is given even  if you had shingles before or have had a zoster live vaccine in the past.    Zoster live vaccine (ZVL; Zostavax) may be given to healthy adults over age 60. It's given in one dose.   Counseling Who needs it How often   Diet and exercise Men who are overweight or obese When diagnosed, and then at routine exams   Fall prevention (exercise, vitamin D supplements) All men in this age group At yearly routine exams   Sexually transmitted infection Men at increased risk for infection At yearly routine exams   Use of daily aspirin Men up to age 70 who are at high risk for cardiovascular problems and not at an increased risk of bleeding as identified by your healthcare provider When your risk is known   Use of tobacco and the health effects it can cause All men in this age group Every visit   [employer] last reviewed this educational content on 4/1/2020 2000-2021 [employer]. All rights reserved. This information is not intended as a substitute for professional medical care. Always follow your healthcare professional's instructions.

## 2021-12-20 ENCOUNTER — TRANSFERRED RECORDS (OUTPATIENT)
Dept: HEALTH INFORMATION MANAGEMENT | Facility: CLINIC | Age: 81
End: 2021-12-20
Payer: COMMERCIAL

## 2021-12-23 ENCOUNTER — LAB (OUTPATIENT)
Dept: LAB | Facility: CLINIC | Age: 81
End: 2021-12-23
Payer: COMMERCIAL

## 2021-12-23 ENCOUNTER — ALLIED HEALTH/NURSE VISIT (OUTPATIENT)
Dept: FAMILY MEDICINE | Facility: CLINIC | Age: 81
End: 2021-12-23
Payer: COMMERCIAL

## 2021-12-23 VITALS — DIASTOLIC BLOOD PRESSURE: 84 MMHG | SYSTOLIC BLOOD PRESSURE: 134 MMHG | HEART RATE: 80 BPM

## 2021-12-23 DIAGNOSIS — I10 ESSENTIAL HYPERTENSION: ICD-10-CM

## 2021-12-23 DIAGNOSIS — I10 ESSENTIAL HYPERTENSION: Primary | ICD-10-CM

## 2021-12-23 DIAGNOSIS — E11.69 TYPE 2 DIABETES MELLITUS WITH OTHER SPECIFIED COMPLICATION, WITHOUT LONG-TERM CURRENT USE OF INSULIN (H): ICD-10-CM

## 2021-12-23 LAB
ANION GAP SERPL CALCULATED.3IONS-SCNC: 5 MMOL/L (ref 3–14)
BUN SERPL-MCNC: 17 MG/DL (ref 7–30)
CALCIUM SERPL-MCNC: 8.8 MG/DL (ref 8.5–10.1)
CHLORIDE BLD-SCNC: 98 MMOL/L (ref 94–109)
CO2 SERPL-SCNC: 27 MMOL/L (ref 20–32)
CREAT SERPL-MCNC: 0.7 MG/DL (ref 0.66–1.25)
CREAT UR-MCNC: 82 MG/DL
GFR SERPL CREATININE-BSD FRML MDRD: >90 ML/MIN/1.73M2
GLUCOSE BLD-MCNC: 109 MG/DL (ref 70–99)
MICROALBUMIN UR-MCNC: 16 MG/L
MICROALBUMIN/CREAT UR: 19.51 MG/G CR (ref 0–17)
POTASSIUM BLD-SCNC: 5.1 MMOL/L (ref 3.4–5.3)
SODIUM SERPL-SCNC: 130 MMOL/L (ref 133–144)

## 2021-12-23 PROCEDURE — 36415 COLL VENOUS BLD VENIPUNCTURE: CPT

## 2021-12-23 PROCEDURE — 99207 PR NO CHARGE NURSE ONLY: CPT

## 2021-12-23 PROCEDURE — 80048 BASIC METABOLIC PNL TOTAL CA: CPT

## 2021-12-23 PROCEDURE — 82043 UR ALBUMIN QUANTITATIVE: CPT

## 2021-12-23 NOTE — PROGRESS NOTES
Ana Maria Duvall is a 81 year old year old patient who comes in today for a Blood Pressure check because of new medication- spironolatone.  BP Readings from Last 6 Encounters:   12/23/21 134/84   12/09/21 (!) 150/92   06/18/21 (!) 168/94   05/20/21 (!) 150/90   11/13/20 130/84   11/05/20 (!) 140/82     HR 80  Patient is taking medication as prescribed  Patient is tolerating medications well.  Patient is monitoring Blood Pressure infrequently at home.  Average readings if yes are 138/84.  Current complaints: none  Disposition:  patient to continue with the same medication, home BP monitoring.  Await BMP results.  SLIVIO Montiel RN

## 2022-01-05 ENCOUNTER — TELEPHONE (OUTPATIENT)
Dept: FAMILY MEDICINE | Facility: CLINIC | Age: 82
End: 2022-01-05
Payer: COMMERCIAL

## 2022-01-05 NOTE — TELEPHONE ENCOUNTER
"Patient calling asking if he should reschedule his pre-op appt tomorrow for another day. He has \"come down with a bad cold.\" He has nasal congestion, coughing once in a while. No fever or shortness of breath. Surgery is scheduled with [facility] on 01/19/22, pre-op rescheduled for 01/17/22. Tomorrow's appt changed to [facility] to discuss symptoms.  Lacie SAUCEDA RN    "

## 2022-01-06 ENCOUNTER — ALLIED HEALTH/NURSE VISIT (OUTPATIENT)
Dept: FAMILY MEDICINE | Facility: CLINIC | Age: 82
End: 2022-01-06
Payer: COMMERCIAL

## 2022-01-06 ENCOUNTER — VIRTUAL VISIT (OUTPATIENT)
Dept: FAMILY MEDICINE | Facility: CLINIC | Age: 82
End: 2022-01-06
Payer: COMMERCIAL

## 2022-01-06 VITALS — DIASTOLIC BLOOD PRESSURE: 86 MMHG | TEMPERATURE: 97 F | SYSTOLIC BLOOD PRESSURE: 133 MMHG

## 2022-01-06 DIAGNOSIS — J06.9 UPPER RESPIRATORY TRACT INFECTION, UNSPECIFIED TYPE: ICD-10-CM

## 2022-01-06 DIAGNOSIS — J06.9 UPPER RESPIRATORY TRACT INFECTION, UNSPECIFIED TYPE: Primary | ICD-10-CM

## 2022-01-06 LAB
FLUAV AG SPEC QL IA: NEGATIVE
FLUBV AG SPEC QL IA: NEGATIVE

## 2022-01-06 PROCEDURE — U0005 INFEC AGEN DETEC AMPLI PROBE: HCPCS

## 2022-01-06 PROCEDURE — 99207 PR NO CHARGE LOS: CPT

## 2022-01-06 PROCEDURE — 87804 INFLUENZA ASSAY W/OPTIC: CPT

## 2022-01-06 PROCEDURE — 99441 PR PHYSICIAN TELEPHONE EVALUATION 5-10 MIN: CPT | Mod: 95 | Performed by: FAMILY MEDICINE

## 2022-01-06 PROCEDURE — U0003 INFECTIOUS AGENT DETECTION BY NUCLEIC ACID (DNA OR RNA); SEVERE ACUTE RESPIRATORY SYNDROME CORONAVIRUS 2 (SARS-COV-2) (CORONAVIRUS DISEASE [COVID-19]), AMPLIFIED PROBE TECHNIQUE, MAKING USE OF HIGH THROUGHPUT TECHNOLOGIES AS DESCRIBED BY CMS-2020-01-R: HCPCS

## 2022-01-06 NOTE — PROGRESS NOTES
Yeyo is a 81 year old who is being evaluated via a billable telephone visit.      What phone number would you like to be contacted at? [phone]  How would you like to obtain your AVS? MyChart    Assessment & Plan     Upper respiratory tract infection, unspecified type  Discussed in detail differentials and further management. Symptoms are likely secondary to viral infection.  COVID-19 influenza test ordered.  Recommended well hydration, over-the-counter analgesia, warm fluids and humidifier use.  Follow up if symptoms persist or worsen. Written instructions/information provided. Patient understood and in agreement with the above plan. All questions answered.   - Symptomatic; Unknown COVID-19 Virus (Coronavirus) by PCR; Future  - Influenza A & B Antigen - Clinic Collect; Future      See Meng MD  [facility]    Subjective   Yeyo is a 81 year old who presents for the following health issues     HPI   Acute Illness  Acute illness concerns: Congestion   Onset/Duration: Tuesday - really bad yesterday, a little better today   Symptoms:  Fever: no  Chills/Sweats: no  Headache (location?): no  Sinus Pressure: YES  Conjunctivitis:  no  Ear Pain: no  Rhinorrhea: YES  Congestion: YES  Sore Throat: no  Cough: YES-non-productive  Wheeze: no  Decreased Appetite: no  Nausea: no  Vomiting: no  Diarrhea: no  Dysuria/Freq.: no  Dysuria or Hematuria: no  Fatigue/Achiness: YES- tired   Sick/Strep Exposure: no  Therapies tried and outcome: cough drops, rest, vicks         Review of Systems   Constitutional, HEENT, cardiovascular, pulmonary, gi and gu systems are negative, except as otherwise noted.      Objective           Vitals:  No vitals were obtained today due to virtual visit.    Physical Exam   alert and no distress  PSYCH: Alert and oriented times 3; coherent speech, normal   rate and volume, able to articulate logical thoughts, able   to abstract reason, no tangential thoughts, no  hallucinations   or delusions  His affect is normal  RESP: No cough, no audible wheezing, able to talk in full sentences  Remainder of exam unable to be completed due to telephone visits            Phone call duration: 6 minutes

## 2022-01-07 LAB — SARS-COV-2 RNA RESP QL NAA+PROBE: NEGATIVE

## 2022-01-17 ENCOUNTER — OFFICE VISIT (OUTPATIENT)
Dept: FAMILY MEDICINE | Facility: CLINIC | Age: 82
End: 2022-01-17
Payer: COMMERCIAL

## 2022-01-17 VITALS
OXYGEN SATURATION: 96 % | WEIGHT: 215 LBS | HEART RATE: 79 BPM | DIASTOLIC BLOOD PRESSURE: 72 MMHG | BODY MASS INDEX: 32.58 KG/M2 | HEIGHT: 68 IN | TEMPERATURE: 97.4 F | SYSTOLIC BLOOD PRESSURE: 146 MMHG | RESPIRATION RATE: 16 BRPM

## 2022-01-17 DIAGNOSIS — M54.16 LUMBAR RADICULOPATHY: ICD-10-CM

## 2022-01-17 DIAGNOSIS — Z01.818 PREOP GENERAL PHYSICAL EXAM: Primary | ICD-10-CM

## 2022-01-17 DIAGNOSIS — G56.02 CARPAL TUNNEL SYNDROME OF LEFT WRIST: ICD-10-CM

## 2022-01-17 DIAGNOSIS — E11.69 TYPE 2 DIABETES MELLITUS WITH OTHER SPECIFIED COMPLICATION, WITHOUT LONG-TERM CURRENT USE OF INSULIN (H): ICD-10-CM

## 2022-01-17 DIAGNOSIS — N40.0 BENIGN PROSTATIC HYPERPLASIA WITHOUT LOWER URINARY TRACT SYMPTOMS: ICD-10-CM

## 2022-01-17 DIAGNOSIS — I10 BENIGN ESSENTIAL HYPERTENSION: ICD-10-CM

## 2022-01-17 PROCEDURE — U0005 INFEC AGEN DETEC AMPLI PROBE: HCPCS | Performed by: FAMILY MEDICINE

## 2022-01-17 PROCEDURE — U0003 INFECTIOUS AGENT DETECTION BY NUCLEIC ACID (DNA OR RNA); SEVERE ACUTE RESPIRATORY SYNDROME CORONAVIRUS 2 (SARS-COV-2) (CORONAVIRUS DISEASE [COVID-19]), AMPLIFIED PROBE TECHNIQUE, MAKING USE OF HIGH THROUGHPUT TECHNOLOGIES AS DESCRIBED BY CMS-2020-01-R: HCPCS | Performed by: FAMILY MEDICINE

## 2022-01-17 PROCEDURE — 99214 OFFICE O/P EST MOD 30 MIN: CPT | Performed by: FAMILY MEDICINE

## 2022-01-17 ASSESSMENT — PAIN SCALES - GENERAL: PAINLEVEL: NO PAIN (0)

## 2022-01-17 ASSESSMENT — MIFFLIN-ST. JEOR: SCORE: 1659.73
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
## 2022-01-18 LAB — SARS-COV-2 RNA RESP QL NAA+PROBE: NEGATIVE

## 2022-02-22 ENCOUNTER — TELEPHONE (OUTPATIENT)
Dept: FAMILY MEDICINE | Facility: CLINIC | Age: 82
End: 2022-02-22
Payer: COMMERCIAL

## 2022-02-22 DIAGNOSIS — R10.32 LLQ ABDOMINAL PAIN: Primary | ICD-10-CM

## 2022-02-22 NOTE — TELEPHONE ENCOUNTER
Reason for Call:  Abdominal Pain     Detailed comments: Abdominal Pain especially when he pushes on left side. Pt  Said he gets Diverticulitis frequently and would prescription sent into the Pharmacy.     Phone Number Patient can be reached at: Home number on file [phone] (home)    Best Time: Any Time      Can we leave a detailed message on this number? YES    Call taken on 2/22/2022 at 9:36 AM by Corinna Nj

## 2022-02-22 NOTE — TELEPHONE ENCOUNTER
Pt has L side abd pain, hx diverticulitis. No nausea or fever,  had normal BM this am with no change in pain.  Abd pain 5-10/10, worse with moving around. Same day appointment recommended, pt will not drive today due to weather. Message forwarded to Dr Meng for advice. Katarina Lopez RN

## 2022-02-23 NOTE — TELEPHONE ENCOUNTER
Discussed with patient via phone.  History of diverticulitis, experiencing left lower abdominal pain with some nausea and decreased appetite.  Shared decision made to try antibiotic, has worked well in the past.  Augmentin prescribed, common side effects discussed.  Instructed to go ER if symptoms persist or worsen.  Patient understood and in agreement with above plan.  All questions answered.

## 2022-04-03 DIAGNOSIS — E11.9 TYPE 2 DIABETES MELLITUS WITHOUT COMPLICATION, WITHOUT LONG-TERM CURRENT USE OF INSULIN (H): ICD-10-CM

## 2022-04-28 DIAGNOSIS — E11.69 TYPE 2 DIABETES MELLITUS WITH OTHER SPECIFIED COMPLICATION, WITHOUT LONG-TERM CURRENT USE OF INSULIN (H): ICD-10-CM

## 2022-04-28 RX ORDER — FLASH GLUCOSE SENSOR
KIT MISCELLANEOUS
Qty: 6 EACH | Refills: 3 | Status: SHIPPED | OUTPATIENT
Start: 2022-04-28 | End: 2023-03-03

## 2022-05-18 NOTE — PROGRESS NOTES
Outpatient Physical Therapy Progress Note     Patient: Ana Maria Duvall  : 1940    Beginning/End Dates of Reporting Period:  2017 to 2017    Referring Provider: Jay Swanson Diagnosis: Decreased mobility and strength secondary to chronic LBP     Client Self Report: Pt states that he continues to have pain in his low back. Pt states that he feels he is getting stronger but he has noticed different things. pt states at work, it has gotten worse due to lifting, bending over. pt states 8/10 pain, might go down to a 6/10 after exercises performed. pt regrets not having gone to PT when it was offered to him after his previous surgeries to his low back many years ago. He has an injection tomorrow. Pt states he is having a hard time not performing a lot of activities at work such as lifting, carrying. At home, pt states he did not do a lot but shovelled dirt into his garden and planted tomatoes. After review of goals, pt still is not able to participate with work activities without large increases in pain including participating with lifting, squatting down to look into a drawer.    Objective Measurements:  Objective Measure: Hip Abduction Strength  Details:  B  Objective Measure: TA Strength  Details: Pt progressed to B LE lowering, alternating LE x10 reps prior to pt extending back due to TA fatigue.                 Details: Increased tightness in B Psoas, QL and over previous surgical incisions, large trigger points in lumbar erector spinae paraspinals.          Goals:  Goal Identifier Strength   Goal Description Pt will demonstrate atleast 4+/5 hip abduction strength bilaterally in order to climb stairs with <3/10 pain.   Target Date 17   Date Met  17   Progress:     Goal Identifier HEP   Goal Description Pt will demonstrate proper form and duration with HEP of hip and core strengthening in order to progress independently.   Target Date 17   Date Met  17   Progress:  Patient denies any symptoms currently  Continue Omeprazole 10 mg daily      Goal Identifier Work   Goal Description Pt will be able to lift atleast 50# w/ proper squat form and lifting mechanics in order to prevent recurring low back pain.   Target Date 05/08/17   Date Met      Progress:     Goal Identifier     Goal Description     Target Date     Date Met      Progress:     Goal Identifier     Goal Description     Target Date     Date Met      Progress:     Goal Identifier     Goal Description     Target Date     Date Met      Progress:     Goal Identifier     Goal Description     Target Date     Date Met      Progress:     Goal Identifier     Goal Description     Target Date     Date Met      Progress:     Progress Toward Goals:   Progress this reporting period: Pt has demonstrated progress with improved core and hip strength with some decreases in pain. However, pt continues to have increased pain with work activities of lifting, carrying and pulling open drawers on the bottom shelf.           Plan:  Continue therapy per current plan of care.    Discharge:  No

## 2022-06-10 ENCOUNTER — OFFICE VISIT (OUTPATIENT)
Dept: FAMILY MEDICINE | Facility: CLINIC | Age: 82
End: 2022-06-10
Payer: COMMERCIAL

## 2022-06-10 VITALS
TEMPERATURE: 97.4 F | SYSTOLIC BLOOD PRESSURE: 130 MMHG | HEART RATE: 70 BPM | BODY MASS INDEX: 31.52 KG/M2 | DIASTOLIC BLOOD PRESSURE: 82 MMHG | HEIGHT: 68 IN | WEIGHT: 208 LBS | RESPIRATION RATE: 18 BRPM

## 2022-06-10 DIAGNOSIS — M54.16 LUMBAR RADICULOPATHY: ICD-10-CM

## 2022-06-10 DIAGNOSIS — I10 BENIGN ESSENTIAL HYPERTENSION: ICD-10-CM

## 2022-06-10 DIAGNOSIS — N40.0 BENIGN PROSTATIC HYPERPLASIA WITHOUT LOWER URINARY TRACT SYMPTOMS: ICD-10-CM

## 2022-06-10 DIAGNOSIS — Z01.818 PREOP GENERAL PHYSICAL EXAM: Primary | ICD-10-CM

## 2022-06-10 DIAGNOSIS — D49.7 PITUITARY TUMOR: ICD-10-CM

## 2022-06-10 DIAGNOSIS — E11.69 TYPE 2 DIABETES MELLITUS WITH OTHER SPECIFIED COMPLICATION, WITHOUT LONG-TERM CURRENT USE OF INSULIN (H): ICD-10-CM

## 2022-06-10 LAB
ANION GAP SERPL CALCULATED.3IONS-SCNC: 6 MMOL/L (ref 3–14)
BUN SERPL-MCNC: 16 MG/DL (ref 7–30)
CALCIUM SERPL-MCNC: 9.6 MG/DL (ref 8.5–10.1)
CHLORIDE BLD-SCNC: 99 MMOL/L (ref 94–109)
CO2 SERPL-SCNC: 25 MMOL/L (ref 20–32)
CREAT SERPL-MCNC: 0.68 MG/DL (ref 0.66–1.25)
ERYTHROCYTE [DISTWIDTH] IN BLOOD BY AUTOMATED COUNT: 12.8 % (ref 10–15)
GFR SERPL CREATININE-BSD FRML MDRD: >90 ML/MIN/1.73M2
GLUCOSE BLD-MCNC: 124 MG/DL (ref 70–99)
HCT VFR BLD AUTO: 41.7 % (ref 40–53)
HGB BLD-MCNC: 14.1 G/DL (ref 13.3–17.7)
MCH RBC QN AUTO: 28.9 PG (ref 26.5–33)
MCHC RBC AUTO-ENTMCNC: 33.8 G/DL (ref 31.5–36.5)
MCV RBC AUTO: 86 FL (ref 78–100)
PLATELET # BLD AUTO: 290 10E3/UL (ref 150–450)
POTASSIUM BLD-SCNC: 5 MMOL/L (ref 3.4–5.3)
RBC # BLD AUTO: 4.88 10E6/UL (ref 4.4–5.9)
SODIUM SERPL-SCNC: 130 MMOL/L (ref 133–144)
WBC # BLD AUTO: 5.5 10E3/UL (ref 4–11)

## 2022-06-10 PROCEDURE — 36415 COLL VENOUS BLD VENIPUNCTURE: CPT | Performed by: FAMILY MEDICINE

## 2022-06-10 PROCEDURE — 99214 OFFICE O/P EST MOD 30 MIN: CPT | Performed by: FAMILY MEDICINE

## 2022-06-10 PROCEDURE — 85027 COMPLETE CBC AUTOMATED: CPT | Performed by: FAMILY MEDICINE

## 2022-06-10 PROCEDURE — 80048 BASIC METABOLIC PNL TOTAL CA: CPT | Performed by: FAMILY MEDICINE

## 2022-06-10 PROCEDURE — 93000 ELECTROCARDIOGRAM COMPLETE: CPT | Performed by: FAMILY MEDICINE

## 2022-06-10 ASSESSMENT — PAIN SCALES - GENERAL: PAINLEVEL: NO PAIN (0)
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
## 2022-06-12 NOTE — ADDENDUM NOTE
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 ([facility] ) 11/04/2020     Priority: Low    Contact dermatitis 11/04/2020     Priority: Low    COPD (chronic obstructive pulmonary disease) ([place] [id number] ) 11/04/2020     Priority: Low    Essential hypertension 11/04/2020     Priority: Low       # F/u Acute on Chronic HFrEF, Stage C/D, NYHA III  Etiology: possible ischemic given his hx of CABG vs HTN with high SVR in ED, vs ETOH- current vs progression of dz due to non adherence due to no insurance     Weight: 130 lbs, 146 lbs  NT proBNP 12/21/20: 2066  11/4/20: 12,271     Studies- personally reviewed by me  Echo 1/18/21:  LVEF: 35%, severe hypokinesis  Of mid inferoseptal, basal mid inferior and basal mid inferolateral walls  LVEDd: 6 cm  RV: moderately reduced  PASP: inadequate  MR: moderate to severe    Echo 11/5/20:  LVEF: 35%  LVEDd:  RV: mildly dilated, mild to moderately reduced  PASP: 65 mmHg     Echo 7/2016:  LVEF: 50%     Echo 4/7/15 -[facility]  Moderately reduced left ventricular systolic function  Mild concentric left ventricular hypertrophy  LCX regional wall motion abnormality  Estimated left ventricular ejection fraction is 40%  Mildly dilated left atrium  Aortic sclerosis without stenosis  Diastolic dysfunction with elevated filling pressures     Visually Estimated LV Ejection Fraction is:40%     Catheterization 4/6/15-[facility]  m-%  OM1- 100%  OM2- 100%  RCA- 100%    LIMA-> LAD- patent, supplies collaterals to RCA and LCx territories  SVG-> OM1/OM2 occluded  SVG-> OM3 occluded  SVG->RCA occluded  LV gram- EF 20%      Neurohormonal Blockade: once again off meds due to affordability  --Beta-Blocker: Metoprolol succinate 50 mg Q12   --ACEi, ARB or ARNi:Lisinopril 10 mg daily    (or SVR reduction)  --Aldosterone Receptor Blocker:  --Diuretic: Lasix 40 mg daily     Sudden Cardiac Death Risk Reduction:  --ICD: LiveVest     Electrical Resynchronization:  Narrow QRS     Advanced Therapies (If appropriate): --Inotrope:  --LVAD/Transplant Candidacy: social situation currently precludes     # CAD  S/P CABG at age 28  Reportedly has AARON x 2 at some point as well but cannot locate that report   See most recent [facility] above     No anginal type symptoms at present  Slight troponin elevation but in setting of acutely decompensated HF with poor perfusion  Would continue to trend to peak  Rx: asa, metoprolol, atorvastatin 40 mg     # PAF- spontaneous resolution, metoprolol increased to 50 mg BID, NOAC- Eliquis 5 mg BID- stopped taking due to cost  Is taking aspirin          # Transaminitis  2/2 hepatic congestion, low flow state  SVR reduce and decongest as above    # ED  2/2 acute CHF decompensation with hypoperfusion  Plan as above  Trend BMPs; Cr 1 3 on 12/21  # HTN  # HLD  Atorvastatin 40 mg   11/4/20: , HDL 16- this is before statin therapy  # Severe COPD    # H/O Tobacco abuse  + EtOH  # hyponatremia - due to HF   # financial concerns- cant afford meds    TODAY'S PLAN:  If he can  be on therapy for 3 months and his EF improves or it does not and he needs ICD then can undergo and 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 have any 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, repeat Echo shows EF: 35%, candidate for ICD but has not been on meds as he cannot afford them, no money   Birdie Fair has been helping him out  Continue Eliquis 5 mg BID for PA  Needs follow up lipids on statin now (above LDL off statin)  Says he has no money for meds, will rx metoprolol tartrate and lisinopril to start  --2g sodium diet  - Daily weights    HPI:      64 yo male following up after recent hospitalization for decompensated systolic heart failure  He carries diagnosis of HFrEF with iCM and hx of CABG vs ETOH  Pt had been seen by [facility] cardiology in past  Reported not being on HF meds due to lack of insurance  He had presented to the ED with likely low output but high SVR (BP) and volume overload  We did not start inotrope but focused on diuresis and SVR reduction  During his stay he was seen by HF  due to lack of insurance  At one point during his stay he was placed on 1:1 due to comments made about ending it all 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  He was appropriately diuresed and started on GDMT and discharged with LifeVest as he did have NSVT on monitor  On last follow up stated he had not been on meds as he does not have money  He is working with our     Interval History:  Working with our  due to inability to afford meds  Is without meds again for about 2 weeks    Review of Systems   Constitutional: Negative for activity change, appetite change, fatigue and unexpected weight change (up 16 lbs)  HENT: Negative for congestion and nosebleeds  Eyes: Negative  Respiratory: Negative for cough, chest tightness and shortness of breath  Cardiovascular: Negative for chest pain, palpitations and leg swelling  Gastrointestinal: Negative for abdominal distention  Endocrine: Negative  Genitourinary: Negative  Musculoskeletal: Negative  Skin: Negative  Neurological: Negative for dizziness, syncope and weakness  Hematological: Negative  Psychiatric/Behavioral: Negative  No past medical history on file  No Known Allergies        Current Outpatient Medications:     albuterol (PROVENTIL HFA,VENTOLIN HFA) 90 mcg/act inhaler, Inhale 2 puffs every 4 (four) hours as needed for wheezing, Disp: 1 Inhaler, Rfl: 3    aspirin (ECOTRIN LOW STRENGTH) 81 mg EC tablet, Take 1 tablet (81 mg total) by mouth daily, Disp: 30 tablet, Rfl: 0    atorvastatin (LIPITOR) 40 mg tablet, Take 1 tablet (40 mg total) by mouth daily, Disp: 30 tablet, Rfl: 0    Blood Pressure Monitoring ([employer] Blood Pressure Cuff) MISC, Use 2 (two) times a day (Patient not taking: Reported on 2/3/2021), Disp: 1 each, Rfl: 0    furosemide (LASIX) 40 mg tablet, Take 1 tablet (40 mg total) by mouth daily, Disp: 30 tablet, Rfl: 0    lisinopril (ZESTRIL) 10 mg tablet, Take 1 tablet (10 mg total) by mouth daily, Disp: 30 tablet, Rfl: 0    metoprolol succinate (TOPROL-XL) 50 mg 24 hr tablet, Take 1 tablet (50 mg total) by mouth every 12 (twelve) hours, Disp: 60 tablet, Rfl: 0    rivaroxaban (XARELTO) 20 mg tablet, Take 1 tablet (20 mg total) by mouth daily with breakfast, Disp: 30 tablet, Rfl: 5    triamcinolone (KENALOG) 0 1 % cream, Apply topically 2 (two) times a day, Disp: 30 g, Rfl: 0    Social History     Socioeconomic History    Marital status: Single     Spouse name: Not on file    Number of children: Not on file    Years of education: Not on file    Highest education level: Not on file   Occupational History    Not on file   Social Needs    Financial resource strain: Not on file    Food insecurity     Worry: Not on file     Inability: Not on file    Transportation needs     Medical: Not on file     Non-medical: Not on file   Tobacco Use    Smoking status: Former Smoker    Smokeless tobacco: Never Used   Substance and Sexual Activity    Alcohol use: Not Currently    Drug use: Never    Sexual activity: Not on file   Lifestyle    Physical activity     Days per week: Not on file     Minutes per session: Not on file    Stress: Not on file   Relationships    Social connections     Talks on phone: Not on file     Gets together: Not on file     Attends Confucianism service: Not on file     Active member of club or organization: Not on file     Attends meetings of clubs or organizations: Not on file     Relationship status: Not on file    Intimate partner violence     Fear of current or ex partner: Not on file     Emotionally abused: Not on file     Physically abused: Not on file     Forced sexual activity: Not on file   Other Topics Concern    Not on file   Social History Narrative    Not on file       No family history on file  Physical Exam:    Vitals:   Vitals:    04/26/21 1537   BP: 130/80   Pulse: 74   SpO2: 98%       Physical Exam  Constitutional:       General: He is not in acute distress  Appearance: He is well-developed  HENT:      Head: Normocephalic and atraumatic  Eyes:      General: No scleral icterus  Right eye: No discharge  Left eye: No discharge  Pupils: Pupils are equal, round, and reactive to light  Neck:      Musculoskeletal: Normal range of motion  Vascular: No JVD  Cardiovascular:      Rate and Rhythm: Normal rate and regular rhythm  Heart sounds: No murmur  Comments: LVAD with normal VAD hum  No palpable pulses  Pulmonary:      Effort: Pulmonary effort is normal  No respiratory distress  Breath sounds: Examination of the right-upper field reveals wheezing  Examination of the left-upper field reveals wheezing  Wheezing present  No rales  Abdominal:      General: Bowel sounds are normal  There is no distension  Palpations: Abdomen is soft  Tenderness: There is no abdominal tenderness  Musculoskeletal: Normal range of motion  Skin:     General: Skin is warm and dry  Findings: No erythema or rash  Neurological:      Mental Status: He is alert and oriented to person, place, and time     Psychiatric:         Behavior: Behavior normal          Labs & Results:    Lab Results   Component Value Date    SODIUM 131 (L) 11/14/2020    K 4 4 11/14/2020    CL 96 (L) 11/14/2020    CO2 29 11/14/2020 BUN 37 (H) 11/14/2020    CREATININE 0 99 11/14/2020    GLUC 79 11/14/2020    CALCIUM 9 1 11/14/2020     Lab Results   Component Value Date    WBC 9 26 11/14/2020    HGB 15 6 11/14/2020    HCT 49 7 (H) 11/14/2020    MCV 98 11/14/2020     11/14/2020     Lab Results   Component Value Date    NTBNP 12,271 (H) 11/04/2020      Lab Results   Component Value Date    CHOLESTEROL 216 (H) 11/04/2020     Lab Results   Component Value Date    HDL 16 (L) 11/04/2020     Lab Results   Component Value Date    TRIG 94 11/04/2020     No results found for: Silvestre    EKG personally reviewed by Suleiman Stevens DO  Counseling / Coordination of Care  Time spent today 25 minutes  Greater than 50% of total time was spent with the patient and / or family counseling and / or coordination of care  We went over current diagnosis, most recent studies and any changes in treatment  Thank you for the opportunity to participate in the care of this patient      [address] [facility] PULMONARY HYPERTENSION  MEDICAL DIRECTOR OF [place]

## 2022-06-15 ENCOUNTER — LAB (OUTPATIENT)
Dept: LAB | Facility: CLINIC | Age: 82
End: 2022-06-15
Attending: FAMILY MEDICINE
Payer: COMMERCIAL

## 2022-06-15 DIAGNOSIS — Z01.818 PREOP GENERAL PHYSICAL EXAM: ICD-10-CM

## 2022-06-15 PROCEDURE — U0003 INFECTIOUS AGENT DETECTION BY NUCLEIC ACID (DNA OR RNA); SEVERE ACUTE RESPIRATORY SYNDROME CORONAVIRUS 2 (SARS-COV-2) (CORONAVIRUS DISEASE [COVID-19]), AMPLIFIED PROBE TECHNIQUE, MAKING USE OF HIGH THROUGHPUT TECHNOLOGIES AS DESCRIBED BY CMS-2020-01-R: HCPCS

## 2022-06-15 PROCEDURE — U0005 INFEC AGEN DETEC AMPLI PROBE: HCPCS

## 2022-06-16 LAB — SARS-COV-2 RNA RESP QL NAA+PROBE: NEGATIVE

## 2022-06-29 ENCOUNTER — OFFICE VISIT (OUTPATIENT)
Dept: FAMILY MEDICINE | Facility: CLINIC | Age: 82
End: 2022-06-29
Payer: COMMERCIAL

## 2022-06-29 VITALS
WEIGHT: 205 LBS | HEART RATE: 69 BPM | SYSTOLIC BLOOD PRESSURE: 127 MMHG | TEMPERATURE: 97.9 F | OXYGEN SATURATION: 97 % | RESPIRATION RATE: 24 BRPM | DIASTOLIC BLOOD PRESSURE: 72 MMHG | BODY MASS INDEX: 31.17 KG/M2

## 2022-06-29 DIAGNOSIS — D35.2 PITUITARY ADENOMA (H): Primary | ICD-10-CM

## 2022-06-29 DIAGNOSIS — E87.1 HYPONATREMIA: Primary | ICD-10-CM

## 2022-06-29 DIAGNOSIS — R33.9 URINARY RETENTION: ICD-10-CM

## 2022-06-29 DIAGNOSIS — E11.9 TYPE 2 DIABETES MELLITUS WITHOUT COMPLICATION, UNSPECIFIED WHETHER LONG TERM INSULIN USE (H): ICD-10-CM

## 2022-06-29 DIAGNOSIS — E87.1 HYPONATREMIA: ICD-10-CM

## 2022-06-29 LAB
ANION GAP SERPL CALCULATED.3IONS-SCNC: 7 MMOL/L (ref 3–14)
BUN SERPL-MCNC: 20 MG/DL (ref 7–30)
CALCIUM SERPL-MCNC: 9.8 MG/DL (ref 8.5–10.1)
CHLORIDE BLD-SCNC: 96 MMOL/L (ref 94–109)
CO2 SERPL-SCNC: 26 MMOL/L (ref 20–32)
CREAT SERPL-MCNC: 0.62 MG/DL (ref 0.66–1.25)
GFR SERPL CREATININE-BSD FRML MDRD: >90 ML/MIN/1.73M2
GLUCOSE BLD-MCNC: 106 MG/DL (ref 70–99)
HBA1C MFR BLD: 6.5 % (ref 0–5.6)
POTASSIUM BLD-SCNC: 5.2 MMOL/L (ref 3.4–5.3)
SODIUM SERPL-SCNC: 129 MMOL/L (ref 133–144)

## 2022-06-29 PROCEDURE — 99215 OFFICE O/P EST HI 40 MIN: CPT | Performed by: NURSE PRACTITIONER

## 2022-06-29 PROCEDURE — 83036 HEMOGLOBIN GLYCOSYLATED A1C: CPT | Performed by: NURSE PRACTITIONER

## 2022-06-29 PROCEDURE — 36415 COLL VENOUS BLD VENIPUNCTURE: CPT | Performed by: NURSE PRACTITIONER

## 2022-06-29 PROCEDURE — 80048 BASIC METABOLIC PNL TOTAL CA: CPT | Performed by: NURSE PRACTITIONER

## 2022-06-29 RX ORDER — HYDROCORTISONE 20 MG/1
20 TABLET ORAL
COMMUNITY
Start: 2022-06-19 | End: 2023-07-13

## 2022-06-29 RX ORDER — OXYCODONE HYDROCHLORIDE 5 MG/1
5-10 TABLET ORAL
COMMUNITY
Start: 2022-06-18 | End: 2022-06-29

## 2022-06-29 RX ORDER — TAMSULOSIN HYDROCHLORIDE 0.4 MG/1
0.4 CAPSULE ORAL DAILY
COMMUNITY
Start: 2022-06-25 | End: 2022-06-29

## 2022-06-29 RX ORDER — HYDROCORTISONE 20 MG/1
10 TABLET ORAL DAILY
COMMUNITY
Start: 2022-06-18 | End: 2023-07-13

## 2022-06-29 RX ORDER — LEVOTHYROXINE SODIUM 50 UG/1
50 TABLET ORAL
COMMUNITY
Start: 2022-06-23

## 2022-06-29 ASSESSMENT — PAIN SCALES - GENERAL: PAINLEVEL: MILD PAIN (2)

## 2022-06-29 NOTE — PATIENT INSTRUCTIONS
Pituitary adenoma (H)  Newly diagnosed adenoma after work-up through ophthalmology for left eye temporal field cut.  MRI of the brain revealed a sella lesion.  Had surgery and hospital.  Tolerated well.  Denies any visual field abnormalities at this time.  Reports not taking Cortef, advised needs to take this.  Synthroid, recommended making sure he takes this in the morning on an empty stomach at least 1/2-hour prior to eating or any other medications.  Denies any pain.  Needs endocrinology follow-up, does have a follow-up with Dr. Palacio in early August, however needed follow-up within 1 to 2 weeks from discharge.  Patient would like to be seen closer to home, new endocrinology referral placed for [place] can call [phone] to schedule.  If no endocrinology in [place], may recommend staying with the above endocrinologist and calling [phone] to schedule with them, post hospital within the next week.  Should also have a follow-up appointment with neurosurgery NP for routine follow-up as well as Dr. Patel in 3 months with brain MRI, [facility] at [phone] to schedule.  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  Last sodium noted to be 128.  Patient had been advised to hold spironolactone, however forgot and did take 2 doses at home, however did not take today.  Advised to continue to stay off this at this time, repeat metabolic panel/sodium level and will notify of results and any further recommendations to restart.  Blood pressure is stable at this time, no concerns to start a medication right away.  - Basic metabolic panel  (Ca, Cl, CO2, Creat, Gluc, K, Na, BUN); Future    Type 2 diabetes mellitus without complication, unspecified whether long term insulin use (H)  Chronic, stable.  Last A1c 6.3.  Due for recheck.  We will recheck today notify patient of results and any further  recommendations.  - HEMOGLOBIN A1C; Future    Urinary retention  Had urinary retention during hospitalization, started on Flomax.  Patient developed diarrhea at home and stopped Flomax.  Not having any issues with urinary retention.  Okay to stay off.  If develops any difficulty urinating advised to notify provider.

## 2022-06-29 NOTE — PROGRESS NOTES
Assessment & Plan     Pituitary adenoma (H)  Newly diagnosed adenoma after work-up through ophthalmology for left eye temporal field cut.  MRI of the brain revealed a sella lesion.  Had surgery and hospital.  Tolerated well.  Denies any visual field abnormalities at this time.  Reports not taking Cortef, advised needs to take this.  Synthroid, recommended making sure he takes this in the morning on an empty stomach at least 1/2-hour prior to eating or any other medications.  Denies any pain.  Needs endocrinology follow-up, does have a follow-up with Dr. Palacio in early August, however needed follow-up within 1 to 2 weeks from discharge.  Patient would like to be seen closer to home, new endocrinology referral placed for [place] can call [phone] to schedule.  If no endocrinology in [place], may recommend staying with the above endocrinologist and calling [phone] to schedule with them, post hospital within the next week.  Should also have a follow-up appointment with neurosurgery NP for routine follow-up as well as Dr. Patel in 3 months with brain MRI, [facility] at [phone] to schedule.  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  Last sodium noted to be 128.  Patient had been advised to hold spironolactone, however forgot and did take 2 doses at home, however did not take today.  Advised to continue to stay off this at this time, repeat metabolic panel/sodium level and will notify of results and any further recommendations to restart.  Blood pressure is stable at this time, no concerns to start a medication right away.  - Basic metabolic panel  (Ca, Cl, CO2, Creat, Gluc, K, Na, BUN); Future    Type 2 diabetes mellitus without complication, unspecified whether long term insulin use (H)  Chronic, stable.  Last A1c 6.3.  Due for recheck.  We will recheck today notify patient of results and  "any further recommendations.  - HEMOGLOBIN A1C; Future    Urinary retention  Had urinary retention during hospitalization, started on Flomax.  Patient developed diarrhea at home and stopped Flomax.  Not having any issues with urinary retention.  Okay to stay off.  If develops any difficulty urinating advised to notify provider.       Review of prior external note(s) from - CareEverywhere information from [facility] reviewed  45 minutes spent on the date of the encounter doing chart review, history and exam, documentation and further activities per the note       BMI:   Estimated body mass index is 31.17 kg/m  as calculated from the following:    Height as of 6/10/22: 1.727 m (5' 8\").    Weight as of this encounter: 93 kg (205 lb).   Weight management plan: Not addressed today    See Patient Instructions    Return in about 3 months (around 9/29/2022) for Recheck.    Elizabeth Staton, DNP, APRN-CNP   [facility]     Ana Maria Duvall is a 81 year old male who presents today for the following   health issues:    HPI    Chief Complaint   Patient presents with     Hospital F/U     Medication Reconciliation     Holding Aldactone due to hyponatremia (but did take it for 2 days because forgot was to hold, today didn't take). Forgot to take Flomax and felt better so does not want to be on Flomax any longer.        Hospital Follow-up Visit:    Hospital/Nursing Home/IP Rehab Facility: . [facility]  Date of Admission: 6/17/2022    Date of Discharge: 06/24/2022  Reason(s) for Admission: Pituitary neoplasm (Primary Dx);   Pituitary tumor;   Central hypothyroidism;   Essential hypertension;   Benign prostatic hyperplasia, unspecified whether lower urinary tract symptoms present    Was your hospitalization related to COVID-19? No   Problems taking medications regularly:  None  Medication changes since discharge: None  Problems adhering to non-medication therapy:  None    Summary " of hospitalization:  CareEverywhere information obtained and reviewed  Diagnostic Tests/Treatments reviewed.  Follow up needed: Endocrinology, ENT, Neurosurgery  Other Healthcare Providers Involved in Patient s Care:         Specialist appointment - as above  Update since discharge: improved.       Post Discharge Medication Reconciliation: discharge medications reconciled and changed, per note/orders.  Plan of care communicated with patient and wife           Not taking Oxycodone - doesn't want to. Doesn't like it  Not having any pain  Taking Tylenol - 3 in the a.m. and 3 at bedtime   Not taking stool softeners   Stopped Flomax - caused diarrhea   Normal bowel movement this morning   No swelling   Feels a bit weak yet from hospital and off balance  Has not been taking Cortef as prescribed from hospital  No follow up appointments made           Review of Systems    Constitutional, HEENT, cardiovascular, pulmonary, gi and gu systems are negative, except as otherwise noted.    Objective   /72   Pulse 69   Temp 97.9  F (36.6  C)   Resp 24   Wt 93 kg (205 lb)   SpO2 97%   BMI 31.17 kg/m    Body mass index is 31.17 kg/m .    Physical Exam  GENERAL APPEARANCE: healthy, alert and no distress  HENT: ear canals and TM's normal and nose and mouth without ulcers or lesions  NECK: no adenopathy, no asymmetry, masses, or scars and thyroid normal to palpation  RESP: lungs clear to auscultation - no rales, rhonchi or wheezes  CV: regular rates and rhythm, normal S1 S2, no S3 or S4 and no murmur, click or rub, no edema  ABDOMEN: soft, nontender, without hepatosplenomegaly or masses and bowel sounds normal  MS: extremities normal- no gross deformities noted and peripheral pulses normal  SKIN: no suspicious lesions or rashes  NEURO: Normal strength and tone, mentation intact and speech normal  PSYCH: mentation appears normal and affect normal/bright    Diagnostic Test Results:  Results for orders placed or performed in  visit on 06/29/22 (from the past 24 hour(s))   HEMOGLOBIN A1C   Result Value Ref Range    Hemoglobin A1C 6.5 (H) 0.0 - 5.6 %      Pending BMP    Chart documentation with Dragon Voice recognition Software. Although reviewed after completion, some words and grammatical errors may remain.                    .  ..     Chart documentation with Dragon Voice recognition Software. Although reviewed after completion, some words and grammatical errors may remain.

## 2022-07-12 ENCOUNTER — TRANSFERRED RECORDS (OUTPATIENT)
Dept: FAMILY MEDICINE | Facility: CLINIC | Age: 82
End: 2022-07-12

## 2022-07-22 DIAGNOSIS — E11.9 TYPE 2 DIABETES MELLITUS WITHOUT COMPLICATION, WITHOUT LONG-TERM CURRENT USE OF INSULIN (H): ICD-10-CM

## 2022-09-09 ENCOUNTER — TELEPHONE (OUTPATIENT)
Dept: FAMILY MEDICINE | Facility: CLINIC | Age: 82
End: 2022-09-09

## 2022-09-13 ENCOUNTER — MEDICAL CORRESPONDENCE (OUTPATIENT)
Dept: FAMILY MEDICINE | Facility: CLINIC | Age: 82
End: 2022-09-13

## 2022-09-14 NOTE — TELEPHONE ENCOUNTER
Forms completed and signed by Dr. Meng. Faxed back on 9/13/2022. Sent to scanning.    Suzie Esquivel Patient

## 2022-10-05 NOTE — TELEPHONE ENCOUNTER
Pt changed insurance and needs to get Rx for One Touch Glucometer. He will need the test strips and lancets to go with. He checks once daily    [facility]  
Rx sent to pharmacy.  Lacie SAUCEDA RN    
rabies follow up

## 2022-10-09 ENCOUNTER — HEALTH MAINTENANCE LETTER (OUTPATIENT)
Age: 82
End: 2022-10-09

## 2022-10-27 DIAGNOSIS — E11.9 TYPE 2 DIABETES MELLITUS WITHOUT COMPLICATION, WITHOUT LONG-TERM CURRENT USE OF INSULIN (H): ICD-10-CM

## 2022-12-05 DIAGNOSIS — I10 ESSENTIAL HYPERTENSION: ICD-10-CM

## 2022-12-08 RX ORDER — SPIRONOLACTONE 25 MG/1
25 TABLET ORAL DAILY
Qty: 90 TABLET | Refills: 3 | Status: SHIPPED | OUTPATIENT
Start: 2022-12-08 | End: 2023-12-06

## 2022-12-08 NOTE — TELEPHONE ENCOUNTER
"Requested Prescriptions   Pending Prescriptions Disp Refills    spironolactone (ALDACTONE) 25 MG tablet [Pharmacy Med Name: spironolactone 25 mg tablet] 90 tablet 3     Sig: Take 1 tablet (25 mg) by mouth daily       Diuretics (Including Combos) Protocol Failed - 12/5/2022  8:05 AM        Failed - Normal serum creatinine on file in past 12 months     Recent Labs   Lab Test 06/29/22  1037   CR 0.62*              Failed - Normal serum sodium on file in past 12 months     Recent Labs   Lab Test 06/29/22  1037   *              Passed - Blood pressure under 140/90 in past 12 months     BP Readings from Last 3 Encounters:   06/29/22 127/72   06/10/22 130/82   01/17/22 (!) 146/72                 Passed - Recent (12 mo) or future (30 days) visit within the authorizing provider's specialty     Patient has had an office visit with the authorizing provider or a provider within the authorizing providers department within the previous 12 mos or has a future within next 30 days. See \"Patient Info\" tab in inbasket, or \"Choose Columns\" in Meds & Orders section of the refill encounter.              Passed - Medication is active on med list        Passed - Patient is age 18 or older        Passed - Normal serum potassium on file in past 12 months     Recent Labs   Lab Test 06/29/22  1037   POTASSIUM 5.2                         "

## 2022-12-09 ENCOUNTER — OFFICE VISIT (OUTPATIENT)
Dept: FAMILY MEDICINE | Facility: CLINIC | Age: 82
End: 2022-12-09
Payer: COMMERCIAL

## 2022-12-09 VITALS
DIASTOLIC BLOOD PRESSURE: 72 MMHG | OXYGEN SATURATION: 98 % | WEIGHT: 208 LBS | BODY MASS INDEX: 31.52 KG/M2 | RESPIRATION RATE: 16 BRPM | HEART RATE: 68 BPM | TEMPERATURE: 97.8 F | SYSTOLIC BLOOD PRESSURE: 118 MMHG | HEIGHT: 68 IN

## 2022-12-09 DIAGNOSIS — E23.0 PANHYPOPITUITARISM (H): ICD-10-CM

## 2022-12-09 DIAGNOSIS — Z00.00 ENCOUNTER FOR MEDICARE ANNUAL WELLNESS EXAM: Primary | ICD-10-CM

## 2022-12-09 DIAGNOSIS — E11.9 TYPE 2 DIABETES MELLITUS WITHOUT COMPLICATION, WITHOUT LONG-TERM CURRENT USE OF INSULIN (H): ICD-10-CM

## 2022-12-09 DIAGNOSIS — Z12.5 SCREENING FOR PROSTATE CANCER: ICD-10-CM

## 2022-12-09 DIAGNOSIS — E87.5 HYPERKALEMIA: ICD-10-CM

## 2022-12-09 DIAGNOSIS — Z23 ENCOUNTER FOR IMMUNIZATION: ICD-10-CM

## 2022-12-09 DIAGNOSIS — E87.5 HYPERKALEMIA: Primary | ICD-10-CM

## 2022-12-09 DIAGNOSIS — Z13.220 SCREENING FOR HYPERLIPIDEMIA: ICD-10-CM

## 2022-12-09 LAB
CHOLEST SERPL-MCNC: 194 MG/DL
CREAT UR-MCNC: 111.1 MG/DL
ERYTHROCYTE [DISTWIDTH] IN BLOOD BY AUTOMATED COUNT: 13 % (ref 10–15)
HBA1C MFR BLD: 8 % (ref 0–5.6)
HCT VFR BLD AUTO: 42.6 % (ref 40–53)
HDLC SERPL-MCNC: 39 MG/DL
HGB BLD-MCNC: 14.2 G/DL (ref 13.3–17.7)
LDLC SERPL CALC-MCNC: 126 MG/DL
MCH RBC QN AUTO: 29 PG (ref 26.5–33)
MCHC RBC AUTO-ENTMCNC: 33.3 G/DL (ref 31.5–36.5)
MCV RBC AUTO: 87 FL (ref 78–100)
MICROALBUMIN UR-MCNC: <12 MG/L
MICROALBUMIN/CREAT UR: NORMAL MG/G{CREAT}
NONHDLC SERPL-MCNC: 155 MG/DL
PLATELET # BLD AUTO: 285 10E3/UL (ref 150–450)
POTASSIUM SERPL-SCNC: 5.2 MMOL/L (ref 3.4–5.3)
PSA SERPL-MCNC: 0.87 NG/ML
RBC # BLD AUTO: 4.89 10E6/UL (ref 4.4–5.9)
TRIGL SERPL-MCNC: 145 MG/DL
WBC # BLD AUTO: 6.5 10E3/UL (ref 4–11)

## 2022-12-09 PROCEDURE — 99397 PER PM REEVAL EST PAT 65+ YR: CPT | Mod: 25 | Performed by: FAMILY MEDICINE

## 2022-12-09 PROCEDURE — G0008 ADMIN INFLUENZA VIRUS VAC: HCPCS | Performed by: FAMILY MEDICINE

## 2022-12-09 PROCEDURE — 82043 UR ALBUMIN QUANTITATIVE: CPT | Performed by: FAMILY MEDICINE

## 2022-12-09 PROCEDURE — 84132 ASSAY OF SERUM POTASSIUM: CPT | Performed by: FAMILY MEDICINE

## 2022-12-09 PROCEDURE — 85027 COMPLETE CBC AUTOMATED: CPT | Performed by: FAMILY MEDICINE

## 2022-12-09 PROCEDURE — 90662 IIV NO PRSV INCREASED AG IM: CPT | Performed by: FAMILY MEDICINE

## 2022-12-09 PROCEDURE — 99214 OFFICE O/P EST MOD 30 MIN: CPT | Mod: 25 | Performed by: FAMILY MEDICINE

## 2022-12-09 PROCEDURE — 83036 HEMOGLOBIN GLYCOSYLATED A1C: CPT | Performed by: FAMILY MEDICINE

## 2022-12-09 PROCEDURE — 80061 LIPID PANEL: CPT | Performed by: FAMILY MEDICINE

## 2022-12-09 PROCEDURE — G0103 PSA SCREENING: HCPCS | Performed by: FAMILY MEDICINE

## 2022-12-09 PROCEDURE — 36415 COLL VENOUS BLD VENIPUNCTURE: CPT | Performed by: FAMILY MEDICINE

## 2022-12-09 ASSESSMENT — PAIN SCALES - GENERAL: PAINLEVEL: MODERATE PAIN (5)

## 2022-12-09 ASSESSMENT — ENCOUNTER SYMPTOMS
ABDOMINAL PAIN: 0
DYSURIA: 0
NAUSEA: 0
EYE PAIN: 0
PALPITATIONS: 0
PARESTHESIAS: 0
CONSTIPATION: 0
FEVER: 0
NERVOUS/ANXIOUS: 0
WEAKNESS: 0
DIARRHEA: 0
MYALGIAS: 1
HEARTBURN: 0
HEMATOCHEZIA: 0
COUGH: 0
JOINT SWELLING: 1
ARTHRALGIAS: 1
SHORTNESS OF BREATH: 0
CHILLS: 0
HEMATURIA: 0
DIZZINESS: 0
FREQUENCY: 0
SORE THROAT: 0
HEADACHES: 1

## 2022-12-09 ASSESSMENT — ACTIVITIES OF DAILY LIVING (ADL): CURRENT_FUNCTION: NO ASSISTANCE NEEDED

## 2022-12-09 NOTE — PATIENT INSTRUCTIONS
Patient Education   Personalized Prevention Plan  You are due for the preventive services outlined below.  Your care team is available to assist you in scheduling these services.  If you have already completed any of these items, please share that information with your care team to update in your medical record.  Health Maintenance Due   Topic Date Due     COVID-19 Vaccine (1) Never done     Zoster (Shingles) Vaccine (1 of 2) Never done     Diptheria Tetanus Pertussis (DTAP/TDAP/TD) Vaccine (3 - Td or Tdap) 08/20/2018     Eye Exam  06/28/2020     Diabetic Foot Exam  05/20/2022     Flu Vaccine (1) 09/01/2022     Annual Wellness Visit  12/09/2022     Cholesterol Lab  12/09/2022     Kidney Microalbumin Urine Test  12/23/2022       Exercise for a Healthier Heart  You may wonder how you can improve the health of your heart. If you re thinking about exercise, you re on the right track. You don t need to become an athlete. But you do need a certain amount of brisk exercise to help strengthen your heart. If you have been diagnosed with a heart condition, your healthcare provider may advise exercise to help stabilize your condition. To help make exercise a habit, choose safe, fun activities.      Exercise with a friend. When activity is fun, you're more likely to stick with it.   Before you start  Check with your healthcare provider before starting an exercise program. This is especially important if you have not been active for a while. It's also important if you have a long-term (chronic) health problem such as heart disease, diabetes, or obesity. Or if you are at high risk for having these problems.   Why exercise?  Exercising regularly offers many healthy rewards. It can help you do all of the following:     Improve your blood cholesterol level to help prevent further heart trouble    Lower your blood pressure to help prevent a stroke or heart attack    Control diabetes, or reduce your risk of getting this  disease    Improve your heart and lung function    Reach and stay at a healthy weight    Make your muscles stronger so you can stay active    Prevent falls and fractures by slowing the loss of bone mass (osteoporosis)    Manage stress better    Reduce your blood pressure    Improve your sense of self and your body image  Exercise tips      Ease into your routine. Set small goals. Then build on them. If you are not sure what your activity level should be, talk with your healthcare provider first before starting an exercise routine.    Exercise on most days. Aim for a total of 150 minutes (2 hours and 30 minutes) or more of moderate-intensity aerobic activity each week. Or 75 minutes (1 hour and 15 minutes) or more of vigorous-intensity aerobic activity each week. Or try for a combination of both. Moderate activity means that you breathe heavier and your heart rate increases but you can still talk. Think about doing 40 minutes of moderate exercise, 3 to 4 times a week. For best results, activity should last for about 40 minutes to lower blood pressure and cholesterol. It's OK to work up to the 40-minute period over time. Examples of moderate-intensity activity are walking 1 mile in 15 minutes. Or doing 30 to 45 minutes of yard work.    Step up your daily activity level.  Along with your exercise program, try being more active the whole day. Walk instead of drive. Or park further away so that you take more steps each day. Do more household tasks or yard work. You may not be able to meet the advised mount of physical activity. But doing some moderate- or vigorous-intensity aerobic activity can help reduce your risk for heart disease. Your healthcare provider can help you figure out what is best for you.    Choose 1 or more activities you enjoy.  Walking is one of the easiest things you can do. You can also try swimming, riding a bike, dancing, or taking an exercise class.    When to call your healthcare provider  Call  your healthcare provider if you have any of these:     Chest pain or feel dizzy or lightheaded    Burning, tightness, pressure, or heaviness in your chest, neck, shoulders, back, or arms    Abnormal shortness of breath    More joint or muscle pain    A very fast or irregular heartbeat (palpitations)  [employer] last reviewed this educational content on 7/1/2019 2000-2021 [employer]. All rights reserved. This information is not intended as a substitute for professional medical care. Always follow your healthcare professional's instructions.          Understanding USDA MyPlate  The USDA has guidelines to help you make healthy food choices. These are called MyPlate. MyPlate shows the food groups that make up healthy meals using the image of a place setting. Before you eat, think about the healthiest choices for what to put on your plate or in your cup or bowl. To learn more about building a healthy plate, visit www.choosemyplate.gov.    The food groups    Fruits. Any fruit or 100% fruit juice counts as part of the Fruit Group. Fruits may be fresh, canned, frozen, or dried, and may be whole, cut-up, or pureed. Make 1/2 of your plate fruits and vegetables.    Vegetables. Any vegetable or 100% vegetable juice counts as a member of the Vegetable Group. Vegetables may be fresh, frozen, canned, or dried. They can be served raw or cooked and may be whole, cut-up, or mashed. Make 1/2 of your plate fruits and vegetables.    Grains. All foods made from grains are part of the Grains Group. These include wheat, rice, oats, cornmeal, and barley. Grains are often used to make foods such as bread, pasta, oatmeal, cereal, tortillas, and grits. Grains should be no more than 1/4 of your plate. At least half of your grains should be whole grains.    Protein. This group includes meat, poultry, seafood, beans and peas, eggs, processed soy products (such as tofu), nuts (including nut butters), and seeds. Make protein choices  no more than 1/4 of your plate. Meat and poultry choices should be lean or low fat.    Dairy. The Dairy Group includes all fluid milk products and foods made from milk that contain calcium, such as yogurt and cheese. (Foods that have little calcium, such as cream, butter, and cream cheese, are not part of this group.) Most dairy choices should be low-fat or fat-free.    Oils. Oils aren't a food group, but they do contain essential nutrients. However it's important to watch your intake of oils. These are fats that are liquid at room temperature. They include canola, corn, olive, soybean, vegetable, and sunflower oil. Foods that are mainly oil include mayonnaise, certain salad dressings, and soft margarines. You likely already get your daily oil allowance from the foods you eat.  Things to limit  Eating healthy also means limiting these things in your diet:       Salt (sodium). Many processed foods have a lot of sodium. To keep sodium intake down, eat fresh vegetables, meats, poultry, and seafood when possible. Purchase low-sodium, reduced-sodium, or no-salt-added food products at the store. And don't add salt to your meals at home. Instead, season them with herbs and spices such as dill, oregano, cumin, and paprika. Or try adding flavor with lemon or lime zest and juice.    Saturated fat. Saturated fats are most often found in animal products such as beef, pork, and chicken. They are often solid at room temperature, such as butter. To reduce your saturated fat intake, choose leaner cuts of meat and poultry. And try healthier cooking methods such as grilling, broiling, roasting, or baking. For a simple lower-fat swap, use plain nonfat yogurt instead of mayonnaise when making potato salad or macaroni salad.    Added sugars. These are sugars added to foods. They are in foods such as ice cream, candy, soda, fruit drinks, sports drinks, energy drinks, cookies, pastries, jams, and syrups. Cut down on added sugars by  sharing sweet treats with a family member or friend. You can also choose fruit for dessert, and drink water or other unsweetened beverages.     [employer] last reviewed this educational content on 6/1/2020 2000-2021 [employer]. All rights reserved. This information is not intended as a substitute for professional medical care. Always follow your healthcare professional's instructions.          Signs of Hearing Loss      Hearing much better with one ear can be a sign of hearing loss.   Hearing loss is a problem shared by many people. In fact, it is one of the most common health problems, particularly as people age. Most people age 65 and older have some hearing loss. By age 80, almost everyone does. Hearing loss often occurs slowly over the years. So you may not realize your hearing has gotten worse.  Have your hearing checked  Call your healthcare provider if you:    Have to strain to hear normal conversation    Have to watch other people s faces very carefully to follow what they re saying    Need to ask people to repeat what they ve said    Often misunderstand what people are saying    Turn the volume of the television or radio up so high that others complain    Feel that people are mumbling when they re talking to you    Find that the effort to hear leaves you feeling tired and irritated    Notice, when using the phone, that you hear better with one ear than the other  [employer] last reviewed this educational content on 1/1/2020 2000-2021 [employer]. All rights reserved. This information is not intended as a substitute for professional medical care. Always follow your healthcare professional's instructions.          Preventing Falls at Home  A person can fall for many reasons. Older adults may fall because reaction time slows down as we age. Your muscles and joints may get stiff, weak, or less flexible because of illness, medicines, or a physical condition.   Other health problems  that make falls more likely include:     Arthritis    Dizziness or lightheadedness when you stand up (orthostatic hypotension)    History of a stroke    Dizziness    Anemia    Certain medicines taken for mental illness or to control blood pressure.    Problems with balance or gait    Bladder or urinary problems    History of falling    Changes in vision (vision impairment)    Changes in thinking skills and memory (cognitive impairment)  Injuries from a fall can include serious injuries such as broken bones, dislocated joints, internal bleeding and cuts. Injuries like these can limit your independence.   Prevention tips  To help prevent falls and fall-related injuries, follow the tips below.    Floors  To make floors safer:     Put nonskid pads under area rugs.    Remove small rugs.    Replace worn floor coverings.    Tack carpets firmly to each step on carpeted stairs. Put nonskid strips on the edges of uncarpeted stairs.    Keep floors and stairs free of clutter and cords.    Arrange furniture so there are clear pathways.    Clean up any spills right away.  Bathrooms    To make bathrooms safer:     Install grab bars in the tub or shower.    Apply nonskid strips or put a nonskid rubber mat in the tub or shower.    Sit on a bath chair to bathe.    Use bathmats with nonskid backing.  Lighting  To improve visibility in your home:      Keep a flashlight in each room. Or put a lamp next to the bed within easy reach.    Put nightlights in the bedrooms, hallways, kitchen, and bathrooms.    Make sure all stairways have good lighting.    Take your time when going up and down stairs.    Put handrails on both sides of stairs and in walkways for more support. To prevent injury to your wrist or arm, don t use handrails to pull yourself up.    Install grab bars to pull yourself up.    Move or rearrange items that you use often. This will make them easier to find or reach.    Look at your home to find any safety hazards.  Especially look at doorways, walkways, and the driveway. Remove or repair any safety problems that you find.  Other changes to make    Look around to find any safety hazards. Look closely at doorways, walkways, and the driveway. Remove or repair any safety problems that you find.    Wear shoes that fit well.    Take your time when going up and down stairs.    Put handrails on both sides of stairs and in walkways for more support. To prevent injury to your wrist or arm, don t use handrails to pull yourself up.    Install grab bars wherever needed to pull yourself up.    Arrange items that you use often. This will make them easier to find or reach.    [employer] last reviewed this educational content on 3/1/2020    3271-6366 [employer]. All rights reserved. This information is not intended as a substitute for professional medical care. Always follow your healthcare professional's instructions.

## 2022-12-09 NOTE — PROGRESS NOTES
"SUBJECTIVE:   Yeyo is a 82 year old who presents for Preventive Visit.  Patient has been advised of split billing requirements and indicates understanding: Yes  Are you in the first 12 months of your Medicare coverage?  No    Healthy Habits:     In general, how would you rate your overall health?  Good    Frequency of exercise:  None    Do you usually eat at least 4 servings of fruit and vegetables a day, include whole grains    & fiber and avoid regularly eating high fat or \"junk\" foods?  No    Taking medications regularly:  Yes    Medication side effects:  None    Ability to successfully perform activities of daily living:  No assistance needed    Home Safety:  Throw rugs in the hallway    Hearing Impairment:  Difficulty following a conversation in a noisy restaurant or crowded room and difficulty understanding soft or whispered speech    In the past 6 months, have you been bothered by leaking of urine?  No    In general, how would you rate your overall mental or emotional health?  Good      PHQ-2 Total Score: 0    Additional concerns today:  No      Have you ever done Advance Care Planning? (For example, a Health Directive, POLST, or a discussion with a medical provider or your loved ones about your wishes): No, advance care planning information given to patient to review.  Patient plans to discuss their wishes with loved ones or provider.         Fall risk  Fallen 2 or more times in the past year?: Yes  Any fall with injury in the past year?: No    Cognitive Screening   1) Repeat 3 items (Leader, Season, Table)    2) Clock draw: NORMAL  3) 3 item recall: Recalls NO objects   Results: 0 items recalled: PROBABLE COGNITIVE IMPAIRMENT, **INFORM PROVIDER**    Mini-CogTM Copyright CHARLI Mazariegos. Licensed by the author for use in [facility]; reprinted with permission (jesus@.[email or web address]). All rights reserved.      Do you have sleep apnea, excessive snoring or daytime drowsiness?: yes    Reviewed and updated as " needed this visit by clinical staff   Tobacco  Allergies  Meds              Reviewed and updated as needed this visit by Provider                 Social History     Tobacco Use     Smoking status: Former     Years: 20.00     Types: Cigarettes, Cigars, Pipe     Quit date: 12/15/1966     Years since quittin.0     Smokeless tobacco: Never   Substance Use Topics     Alcohol use: Yes     Comment: occ.         Alcohol Use 2022   Prescreen: >3 drinks/day or >7 drinks/week? No   Prescreen: >3 drinks/day or >7 drinks/week? -         Current providers sharing in care for this patient include:   Patient Care Team:  See Meng MD as PCP - General (Family Practice)  Ariana Bond RN as   See Meng MD as Assigned PCP    The following health maintenance items are reviewed in Epic and correct as of today:  Health Maintenance   Topic Date Due     COVID-19 Vaccine (1) Never done     ZOSTER IMMUNIZATION (1 of 2) Never done     DTAP/TDAP/TD IMMUNIZATION (3 - Td or Tdap) 2018     EYE EXAM  2020     DIABETIC FOOT EXAM  2022     INFLUENZA VACCINE (1) 2022     MEDICARE ANNUAL WELLNESS VISIT  2022     LIPID  2022     MICROALBUMIN  2022     A1C  2022     ANNUAL REVIEW OF HM ORDERS  2023     BMP  2023     FALL RISK ASSESSMENT  2023     COLORECTAL CANCER SCREENING  2026     ADVANCE CARE PLANNING  2027     PHQ-2 (once per calendar year)  Completed     Pneumococcal Vaccine: 65+ Years  Completed     IPV IMMUNIZATION  Aged Out     MENINGITIS IMMUNIZATION  Aged Out     Lab work is in process  Labs reviewed in EPIC  BP Readings from Last 3 Encounters:   22 118/72   22 127/72   06/10/22 130/82    Wt Readings from Last 3 Encounters:   22 94.3 kg (208 lb)   22 93 kg (205 lb)   06/10/22 94.3 kg (208 lb)                  Patient Active Problem List   Diagnosis     Essential hypertension     Glaucoma      ERECTILE DYSFUNCTION     Diverticulitis of colon     Slowing of urinary stream     Sciatica     Degenerative arthritis of thumb     Type 2 diabetes mellitus without complications (H)     Advanced directives, counseling/discussion     Health Care Home     Lumbar radiculopathy     CARDIOVASCULAR SCREENING; LDL GOAL LESS THAN 130     Nodular basal cell carcinoma     Statin intolerance     Pituitary adenoma (H)     Past Surgical History:   Procedure Laterality Date     ARTHROSCOPY KNEE Right      BACK SURGERY       BACK SURGERY       COLONOSCOPY  2018    Repeat in 3 years; 12 mm polyp removed, diverticulosis, internal hemorrhoids     COLONOSCOPY  10/02/2008    Repeat in 10 years; diverticulosis     COLONOSCOPY N/A 2021    Procedure: COLONOSCOPY, WITH POLYPECTOMY AND BIOPSY;  Surgeon: Xu Ramachandran DO;  Location: [facility]     DECOMPRESSION LUMBAR MINIMALLY INVASIVE THREE+ LEVELS  2013    Lumbar 2-3, Lumbar 3-4, Lumbar 5-Sacral 1;  Surgeon: Param Jones MD;  Location:  OR     ENDOSCOPIC RELEASE ULNAR NERVE (ELBOW) Right 3/3/2020    Procedure: ENDOSCOPIC Cubital Tunnel Release;  Surgeon: Carlos Jordan MD;  Location: [facility]     RELEASE CARPAL TUNNEL Right 3/3/2020    Procedure: Open Carpal Tunnel Release;  Surgeon: Carlos Jordan MD;  Location: [facility]       Social History     Tobacco Use     Smoking status: Former     Years: 20.00     Types: Cigarettes, Cigars, Pipe     Quit date: 12/15/1966     Years since quittin.0     Smokeless tobacco: Never   Substance Use Topics     Alcohol use: Yes     Comment: occ.     Family History   Problem Relation Age of Onset     Cancer Mother      Alcohol/Drug Father         age 33 bleeding ulcers     Cancer Brother      Arthritis Brother      Eye Disorder Brother      Diabetes Sister      Eye Disorder Sister         glaucoma     Arthritis Sister      Cardiovascular Son      Eye Disorder Brother         glaucoma     Diabetes Brother       Arthritis Brother      Cancer Brother         LUNG CANCER WITH METS     Diabetes Brother      Cancer Brother         prostate     Arthritis Brother      Eye Disorder Brother      Arthritis Brother      Eye Disorder Brother      Arthritis Sister      Eye Disorder Sister          Current Outpatient Medications   Medication Sig Dispense Refill     acetaminophen (TYLENOL) 500 MG tablet Take 1,000 mg by mouth every 6 hours as needed       ASPIRIN 81 MG OR TABS 1 TABLET DAILY       blood glucose monitoring (FREESTYLE FREEDOM LITE) meter device kit Use to test blood sugar 0ne times daily. 1 kit 0     blood glucose monitoring (NO BRAND SPECIFIED) meter device kit Use to test blood sugar one times daily.. Preferred blood glucose meter OR supplies to accompany: Blood Glucose Monitor Brands: per insurance. Patient requesting ONE TOUCH. 1 kit 0     brimonidine (ALPHAGAN) 0.2 % ophthalmic solution Place 1 drop into both eyes 2 times daily 1 Bottle 2     CALCIUM + D OR 1 TABLET DAILY       Cholecalciferol (VITAMIN D3 PO) Take by mouth daily       CINNAMON PO        Continuous Blood Gluc Sensor (FREESTYLE JANET 14 DAY SENSOR) MISC use 1 each every 14 days Change every 14 days. 6 each 3     ibuprofen (ADVIL,MOTRIN) 200 MG tablet Take 200 mg by mouth every 4 hours as needed Reported on 4/21/2017       latanoprost (XALATAN) 0.005 % ophthalmic solution Apply 1 drop to eye At Bedtime  11     MULTI-VITAMIN OR TABS DAILY       Saw Berkley 1000 MG CAPS Take 2 capsules by mouth daily 60 capsule 0     spironolactone (ALDACTONE) 25 MG tablet Take 1 tablet (25 mg) by mouth daily 90 tablet 3     STATIN NOT PRESCRIBED, INTENTIONAL, Statin not prescribed intentionally due to Intolerance       hydrocortisone (CORTEF) 20 MG tablet Take 10 mg by mouth daily Daily in the afternoon (Patient not taking: Reported on 12/9/2022)       hydrocortisone (CORTEF) 20 MG tablet Take 20 mg by mouth With a meal (Patient not taking: Reported on 12/9/2022)        levothyroxine (SYNTHROID/LEVOTHROID) 50 MCG tablet Take 50 mcg by mouth (Patient not taking: Reported on 12/9/2022)       metFORMIN (GLUCOPHAGE) 500 MG tablet Take 1 tablet (500 mg) by mouth 2 times daily (with meals) (Patient not taking: Reported on 12/9/2022) 180 tablet 0     UNABLE TO FIND MEDICATION NAME: CBD Gummies       Allergies   Allergen Reactions     Amlodipine      Leg swelling      Dorzolamide Other (See Comments)     Dry eyes  Nicholas Avitia MD  Ophthalmology   5/4/2021   2:19 PM     Lisinopril Cough     Nka [No Known Allergies]      Timolol Other (See Comments)     Dry eyes  Nicholas Avitia MD  Ophthalmology   5/4/2021   2:18 PM     Recent Labs   Lab Test 06/29/22  1037 06/10/22  0947 12/23/21  1214 12/09/21  1035 05/20/21  1613 11/13/20  0808 10/16/20  0803 10/12/20  1647 03/06/20  0927 02/14/20  1032 11/08/19  0847 08/14/19  0717 08/12/19  0845 01/19/18  1038 06/09/17  0805 02/03/17  1041 08/01/16  1041   A1C 6.5*  --   --  6.3* 6.4*  --    < >  --   --   --  6.1*  --   --    < > 6.1*  --  5.8   LDL  --   --   --  116*  --  106*  --   --   --   --  108*  --   --    < > 115*  --   --    HDL  --   --   --  46  --  60  --   --   --   --  50  --   --    < > 49  --   --    TRIG  --   --   --  92  --  130  --   --   --   --  109  --   --    < > 75  --   --    ALT  --   --   --   --   --   --   --  35  --   --   --   --   --   --   --   --  32   CR 0.62* 0.68   < > 0.73  --   --   --  0.81 0.70   < > 0.76   < >  --    < > 0.83  --  0.76   GFRESTIMATED >90 >90   < > 88  --   --   --  84 >90   < > 88   < >  --    < > >90  Non  GFR Calc     < > >90  Non  GFR Calc     GFRESTBLACK  --   --   --   --   --   --   --  >90 >90   < > >90   < >  --    < > >90   GFR Calc     < > >90   GFR Calc     POTASSIUM 5.2 5.0   < > 4.8  --   --   --  4.6 4.2   < > 4.5   < >  --    < > 4.3  --  5.0   TSH  --   --   --   --   --   --   --   --   --   --   --   --   "0.92  --  1.52  --   --     < > = values in this interval not displayed.          Review of Systems   Constitutional: Negative for chills and fever.   HENT: Positive for hearing loss. Negative for ear pain and sore throat.    Eyes: Negative for pain and visual disturbance.   Respiratory: Negative for cough and shortness of breath.    Cardiovascular: Negative for chest pain, palpitations and peripheral edema.   Gastrointestinal: Negative for abdominal pain, constipation, diarrhea, heartburn, hematochezia and nausea.   Genitourinary: Positive for impotence. Negative for dysuria, frequency, genital sores, hematuria, penile discharge and urgency.   Musculoskeletal: Positive for arthralgias, joint swelling and myalgias.   Skin: Negative for rash.   Neurological: Positive for headaches. Negative for dizziness, weakness and paresthesias.   Psychiatric/Behavioral: Negative for mood changes. The patient is not nervous/anxious.        Lab Results   Component Value Date    A1C 6.5 06/29/2022    A1C 6.3 12/09/2021    A1C 6.4 05/20/2021    A1C 5.9 10/16/2020    A1C 6.1 11/08/2019    A1C 6.2 05/17/2019    A1C 6.3 09/20/2018     TSH   Date Value Ref Range Status   08/12/2019 0.92 0.40 - 4.00 mU/L Final       OBJECTIVE:   /72 (BP Location: Right arm, Patient Position: Sitting, Cuff Size: Adult Regular)   Pulse 68   Temp 97.8  F (36.6  C) (Tympanic)   Resp 16   Ht 1.727 m (5' 8\")   Wt 94.3 kg (208 lb)   SpO2 98%   BMI 31.63 kg/m   Estimated body mass index is 31.63 kg/m  as calculated from the following:    Height as of this encounter: 1.727 m (5' 8\").    Weight as of this encounter: 94.3 kg (208 lb).  Physical Exam  GENERAL: alert and no distress  EYES: Eyes grossly normal to inspection, PERRL and conjunctivae and sclerae normal  HENT: normal cephalic/atraumatic, nose and mouth without ulcers or lesions, oropharynx clear and oral mucous membranes moist  NECK: no adenopathy, no asymmetry, masses, or scars and thyroid " "normal to palpation  RESP: lungs clear to auscultation - no rales, rhonchi or wheezes  CV: regular rate and rhythm, normal S1 S2, no S3 or S4, no murmur, click or rub, no peripheral edema and peripheral pulses strong  ABDOMEN: soft, nontender, no hepatosplenomegaly, no masses and bowel sounds normal  MS: no gross musculoskeletal defects noted, no edema  SKIN: no suspicious lesions or rashes  NEURO: Normal strength and tone, mentation intact and speech normal  PSYCH: mentation appears normal, affect normal/bright        ASSESSMENT / PLAN:   (Z00.00) Encounter for Medicare annual wellness exam  (primary encounter diagnosis)  Comment: Patient stopped taking metformin about 2 months ago, considering it \"bad\" for health, risks and benefits explained in detail and suggested to resume metformin considering history of type 2 diabetes mellitus.  Routine labs ordered and influenza vaccine administered in office today.  Recommended well hydration, regular walks and diabetic diet.  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  Following endocrinology    (Z23) Encounter for immunization  Comment: Influenza vaccine administered in office today  Plan: INFLUENZA, QUAD, HIGH DOSE, PF, 65YR + (FLUZONE        HD)      (Z12.5) Screening for prostate cancer  Comment:   Plan: PSA, screen              COUNSELING:  Reviewed preventive health counseling, as reflected in patient instructions        He reports that he quit smoking about 56 years ago. His smoking use included cigarettes, " cigars, and pipe. He has never used smokeless tobacco.      Appropriate preventive services were discussed with this patient, including applicable screening as appropriate for cardiovascular disease, diabetes, osteopenia/osteoporosis, and glaucoma.  As appropriate for age/gender, discussed screening for colorectal cancer, prostate cancer, breast cancer, and cervical cancer. Checklist reviewing preventive services available has been given to the patient.    Reviewed patients plan of care and provided an AVS. The Basic Care Plan (routine screening as documented in Health Maintenance) for Ana Maria meets the Care Plan requirement. This Care Plan has been established and reviewed with the Patient.      See Meng MD  [facility]    Identified Health Risks:    He is at risk for lack of exercise and has been provided with information to increase physical activity for the benefit of his well-being.  The patient was counseled and encouraged to consider modifying their diet and eating habits. He was provided with information on recommended healthy diet options.  The patient was provided with written information regarding signs of hearing loss.  He is at risk for falling and has been provided with information to reduce the risk of falling at home.

## 2023-01-13 ENCOUNTER — TRANSFERRED RECORDS (OUTPATIENT)
Dept: HEALTH INFORMATION MANAGEMENT | Facility: CLINIC | Age: 83
End: 2023-01-13

## 2023-01-13 LAB — RETINOPATHY: NEGATIVE

## 2023-03-03 DIAGNOSIS — E11.69 TYPE 2 DIABETES MELLITUS WITH OTHER SPECIFIED COMPLICATION, WITHOUT LONG-TERM CURRENT USE OF INSULIN (H): ICD-10-CM

## 2023-03-03 RX ORDER — FLASH GLUCOSE SENSOR
KIT MISCELLANEOUS
Qty: 12 EACH | Refills: 3 | Status: SHIPPED | OUTPATIENT
Start: 2023-03-03 | End: 2024-01-03

## 2023-03-03 NOTE — TELEPHONE ENCOUNTER
Routing to ordering provider for consideration, not on refill protocol.           Abiola Tate     RN MSN

## 2023-03-10 NOTE — ANESTHESIA CARE TRANSFER NOTE
Patient: Ana Maria Duvall    Procedure(s):  Open Carpal Tunnel Release  ENDOSCOPIC Cubital Tunnel Release    Diagnosis: Right carpal tunnel syndrome [G56.01]  Diagnosis Additional Information: No value filed.    Anesthesia Type:   General, LMA, ETT     Note:  Airway :Nasal Cannula  Patient transferred to:Phase II  Handoff Report: Identifed the Patient, Identified the Reponsible Provider, Reviewed the pertinent medical history, Discussed the surgical course, Reviewed Intra-OP anesthesia mangement and issues during anesthesia, Set expectations for post-procedure period and Allowed opportunity for questions and acknowledgement of understanding      Vitals: (Last set prior to Anesthesia Care Transfer)    CRNA VITALS  3/3/2020 1548 - 3/3/2020 1648      3/3/2020             Pulse:  89    SpO2:  95 %    Resp Rate (observed):  (!) 3                Electronically Signed By: Carmen Young CRNA, APRN CRNA  March 3, 2020  5:12 PM   [FreeTextEntry1] : -Blood work at the next visit\par - Nutrition\par - F/u in 1 month

## 2023-03-15 DIAGNOSIS — E11.9 TYPE 2 DIABETES MELLITUS WITHOUT COMPLICATION, WITHOUT LONG-TERM CURRENT USE OF INSULIN (H): ICD-10-CM

## 2023-04-21 ENCOUNTER — TELEPHONE (OUTPATIENT)
Dept: FAMILY MEDICINE | Facility: CLINIC | Age: 83
End: 2023-04-21
Payer: COMMERCIAL

## 2023-04-25 ENCOUNTER — MEDICAL CORRESPONDENCE (OUTPATIENT)
Dept: HEALTH INFORMATION MANAGEMENT | Facility: CLINIC | Age: 83
End: 2023-04-25
Payer: COMMERCIAL

## 2023-06-10 ENCOUNTER — HEALTH MAINTENANCE LETTER (OUTPATIENT)
Age: 83
End: 2023-06-10

## 2023-06-14 ENCOUNTER — DOCUMENTATION ONLY (OUTPATIENT)
Dept: LAB | Facility: CLINIC | Age: 83
End: 2023-06-14

## 2023-06-14 ENCOUNTER — LAB (OUTPATIENT)
Dept: LAB | Facility: CLINIC | Age: 83
End: 2023-06-14
Payer: COMMERCIAL

## 2023-06-14 DIAGNOSIS — E11.9 TYPE 2 DIABETES MELLITUS WITHOUT COMPLICATION, WITHOUT LONG-TERM CURRENT USE OF INSULIN (H): Primary | ICD-10-CM

## 2023-06-14 DIAGNOSIS — E11.9 TYPE 2 DIABETES MELLITUS WITHOUT COMPLICATION, WITHOUT LONG-TERM CURRENT USE OF INSULIN (H): ICD-10-CM

## 2023-06-14 LAB
ANION GAP SERPL CALCULATED.3IONS-SCNC: 13 MMOL/L (ref 7–15)
BUN SERPL-MCNC: 20 MG/DL (ref 8–23)
CALCIUM SERPL-MCNC: 9.9 MG/DL (ref 8.8–10.2)
CHLORIDE SERPL-SCNC: 100 MMOL/L (ref 98–107)
CREAT SERPL-MCNC: 0.83 MG/DL (ref 0.67–1.17)
DEPRECATED HCO3 PLAS-SCNC: 24 MMOL/L (ref 22–29)
GFR SERPL CREATININE-BSD FRML MDRD: 88 ML/MIN/1.73M2
GLUCOSE SERPL-MCNC: 147 MG/DL (ref 70–99)
HBA1C MFR BLD: 7.2 % (ref 0–5.6)
HOLD SPECIMEN: NORMAL
POTASSIUM SERPL-SCNC: 5.2 MMOL/L (ref 3.4–5.3)
SODIUM SERPL-SCNC: 137 MMOL/L (ref 136–145)
TSH SERPL DL<=0.005 MIU/L-ACNC: 3.11 UIU/ML (ref 0.3–4.2)

## 2023-06-14 PROCEDURE — 80048 BASIC METABOLIC PNL TOTAL CA: CPT

## 2023-06-14 PROCEDURE — 84443 ASSAY THYROID STIM HORMONE: CPT

## 2023-06-14 PROCEDURE — 36415 COLL VENOUS BLD VENIPUNCTURE: CPT

## 2023-06-14 PROCEDURE — 83036 HEMOGLOBIN GLYCOSYLATED A1C: CPT

## 2023-06-14 NOTE — PROGRESS NOTES
Ana Maria Duvall has an upcoming lab appointment:    Future Appointments   Date Time Provider Department Center   6/14/2023  7:40 AM See Meng MD [facility]   6/14/2023  8:45 AM [facility]   6/22/2023 10:00 AM See Meng MD [facility]     Patient is scheduled for the following lab(s): was here to see Dr. Meng    There is no order available. Please review and place either future orders or HMPO (Review of Health Maintenance Protocol Orders), as appropriate.    Health Maintenance Due   Topic     TSH W/FREE T4 REFLEX      ANNUAL REVIEW OF HM ORDERS      A1C      BMP      Rosa Shepherd

## 2023-07-13 ENCOUNTER — OFFICE VISIT (OUTPATIENT)
Dept: FAMILY MEDICINE | Facility: CLINIC | Age: 83
End: 2023-07-13
Payer: COMMERCIAL

## 2023-07-13 VITALS
OXYGEN SATURATION: 96 % | HEIGHT: 68 IN | WEIGHT: 213 LBS | SYSTOLIC BLOOD PRESSURE: 132 MMHG | TEMPERATURE: 98.2 F | RESPIRATION RATE: 18 BRPM | HEART RATE: 88 BPM | DIASTOLIC BLOOD PRESSURE: 70 MMHG | BODY MASS INDEX: 32.28 KG/M2

## 2023-07-13 DIAGNOSIS — E11.69 TYPE 2 DIABETES MELLITUS WITH OTHER SPECIFIED COMPLICATION, WITHOUT LONG-TERM CURRENT USE OF INSULIN (H): Primary | ICD-10-CM

## 2023-07-13 PROCEDURE — 99213 OFFICE O/P EST LOW 20 MIN: CPT | Performed by: FAMILY MEDICINE

## 2023-07-13 ASSESSMENT — PAIN SCALES - GENERAL: PAINLEVEL: NO PAIN (0)

## 2023-07-13 NOTE — NURSING NOTE
"Chief Complaint   Patient presents with     Diabetes     /70 (Cuff Size: Adult Large)   Pulse 88   Temp 98.2  F (36.8  C) (Tympanic)   Resp 18   Ht 1.727 m (5' 8\")   Wt 96.6 kg (213 lb)   SpO2 96%   BMI 32.39 kg/m   Estimated body mass index is 32.39 kg/m  as calculated from the following:    Height as of this encounter: 1.727 m (5' 8\").    Weight as of this encounter: 96.6 kg (213 lb).  Patient presents to the clinic using No DME      Health Maintenance that is potentially due pending provider review:    Health Maintenance Due   Topic Date Due     COVID-19 Vaccine (1) Never done     ZOSTER IMMUNIZATION (1 of 2) Never done     DIABETIC FOOT EXAM  05/20/2022                "

## 2023-07-13 NOTE — PROGRESS NOTES
"  Assessment & Plan     Type 2 diabetes mellitus with other specified complication, without long-term current use of insulin (H)  Last hemoglobin A1c 7.2, consistent with well-controlled diabetes.  Recommended to continue metformin, diabetic diet, regular walks.  Other medications reviewed and no changes made.  Lab Results   Component Value Date    A1C 7.2 06/14/2023    A1C 8.0 12/09/2022    A1C 6.5 06/29/2022    A1C 6.3 12/09/2021    A1C 6.4 05/20/2021    A1C 5.9 10/16/2020    A1C 6.1 11/08/2019    A1C 6.2 05/17/2019    A1C 6.3 09/20/2018     Patient has been experiencing some hot flashes.  History of panhypopituitarism.  Recommended to schedule appointment with endocrinologist for further evaluation.    BMI:   Estimated body mass index is 32.39 kg/m  as calculated from the following:    Height as of this encounter: 1.727 m (5' 8\").    Weight as of this encounter: 96.6 kg (213 lb).   Weight management plan: Discussed healthy diet and exercise guidelines      See Meng MD  [facility]    Amanda Orona is a 82 year old, presenting for the following health issues:  Diabetes      HPI     Diabetes Follow-up    How often are you checking your blood sugar? Continuous glucose monitor  What time of day are you checking your blood sugars (select all that apply)?  Before and after meals and At bedtime  Have you had any blood sugars above 200?  No  Have you had any blood sugars below 70?  No    What symptoms do you notice when your blood sugar is low?  None    What concerns do you have today about your diabetes? None     Do you have any of these symptoms? (Select all that apply)  Burning in feet      BP Readings from Last 2 Encounters:   07/13/23 132/70   12/09/22 118/72     Hemoglobin A1C (%)   Date Value   06/14/2023 7.2 (H)   12/09/2022 8.0 (H)   05/20/2021 6.4 (H)   10/16/2020 5.9 (H)     LDL Cholesterol Calculated (mg/dL)   Date Value   12/09/2022 126 (H)   12/09/2021 116 (H) " "  11/13/2020 106 (H)   11/08/2019 108 (H)         Review of Systems   Constitutional, HEENT, cardiovascular, pulmonary, GI, , musculoskeletal, neuro, skin, endocrine and psych systems are negative, except as otherwise noted.        Objective    /70 (Cuff Size: Adult Large)   Pulse 88   Temp 98.2  F (36.8  C) (Tympanic)   Resp 18   Ht 1.727 m (5' 8\")   Wt 96.6 kg (213 lb)   SpO2 96%   BMI 32.39 kg/m    Body mass index is 32.39 kg/m .  Physical Exam   GENERAL: alert and no distress  EYES: Eyes grossly normal to inspection  NECK: no adenopathy, no asymmetry, masses, or scars and thyroid normal to palpation  RESP: lungs clear to auscultation - no rales, rhonchi or wheezes  CV: regular rates and rhythm, normal S1 S2, no S3 or S4 and no murmur, click or rub  MS: extremities normal- no gross deformities noted  SKIN: no suspicious lesions or rashes  NEURO: Normal strength and tone, mentation intact and speech normal                "

## 2023-08-31 DIAGNOSIS — E11.9 TYPE 2 DIABETES MELLITUS WITHOUT COMPLICATION, WITHOUT LONG-TERM CURRENT USE OF INSULIN (H): ICD-10-CM

## 2023-11-09 ENCOUNTER — PATIENT OUTREACH (OUTPATIENT)
Dept: CARE COORDINATION | Facility: CLINIC | Age: 83
End: 2023-11-09
Payer: COMMERCIAL

## 2023-11-30 NOTE — NURSING NOTE
"Chief Complaint   Patient presents with     Back Pain       Initial /82 mmHg  Pulse 68  Temp(Src) 96.6  F (35.9  C) (Tympanic)  Resp 18  Ht 5' 7\" (1.702 m)  Wt 207 lb (93.895 kg)  BMI 32.41 kg/m2  SpO2 95% Estimated body mass index is 32.41 kg/(m^2) as calculated from the following:    Height as of this encounter: 5' 7\" (1.702 m).    Weight as of this encounter: 207 lb (93.895 kg).  BP completed using cuff size: regular  "
no

## 2023-12-06 DIAGNOSIS — I10 ESSENTIAL HYPERTENSION: ICD-10-CM

## 2023-12-06 RX ORDER — SPIRONOLACTONE 25 MG/1
25 TABLET ORAL DAILY
Qty: 90 TABLET | Refills: 1 | Status: SHIPPED | OUTPATIENT
Start: 2023-12-06 | End: 2024-06-03

## 2023-12-21 ENCOUNTER — OFFICE VISIT (OUTPATIENT)
Dept: FAMILY MEDICINE | Facility: CLINIC | Age: 83
End: 2023-12-21
Payer: COMMERCIAL

## 2023-12-21 VITALS
RESPIRATION RATE: 18 BRPM | TEMPERATURE: 98.2 F | WEIGHT: 204 LBS | BODY MASS INDEX: 30.92 KG/M2 | DIASTOLIC BLOOD PRESSURE: 72 MMHG | HEART RATE: 77 BPM | SYSTOLIC BLOOD PRESSURE: 120 MMHG | OXYGEN SATURATION: 98 % | HEIGHT: 68 IN

## 2023-12-21 DIAGNOSIS — E03.9 ACQUIRED HYPOTHYROIDISM: ICD-10-CM

## 2023-12-21 DIAGNOSIS — Z23 NEED FOR PROPHYLACTIC VACCINATION AND INOCULATION AGAINST INFLUENZA: ICD-10-CM

## 2023-12-21 DIAGNOSIS — Z00.00 ENCOUNTER FOR MEDICARE ANNUAL WELLNESS EXAM: Primary | ICD-10-CM

## 2023-12-21 DIAGNOSIS — H61.23 BILATERAL IMPACTED CERUMEN: ICD-10-CM

## 2023-12-21 DIAGNOSIS — E11.9 TYPE 2 DIABETES MELLITUS WITHOUT COMPLICATION, WITHOUT LONG-TERM CURRENT USE OF INSULIN (H): ICD-10-CM

## 2023-12-21 PROBLEM — D35.2 PITUITARY ADENOMA (H): Status: RESOLVED | Noted: 2022-06-29 | Resolved: 2023-12-21

## 2023-12-21 LAB
CHOLEST SERPL-MCNC: 174 MG/DL
CREAT UR-MCNC: 62.5 MG/DL
FASTING STATUS PATIENT QL REPORTED: YES
HBA1C MFR BLD: 7.6 % (ref 0–5.6)
HDLC SERPL-MCNC: 39 MG/DL
LDLC SERPL CALC-MCNC: 105 MG/DL
MICROALBUMIN UR-MCNC: 13.6 MG/L
MICROALBUMIN/CREAT UR: 21.76 MG/G CR (ref 0–17)
NONHDLC SERPL-MCNC: 135 MG/DL
TRIGL SERPL-MCNC: 151 MG/DL
TSH SERPL DL<=0.005 MIU/L-ACNC: 1.71 UIU/ML (ref 0.3–4.2)

## 2023-12-21 PROCEDURE — 36415 COLL VENOUS BLD VENIPUNCTURE: CPT | Performed by: FAMILY MEDICINE

## 2023-12-21 PROCEDURE — 80061 LIPID PANEL: CPT | Performed by: FAMILY MEDICINE

## 2023-12-21 PROCEDURE — G0008 ADMIN INFLUENZA VIRUS VAC: HCPCS | Performed by: FAMILY MEDICINE

## 2023-12-21 PROCEDURE — 82570 ASSAY OF URINE CREATININE: CPT | Performed by: FAMILY MEDICINE

## 2023-12-21 PROCEDURE — 83036 HEMOGLOBIN GLYCOSYLATED A1C: CPT | Performed by: FAMILY MEDICINE

## 2023-12-21 PROCEDURE — G0439 PPPS, SUBSEQ VISIT: HCPCS | Performed by: FAMILY MEDICINE

## 2023-12-21 PROCEDURE — 90662 IIV NO PRSV INCREASED AG IM: CPT | Performed by: FAMILY MEDICINE

## 2023-12-21 PROCEDURE — 69209 REMOVE IMPACTED EAR WAX UNI: CPT | Performed by: FAMILY MEDICINE

## 2023-12-21 PROCEDURE — 82043 UR ALBUMIN QUANTITATIVE: CPT | Performed by: FAMILY MEDICINE

## 2023-12-21 PROCEDURE — 84443 ASSAY THYROID STIM HORMONE: CPT | Performed by: FAMILY MEDICINE

## 2023-12-21 PROCEDURE — 99213 OFFICE O/P EST LOW 20 MIN: CPT | Mod: 25 | Performed by: FAMILY MEDICINE

## 2023-12-21 ASSESSMENT — PAIN SCALES - GENERAL: PAINLEVEL: NO PAIN (0)

## 2023-12-21 ASSESSMENT — ACTIVITIES OF DAILY LIVING (ADL): CURRENT_FUNCTION: NO ASSISTANCE NEEDED

## 2023-12-21 NOTE — LETTER
December 27, 2023      Yeyo Duvall  [address] [place] [facility] [address]        Dear ,    We are writing to inform you of your test results.    Lipid panel showed elevated cholesterol levels.  TSH level within reference range.     Will recommend to continue activity as tolerated, healthy diet and current medications.     Let us know if there are any questions.       Resulted Orders   Lipid panel reflex to direct LDL Non-fasting   Result Value Ref Range    Cholesterol 174 <200 mg/dL    Triglycerides 151 (H) <150 mg/dL    Direct Measure HDL 39 (L) >=40 mg/dL    LDL Cholesterol Calculated 105 (H) <=100 mg/dL    Non HDL Cholesterol 135 (H) <130 mg/dL    Patient Fasting > 8hrs? Yes     Narrative    Cholesterol  Desirable:  <200 mg/dL    Triglycerides  Normal:  Less than 150 mg/dL  Borderline High:  150-199 mg/dL  High:  200-499 mg/dL  Very High:  Greater than or equal to 500 mg/dL    Direct Measure HDL  Female:  Greater than or equal to 50 mg/dL   Male:  Greater than or equal to 40 mg/dL    LDL Cholesterol  Desirable:  <100mg/dL  Above Desirable:  100-129 mg/dL   Borderline High:  130-159 mg/dL   High:  160-189 mg/dL   Very High:  >= 190 mg/dL    Non HDL Cholesterol  Desirable:  130 mg/dL  Above Desirable:  130-159 mg/dL  Borderline High:  160-189 mg/dL  High:  190-219 mg/dL  Very High:  Greater than or equal to 220 mg/dL   Albumin Random Urine Quantitative with Creat Ratio   Result Value Ref Range    Creatinine Urine mg/dL 62.5 mg/dL      Comment:      The reference ranges have not been established in urine creatinine. The results should be integrated into the clinical context for interpretation.    Albumin Urine mg/L 13.6 mg/L      Comment:      The reference ranges have not been established in urine albumin. The results should be integrated into the clinical context for interpretation.    Albumin Urine mg/g Cr 21.76 (H) 0.00 - 17.00 mg/g Cr      Comment:      Microalbuminuria is defined as an  albumin:creatinine ratio of 17 to 299 for males and 25 to 299 for females. A ratio of albumin:creatinine of 300 or higher is indicative of overt proteinuria.  Due to biologic variability, positive results should be confirmed by a second, first-morning random or 24-hour timed urine specimen. If there is discrepancy, a third specimen is recommended. When 2 out of 3 results are in the microalbuminuria range, this is evidence for incipient nephropathy and warrants increased efforts at glucose control, blood pressure control, and institution of therapy with an angiotensin-converting-enzyme (ACE) inhibitor (if the patient can tolerate it).     HEMOGLOBIN A1C   Result Value Ref Range    Hemoglobin A1C 7.6 (H) 0.0 - 5.6 %      Comment:      Normal <5.7%   Prediabetes 5.7-6.4%    Diabetes 6.5% or higher     Note: Adopted from ADA consensus guidelines.   TSH with free T4 reflex   Result Value Ref Range    TSH 1.71 0.30 - 4.20 uIU/mL       If you have any questions or concerns, please call the clinic at the number listed above.       Sincerely,      See Meng MD

## 2023-12-21 NOTE — PATIENT INSTRUCTIONS
Patient Education   Personalized Prevention Plan  You are due for the preventive services outlined below.  Your care team is available to assist you in scheduling these services.  If you have already completed any of these items, please share that information with your care team to update in your medical record.  Health Maintenance Due   Topic Date Due     COVID-19 Vaccine (1) Never done     Zoster (Shingles) Vaccine (1 of 2) Never done     RSV VACCINE (Pregnancy & 60+) (1 - 1-dose 60+ series) Never done     Diabetic Foot Exam  05/20/2022     Flu Vaccine (1) 09/01/2023     Cholesterol Lab  12/09/2023     Kidney Microalbumin Urine Test  12/09/2023     A1C Lab  12/14/2023     Eye Exam  01/13/2024

## 2023-12-21 NOTE — PROGRESS NOTES
"SUBJECTIVE:   Yeyo is a 83 year old, presenting for the following:  Physical    Are you in the first 12 months of your Medicare coverage?  No    Healthy Habits:     In general, how would you rate your overall health?  Good    Frequency of exercise:  2-3 days/week    Duration of exercise:  15-30 minutes    Do you usually eat at least 4 servings of fruit and vegetables a day, include whole grains    & fiber and avoid regularly eating high fat or \"junk\" foods?  Yes    Taking medications regularly:  Yes    Barriers to taking medications:  None    Medication side effects:  None    Ability to successfully perform activities of daily living:  No assistance needed    Home Safety:  No safety concerns identified    Hearing Impairment:  No hearing concerns    In the past 6 months, have you been bothered by leaking of urine?  No    In general, how would you rate your overall mental or emotional health?  Very good    Additional concerns today:  Yes      Have you ever done Advance Care Planning? (For example, a Health Directive, POLST, or a discussion with a medical provider or your loved ones about your wishes): Yes, advance care planning is on file.       Fall risk  Fallen 2 or more times in the past year?: No  Any fall with injury in the past year?: No    Cognitive Screening   1) Repeat 3 items (Leader, Season, Table)    2) Clock draw: NORMAL  3) 3 item recall: Recalls 3 objects  Results: 3 items recalled: COGNITIVE IMPAIRMENT LESS LIKELY    Mini-CogTM Copyright S Yair. Licensed by the author for use in [facility]; reprinted with permission (jesus@.[facility]). All rights reserved.        Reviewed and updated as needed this visit by clinical staff   Tobacco  Allergies  Meds  Problems             Reviewed and updated as needed this visit by Provider                 Social History     Tobacco Use     Smoking status: Former     Years: 20     Types: Cigarettes, Cigars, Pipe     Quit date: 12/15/1966     Years since " quittin.0     Passive exposure: Past     Smokeless tobacco: Never   Substance Use Topics     Alcohol use: Yes     Comment: occ.             2022     9:55 AM   Alcohol Use   Prescreen: >3 drinks/day or >7 drinks/week? No     Do you have a current opioid prescription? No  Do you use any other controlled substances or medications that are not prescribed by a provider? None      PROBLEMS TO ADD ON...      Current providers sharing in care for this patient include:   Patient Care Team:  See Meng MD as PCP - General (Family Practice)  Ariana Bond RN as   See Meng MD as Assigned PCP    The following health maintenance items are reviewed in Epic and correct as of today:  Health Maintenance   Topic Date Due     COVID-19 Vaccine (1) Never done     ZOSTER IMMUNIZATION (1 of 2) Never done     RSV VACCINE (Pregnancy & 60+) (1 - 1-dose 60+ series) Never done     DIABETIC FOOT EXAM  2022     INFLUENZA VACCINE (1) 2023     LIPID  2023     MICROALBUMIN  2023     A1C  2023     EYE EXAM  2024     BMP  2024     TSH W/FREE T4 REFLEX  2024     ANNUAL REVIEW OF HM ORDERS  2024     MEDICARE ANNUAL WELLNESS VISIT  2024     FALL RISK ASSESSMENT  2024     COLORECTAL CANCER SCREENING  2026     ADVANCE CARE PLANNING  2028     DTAP/TDAP/TD IMMUNIZATION (2 - Td or Tdap) 2032     PHQ-2 (once per calendar year)  Completed     Pneumococcal Vaccine: 65+ Years  Completed     IPV IMMUNIZATION  Aged Out     HPV IMMUNIZATION  Aged Out     MENINGITIS IMMUNIZATION  Aged Out     RSV MONOCLONAL ANTIBODY  Aged Out     Lab work is in process  Labs reviewed in EPIC  BP Readings from Last 3 Encounters:   23 120/72   23 132/70   22 118/72    Wt Readings from Last 3 Encounters:   23 92.5 kg (204 lb)   23 96.6 kg (213 lb)   22 94.3 kg (208 lb)                  Patient Active Problem List    Diagnosis     Essential hypertension     Glaucoma     ERECTILE DYSFUNCTION     Diverticulitis of colon     Slowing of urinary stream     Sciatica     Degenerative arthritis of thumb     Type 2 diabetes mellitus without complications (H)     Advanced directives, counseling/discussion     Health Care Home     Lumbar radiculopathy     CARDIOVASCULAR SCREENING; LDL GOAL LESS THAN 130     Nodular basal cell carcinoma     Statin intolerance     Past Surgical History:   Procedure Laterality Date     ARTHROSCOPY KNEE Right      BACK SURGERY       BACK SURGERY       COLONOSCOPY  2018    Repeat in 3 years; 12 mm polyp removed, diverticulosis, internal hemorrhoids     COLONOSCOPY  10/02/2008    Repeat in 10 years; diverticulosis     COLONOSCOPY N/A 2021    Procedure: COLONOSCOPY, WITH POLYPECTOMY AND BIOPSY;  Surgeon: Xu Ramachandran DO;  Location: [facility]     DECOMPRESSION LUMBAR MINIMALLY INVASIVE THREE+ LEVELS  2013    Lumbar 2-3, Lumbar 3-4, Lumbar 5-Sacral 1;  Surgeon: Param Jones MD;  Location:  OR     ENDOSCOPIC RELEASE ULNAR NERVE (ELBOW) Right 3/3/2020    Procedure: ENDOSCOPIC Cubital Tunnel Release;  Surgeon: Carlos Jordan MD;  Location: [facility]     RELEASE CARPAL TUNNEL Right 3/3/2020    Procedure: Open Carpal Tunnel Release;  Surgeon: Carlos Jordan MD;  Location: [facility]       Social History     Tobacco Use     Smoking status: Former     Years: 20     Types: Cigarettes, Cigars, Pipe     Quit date: 12/15/1966     Years since quittin.0     Passive exposure: Past     Smokeless tobacco: Never   Substance Use Topics     Alcohol use: Yes     Comment: occ.     Family History   Problem Relation Age of Onset     Cancer Mother      Alcohol/Drug Father         age 33 bleeding ulcers     Cancer Brother      Arthritis Brother      Eye Disorder Brother      Diabetes Sister      Eye Disorder Sister         glaucoma     Arthritis Sister      Cardiovascular Son      Eye  Disorder Brother         glaucoma     Diabetes Brother      Arthritis Brother      Cancer Brother         LUNG CANCER WITH METS     Diabetes Brother      Cancer Brother         prostate     Arthritis Brother      Eye Disorder Brother      Arthritis Brother      Eye Disorder Brother      Arthritis Sister      Eye Disorder Sister          Current Outpatient Medications   Medication Sig Dispense Refill     acetaminophen (TYLENOL) 500 MG tablet Take 1,000 mg by mouth every 6 hours as needed       ASPIRIN 81 MG OR TABS 1 TABLET DAILY       blood glucose monitoring (FREESTYLE FREEDOM LITE) meter device kit Use to test blood sugar 0ne times daily. 1 kit 0     brimonidine (ALPHAGAN) 0.2 % ophthalmic solution Place 1 drop into both eyes 2 times daily 1 Bottle 2     CALCIUM + D OR 1 TABLET DAILY       Cholecalciferol (VITAMIN D3 PO) Take by mouth daily       CINNAMON PO        Continuous Blood Gluc Sensor (FREESTYLE JANET 14 DAY SENSOR) MISC CHANGE every 14 DAYS.] 12 each 3     ibuprofen (ADVIL,MOTRIN) 200 MG tablet Take 200 mg by mouth every 4 hours as needed Reported on 4/21/2017       latanoprost (XALATAN) 0.005 % ophthalmic solution Apply 1 drop to eye At Bedtime  11     levothyroxine (SYNTHROID/LEVOTHROID) 50 MCG tablet Take 50 mcg by mouth       metFORMIN (GLUCOPHAGE) 500 MG tablet Take 1 tablet (500 mg) by mouth 2 times daily (with meals) 180 tablet 1     MULTI-VITAMIN OR TABS DAILY       Saw [place] 1000 MG CAPS Take 2 capsules by mouth daily 60 capsule 0     spironolactone (ALDACTONE) 25 MG tablet Take 1 tablet (25 mg) by mouth daily 90 tablet 1     blood glucose monitoring (NO BRAND SPECIFIED) meter device kit Use to test blood sugar one times daily.. Preferred blood glucose meter OR supplies to accompany: Blood Glucose Monitor Brands: per insurance. Patient requesting ONE TOUCH. 1 kit 0     STATIN NOT PRESCRIBED, INTENTIONAL, Statin not prescribed intentionally due to Intolerance       Allergies   Allergen  "Reactions     Amlodipine      Leg swelling      Dorzolamide Other (See Comments)     Dry eyes  Nicholas Avitia MD  Ophthalmology   5/4/2021   2:19 PM     Lisinopril Cough     Nka [No Known Allergies]      Timolol Other (See Comments)     Dry eyes  Nicholas Avitia MD  Ophthalmology   5/4/2021   2:18 PM     Recent Labs   Lab Test 06/14/23  0725 06/14/23  0718 12/09/22  1106 06/29/22  1037 12/23/21  1214 12/09/21  1035 05/20/21  1613 11/13/20  0808 10/16/20  0803 10/12/20  1647 03/06/20  0927 08/14/19  0717 08/12/19  0845 02/03/17  1041 08/01/16  1041   A1C  --  7.2* 8.0* 6.5*  --  6.3*   < >  --    < >  --   --    < >  --    < > 5.8   LDL  --   --  126*  --   --  116*  --  106*  --   --   --    < >  --    < >  --    HDL  --   --  39*  --   --  46  --  60  --   --   --    < >  --    < >  --    TRIG  --   --  145  --   --  92  --  130  --   --   --    < >  --    < >  --    ALT  --   --   --   --   --   --   --   --   --  35  --   --   --   --  32   CR 0.83  --   --  0.62*   < > 0.73  --   --   --  0.81 0.70   < >  --    < > 0.76   GFRESTIMATED 88  --   --  >90   < > 88  --   --   --  84 >90   < >  --    < > >90  Non  GFR Calc     GFRESTBLACK  --   --   --   --   --   --   --   --   --  >90 >90   < >  --    < > >90   GFR Calc     POTASSIUM 5.2  --  5.2 5.2   < > 4.8  --   --   --  4.6 4.2   < >  --    < > 5.0   TSH 3.11  --   --   --   --   --   --   --   --   --   --   --  0.92   < >  --     < > = values in this interval not displayed.          Review of Systems  Constitutional, HEENT, cardiovascular, pulmonary, GI, , musculoskeletal, neuro, skin, endocrine and psych systems are negative, except as otherwise noted.    OBJECTIVE:   /72 (Cuff Size: Adult Regular)   Pulse 77   Temp 98.2  F (36.8  C) (Tympanic)   Resp 18   Ht 1.727 m (5' 8\")   Wt 92.5 kg (204 lb)   SpO2 98%   BMI 31.02 kg/m   Estimated body mass index is 31.02 kg/m  as calculated from the following:    Height " "as of this encounter: 1.727 m (5' 8\").    Weight as of this encounter: 92.5 kg (204 lb).  Physical Exam  GENERAL: alert and no distress  EYES: Eyes grossly normal to inspection, PERRL and conjunctivae and sclerae normal  HENT: Impacted cerumen involving both ears, oropharynx clear  NECK: no adenopathy, no asymmetry, masses, or scars and thyroid normal to palpation  RESP: lungs clear to auscultation - no rales, rhonchi or wheezes  CV: regular rates and rhythm, normal S1 S2, no S3 or S4, and no murmur, click or rub  ABDOMEN: soft, nontender, without hepatosplenomegaly or masses  MS: Chronic OA changes  Multiple joints  SKIN: no suspicious lesions or rashes  NEURO: Normal strength and tone, mentation intact and speech normal  PSYCH: mentation appears normal, affect normal/bright      ASSESSMENT / PLAN:   (Z00.00) Encounter for Medicare annual wellness exam  (primary encounter diagnosis)  Comment: Medically stable.  Medications reviewed and no changes made.  Bilateral impacted cerumen cleaned with saline irrigation by MA.  Recommended to continue activity as tolerated and healthy diet.  Influenza vaccine administered in the office today.  Patient had COVID-19 infection about 2 weeks ago      (E11.9) Type 2 diabetes mellitus without complication, without long-term current use of insulin (H)  Comment: Recommended to continue metformin, healthy diet and activity as tolerated  Lab Results   Component Value Date    A1C 7.2 06/14/2023    A1C 8.0 12/09/2022    A1C 6.5 06/29/2022    A1C 6.3 12/09/2021    A1C 6.4 05/20/2021    A1C 5.9 10/16/2020    A1C 6.1 11/08/2019    A1C 6.2 05/17/2019    A1C 6.3 09/20/2018    Plan: Lipid panel reflex to direct LDL Non-fasting,         Albumin Random Urine Quantitative with Creat         Ratio, HEMOGLOBIN A1C          (E03.9) Acquired hypothyroidism  Comment: TSH ordered, will titrate levothyroxine dose accordingly  Plan: TSH with free T4 reflex            (H61.23) Bilateral impacted " franchesca  Comment: As above      COUNSELING:  Reviewed preventive health counseling, as reflected in patient instructions        He reports that he quit smoking about 57 years ago. His smoking use included cigarettes, cigars, and pipe. He has been exposed to tobacco smoke. He has never used smokeless tobacco.      Appropriate preventive services were discussed with this patient, including applicable screening as appropriate for fall prevention, nutrition, physical activity, Tobacco-use cessation, weight loss and cognition.  Checklist reviewing preventive services available has been given to the patient.    Reviewed patients plan of care and provided an AVS. The Basic Care Plan (routine screening as documented in Health Maintenance) for Ana Maria meets the Care Plan requirement. This Care Plan has been established and reviewed with the Patient.        See Meng MD  [facility]    Identified Health Risks:

## 2024-01-03 DIAGNOSIS — E11.69 TYPE 2 DIABETES MELLITUS WITH OTHER SPECIFIED COMPLICATION, WITHOUT LONG-TERM CURRENT USE OF INSULIN (H): ICD-10-CM

## 2024-01-04 ENCOUNTER — TELEPHONE (OUTPATIENT)
Dept: FAMILY MEDICINE | Facility: CLINIC | Age: 84
End: 2024-01-04
Payer: COMMERCIAL

## 2024-01-04 RX ORDER — FLASH GLUCOSE SENSOR
KIT MISCELLANEOUS
Qty: 12 EACH | Refills: 3 | Status: SHIPPED | OUTPATIENT
Start: 2024-01-04 | End: 2024-01-09

## 2024-01-04 NOTE — TELEPHONE ENCOUNTER
Prior Authorization Retail Medication Request    Medication/Dose: Freestyle Beth 14 day sensor  Diagnosis and ICD code (if different than what is on RX):    New/renewal/insurance change PA/secondary ins. PA:  Previously Tried and Failed:    Rationale:      Insurance   Primary:   Insurance ID:      Secondary (if applicable):  Insurance ID:      Pharmacy Information (if different than what is on RX)  Name:    Phone:    Fax:      CMM:  KEY: [id number]

## 2024-01-08 NOTE — TELEPHONE ENCOUNTER
PA Initiation    Medication: FREESTYLE JANET 14 DAY SENSOR [facility] [place]  Insurance Company: [employer] - Phone [phone] Fax [phone]  Pharmacy Filling the Rx: [facility] - [place], [place] - [address] [place] [address]  Filling Pharmacy Phone: [phone]  Filling Pharmacy Fax: [phone]  Start Date: 1/8/2024

## 2024-01-08 NOTE — TELEPHONE ENCOUNTER
PRIOR AUTHORIZATION DENIED    Medication: FREESTYLE JANET 14 DAY SENSOR [facility] [place]    [place] Company: [employer] - Phone [phone] Fax [phone]    Denial Date: 1/8/2024    Denial Reason(s): Patient is not injecting insulin at least once daily        Appeal Information:

## 2024-01-09 DIAGNOSIS — E11.69 TYPE 2 DIABETES MELLITUS WITH OTHER SPECIFIED COMPLICATION, WITHOUT LONG-TERM CURRENT USE OF INSULIN (H): ICD-10-CM

## 2024-01-09 RX ORDER — FLASH GLUCOSE SENSOR
KIT MISCELLANEOUS
Qty: 6 EACH | Refills: 3 | Status: SHIPPED | OUTPATIENT
Start: 2024-01-09

## 2024-01-10 ENCOUNTER — TELEPHONE (OUTPATIENT)
Dept: FAMILY MEDICINE | Facility: CLINIC | Age: 84
End: 2024-01-10
Payer: COMMERCIAL

## 2024-01-10 NOTE — TELEPHONE ENCOUNTER
Prior Authorization Retail Medication Request    Medication/Dose: Free Style Dwayne 14 day sensor   Diagnosis and ICD code (if different than what is on RX):    New/renewal/insurance change PA/secondary ins. PA:  Previously Tried and Failed:    Rationale:      Insurance   Primary:   Insurance ID:      Secondary (if applicable):  Insurance ID:      Pharmacy Information (if different than what is on RX)  Name:    Phone:    Fax:    CMM:  KEY: [id number]

## 2024-02-08 ENCOUNTER — OFFICE VISIT (OUTPATIENT)
Dept: FAMILY MEDICINE | Facility: CLINIC | Age: 84
End: 2024-02-08
Payer: COMMERCIAL

## 2024-02-08 VITALS
TEMPERATURE: 97.4 F | DIASTOLIC BLOOD PRESSURE: 78 MMHG | HEIGHT: 68 IN | BODY MASS INDEX: 30.92 KG/M2 | HEART RATE: 70 BPM | RESPIRATION RATE: 18 BRPM | SYSTOLIC BLOOD PRESSURE: 123 MMHG | OXYGEN SATURATION: 98 % | WEIGHT: 204 LBS

## 2024-02-08 DIAGNOSIS — N41.9 PROSTATITIS, UNSPECIFIED PROSTATITIS TYPE: Primary | ICD-10-CM

## 2024-02-08 DIAGNOSIS — Z12.5 SCREENING FOR PROSTATE CANCER: ICD-10-CM

## 2024-02-08 LAB — PSA SERPL DL<=0.01 NG/ML-MCNC: 1.05 NG/ML

## 2024-02-08 PROCEDURE — 99213 OFFICE O/P EST LOW 20 MIN: CPT | Performed by: FAMILY MEDICINE

## 2024-02-08 PROCEDURE — 81001 URINALYSIS AUTO W/SCOPE: CPT | Performed by: FAMILY MEDICINE

## 2024-02-08 PROCEDURE — 36415 COLL VENOUS BLD VENIPUNCTURE: CPT | Performed by: FAMILY MEDICINE

## 2024-02-08 PROCEDURE — G0103 PSA SCREENING: HCPCS | Performed by: FAMILY MEDICINE

## 2024-02-08 PROCEDURE — 87086 URINE CULTURE/COLONY COUNT: CPT | Performed by: FAMILY MEDICINE

## 2024-02-08 RX ORDER — CIPROFLOXACIN 500 MG/1
500 TABLET, FILM COATED ORAL 2 TIMES DAILY
Qty: 20 TABLET | Refills: 0 | Status: SHIPPED | OUTPATIENT
Start: 2024-02-08 | End: 2024-02-18

## 2024-02-08 ASSESSMENT — PAIN SCALES - GENERAL: PAINLEVEL: NO PAIN (0)

## 2024-02-08 NOTE — PROGRESS NOTES
"  Assessment & Plan     Prostatitis, unspecified prostatitis type  83-year-old male presented with pelvic, back pain and mild dysuria, experiencing symptoms for last 3 weeks or so.  Physical examination as described.  Differentials discussed in detail including acute prostatitis.  Ciprofloxacin prescribed to cover possible infection.  UA and PSA ordered.  Urology referral placed for further evaluation.  Patient understood and in agreement with above plan.  All questions answered.  - Adult Urology  Referral; Future  - ciprofloxacin (CIPRO) 500 MG tablet; Take 1 tablet (500 mg) by mouth 2 times daily for 10 days    Screening for prostate cancer  - PSA, screen; Future      Amanda Orona is a 83 year old, presenting for the following health issues:  Prostate Problem        2/8/2024    10:16 AM   Additional Questions   Roomed by ES   Accompanied by Wife     HPI       Concern - Prostate   Onset: 3 weeks   Description: Prostate is bothering him. Just burns.  Taking a lot longer to go to the bathroom   Therapies tried and outcome:  none   No fever, chills, flank pain or other relevant systemic symptoms        Review of Systems  Constitutional, HEENT, cardiovascular, pulmonary, gi and gu systems are negative, except as otherwise noted.      Objective    /78 (Cuff Size: Adult Large)   Pulse 70   Temp 97.4  F (36.3  C) (Tympanic)   Resp 18   Ht 1.727 m (5' 8\")   Wt 92.5 kg (204 lb)   SpO2 98%   BMI 31.02 kg/m    Body mass index is 31.02 kg/m .  Physical Exam   GENERAL: alert and no distress  NECK: no adenopathy, no asymmetry, masses, or scars  RESP: lungs clear to auscultation - no rales, rhonchi or wheezes  ABDOMEN: soft, nontender  RECTAL (male): Mildly tender prostate, smooth in consistency, no significant enlargement noted, rectal sphincteric tone normal, no bleeding  MS: no gross musculoskeletal defects noted, no edema  NEURO: Normal strength and tone, mentation intact and speech " normal      Signed Electronically by: See Meng MD

## 2024-02-08 NOTE — NURSING NOTE
"Chief Complaint   Patient presents with    Prostate Problem     /78 (Cuff Size: Adult Large)   Pulse 70   Temp 97.4  F (36.3  C) (Tympanic)   Resp 18   Ht 1.727 m (5' 8\")   Wt 92.5 kg (204 lb)   SpO2 98%   BMI 31.02 kg/m   Estimated body mass index is 31.02 kg/m  as calculated from the following:    Height as of this encounter: 1.727 m (5' 8\").    Weight as of this encounter: 92.5 kg (204 lb).  Patient presents to the clinic using [facility]      Health Maintenance that is potentially due pending provider review:    Health Maintenance Due   Topic Date Due    COVID-19 Vaccine (1) Never done    ZOSTER IMMUNIZATION (1 of 2) Never done    RSV VACCINE (Pregnancy & 60+) (1 - 1-dose 60+ series) Never done    DIABETIC FOOT EXAM  05/20/2022    EYE EXAM  01/13/2024                  "

## 2024-02-09 LAB
ALBUMIN UR-MCNC: NEGATIVE MG/DL
APPEARANCE UR: CLEAR
BACTERIA #/AREA URNS HPF: ABNORMAL /HPF
BILIRUB UR QL STRIP: NEGATIVE
COLOR UR AUTO: YELLOW
GLUCOSE UR STRIP-MCNC: NEGATIVE MG/DL
HGB UR QL STRIP: NEGATIVE
KETONES UR STRIP-MCNC: NEGATIVE MG/DL
LEUKOCYTE ESTERASE UR QL STRIP: ABNORMAL
NITRATE UR QL: NEGATIVE
PH UR STRIP: 7 [PH] (ref 5–7)
RBC #/AREA URNS AUTO: ABNORMAL /HPF
SP GR UR STRIP: 1.02 (ref 1–1.03)
UROBILINOGEN UR STRIP-ACNC: 0.2 E.U./DL
WBC #/AREA URNS AUTO: ABNORMAL /HPF
WBC CLUMPS #/AREA URNS HPF: PRESENT /HPF

## 2024-02-11 LAB — BACTERIA UR CULT: NORMAL

## 2024-02-22 ENCOUNTER — OFFICE VISIT (OUTPATIENT)
Dept: UROLOGY | Facility: CLINIC | Age: 84
End: 2024-02-22
Attending: FAMILY MEDICINE
Payer: COMMERCIAL

## 2024-02-22 VITALS
BODY MASS INDEX: 30.92 KG/M2 | DIASTOLIC BLOOD PRESSURE: 78 MMHG | SYSTOLIC BLOOD PRESSURE: 149 MMHG | WEIGHT: 204 LBS | HEART RATE: 87 BPM | OXYGEN SATURATION: 97 % | HEIGHT: 68 IN | TEMPERATURE: 98.5 F

## 2024-02-22 DIAGNOSIS — N41.1 CHRONIC PROSTATITIS: ICD-10-CM

## 2024-02-22 PROCEDURE — 51798 US URINE CAPACITY MEASURE: CPT | Performed by: STUDENT IN AN ORGANIZED HEALTH CARE EDUCATION/TRAINING PROGRAM

## 2024-02-22 PROCEDURE — 99203 OFFICE O/P NEW LOW 30 MIN: CPT | Mod: 25 | Performed by: STUDENT IN AN ORGANIZED HEALTH CARE EDUCATION/TRAINING PROGRAM

## 2024-02-22 RX ORDER — DOXYCYCLINE 100 MG/1
100 CAPSULE ORAL 2 TIMES DAILY
Qty: 42 CAPSULE | Refills: 0 | Status: SHIPPED | OUTPATIENT
Start: 2024-02-22 | End: 2024-03-14

## 2024-02-22 ASSESSMENT — PAIN SCALES - GENERAL: PAINLEVEL: SEVERE PAIN (6)
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
## 2024-02-22 NOTE — NURSING NOTE
"Initial BP (!) 149/78   Pulse 87   Temp 98.5  F (36.9  C) (Tympanic)   Ht 1.727 m (5' 8\")   Wt 92.5 kg (204 lb)   SpO2 97%   BMI 31.02 kg/m   Estimated body mass index is 31.02 kg/m  as calculated from the following:    Height as of this encounter: 1.727 m (5' 8\").    Weight as of this encounter: 92.5 kg (204 lb). .  Active order to obtain bladder scan? Yes   Name of ordering provider:  Ariadne Castelan  Bladder scan preformed post void No, patient voided about an hour and 30 minutes ago  Bladder scan reveled 105ML  Provider notified?  Yes    Lindsey AUGUSTIN CMA        "

## 2024-03-05 DIAGNOSIS — E11.9 TYPE 2 DIABETES MELLITUS WITHOUT COMPLICATION, WITHOUT LONG-TERM CURRENT USE OF INSULIN (H): ICD-10-CM

## 2024-04-23 ENCOUNTER — TELEPHONE (OUTPATIENT)
Dept: FAMILY MEDICINE | Facility: CLINIC | Age: 84
End: 2024-04-23
Payer: COMMERCIAL

## 2024-04-23 NOTE — TELEPHONE ENCOUNTER
Reason for Call:  Other appointment    Detailed comments: PATIENT WAS TOLD BY CHIROPRACTOR TO GET AN MRI AND PATIENT NEEDS AN ORDER    PATIENT WOULD LIKE TO KNOW IF HE NEEDS AN APPT OR IF HE CAN GET AN ORDER WITHOUT AN APPT    IF NEEDED, CAN HE BE FIT IN ASAP WITH ANYONE IN CLINIC?    PLEASE CALL    Phone Number Patient can be reached at: Cell number on file:    Telephone Information:   Mobile [phone]       Best Time: ASAP    Can we leave a detailed message on this number? YES    Call taken on 4/23/2024 at 8:09 AM by Jessica García

## 2024-04-29 ENCOUNTER — VIRTUAL VISIT (OUTPATIENT)
Dept: FAMILY MEDICINE | Facility: CLINIC | Age: 84
End: 2024-04-29
Payer: COMMERCIAL

## 2024-04-29 DIAGNOSIS — M54.16 LUMBAR RADICULOPATHY: Primary | ICD-10-CM

## 2024-04-29 PROCEDURE — 99441 PR PHYSICIAN TELEPHONE EVALUATION 5-10 MIN: CPT | Mod: 93 | Performed by: FAMILY MEDICINE

## 2024-04-29 NOTE — NURSING NOTE
"Chief Complaint   Patient presents with    Back Pain       Initial There were no vitals taken for this visit. Estimated body mass index is 31.02 kg/m  as calculated from the following:    Height as of 2/22/24: 1.727 m (5' 8\").    Weight as of 2/22/24: 92.5 kg (204 lb).    Patient presents to the clinic using No DME    Is there anyone who you would like to be able to receive your results? No  If yes have patient fill out ANA      "

## 2024-04-29 NOTE — PROGRESS NOTES
Yeyo is a 83 year old who is being evaluated via a billable telephone visit.    What phone number would you like to be contacted at? [phone]  How would you like to obtain your AVS? Tazharfatmata  Originating Location (pt. Location): Home    Distant Location (provider location):  On-site    Assessment & Plan     Lumbar radiculopathy  Differentials discussed in detail including lumbar radiculopathy.  Symptoms ongoing for quite some time, did chiropractic therapy without any significant relief.  MRI lumbar spine ordered and suggested over-the-counter oral/topical analgesia.  Spine  referral placed for further review and recommendations.  All questions answered.  - MR Lumbar Spine w/o Contrast; Future  - Spine  Referral; Future      Subjective   Yeyo is a 83 year old, presenting for the following health issues:  Back Pain      4/29/2024    11:14 AM   Additional Questions   Roomed by Maru MENON   Accompanied by self     HPI     Chronic/Recurring Back Pain Follow Up  Where is your back pain located? (Select all that apply) low back more on the right  How would you describe your back pain?  burning, cramping, dull ache, sharp, shooting, and stabbing  Where does your back pain spread? Rt sided buttocks  Since your last clinic visit for back pain, how has your pain changed? gradually worsening  Does your back pain interfere with your job? some  Since your last visit, have you tried any new treatment? Chiropractor has recommended a MRI for further evaluation.      Review of Systems  Constitutional, HEENT, cardiovascular, pulmonary, gi and gu systems are negative, except as otherwise noted.      Objective         Vitals:  No vitals were obtained today due to virtual visit.    Physical Exam   General: Alert and no distress //Respiratory: No audible wheeze, cough, or shortness of breath // Psychiatric:  Appropriate affect, tone, and pace of words        Phone call duration: 8 minutes  Signed Electronically by:  See Meng MD

## 2024-05-13 ENCOUNTER — HOSPITAL ENCOUNTER (OUTPATIENT)
Dept: MRI IMAGING | Facility: CLINIC | Age: 84
Discharge: HOME | End: 2024-05-13
Attending: FAMILY MEDICINE | Admitting: FAMILY MEDICINE
Payer: COMMERCIAL

## 2024-05-13 DIAGNOSIS — M54.16 LUMBAR RADICULOPATHY: ICD-10-CM

## 2024-05-13 PROCEDURE — 72148 MRI LUMBAR SPINE W/O DYE: CPT

## 2024-05-23 ENCOUNTER — OFFICE VISIT (OUTPATIENT)
Dept: PALLIATIVE MEDICINE | Facility: CLINIC | Age: 84
End: 2024-05-23
Attending: FAMILY MEDICINE
Payer: COMMERCIAL

## 2024-05-23 VITALS — DIASTOLIC BLOOD PRESSURE: 74 MMHG | SYSTOLIC BLOOD PRESSURE: 136 MMHG | HEART RATE: 73 BPM

## 2024-05-23 DIAGNOSIS — G89.29 CHRONIC BILATERAL LOW BACK PAIN WITH RIGHT-SIDED SCIATICA: Primary | ICD-10-CM

## 2024-05-23 DIAGNOSIS — M54.41 CHRONIC BILATERAL LOW BACK PAIN WITH RIGHT-SIDED SCIATICA: Primary | ICD-10-CM

## 2024-05-23 DIAGNOSIS — M54.16 LUMBAR RADICULOPATHY: ICD-10-CM

## 2024-05-23 DIAGNOSIS — R26.89 LOSS OF BALANCE: ICD-10-CM

## 2024-05-23 DIAGNOSIS — G62.9 PERIPHERAL POLYNEUROPATHY: ICD-10-CM

## 2024-05-23 PROCEDURE — G2211 COMPLEX E/M VISIT ADD ON: HCPCS | Performed by: NURSE PRACTITIONER

## 2024-05-23 PROCEDURE — 99205 OFFICE O/P NEW HI 60 MIN: CPT | Performed by: NURSE PRACTITIONER

## 2024-05-23 ASSESSMENT — PAIN SCALES - GENERAL: PAINLEVEL: MODERATE PAIN (4)

## 2024-05-23 NOTE — PATIENT INSTRUCTIONS
----------------------------------------------------------------  [facility] Number:  [phone]   Call with any questions about your care and for scheduling assistance.   Calls are returned Monday through Friday between 8 AM and 4:30 PM. We usually get back to you within 2 business days depending on the issue/request.    If we are prescribing your medications:  For opioid medication refills, call the clinic or send a [employer] message 7 days in advance.  Please include:  Name of requested medication  Name of the pharmacy.  For non-opioid medications, call your pharmacy directly to request a refill. Please allow 3-4 days to be processed.   Per [place] State Law:  All controlled substance prescriptions must be filled within 30 days of being written.    For those controlled substances allowing refills, pickup must occur within 30 days of last fill.      We believe regular attendance is key to your success in our program!    Any time you are unable to keep your appointment we ask that you call us at least 24 hours in advance to cancel.This will allow us to offer the appointment time to another patient.   Multiple missed appointments may lead to dismissal from the clinic.

## 2024-06-02 DIAGNOSIS — I10 ESSENTIAL HYPERTENSION: ICD-10-CM

## 2024-06-03 RX ORDER — SPIRONOLACTONE 25 MG/1
25 TABLET ORAL DAILY
Qty: 90 TABLET | Refills: 0 | Status: SHIPPED | OUTPATIENT
Start: 2024-06-03 | End: 2024-09-12

## 2024-06-03 NOTE — TELEPHONE ENCOUNTER
Prescription approved per [facility] Refill Protocol.  Julie Behrendt RN

## 2024-06-12 DIAGNOSIS — E11.9 TYPE 2 DIABETES MELLITUS WITHOUT COMPLICATION, WITHOUT LONG-TERM CURRENT USE OF INSULIN (H): ICD-10-CM

## 2024-07-17 ENCOUNTER — TELEPHONE (OUTPATIENT)
Dept: PALLIATIVE MEDICINE | Facility: CLINIC | Age: 84
End: 2024-07-17
Payer: COMMERCIAL

## 2024-07-17 NOTE — TELEPHONE ENCOUNTER
I have attempted to contact this patient by phone to reschedule follow up on 8/29 to 8/22  with provider at the [place] location.    Lissette COLLINS

## 2024-08-03 ENCOUNTER — HEALTH MAINTENANCE LETTER (OUTPATIENT)
Age: 84
End: 2024-08-03

## 2024-08-05 ENCOUNTER — DOCUMENTATION ONLY (OUTPATIENT)
Dept: LAB | Facility: CLINIC | Age: 84
End: 2024-08-05

## 2024-08-05 ENCOUNTER — DOCUMENTATION ONLY (OUTPATIENT)
Dept: FAMILY MEDICINE | Facility: CLINIC | Age: 84
End: 2024-08-05
Payer: COMMERCIAL

## 2024-08-05 DIAGNOSIS — E11.9 TYPE 2 DIABETES MELLITUS WITHOUT COMPLICATION, WITHOUT LONG-TERM CURRENT USE OF INSULIN (H): Primary | ICD-10-CM

## 2024-08-05 NOTE — PROGRESS NOTES
Ana Maria Duvall has an upcoming lab appointment:    Future Appointments   Date Time Provider Department Center   8/15/2024 10:30 AM [facility] LAB [facility]     Patient is scheduled for the following lab(s): A1c    There is no order available. Please review and place either future orders or HMPO (Review of Health Maintenance Protocol Orders), as appropriate.    Health Maintenance Due   Topic    BMP     ANNUAL REVIEW OF HM ORDERS     A1C      Rosa Shepherd

## 2024-08-15 ENCOUNTER — LAB (OUTPATIENT)
Dept: LAB | Facility: CLINIC | Age: 84
End: 2024-08-15
Payer: COMMERCIAL

## 2024-08-15 DIAGNOSIS — E11.9 TYPE 2 DIABETES MELLITUS WITHOUT COMPLICATION, WITHOUT LONG-TERM CURRENT USE OF INSULIN (H): ICD-10-CM

## 2024-08-15 LAB
ANION GAP SERPL CALCULATED.3IONS-SCNC: 11 MMOL/L (ref 7–15)
BUN SERPL-MCNC: 19.1 MG/DL (ref 8–23)
CALCIUM SERPL-MCNC: 10.2 MG/DL (ref 8.8–10.4)
CHLORIDE SERPL-SCNC: 100 MMOL/L (ref 98–107)
CREAT SERPL-MCNC: 0.79 MG/DL (ref 0.67–1.17)
EGFRCR SERPLBLD CKD-EPI 2021: 89 ML/MIN/1.73M2
GLUCOSE SERPL-MCNC: 151 MG/DL (ref 70–99)
HBA1C MFR BLD: 8 % (ref 0–5.6)
HCO3 SERPL-SCNC: 22 MMOL/L (ref 22–29)
POTASSIUM SERPL-SCNC: 4.9 MMOL/L (ref 3.4–5.3)
SODIUM SERPL-SCNC: 133 MMOL/L (ref 135–145)

## 2024-08-15 PROCEDURE — 83036 HEMOGLOBIN GLYCOSYLATED A1C: CPT

## 2024-08-15 PROCEDURE — 80048 BASIC METABOLIC PNL TOTAL CA: CPT

## 2024-08-15 PROCEDURE — 36415 COLL VENOUS BLD VENIPUNCTURE: CPT

## 2024-09-12 ENCOUNTER — OFFICE VISIT (OUTPATIENT)
Dept: FAMILY MEDICINE | Facility: CLINIC | Age: 84
End: 2024-09-12
Payer: COMMERCIAL

## 2024-09-12 ENCOUNTER — ANCILLARY PROCEDURE (OUTPATIENT)
Dept: GENERAL RADIOLOGY | Facility: CLINIC | Age: 84
End: 2024-09-12
Attending: FAMILY MEDICINE
Payer: COMMERCIAL

## 2024-09-12 VITALS
WEIGHT: 211.4 LBS | HEIGHT: 68 IN | HEART RATE: 81 BPM | SYSTOLIC BLOOD PRESSURE: 130 MMHG | RESPIRATION RATE: 20 BRPM | OXYGEN SATURATION: 96 % | TEMPERATURE: 97.8 F | BODY MASS INDEX: 32.04 KG/M2 | DIASTOLIC BLOOD PRESSURE: 60 MMHG

## 2024-09-12 DIAGNOSIS — M25.561 CHRONIC PAIN OF BOTH KNEES: ICD-10-CM

## 2024-09-12 DIAGNOSIS — M25.562 CHRONIC PAIN OF BOTH KNEES: ICD-10-CM

## 2024-09-12 DIAGNOSIS — M25.561 ARTHRALGIA OF BOTH LOWER LEGS: ICD-10-CM

## 2024-09-12 DIAGNOSIS — M25.562 ARTHRALGIA OF BOTH LOWER LEGS: ICD-10-CM

## 2024-09-12 DIAGNOSIS — E11.9 TYPE 2 DIABETES MELLITUS WITHOUT COMPLICATION, WITHOUT LONG-TERM CURRENT USE OF INSULIN (H): ICD-10-CM

## 2024-09-12 DIAGNOSIS — I10 ESSENTIAL HYPERTENSION: ICD-10-CM

## 2024-09-12 DIAGNOSIS — G89.29 CHRONIC PAIN OF BOTH KNEES: ICD-10-CM

## 2024-09-12 PROCEDURE — 73562 X-RAY EXAM OF KNEE 3: CPT | Mod: TC | Performed by: RADIOLOGY

## 2024-09-12 PROCEDURE — 20610 DRAIN/INJ JOINT/BURSA W/O US: CPT | Performed by: FAMILY MEDICINE

## 2024-09-12 PROCEDURE — 99214 OFFICE O/P EST MOD 30 MIN: CPT | Mod: 25 | Performed by: FAMILY MEDICINE

## 2024-09-12 RX ORDER — LIDOCAINE HYDROCHLORIDE 10 MG/ML
3 INJECTION, SOLUTION EPIDURAL; INFILTRATION; INTRACAUDAL; PERINEURAL ONCE
Status: COMPLETED | OUTPATIENT
Start: 2024-09-12 | End: 2024-09-12

## 2024-09-12 RX ORDER — TRIAMCINOLONE ACETONIDE 40 MG/ML
40 INJECTION, SUSPENSION INTRA-ARTICULAR; INTRAMUSCULAR ONCE
Status: COMPLETED | OUTPATIENT
Start: 2024-09-12 | End: 2024-09-12

## 2024-09-12 RX ORDER — SPIRONOLACTONE 25 MG/1
25 TABLET ORAL DAILY
Qty: 90 TABLET | Refills: 3 | Status: SHIPPED | OUTPATIENT
Start: 2024-09-12

## 2024-09-12 RX ADMIN — TRIAMCINOLONE ACETONIDE 40 MG: 40 INJECTION, SUSPENSION INTRA-ARTICULAR; INTRAMUSCULAR at 12:44

## 2024-09-12 RX ADMIN — LIDOCAINE HYDROCHLORIDE 3 ML: 10 INJECTION, SOLUTION EPIDURAL; INFILTRATION; INTRACAUDAL; PERINEURAL at 12:44

## 2024-09-12 ASSESSMENT — PAIN SCALES - GENERAL: PAINLEVEL: MODERATE PAIN (5)

## 2024-09-12 NOTE — PROGRESS NOTES
Assessment & Plan     Type 2 diabetes mellitus without complication, without long-term current use of insulin (H)  Last hemoglobin A1c 8.0, slightly above target goal.  Recommended healthy diet and activity as tolerated.  Metformin refilled  Component Value Date    A1C 8.0 08/15/2024    A1C 7.6 12/21/2023    A1C 7.2 06/14/2023    A1C 8.0 12/09/2022    A1C 6.5 06/29/2022    A1C 6.4 05/20/2021    A1C 5.9 10/16/2020    A1C 6.1 11/08/2019    A1C 6.2 05/17/2019    A1C 6.3 09/20/2018    - metFORMIN (GLUCOPHAGE) 500 MG tablet; Take 1 tablet (500 mg) by mouth 2 times daily (with meals).    Essential hypertension  Blood pressure stable.  Spironolactone refilled  - spironolactone (ALDACTONE) 25 MG tablet; Take 1 tablet (25 mg) by mouth daily.    Chronic pain of both knees  Arthralgia of both lower legs  X-ray findings consistent with moderate bilateral knee joint arthritis.  Steroid joint injection administered in office today, see procedure note for detail.  Patient deferred physical therapy   - XR Knee Bilateral 3 Views; Future  - triamcinolone (KENALOG-40) injection 40 mg  - lidocaine (PF) (XYLOCAINE) 1 % injection 3 mL  - triamcinolone (KENALOG-40) injection 40 mg  - lidocaine (PF) (XYLOCAINE) 1 % injection 3 mL    PROCEDURE:  JOINT ASPIRATION/INJECTION.    After a discussion of risks, benefits and side effects of procedure, informed patient consent was obtained.  The both knees was were3 cc of 1 % lidocaine was used for local analgesia.    ASPIRATION: Not performed.     INJECTION:  Using 3 cc of 1 % lidocaine mixed with 40 mg of Kenalog, both knees were successfully injected without complication.  Patient did experience some pain relief following injection.      Amanda Orona is a 83 year old, presenting for the following health issues:  Knee Pain        9/12/2024    10:39 AM   Additional Questions   Roomed by Ceci MARQUEZ LPN   Accompanied by Self     HPI       Concern - Knee pain  Onset: years go  Description:  "pain in both knees, cracks/snaps. Right knee Is worse.   Intensity: moderate  Progression of Symptoms:  worsening    Due for metformin and spironolactone refill      Review of Systems  Constitutional, neuro, ENT, endocrine, pulmonary, cardiac, gastrointestinal, genitourinary, musculoskeletal, integument and psychiatric systems are negative, except as otherwise noted.      Objective    /60   Pulse 81   Temp 97.8  F (36.6  C) (Tympanic)   Resp 20   Ht 1.727 m (5' 8\")   Wt 95.9 kg (211 lb 6.4 oz)   SpO2 96%   BMI 32.14 kg/m    Body mass index is 32.14 kg/m .  Physical Exam   GENERAL: alert and no distress  NECK: no adenopathy, no asymmetry, masses, or scars  RESP: lungs clear to auscultation - no rales, rhonchi or wheezes  CV: regular rate and rhythm, normal S1 S2, no S3 or S4, no murmur, click or rub  MS: Chronic OA changes involving multiple joints including both knees  NEURO: Normal strength and tone, mentation intact and speech normal  PSYCH: mentation appears normal, affect normal/bright      Signed Electronically by: See Meng MD    "

## 2024-09-19 ENCOUNTER — OFFICE VISIT (OUTPATIENT)
Dept: UROLOGY | Facility: CLINIC | Age: 84
End: 2024-09-19
Payer: COMMERCIAL

## 2024-09-19 VITALS
OXYGEN SATURATION: 96 % | DIASTOLIC BLOOD PRESSURE: 77 MMHG | HEART RATE: 71 BPM | HEIGHT: 68 IN | BODY MASS INDEX: 31.98 KG/M2 | WEIGHT: 211 LBS | SYSTOLIC BLOOD PRESSURE: 127 MMHG | TEMPERATURE: 97.2 F

## 2024-09-19 DIAGNOSIS — N41.1 CHRONIC PROSTATITIS: Primary | ICD-10-CM

## 2024-09-19 DIAGNOSIS — R39.11 URINARY HESITANCY: ICD-10-CM

## 2024-09-19 PROCEDURE — 99213 OFFICE O/P EST LOW 20 MIN: CPT | Performed by: STUDENT IN AN ORGANIZED HEALTH CARE EDUCATION/TRAINING PROGRAM

## 2024-09-19 RX ORDER — ALFUZOSIN HYDROCHLORIDE 10 MG/1
10 TABLET, EXTENDED RELEASE ORAL DAILY
Qty: 90 TABLET | Refills: 1 | Status: SHIPPED | OUTPATIENT
Start: 2024-09-19

## 2024-09-19 ASSESSMENT — PAIN SCALES - GENERAL: PAINLEVEL: SEVERE PAIN (6)

## 2024-09-19 NOTE — NURSING NOTE
"Initial /77   Pulse 71   Temp 97.2  F (36.2  C) (Tympanic)   Ht 1.727 m (5' 8\")   Wt 95.7 kg (211 lb)   SpO2 96%   BMI 32.08 kg/m   Estimated body mass index is 32.08 kg/m  as calculated from the following:    Height as of this encounter: 1.727 m (5' 8\").    Weight as of this encounter: 95.7 kg (211 lb). .  Lindsey AUGUSTIN CMA 09/19/24 9:56 AM      "
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
## 2024-10-03 ENCOUNTER — HOSPITAL ENCOUNTER (OUTPATIENT)
Dept: CT IMAGING | Facility: CLINIC | Age: 84
Discharge: HOME | End: 2024-10-03
Attending: STUDENT IN AN ORGANIZED HEALTH CARE EDUCATION/TRAINING PROGRAM | Admitting: STUDENT IN AN ORGANIZED HEALTH CARE EDUCATION/TRAINING PROGRAM
Payer: COMMERCIAL

## 2024-10-03 DIAGNOSIS — N41.1 CHRONIC PROSTATITIS: ICD-10-CM

## 2024-10-03 LAB
CREAT BLD-MCNC: 0.8 MG/DL (ref 0.7–1.3)
EGFRCR SERPLBLD CKD-EPI 2021: >60 ML/MIN/1.73M2

## 2024-10-03 PROCEDURE — 250N000009 HC RX 250: Performed by: RADIOLOGY

## 2024-10-03 PROCEDURE — 74177 CT ABD & PELVIS W/CONTRAST: CPT

## 2024-10-03 PROCEDURE — 250N000011 HC RX IP 250 OP 636: Performed by: RADIOLOGY

## 2024-10-03 PROCEDURE — 82565 ASSAY OF CREATININE: CPT

## 2024-10-03 RX ORDER — IOPAMIDOL 755 MG/ML
103 INJECTION, SOLUTION INTRAVASCULAR ONCE
Status: COMPLETED | OUTPATIENT
Start: 2024-10-03 | End: 2024-10-03

## 2024-10-03 RX ADMIN — IOPAMIDOL 103 ML: 755 INJECTION, SOLUTION INTRAVENOUS at 11:51

## 2024-10-03 RX ADMIN — SODIUM CHLORIDE 66 ML: 9 INJECTION, SOLUTION INTRAVENOUS at 11:51

## 2024-10-08 NOTE — TELEPHONE ENCOUNTER
Pt had lumbar facet joint injections today.  He has [employer] for insurance and it was only covered for an lumbar epidural steroid injection.  Pt informed that we would be working on getting coverage for procedure after-the-fact.    Routed to Shannon Reeves and Funmi Curtis.    Nitza Velasco, RN-BSN  [facility]     no

## 2024-11-21 ENCOUNTER — PATIENT OUTREACH (OUTPATIENT)
Dept: CARE COORDINATION | Facility: CLINIC | Age: 84
End: 2024-11-21
Payer: COMMERCIAL

## 2024-12-04 ENCOUNTER — OFFICE VISIT (OUTPATIENT)
Dept: UROLOGY | Facility: CLINIC | Age: 84
End: 2024-12-04
Payer: COMMERCIAL

## 2024-12-04 VITALS
WEIGHT: 211 LBS | BODY MASS INDEX: 31.98 KG/M2 | OXYGEN SATURATION: 96 % | HEART RATE: 85 BPM | SYSTOLIC BLOOD PRESSURE: 150 MMHG | HEIGHT: 68 IN | DIASTOLIC BLOOD PRESSURE: 82 MMHG

## 2024-12-04 DIAGNOSIS — R39.11 URINARY HESITANCY: ICD-10-CM

## 2024-12-04 DIAGNOSIS — N32.9 LESION OF BLADDER: ICD-10-CM

## 2024-12-04 DIAGNOSIS — R39.198 SLOWING OF URINARY STREAM: Primary | ICD-10-CM

## 2024-12-04 ASSESSMENT — PAIN SCALES - GENERAL: PAINLEVEL_OUTOF10: EXTREME PAIN (9)

## 2024-12-04 NOTE — PROGRESS NOTES
Reason for cystoscopy: LUTS    Brief History:  84 year old male with a history of HTN, T2 DM, diverticulitis, and basal cell carcinoma.     2/22/2024: A bladder scan revealed 105 mL residual volume, and the prostate was mildly tender. Diagnosed with prostatitis and prescribed ciprofloxacin, with partial relief reported; symptoms included intermittent pain during urination, weak stream, incomplete emptying, and nocturia 3-4 times per night. Previous trial of tamsulosin caused diarrhea.    9/19/2024: Despite initial treatment with ciprofloxacin and doxycycline, the patient reports persistent urinary hesitancy, daytime frequency, nocturia, and frequent dysuria, with no improvement since February. Prostate remained tender on HARRIET; a CT scan was planned, and alfuzosin was suggested as an alternative treatment.    10/3/2024: CT scan showed the prostate was approximately 55 grams with calcifications, warranting further evaluation.    12/4/2024: Today pt reports Over the past year, the patient has developed a gradual onset of burning during urination and nocturia, occurring 4-8 times per night      The following distinct labs were reviewed   Most Recent 3 BMP's:  Recent Labs   Lab Test 10/03/24  1046 08/15/24  1031 06/14/23  0725 12/09/22  1106 06/29/22  1037   NA  --  133* 137  --  129*   POTASSIUM  --  4.9 5.2 5.2 5.2   CHLORIDE  --  100 100  --  96   CO2  --  22 24  --  26   BUN  --  19.1 20.0  --  20   CR 0.8 0.79 0.83  --  0.62*   ANIONGAP  --  11 13  --  7   RENNY  --  10.2 9.9  --  9.8   GLC  --  151* 147*  --  106*     Most Recent Urinalysis:  Recent Labs   Lab Test 02/08/24  1600   COLOR Yellow   APPEARANCE Clear   URINEGLC Negative   URINEBILI Negative   URINEKETONE Negative   SG 1.020   UBLD Negative   URINEPH 7.0   PROTEIN Negative   UROBILINOGEN 0.2   NITRITE Negative   LEUKEST Trace*   RBCU 0-2   WBCU 10-25*     A1C  8.0    PSA   Date Value Ref Range Status   11/13/2020 1.59 0 - 4 ug/L Final     Comment:      Assay Method:  Chemiluminescence using Siemens Vista analyzer     Prostate Specific Antigen Screen   Date Value Ref Range Status   02/08/2024 1.05 ng/mL Final     Comment:     No reference ranges have been established for patients over 80 years.   12/09/2021 1.59 0.00 - 4.00 ug/L Final     CYSTOSCOPY  We discussed the risks and benefits of the procedure which include risk of bleeding and infection.  Informed consent was obtained, the patient was prepped and draped in the standard sterile fashion.  A flexible cystoscope was introduced through a well-lubricated urethra.     Anterior urethra strictures were absent.   The urinary sphincter was intact.  The prostate demonstrated bilobar hypertrophy.    Bladder neck was open.   Bladder signififcant for the following:      Diverticuli: No      Cellules: No      Trabeculation: mild       Tumors: No      Stones:No  The ureteral orifices  were identified on each side in orthotopic position  On retroflexion there was the usual bladder neck hyperemia.  There mild intravesical protrusion of the prostate.      The flexible cystoscope was removed and the findings were described to the patient.   There was this red patchy area near dome of bladder.    Assessment and Plan  84 year old male with a history of HTN, T2 DM, diverticulitis, and basal cell carcinoma.     1. Burning during urination  - Assessment: The burning sensation may be related to the red area observed in the bladder.  - Plan: Perform a biopsy of the red area in the operating room to rule out malignancy   - check urine culture and cytology today    2. Frequent nocturia  - Assessment: Nocturia may be related to bladder or prostate issues.  - Plan: Consider a TURP procedure to open up the prostate if necessary, he does have some evidence of obstruction and has a weak stream

## 2024-12-05 ENCOUNTER — PATIENT OUTREACH (OUTPATIENT)
Dept: CARE COORDINATION | Facility: CLINIC | Age: 84
End: 2024-12-05
Payer: COMMERCIAL

## 2024-12-05 LAB — BACTERIA UR CULT: NORMAL

## 2024-12-09 ENCOUNTER — OFFICE VISIT (OUTPATIENT)
Dept: FAMILY MEDICINE | Facility: CLINIC | Age: 84
End: 2024-12-09
Payer: COMMERCIAL

## 2024-12-09 VITALS
SYSTOLIC BLOOD PRESSURE: 122 MMHG | BODY MASS INDEX: 31.92 KG/M2 | HEIGHT: 68 IN | HEART RATE: 72 BPM | WEIGHT: 210.6 LBS | TEMPERATURE: 97.8 F | DIASTOLIC BLOOD PRESSURE: 78 MMHG | RESPIRATION RATE: 20 BRPM | OXYGEN SATURATION: 96 %

## 2024-12-09 DIAGNOSIS — Z01.818 PREOP GENERAL PHYSICAL EXAM: Primary | ICD-10-CM

## 2024-12-09 DIAGNOSIS — E03.9 ACQUIRED HYPOTHYROIDISM: ICD-10-CM

## 2024-12-09 DIAGNOSIS — I10 ESSENTIAL HYPERTENSION: ICD-10-CM

## 2024-12-09 DIAGNOSIS — E11.9 TYPE 2 DIABETES MELLITUS WITHOUT COMPLICATION, WITHOUT LONG-TERM CURRENT USE OF INSULIN (H): ICD-10-CM

## 2024-12-09 DIAGNOSIS — N32.9 LESION OF BLADDER: ICD-10-CM

## 2024-12-09 PROCEDURE — 99214 OFFICE O/P EST MOD 30 MIN: CPT | Performed by: FAMILY MEDICINE

## 2024-12-09 ASSESSMENT — PAIN SCALES - GENERAL: PAINLEVEL_OUTOF10: MODERATE PAIN (4)
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
## 2024-12-10 ENCOUNTER — ANESTHESIA EVENT (OUTPATIENT)
Dept: SURGERY | Facility: CLINIC | Age: 84
End: 2024-12-10
Payer: COMMERCIAL

## 2024-12-10 ASSESSMENT — LIFESTYLE VARIABLES: TOBACCO_USE: 1
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
## 2024-12-11 ENCOUNTER — HOSPITAL ENCOUNTER (OUTPATIENT)
Facility: CLINIC | Age: 84
Discharge: HOME | End: 2024-12-11
Attending: STUDENT IN AN ORGANIZED HEALTH CARE EDUCATION/TRAINING PROGRAM | Admitting: STUDENT IN AN ORGANIZED HEALTH CARE EDUCATION/TRAINING PROGRAM
Payer: COMMERCIAL

## 2024-12-11 ENCOUNTER — ANESTHESIA (OUTPATIENT)
Dept: SURGERY | Facility: CLINIC | Age: 84
End: 2024-12-11
Payer: COMMERCIAL

## 2024-12-11 VITALS
RESPIRATION RATE: 16 BRPM | DIASTOLIC BLOOD PRESSURE: 72 MMHG | TEMPERATURE: 98.6 F | WEIGHT: 210.6 LBS | OXYGEN SATURATION: 98 % | SYSTOLIC BLOOD PRESSURE: 135 MMHG | HEART RATE: 78 BPM | HEIGHT: 68 IN | BODY MASS INDEX: 31.92 KG/M2

## 2024-12-11 DIAGNOSIS — R30.0 DYSURIA: Primary | ICD-10-CM

## 2024-12-11 LAB — GLUCOSE BLDC GLUCOMTR-MCNC: 154 MG/DL (ref 70–99)

## 2024-12-11 PROCEDURE — 52204 CYSTOSCOPY W/BIOPSY(S): CPT | Performed by: STUDENT IN AN ORGANIZED HEALTH CARE EDUCATION/TRAINING PROGRAM

## 2024-12-11 PROCEDURE — 250N000011 HC RX IP 250 OP 636: Performed by: NURSE ANESTHETIST, CERTIFIED REGISTERED

## 2024-12-11 PROCEDURE — 370N000017 HC ANESTHESIA TECHNICAL FEE, PER MIN: Performed by: STUDENT IN AN ORGANIZED HEALTH CARE EDUCATION/TRAINING PROGRAM

## 2024-12-11 PROCEDURE — 250N000013 HC RX MED GY IP 250 OP 250 PS 637: Performed by: NURSE ANESTHETIST, CERTIFIED REGISTERED

## 2024-12-11 PROCEDURE — 999N000141 HC STATISTIC PRE-PROCEDURE NURSING ASSESSMENT: Performed by: STUDENT IN AN ORGANIZED HEALTH CARE EDUCATION/TRAINING PROGRAM

## 2024-12-11 PROCEDURE — 250N000011 HC RX IP 250 OP 636: Performed by: STUDENT IN AN ORGANIZED HEALTH CARE EDUCATION/TRAINING PROGRAM

## 2024-12-11 PROCEDURE — 82962 GLUCOSE BLOOD TEST: CPT

## 2024-12-11 PROCEDURE — 710N000012 HC RECOVERY PHASE 2, PER MINUTE: Performed by: STUDENT IN AN ORGANIZED HEALTH CARE EDUCATION/TRAINING PROGRAM

## 2024-12-11 PROCEDURE — 360N000075 HC SURGERY LEVEL 2, PER MIN: Performed by: STUDENT IN AN ORGANIZED HEALTH CARE EDUCATION/TRAINING PROGRAM

## 2024-12-11 PROCEDURE — 88305 TISSUE EXAM BY PATHOLOGIST: CPT | Mod: TC | Performed by: STUDENT IN AN ORGANIZED HEALTH CARE EDUCATION/TRAINING PROGRAM

## 2024-12-11 PROCEDURE — 258N000003 HC RX IP 258 OP 636: Performed by: NURSE ANESTHETIST, CERTIFIED REGISTERED

## 2024-12-11 PROCEDURE — 272N000001 HC OR GENERAL SUPPLY STERILE: Performed by: STUDENT IN AN ORGANIZED HEALTH CARE EDUCATION/TRAINING PROGRAM

## 2024-12-11 RX ORDER — PHENAZOPYRIDINE HYDROCHLORIDE 200 MG/1
200 TABLET, FILM COATED ORAL 3 TIMES DAILY PRN
Qty: 9 TABLET | Refills: 0 | Status: SHIPPED | OUTPATIENT
Start: 2024-12-11

## 2024-12-11 RX ORDER — FENTANYL CITRATE 50 UG/ML
INJECTION, SOLUTION INTRAMUSCULAR; INTRAVENOUS PRN
Status: DISCONTINUED | OUTPATIENT
Start: 2024-12-11 | End: 2024-12-11

## 2024-12-11 RX ORDER — ACETAMINOPHEN 325 MG/1
975 TABLET ORAL ONCE
Status: COMPLETED | OUTPATIENT
Start: 2024-12-11 | End: 2024-12-11

## 2024-12-11 RX ORDER — ACETAMINOPHEN 325 MG/1
975 TABLET ORAL ONCE
Status: DISCONTINUED | OUTPATIENT
Start: 2024-12-11 | End: 2024-12-11

## 2024-12-11 RX ORDER — LIDOCAINE 40 MG/G
CREAM TOPICAL
Status: DISCONTINUED | OUTPATIENT
Start: 2024-12-11 | End: 2024-12-11 | Stop reason: HOSPADM

## 2024-12-11 RX ORDER — CEFAZOLIN SODIUM/WATER 2 G/20 ML
2 SYRINGE (ML) INTRAVENOUS SEE ADMIN INSTRUCTIONS
Status: DISCONTINUED | OUTPATIENT
Start: 2024-12-11 | End: 2024-12-11 | Stop reason: HOSPADM

## 2024-12-11 RX ORDER — SODIUM CHLORIDE, SODIUM LACTATE, POTASSIUM CHLORIDE, CALCIUM CHLORIDE 600; 310; 30; 20 MG/100ML; MG/100ML; MG/100ML; MG/100ML
INJECTION, SOLUTION INTRAVENOUS CONTINUOUS
Status: DISCONTINUED | OUTPATIENT
Start: 2024-12-11 | End: 2024-12-11 | Stop reason: HOSPADM

## 2024-12-11 RX ORDER — GABAPENTIN 100 MG/1
100 CAPSULE ORAL
Status: COMPLETED | OUTPATIENT
Start: 2024-12-11 | End: 2024-12-11

## 2024-12-11 RX ORDER — NYSTATIN 100000 U/G
CREAM TOPICAL 2 TIMES DAILY
Qty: 15 G | Refills: 0 | Status: SHIPPED | OUTPATIENT
Start: 2024-12-11 | End: 2025-01-10

## 2024-12-11 RX ORDER — ACETAMINOPHEN 650 MG/1
650 SUPPOSITORY RECTAL ONCE
Status: DISCONTINUED | OUTPATIENT
Start: 2024-12-11 | End: 2024-12-11

## 2024-12-11 RX ORDER — CEFAZOLIN SODIUM/WATER 2 G/20 ML
2 SYRINGE (ML) INTRAVENOUS
Status: COMPLETED | OUTPATIENT
Start: 2024-12-11 | End: 2024-12-11

## 2024-12-11 RX ORDER — PROPOFOL 10 MG/ML
INJECTION, EMULSION INTRAVENOUS PRN
Status: DISCONTINUED | OUTPATIENT
Start: 2024-12-11 | End: 2024-12-11

## 2024-12-11 RX ADMIN — SODIUM CHLORIDE, POTASSIUM CHLORIDE, SODIUM LACTATE AND CALCIUM CHLORIDE: 600; 310; 30; 20 INJECTION, SOLUTION INTRAVENOUS at 13:59

## 2024-12-11 RX ADMIN — Medication 2 G: at 14:10

## 2024-12-11 RX ADMIN — ACETAMINOPHEN 975 MG: 325 TABLET ORAL at 13:50

## 2024-12-11 RX ADMIN — GABAPENTIN 100 MG: 100 CAPSULE ORAL at 13:50

## 2024-12-11 RX ADMIN — FENTANYL CITRATE 25 MCG: 50 INJECTION INTRAMUSCULAR; INTRAVENOUS at 14:29

## 2024-12-11 RX ADMIN — FENTANYL CITRATE 25 MCG: 50 INJECTION INTRAMUSCULAR; INTRAVENOUS at 14:32

## 2024-12-11 RX ADMIN — FENTANYL CITRATE 50 MCG: 50 INJECTION INTRAMUSCULAR; INTRAVENOUS at 14:16

## 2024-12-11 RX ADMIN — PROPOFOL 150 MCG/KG/MIN: 10 INJECTION, EMULSION INTRAVENOUS at 14:17

## 2024-12-11 RX ADMIN — PROPOFOL 50 MG: 10 INJECTION, EMULSION INTRAVENOUS at 14:16

## 2024-12-11 ASSESSMENT — ACTIVITIES OF DAILY LIVING (ADL)
ADLS_ACUITY_SCORE: 35
ADLS_ACUITY_SCORE: 36
ADLS_ACUITY_SCORE: 36

## 2024-12-11 NOTE — ANESTHESIA CARE TRANSFER NOTE
Patient: Ana Maria Duvall    Procedure: Procedure(s):  CYSTOSCOPY, WITH BLADDER BIOPSY       Diagnosis: Lesion of bladder [N32.9]  Diagnosis Additional Information: No value filed.    Anesthesia Type:   General     Note:    Oropharynx: oropharynx clear of all foreign objects  Level of Consciousness: drowsy  Oxygen Supplementation: room air    Independent Airway: airway patency satisfactory and stable  Dentition: dentition unchanged  Vital Signs Stable: post-procedure vital signs reviewed and stable  Report to RN Given: handoff report given  Patient transferred to: Phase II    Handoff Report: Identifed the Patient, Identified the Reponsible Provider, Reviewed the pertinent medical history, Discussed the surgical course, Reviewed Intra-OP anesthesia mangement and issues during anesthesia, Set expectations for post-procedure period and Allowed opportunity for questions and acknowledgement of understanding      Vitals:  Vitals Value Taken Time   /72 12/11/24 1529   Temp 36.7  C (98  F) 12/11/24 1445   Pulse 78 12/11/24 1529   Resp 16 12/11/24 1445   SpO2 98 % 12/11/24 1530   Vitals shown include unfiled device data.    Electronically Signed By: NAN Cordova CRNA  December 11, 2024  3:49 PM

## 2024-12-11 NOTE — ANESTHESIA CARE TRANSFER NOTE
Patient: Ana Maria Duvall    Procedure: Procedure(s):  CYSTOSCOPY, WITH BLADDER BIOPSY       Diagnosis: Lesion of bladder [N32.9]  Diagnosis Additional Information: No value filed.    Anesthesia Type:   General     Note:    Oropharynx: oropharynx clear of all foreign objects  Level of Consciousness: drowsy  Oxygen Supplementation: room air    Independent Airway: airway patency satisfactory and stable  Dentition: dentition unchanged  Vital Signs Stable: post-procedure vital signs reviewed and stable  Report to RN Given: handoff report given  Patient transferred to: Phase II    Handoff Report: Identifed the Patient, Identified the Reponsible Provider, Reviewed the pertinent medical history, Discussed the surgical course, Reviewed Intra-OP anesthesia mangement and issues during anesthesia, Set expectations for post-procedure period and Allowed opportunity for questions and acknowledgement of understanding      Vitals:  Vitals Value Taken Time   BP     Temp     Pulse     Resp     SpO2 98 % 12/11/24 1445   Vitals shown include unfiled device data.    Electronically Signed By: NAN Cordova CRNA  December 11, 2024  2:46 PM

## 2024-12-11 NOTE — ANESTHESIA POSTPROCEDURE EVALUATION
Patient: Ana Maria Duvall    Procedure: Procedure(s):  CYSTOSCOPY, WITH BLADDER BIOPSY       Anesthesia Type:  General    Note:  Disposition: Outpatient   Postop Pain Control: Uneventful            Sign Out: Well controlled pain   PONV: No   Neuro/Psych: Uneventful            Sign Out: Acceptable/Baseline neuro status   Airway/Respiratory: Uneventful            Sign Out: Acceptable/Baseline resp. status   CV/Hemodynamics: Uneventful            Sign Out: Acceptable CV status; No obvious hypovolemia; No obvious fluid overload   Other NRE:    DID A NON-ROUTINE EVENT OCCUR?            Last vitals:  Vitals Value Taken Time   /72 12/11/24 1529   Temp 36.7  C (98  F) 12/11/24 1445   Pulse 78 12/11/24 1529   Resp 16 12/11/24 1445   SpO2 98 % 12/11/24 1530   Vitals shown include unfiled device data.    Electronically Signed By: NAN Cordova CRNA  December 11, 2024  3:50 PM

## 2024-12-11 NOTE — DISCHARGE INSTRUCTIONS
Cystoscopy and Bladder Biopsy Postop Instructions    24 hours postop  Do not drive a car or operate machinery for 24 hours after anesthesia. Do not consume alcohol, tranquilizers, sleeping medication or any nonprescription medication for 24 hours after anesthesia or if taking a pain medication.    Bladder symptoms  It is normal to see blood, debris and small blood clots in the urine for one to two weeks after surgery. You may feel burning and stinging with urination for the first several weeks after surgery. It is also common to have more frequent urination and a greater sense of the urge to urinate.  You may have a catheter in place for several days to allow the bladder time to heal. It is important to keep the catheter well secured to the leg and avoid any tugging on the catheter.    Diet  After anesthesia, begin with clear liquids. Avoid any heavy meals on the day of the procedure. You may slowly resume your normal diet. Drink plenty of water. The more blood you see in the urine, the more water you should drink to flush the system and keep clots from forming.    Activity  Avoid strenuous activity or heavy lifting until the blood in the urine has cleared for several days. Then slowly resume your normal activity. You may shower as you normally do.    Medication  You can obtain good pain relief by taking two acetaminophen (Tylenol) every four hours while awake for the first several days. You will also get a prescription for pain pills. You can use these prescription pain pills in addition to acetaminophen every four hours. Do not exceed 4000 mg acetaminophen per day.  You may also place a heating pad over the bladder to ease postop discomfort. Take plenty of fiber, water and over the counter stool softener to avoid straining for bowel movements or constipation. Avoid aspirin until the blood in the urine has cleared for several days.    Follow-Up:  - Follow up with Dr. Barfield in 2-3 weeks ( I have messaged clinic to  make a appt for you on 12/31)  - Call or return sooner than your regularly scheduled visit if you develop any of the following: fever (greater than 101.5), uncontrolled pain, uncontrolled nausea or vomiting, as well as increased redness, swelling, or drainage from your wound.     Phone numbers:   - Monday through Friday 8am to 4:30pm: Call [phone] with questions or to schedule or confirm appointment.                          Same Day Surgery Discharge Instructions  Special Precautions After Surgery - Adult    It is not unusual to feel lightheaded or faint, up to 24 hours after surgery or while taking pain medication.  If you have these symptoms; sit for a few minutes before standing and have someone assist you when getting up.  You should rest and relax for the next 24 hours and must have someone stay with you for at least 24 hours after your discharge.  DO NOT DRIVE any vehicle or operate mechanical equipment for 24 hours following the end of your surgery.  DO NOT DRIVE while taking narcotic pain medications that have been prescribed by your physician.  If you had a limb operated on, you must be able to use it fully to drive.  DO NOT drink alcoholic beverages for 24 hours following surgery or while taking prescription pain medication.  Drink clear liquids (apple juice, ginger ale, broth, 7-Up, etc.).  Progress to your regular diet as you feel able.  Any questions call your physician and do not make important decisions for 24 hours.    Nausea and Vomiting: Nausea and vomiting can occur any time after receiving anesthesia. If you experience nausea and vomiting we encourage you to move to a clear liquid diet and advance your diet as tolerated. If nausea and vomiting do not improve within 12 hours please call the surgeon or present to the Emergency department.     Break-through Bleeding: If your experience bleeding from your surgical site apply pressure and additional dressing per nurse instruction. For simple  problems such as a saturated dressing, you may need to reinforce the dressing with more gauze and tape and put slight pressure on the site. If bleeding does not subside contact the surgeon or present to the Emergency Department.    Post-op Infection: If you develop a fever of 100.4 or greater, have pus like drainage, redness, swelling or severe pain at the surgical site not alleviated with pain medications; please contact the surgeon or present to the Emergency Department.     Medications:  Acetaminophen (Tylenol):  Next dose: anytime after 7:50 PM.  Follow the instructions on the bottle.  __________________________________________________________________________________________________________________________________  IMPORTANT NUMBERS:    [facility] [place]:  [phone], [phone]  Pharmacy:  [phone]  Same Day Surgery:  [phone], for general post-op questions call Monday - Thursday until 8:30 p.m., Fridays until 6:00 p.m.   Mental Health Mobile Crisis line: [phone]                                                                      [facility], podiatry:  [phone]  [facility]:  [phone]     [facility]:  [phone]   General Surgery:  [phone]  Urology: [phone]  Special[facility] Center: [phone]

## 2024-12-11 NOTE — ADDENDUM NOTE
Addendum  created 12/11/24 1600 by Deven Swain APRN CRNA    Review and Sign - Ready for Procedure

## 2024-12-11 NOTE — OP NOTE
OPERATIVE REPORT    PREOPERATIVE DIAGNOSIS:  Lesion of bladder [N32.9]     POSTOPERATIVE DIAGNOSIS: Same    PROCEDURES PERFORMED:   1. Cystourethroscopy  2. Targeted bladder biopsies  3. Fulguration of biopsied areas    STAFF SURGEON: Aditya Barfield MD    ANESTHESIA: General    ESTIMATED BLOOD LOSS: < 10 mL.     DRAINS: none    FINDINGS:    Bilobar hypertrophy kissing lobes, erythema in dome of bladder biopsied and fulgurated, bladder with moderate trabeculations  . . Noted a groin rash and prescribed nystatin ointment                       OPERATIVE INDICATIONS:   Ana Maria Duvall is a(n) 84 year old male with a history of HTN, T2 DM, diverticulitis, and basal cell carcinoma. symptoms included intermittent pain during urination, weak stream, incomplete emptying, and nocturia 3-4 times per night. . CT scan showed the prostate was approximately 55 grams with calcifications, warranting further evaluation.  Over the past year, the patient has developed a gradual onset of burning during urination and nocturia, occurring 4-8 times per night. Cystoscopy showed red patchy area near dome of bladder. The patient elected for biopsy and maximal fulguration, after a discussion of all risks, benefits, and alternatives.    DESCRIPTION OF PROCEDURE:   After verification of informed consent was obtained, the patient was brought to the operating room, and moved to the operating table. After adequate anesthesia was induced, the patient was repositioned in dorsal lithotomy position and prepped and draped in the usual sterile fashion. A formal timeout was performed to confirm the correct patient, procedure and operative site.     A 22-Azeri rigid cystoscope was inserted into a well lubricated urethra. We began by performing a white light cystourethroscopy. The urethra was unremarkable. The ureteral orifices were in their orthotopic positions bilaterally. There were no intravesical stones or diverticuli and the bladder was  moderately trabeculated.     We used a cold-cup flexible biopsy forceps to biopsy the erythema in the bladder dome and AND passed it off. A Bugbee was used to fulgurate the biopsied site The bladder was re-examined under low pressure and hemostasis was confirmed. At this point, the bladder was drained and the cystoscope withdrawn.    The procedure was then concluded. The patient was transferred to the postanesthesia care unit in stable condition and tolerated the procedure well.    POSTOP PLAN:  1. Follow-up in 2-3 weeks for path review  2. Noted a groin rash and prescribed nystatin ointment

## 2024-12-11 NOTE — BRIEF OP NOTE
[facility]    Brief Operative Note    Pre-operative diagnosis: Lesion of bladder [N32.9]  Post-operative diagnosis Same as pre-operative diagnosis    Procedure: CYSTOSCOPY, WITH BLADDER BIOPSY, N/A - Urethra    Surgeon: Surgeons and Role:     * Aditya Barfield MD - Primary  Anesthesia: General   Estimated Blood Loss: None    Drains: None  Specimens:   ID Type Source Tests Collected by Time Destination   1 : bladder dome lesion Tissue Urinary Bladder, Dome SURGICAL PATHOLOGY EXAM Aditya Barfield MD 12/11/2024  2:32 PM      Findings:   Bilobar hypertrophy kissing lobes, erythema in dome of bladder biopsied and fulgurated, bladder with moderate trabeculations  .  Complications: None.  Implants: * No implants in log *

## 2024-12-11 NOTE — INTERVAL H&P NOTE
"I have reviewed the surgical (or preoperative) H&P that is linked to this encounter, and examined the patient. There are no significant changes  We discussed risks of bleeding, infection, damage to bladder wall.  He has erythema, we are going to biopsy  Aditya Barfield MD      Clinical Conditions Present on Arrival:  Clinically Significant Risk Factors Present on Admission                  # Drug Induced Platelet Defect: home medication list includes an antiplatelet medication      # DMII: A1C = N/A within past 6 months  # Obesity: Estimated body mass index is 32.03 kg/m  as calculated from the following:    Height as of this encounter: 1.727 m (5' 7.99\").    Weight as of this encounter: 95.5 kg (210 lb 9.6 oz).       "

## 2024-12-13 LAB
PATH REPORT.COMMENTS IMP SPEC: ABNORMAL
PATH REPORT.COMMENTS IMP SPEC: ABNORMAL
PATH REPORT.COMMENTS IMP SPEC: YES
PATH REPORT.FINAL DX SPEC: ABNORMAL
PATH REPORT.GROSS SPEC: ABNORMAL
PATH REPORT.MICROSCOPIC SPEC OTHER STN: ABNORMAL
PATH REPORT.RELEVANT HX SPEC: ABNORMAL
PHOTO IMAGE: ABNORMAL

## 2024-12-13 PROCEDURE — 88305 TISSUE EXAM BY PATHOLOGIST: CPT | Mod: 26 | Performed by: PATHOLOGY
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
## 2024-12-31 ENCOUNTER — VIRTUAL VISIT (OUTPATIENT)
Dept: UROLOGY | Facility: CLINIC | Age: 84
End: 2024-12-31
Payer: COMMERCIAL

## 2024-12-31 DIAGNOSIS — R39.198 SLOWING OF URINARY STREAM: ICD-10-CM

## 2024-12-31 DIAGNOSIS — R39.11 URINARY HESITANCY: Primary | ICD-10-CM

## 2024-12-31 NOTE — PROGRESS NOTES
Yeyo is a 84 year old who is being evaluated via a billable telephone visit.    What phone number would you like to be contacted at? [phone]  How would you like to obtain your AVS? MyChart  Originating Location (pt. Location): Home

## 2025-01-02 ENCOUNTER — TRANSFERRED RECORDS (OUTPATIENT)
Dept: MULTI SPECIALTY CLINIC | Facility: CLINIC | Age: 85
End: 2025-01-02

## 2025-01-02 LAB — RETINOPATHY: NORMAL

## 2025-01-25 ENCOUNTER — HEALTH MAINTENANCE LETTER (OUTPATIENT)
Age: 85
End: 2025-01-25

## 2025-02-04 ENCOUNTER — TELEPHONE (OUTPATIENT)
Dept: UROLOGY | Facility: CLINIC | Age: 85
End: 2025-02-04

## 2025-02-04 DIAGNOSIS — R10.2 PELVIC PAIN IN MALE: ICD-10-CM

## 2025-02-04 DIAGNOSIS — R39.11 URINARY HESITANCY: Primary | ICD-10-CM

## 2025-02-04 NOTE — TELEPHONE ENCOUNTER
[facility] Call Center    Phone Message    May a detailed message be left on voicemail: no     Reason for Call: Other: Yeyo called in with the fax # for the PT referral Carolyne is sending to [facility] [phone]     Action Taken: Other: [facility]    Travel Screening: Not Applicable     Date of Service:

## 2025-02-06 NOTE — TELEPHONE ENCOUNTER
Please place referral for PT for pt. Clinic can fax when completed. Thank you.  Gauri MENDOZA RN BSN PHN  Specialty Clinics

## 2025-02-11 ENCOUNTER — TELEPHONE (OUTPATIENT)
Dept: FAMILY MEDICINE | Facility: CLINIC | Age: 85
End: 2025-02-11
Payer: COMMERCIAL

## 2025-02-11 NOTE — TELEPHONE ENCOUNTER
Patient Quality Outreach    Patient is due for the following:   Diabetes -  Eye Exam and Foot Exam  Physical Annual Wellness Visit    Action(s) Taken:   Schedule a Annual Wellness Visit    Type of outreach:    Sent [employer] message.    Questions for provider review:    None           Ceci Templeton MA

## 2025-02-22 ENCOUNTER — HEALTH MAINTENANCE LETTER (OUTPATIENT)
Age: 85
End: 2025-02-22

## 2025-02-27 ENCOUNTER — OFFICE VISIT (OUTPATIENT)
Dept: FAMILY MEDICINE | Facility: CLINIC | Age: 85
End: 2025-02-27
Attending: FAMILY MEDICINE
Payer: COMMERCIAL

## 2025-02-27 VITALS
BODY MASS INDEX: 32.99 KG/M2 | SYSTOLIC BLOOD PRESSURE: 134 MMHG | OXYGEN SATURATION: 96 % | RESPIRATION RATE: 22 BRPM | HEART RATE: 74 BPM | HEIGHT: 67 IN | DIASTOLIC BLOOD PRESSURE: 72 MMHG | WEIGHT: 210.2 LBS | TEMPERATURE: 98.4 F

## 2025-02-27 DIAGNOSIS — E11.9 TYPE 2 DIABETES MELLITUS WITHOUT COMPLICATION, WITHOUT LONG-TERM CURRENT USE OF INSULIN (H): ICD-10-CM

## 2025-02-27 DIAGNOSIS — I10 ESSENTIAL HYPERTENSION: ICD-10-CM

## 2025-02-27 DIAGNOSIS — Z00.00 ROUTINE HISTORY AND PHYSICAL EXAMINATION OF ADULT: Primary | ICD-10-CM

## 2025-02-27 DIAGNOSIS — E03.9 ACQUIRED HYPOTHYROIDISM: ICD-10-CM

## 2025-02-27 LAB
CHOLEST SERPL-MCNC: 199 MG/DL
CREAT UR-MCNC: 76.7 MG/DL
EST. AVERAGE GLUCOSE BLD GHB EST-MCNC: 183 MG/DL
FASTING STATUS PATIENT QL REPORTED: NO
HBA1C MFR BLD: 8 % (ref 0–5.6)
HDLC SERPL-MCNC: 44 MG/DL
LDLC SERPL CALC-MCNC: 132 MG/DL
MICROALBUMIN UR-MCNC: <12 MG/L
MICROALBUMIN/CREAT UR: NORMAL MG/G{CREAT}
NONHDLC SERPL-MCNC: 155 MG/DL
TRIGL SERPL-MCNC: 116 MG/DL
TSH SERPL DL<=0.005 MIU/L-ACNC: 2.11 UIU/ML (ref 0.3–4.2)

## 2025-02-27 SDOH — HEALTH STABILITY: PHYSICAL HEALTH: ON AVERAGE, HOW MANY MINUTES DO YOU ENGAGE IN EXERCISE AT THIS LEVEL?: 0 MIN

## 2025-02-27 SDOH — HEALTH STABILITY: PHYSICAL HEALTH: ON AVERAGE, HOW MANY DAYS PER WEEK DO YOU ENGAGE IN MODERATE TO STRENUOUS EXERCISE (LIKE A BRISK WALK)?: 0 DAYS

## 2025-02-27 ASSESSMENT — SOCIAL DETERMINANTS OF HEALTH (SDOH): HOW OFTEN DO YOU GET TOGETHER WITH FRIENDS OR RELATIVES?: ONCE A WEEK

## 2025-02-27 ASSESSMENT — PAIN SCALES - GENERAL: PAINLEVEL_OUTOF10: MODERATE PAIN (5)
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
## 2025-03-05 DIAGNOSIS — N41.1 CHRONIC PROSTATITIS: ICD-10-CM

## 2025-03-05 DIAGNOSIS — R39.11 URINARY HESITANCY: ICD-10-CM

## 2025-03-05 RX ORDER — ALFUZOSIN HYDROCHLORIDE 10 MG/1
10 TABLET, EXTENDED RELEASE ORAL DAILY
Qty: 90 TABLET | Refills: 1 | Status: SHIPPED | OUTPATIENT
Start: 2025-03-05

## 2025-03-05 NOTE — TELEPHONE ENCOUNTER
Requested Prescriptions   Pending Prescriptions Disp Refills    alfuzosin ER (UROXATRAL) 10 MG 24 hr tablet 90 tablet 1     Sig: Take 1 tablet (10 mg) by mouth daily.       There is no refill protocol information for this order           Last office visit: 2/4/2025 ; last virtual visit: 12/31/2024 with prescribing provider:  Ariadne Castelan     Future Office Visit:   Next 5 appointments (look out 90 days)      Apr 01, 2025 9:45 AM  Nurse Only with [facility] Ancillary Schedule  [facility] ([facility] ) [address] [id number] [phone]             [facility]    [facility]  [phone]

## 2025-04-01 ENCOUNTER — APPOINTMENT (OUTPATIENT)
Dept: UROLOGY | Facility: CLINIC | Age: 85
End: 2025-04-01
Payer: COMMERCIAL

## 2025-04-01 ENCOUNTER — OFFICE VISIT (OUTPATIENT)
Dept: UROLOGY | Facility: CLINIC | Age: 85
End: 2025-04-01
Payer: COMMERCIAL

## 2025-04-01 VITALS — RESPIRATION RATE: 18 BRPM | HEART RATE: 77 BPM | DIASTOLIC BLOOD PRESSURE: 95 MMHG | SYSTOLIC BLOOD PRESSURE: 184 MMHG

## 2025-04-01 DIAGNOSIS — N30.10 INTERSTITIAL CYSTITIS: Primary | ICD-10-CM

## 2025-04-01 PROCEDURE — 3080F DIAST BP >= 90 MM HG: CPT | Performed by: STUDENT IN AN ORGANIZED HEALTH CARE EDUCATION/TRAINING PROGRAM

## 2025-04-01 PROCEDURE — 99214 OFFICE O/P EST MOD 30 MIN: CPT | Performed by: STUDENT IN AN ORGANIZED HEALTH CARE EDUCATION/TRAINING PROGRAM

## 2025-04-01 PROCEDURE — 3077F SYST BP >= 140 MM HG: CPT | Performed by: STUDENT IN AN ORGANIZED HEALTH CARE EDUCATION/TRAINING PROGRAM
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
## 2025-04-01 NOTE — NURSING NOTE
"Initial BP (!) 184/95 (BP Location: Right arm, Patient Position: Chair, Cuff Size: Adult Regular)   Pulse 77   Resp 18  Estimated body mass index is 32.92 kg/m  as calculated from the following:    Height as of 2/27/25: 1.702 m (5' 7\").    Weight as of 2/27/25: 95.3 kg (210 lb 3.2 oz). .  Patient is here for a recheck stephanie trejo LPN      "

## 2025-05-08 ENCOUNTER — VIRTUAL VISIT (OUTPATIENT)
Dept: UROLOGY | Facility: CLINIC | Age: 85
End: 2025-05-08
Payer: COMMERCIAL

## 2025-05-08 DIAGNOSIS — N30.10 INTERSTITIAL CYSTITIS: Primary | ICD-10-CM

## 2025-05-08 NOTE — PROGRESS NOTES
Virtual Visit Details    Type of service:  Telephone Visit   Phone call duration: 10 minutes   Originating Location (pt. Location): Home    Distant Location (provider location):  On-site

## 2025-05-08 NOTE — NURSING NOTE
Current patient location: [address] [facility] [address]    Is the patient currently in the state of [place]? YES    Visit mode: TELEPHONE    If the visit is dropped, the patient can be reconnected by:TELEPHONE VISIT: Phone number: [phone]    Will anyone else be joining the visit? NO  (If patient encounters technical issues they should call [phone]    Are changes needed to the allergy or medication list? Yes pt has medications he is not taking     Are refills needed on medications prescribed by this physician? NO    Rooming Documentation:  Not applicable    Reason for visit: RECHECK (4-6 week bladder pain follow-up )    Catalina Zuleta VVF    Pain fluctuates between 3-9 when trying to urinate.
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
## 2025-07-16 ENCOUNTER — TELEPHONE (OUTPATIENT)
Dept: FAMILY MEDICINE | Facility: CLINIC | Age: 85
End: 2025-07-16
Payer: COMMERCIAL

## 2025-07-16 NOTE — TELEPHONE ENCOUNTER
Patient Quality Outreach    Patient is due for the following:   Hypertension -  BP check    Action(s) Taken:   No follow up needed at this time.  Patient had a recent HTN outreach done.     Type of outreach:    Chart review performed, no outreach needed.    Questions for provider review:    None         Bailee Kahler  Chart routed to None.

## 2025-09-03 DIAGNOSIS — R39.11 URINARY HESITANCY: ICD-10-CM

## 2025-09-03 DIAGNOSIS — N41.1 CHRONIC PROSTATITIS: ICD-10-CM

## 2025-09-03 DIAGNOSIS — E11.9 TYPE 2 DIABETES MELLITUS WITHOUT COMPLICATION, WITHOUT LONG-TERM CURRENT USE OF INSULIN (H): ICD-10-CM

## 2025-09-04 RX ORDER — ALFUZOSIN HYDROCHLORIDE 10 MG/1
10 TABLET, EXTENDED RELEASE ORAL DAILY
Qty: 90 TABLET | Refills: 1 | Status: SHIPPED | OUTPATIENT
Start: 2025-09-04

## (undated) DEVICE — BNDG ELASTIC 4" DBL LENGTH UNSTERILE 6611-14

## (undated) DEVICE — BNDG ELASTIC 2"X5YDS UNSTERILE 6611-20

## (undated) DEVICE — GLOVE BIOGEL PI MICRO SZ 7.0 48570

## (undated) DEVICE — CAST PADDING 4" WEBRIL STERILE

## (undated) DEVICE — SU MONOCRYL 3-0 PS-2 18" UND Y497G

## (undated) DEVICE — PREP CHLORHEXIDINE 4% 4OZ (HIBICLENS) 57504

## (undated) DEVICE — CAST PADDING 4" STERILE 9044S

## (undated) DEVICE — GLOVE PROTEXIS W/NEU-THERA 7.0  2D73TE70

## (undated) DEVICE — SU WND CLOSURE VLOC 90 ABS 3-0 18" P-14 VLOCM0124

## (undated) DEVICE — SU ETHILON 4-0 PS-2 18" 1667G

## (undated) DEVICE — SOL NACL 0.9% IRRIG 1000ML BOTTLE 07138-09

## (undated) DEVICE — BLADE KNIFE BEAVER 376700

## (undated) DEVICE — BLANKET BAIR HUGGER LOWER BODY 42568

## (undated) DEVICE — BLADE KNIFE SURG 10 371110

## (undated) DEVICE — IMM ALUMI HAND XLG 761

## (undated) DEVICE — Device

## (undated) DEVICE — GLOVE PROTEXIS W/NEU-THERA 7.5  2D73TE75

## (undated) DEVICE — SLING ARM MED 0814-0063

## (undated) DEVICE — SOL WATER IRRIG 3000ML BAG 2B7117

## (undated) DEVICE — BNDG COBAN 4"X5YDS STERILE

## (undated) DEVICE — DRSG TELFA 2X3"

## (undated) DEVICE — SU VICRYL 2-0 SH 27" UND J417H

## (undated) DEVICE — SOL WATER IRRIG 1000ML BOTTLE 07139-09

## (undated) DEVICE — CAST PADDING 4" WEBRIL UNSTERILE

## (undated) DEVICE — GOWN LG DISP 9515

## (undated) DEVICE — TOURNIQUET CUFF 18" STERILE

## (undated) DEVICE — SLING ARM LG 0814-0064

## (undated) DEVICE — DECANTER VIAL 2006S

## (undated) DEVICE — PACK HAND

## (undated) DEVICE — CAST PLASTER SPLINT 4X15" 7394

## (undated) DEVICE — ANTIFOG SOLUTION W/FOAM PAD 31142527

## (undated) DEVICE — PREP SKIN SCRUB TRAY 4461A

## (undated) DEVICE — SU VICRYL 0 CT-2 27" UND J270H

## (undated) DEVICE — GLOVE PROTEXIS BLUE W/NEU-THERA 7.0  2D73EB70

## (undated) RX ORDER — HYDROMORPHONE HYDROCHLORIDE 1 MG/ML
INJECTION, SOLUTION INTRAMUSCULAR; INTRAVENOUS; SUBCUTANEOUS
Status: DISPENSED
Start: 2020-03-03

## (undated) RX ORDER — ONDANSETRON 2 MG/ML
INJECTION INTRAMUSCULAR; INTRAVENOUS
Status: DISPENSED
Start: 2020-03-03

## (undated) RX ORDER — BUPIVACAINE HYDROCHLORIDE 5 MG/ML
INJECTION, SOLUTION EPIDURAL; INTRACAUDAL
Status: DISPENSED
Start: 2019-07-03

## (undated) RX ORDER — LIDOCAINE HYDROCHLORIDE AND EPINEPHRINE 10; 10 MG/ML; UG/ML
INJECTION, SOLUTION INFILTRATION; PERINEURAL
Status: DISPENSED
Start: 2019-07-03

## (undated) RX ORDER — CEFAZOLIN SODIUM 2 G/100ML
INJECTION, SOLUTION INTRAVENOUS
Status: DISPENSED
Start: 2020-03-03

## (undated) RX ORDER — BUPIVACAINE HYDROCHLORIDE AND EPINEPHRINE 5; 5 MG/ML; UG/ML
INJECTION, SOLUTION EPIDURAL; INTRACAUDAL; PERINEURAL
Status: DISPENSED
Start: 2020-03-03

## (undated) RX ORDER — BUPIVACAINE HYDROCHLORIDE 5 MG/ML
INJECTION, SOLUTION EPIDURAL; INTRACAUDAL
Status: DISPENSED
Start: 2019-10-04

## (undated) RX ORDER — LIDOCAINE HYDROCHLORIDE 10 MG/ML
INJECTION, SOLUTION INFILTRATION; PERINEURAL
Status: DISPENSED
Start: 2020-03-03

## (undated) RX ORDER — METHYLPREDNISOLONE ACETATE 40 MG/ML
INJECTION, SUSPENSION INTRA-ARTICULAR; INTRALESIONAL; INTRAMUSCULAR; SOFT TISSUE
Status: DISPENSED
Start: 2018-06-14

## (undated) RX ORDER — ACETAMINOPHEN 325 MG/1
TABLET ORAL
Status: DISPENSED
Start: 2024-12-11

## (undated) RX ORDER — METHYLPREDNISOLONE ACETATE 40 MG/ML
INJECTION, SUSPENSION INTRA-ARTICULAR; INTRALESIONAL; INTRAMUSCULAR; SOFT TISSUE
Status: DISPENSED
Start: 2019-07-03

## (undated) RX ORDER — PROPOFOL 10 MG/ML
INJECTION, EMULSION INTRAVENOUS
Status: DISPENSED
Start: 2020-03-03

## (undated) RX ORDER — LIDOCAINE HYDROCHLORIDE 10 MG/ML
INJECTION, SOLUTION EPIDURAL; INFILTRATION; INTRACAUDAL; PERINEURAL
Status: DISPENSED
Start: 2020-03-03

## (undated) RX ORDER — LIDOCAINE HYDROCHLORIDE AND EPINEPHRINE 10; 10 MG/ML; UG/ML
INJECTION, SOLUTION INFILTRATION; PERINEURAL
Status: DISPENSED
Start: 2018-04-13

## (undated) RX ORDER — BUPIVACAINE HYDROCHLORIDE 5 MG/ML
INJECTION, SOLUTION EPIDURAL; INTRACAUDAL
Status: DISPENSED
Start: 2018-06-14

## (undated) RX ORDER — DEXAMETHASONE SODIUM PHOSPHATE 10 MG/ML
INJECTION, SOLUTION INTRAMUSCULAR; INTRAVENOUS
Status: DISPENSED
Start: 2018-04-13

## (undated) RX ORDER — FLUMAZENIL 0.1 MG/ML
INJECTION, SOLUTION INTRAVENOUS
Status: DISPENSED
Start: 2019-10-22

## (undated) RX ORDER — IOPAMIDOL 612 MG/ML
INJECTION, SOLUTION INTRATHECAL
Status: DISPENSED
Start: 2018-04-13

## (undated) RX ORDER — BUPIVACAINE HYDROCHLORIDE 5 MG/ML
INJECTION, SOLUTION EPIDURAL; INTRACAUDAL
Status: DISPENSED
Start: 2018-04-13

## (undated) RX ORDER — BUPIVACAINE HYDROCHLORIDE 5 MG/ML
INJECTION, SOLUTION EPIDURAL; INTRACAUDAL
Status: DISPENSED
Start: 2019-10-22

## (undated) RX ORDER — NALOXONE HYDROCHLORIDE 0.4 MG/ML
INJECTION, SOLUTION INTRAMUSCULAR; INTRAVENOUS; SUBCUTANEOUS
Status: DISPENSED
Start: 2019-10-22

## (undated) RX ORDER — DEXAMETHASONE SODIUM PHOSPHATE 10 MG/ML
INJECTION, SOLUTION INTRAMUSCULAR; INTRAVENOUS
Status: DISPENSED
Start: 2018-06-14

## (undated) RX ORDER — METHYLPREDNISOLONE ACETATE 40 MG/ML
INJECTION, SUSPENSION INTRA-ARTICULAR; INTRALESIONAL; INTRAMUSCULAR; SOFT TISSUE
Status: DISPENSED
Start: 2018-04-13

## (undated) RX ORDER — GABAPENTIN 100 MG/1
CAPSULE ORAL
Status: DISPENSED
Start: 2024-12-11

## (undated) RX ORDER — DEXAMETHASONE SODIUM PHOSPHATE 4 MG/ML
INJECTION, SOLUTION INTRA-ARTICULAR; INTRALESIONAL; INTRAMUSCULAR; INTRAVENOUS; SOFT TISSUE
Status: DISPENSED
Start: 2020-03-03

## (undated) RX ORDER — EPHEDRINE SULFATE 50 MG/ML
INJECTION, SOLUTION INTRAMUSCULAR; INTRAVENOUS; SUBCUTANEOUS
Status: DISPENSED
Start: 2020-03-03

## (undated) RX ORDER — FENTANYL CITRATE 50 UG/ML
INJECTION, SOLUTION INTRAMUSCULAR; INTRAVENOUS
Status: DISPENSED
Start: 2024-12-11

## (undated) RX ORDER — GABAPENTIN 100 MG/1
CAPSULE ORAL
Status: DISPENSED
Start: 2020-03-03

## (undated) RX ORDER — BUPIVACAINE HYDROCHLORIDE 5 MG/ML
INJECTION, SOLUTION PERINEURAL
Status: DISPENSED
Start: 2020-03-03

## (undated) RX ORDER — DEXAMETHASONE SODIUM PHOSPHATE 10 MG/ML
INJECTION, SOLUTION INTRAMUSCULAR; INTRAVENOUS
Status: DISPENSED
Start: 2019-07-03

## (undated) RX ORDER — ACETAMINOPHEN 325 MG/1
TABLET ORAL
Status: DISPENSED
Start: 2020-03-03

## (undated) RX ORDER — FENTANYL CITRATE 50 UG/ML
INJECTION, SOLUTION INTRAMUSCULAR; INTRAVENOUS
Status: DISPENSED
Start: 2019-10-22

## (undated) RX ORDER — FENTANYL CITRATE 50 UG/ML
INJECTION, SOLUTION INTRAMUSCULAR; INTRAVENOUS
Status: DISPENSED
Start: 2020-03-03

## (undated) RX ORDER — LIDOCAINE HYDROCHLORIDE 10 MG/ML
INJECTION, SOLUTION EPIDURAL; INFILTRATION; INTRACAUDAL; PERINEURAL
Status: DISPENSED
Start: 2021-06-18

## (undated) RX ORDER — BUPIVACAINE HYDROCHLORIDE 5 MG/ML
INJECTION, SOLUTION EPIDURAL; INTRACAUDAL
Status: DISPENSED
Start: 2019-09-13